# Patient Record
Sex: MALE | Race: OTHER | HISPANIC OR LATINO
[De-identification: names, ages, dates, MRNs, and addresses within clinical notes are randomized per-mention and may not be internally consistent; named-entity substitution may affect disease eponyms.]

---

## 2017-01-23 ENCOUNTER — APPOINTMENT (OUTPATIENT)
Dept: HEMATOLOGY ONCOLOGY | Facility: CLINIC | Age: 42
End: 2017-01-23

## 2017-01-23 VITALS
BODY MASS INDEX: 33.27 KG/M2 | HEART RATE: 71 BPM | SYSTOLIC BLOOD PRESSURE: 122 MMHG | HEIGHT: 67 IN | TEMPERATURE: 97.9 F | WEIGHT: 212 LBS | DIASTOLIC BLOOD PRESSURE: 82 MMHG

## 2017-01-24 LAB
ALBUMIN SERPL-MCNC: 4.1 G/DL
ALBUMIN/GLOB SERPL: 1.64
ALP SERPL-CCNC: 96 IU/L
ALT SERPL-CCNC: 14 IU/L
ANION GAP SERPL CALC-SCNC: 10 MEQ/L
AST SERPL-CCNC: 20 IU/L
BASOPHILS # BLD: 0.01 TH/MM3
BASOPHILS NFR BLD: 0.1 %
BILIRUB SERPL-MCNC: 1.8 MG/DL
BUN SERPL-MCNC: 7 MG/DL
BUN/CREAT SERPL: 9.1 %
CALCIUM SERPL-MCNC: 9.4 MG/DL
CHLORIDE SERPL-SCNC: 104 MEQ/L
CO2 SERPL-SCNC: 26 MEQ/L
CREAT SERPL-MCNC: 0.77 MG/DL
EOSINOPHIL # BLD: 0.05 TH/MM3
EOSINOPHIL NFR BLD: 0.4 %
ERYTHROCYTE [DISTWIDTH] IN BLOOD BY AUTOMATED COUNT: 14.2 %
GFR SERPL CREATININE-BSD FRML MDRD: 111
GLUCOSE SERPL-MCNC: 94 MG/DL
GRANULOCYTES # BLD: 9.7 TH/MM3
GRANULOCYTES NFR BLD: 87 %
HCT VFR BLD AUTO: 41.8 %
HGB BLD-MCNC: 14.2 G/DL
IMM GRANULOCYTES # BLD: 0.04 TH/MM3
IMM GRANULOCYTES NFR BLD: 0.4 %
LYMPHOCYTES # BLD: 1.09 TH/MM3
LYMPHOCYTES NFR BLD: 9.8 %
MCH RBC QN AUTO: 32.5 PG
MCHC RBC AUTO-ENTMCNC: 34 G/DL
MCV RBC AUTO: 95.7 FL
MONOCYTES # BLD: 0.26 TH/MM3
MONOCYTES NFR BLD: 2.3 %
PLATELET # BLD: 204 TH/MM3
PMV BLD AUTO: 10.9 FL
POTASSIUM SERPL-SCNC: 3.8 MMOL/L
PROT SERPL-MCNC: 6.6 G/DL
RBC # BLD AUTO: 4.37 MIL/MM3
SODIUM SERPL-SCNC: 140 MEQ/L
WBC # BLD: 11.15 TH/MM3

## 2017-02-03 ENCOUNTER — OTHER (OUTPATIENT)
Age: 42
End: 2017-02-03

## 2017-02-08 ENCOUNTER — OTHER (OUTPATIENT)
Age: 42
End: 2017-02-08

## 2017-02-14 ENCOUNTER — OTHER (OUTPATIENT)
Age: 42
End: 2017-02-14

## 2017-02-14 ENCOUNTER — APPOINTMENT (OUTPATIENT)
Dept: PODIATRY | Facility: CLINIC | Age: 42
End: 2017-02-14

## 2017-02-22 ENCOUNTER — APPOINTMENT (OUTPATIENT)
Dept: HEMATOLOGY ONCOLOGY | Facility: CLINIC | Age: 42
End: 2017-02-22

## 2017-03-14 ENCOUNTER — APPOINTMENT (OUTPATIENT)
Dept: HEMATOLOGY ONCOLOGY | Facility: CLINIC | Age: 42
End: 2017-03-14

## 2017-03-20 ENCOUNTER — APPOINTMENT (OUTPATIENT)
Dept: HEMATOLOGY ONCOLOGY | Facility: CLINIC | Age: 42
End: 2017-03-20

## 2017-03-20 VITALS
RESPIRATION RATE: 14 BRPM | HEART RATE: 79 BPM | SYSTOLIC BLOOD PRESSURE: 116 MMHG | BODY MASS INDEX: 32.96 KG/M2 | HEIGHT: 67 IN | TEMPERATURE: 97.7 F | WEIGHT: 210 LBS | DIASTOLIC BLOOD PRESSURE: 75 MMHG

## 2017-03-24 ENCOUNTER — OTHER (OUTPATIENT)
Age: 42
End: 2017-03-24

## 2017-03-24 LAB
ALBUMIN SERPL-MCNC: 4.3 G/DL
ALBUMIN/GLOB SERPL: 1.87
ALP SERPL-CCNC: 81 IU/L
ALT SERPL-CCNC: 10 IU/L
ANION GAP SERPL CALC-SCNC: 11 MEQ/L
AST SERPL-CCNC: 18 IU/L
BASOPHILS # BLD: 0.02 TH/MM3
BASOPHILS NFR BLD: 0.2 %
BILIRUB SERPL-MCNC: 1.8 MG/DL
BUN SERPL-MCNC: 4 MG/DL
BUN/CREAT SERPL: 5.2 %
CALCIUM SERPL-MCNC: 9.4 MG/DL
CHLORIDE SERPL-SCNC: 106 MEQ/L
CO2 SERPL-SCNC: 24 MEQ/L
CREAT SERPL-MCNC: 0.77 MG/DL
EOSINOPHIL # BLD: 0.28 TH/MM3
EOSINOPHIL NFR BLD: 3.3 %
ERYTHROCYTE [DISTWIDTH] IN BLOOD BY AUTOMATED COUNT: 13.7 %
GFR SERPL CREATININE-BSD FRML MDRD: 111
GLUCOSE SERPL-MCNC: 78 MG/DL
GRANULOCYTES # BLD: 5.16 TH/MM3
GRANULOCYTES NFR BLD: 61.2 %
HCT VFR BLD AUTO: 40.2 %
HGB BLD-MCNC: 13.8 G/DL
IMM GRANULOCYTES # BLD: 0.02 TH/MM3
IMM GRANULOCYTES NFR BLD: 0.2 %
LYMPHOCYTES # BLD: 2.17 TH/MM3
LYMPHOCYTES NFR BLD: 25.7 %
MCH RBC QN AUTO: 32.1 PG
MCHC RBC AUTO-ENTMCNC: 34.3 G/DL
MCV RBC AUTO: 93.5 FL
MONOCYTES # BLD: 0.79 TH/MM3
MONOCYTES NFR BLD: 9.4 %
PLATELET # BLD: 240 TH/MM3
PMV BLD AUTO: 10.1 FL
POTASSIUM SERPL-SCNC: 4 MMOL/L
PROT SERPL-MCNC: 6.6 G/DL
RBC # BLD AUTO: 4.3 MIL/MM3
SODIUM SERPL-SCNC: 141 MEQ/L
WBC # BLD: 8.44 TH/MM3

## 2017-04-17 ENCOUNTER — APPOINTMENT (OUTPATIENT)
Dept: HEMATOLOGY ONCOLOGY | Facility: CLINIC | Age: 42
End: 2017-04-17

## 2017-04-17 VITALS
HEART RATE: 72 BPM | TEMPERATURE: 97.8 F | HEIGHT: 67 IN | DIASTOLIC BLOOD PRESSURE: 65 MMHG | SYSTOLIC BLOOD PRESSURE: 113 MMHG | BODY MASS INDEX: 32.49 KG/M2 | WEIGHT: 207 LBS | RESPIRATION RATE: 14 BRPM

## 2017-04-18 LAB
ALBUMIN SERPL-MCNC: 4.4 G/DL
ALBUMIN/GLOB SERPL: 1.91
ALP SERPL-CCNC: 96 IU/L
ALT SERPL-CCNC: 11 IU/L
ANION GAP SERPL CALC-SCNC: 12 MEQ/L
AST SERPL-CCNC: 18 IU/L
BASOPHILS # BLD: 0.03 TH/MM3
BASOPHILS NFR BLD: 0.4 %
BILIRUB SERPL-MCNC: 1.6 MG/DL
BUN SERPL-MCNC: 9 MG/DL
BUN/CREAT SERPL: 10.7 %
CALCIUM SERPL-MCNC: 9.4 MG/DL
CHLORIDE SERPL-SCNC: 106 MEQ/L
CO2 SERPL-SCNC: 24 MEQ/L
CREAT SERPL-MCNC: 0.84 MG/DL
EOSINOPHIL # BLD: 0.27 TH/MM3
EOSINOPHIL NFR BLD: 3.8 %
ERYTHROCYTE [DISTWIDTH] IN BLOOD BY AUTOMATED COUNT: 13.4 %
GFR SERPL CREATININE-BSD FRML MDRD: 100
GLUCOSE SERPL-MCNC: 72 MG/DL
GRANULOCYTES # BLD: 3.97 TH/MM3
GRANULOCYTES NFR BLD: 55.2 %
HCT VFR BLD AUTO: 41 %
HGB BLD-MCNC: 13.7 G/DL
IMM GRANULOCYTES # BLD: 0.01 TH/MM3
IMM GRANULOCYTES NFR BLD: 0.1 %
LYMPHOCYTES # BLD: 2.15 TH/MM3
LYMPHOCYTES NFR BLD: 29.9 %
MCH RBC QN AUTO: 31.3 PG
MCHC RBC AUTO-ENTMCNC: 33.4 G/DL
MCV RBC AUTO: 93.6 FL
MONOCYTES # BLD: 0.76 TH/MM3
MONOCYTES NFR BLD: 10.6 %
PLATELET # BLD: 192 TH/MM3
PMV BLD AUTO: 10.7 FL
POTASSIUM SERPL-SCNC: 3.7 MMOL/L
PROT SERPL-MCNC: 6.7 G/DL
RBC # BLD AUTO: 4.38 MIL/MM3
SODIUM SERPL-SCNC: 142 MEQ/L
WBC # BLD: 7.19 TH/MM3

## 2017-05-15 ENCOUNTER — APPOINTMENT (OUTPATIENT)
Dept: HEMATOLOGY ONCOLOGY | Facility: CLINIC | Age: 42
End: 2017-05-15

## 2017-05-15 VITALS
HEART RATE: 61 BPM | WEIGHT: 206 LBS | RESPIRATION RATE: 14 BRPM | SYSTOLIC BLOOD PRESSURE: 108 MMHG | TEMPERATURE: 97.7 F | BODY MASS INDEX: 32.33 KG/M2 | HEIGHT: 67 IN | DIASTOLIC BLOOD PRESSURE: 74 MMHG

## 2017-05-15 LAB
BASOPHILS # BLD: 0.02 TH/MM3
BASOPHILS NFR BLD: 0.3 %
EOSINOPHIL # BLD: 0.19 TH/MM3
EOSINOPHIL NFR BLD: 2.7 %
ERYTHROCYTE [DISTWIDTH] IN BLOOD BY AUTOMATED COUNT: 13.5 %
GRANULOCYTES # BLD: 4.16 TH/MM3
GRANULOCYTES NFR BLD: 59 %
HCT VFR BLD AUTO: 39.1 %
HGB BLD-MCNC: 13 G/DL
IMM GRANULOCYTES # BLD: 0.01 TH/MM3
IMM GRANULOCYTES NFR BLD: 0.1 %
LYMPHOCYTES # BLD: 2.02 TH/MM3
LYMPHOCYTES NFR BLD: 28.7 %
MCH RBC QN AUTO: 31 PG
MCHC RBC AUTO-ENTMCNC: 33.2 G/DL
MCV RBC AUTO: 93.1 FL
MONOCYTES # BLD: 0.65 TH/MM3
MONOCYTES NFR BLD: 9.2 %
PLATELET # BLD: 200 TH/MM3
PMV BLD AUTO: 10.8 FL
RBC # BLD AUTO: 4.2 MIL/MM3
WBC # BLD: 7.05 TH/MM3

## 2017-05-16 LAB
ALBUMIN SERPL-MCNC: 4.3 G/DL
ALBUMIN/GLOB SERPL: 1.72
ALP SERPL-CCNC: 94 IU/L
ALT SERPL-CCNC: 13 IU/L
ANION GAP SERPL CALC-SCNC: 10 MEQ/L
AST SERPL-CCNC: 22 IU/L
BILIRUB SERPL-MCNC: 1.7 MG/DL
BUN SERPL-MCNC: 8 MG/DL
BUN/CREAT SERPL: 7.8 %
CALCIUM SERPL-MCNC: 9.5 MG/DL
CHLORIDE SERPL-SCNC: 103 MEQ/L
CO2 SERPL-SCNC: 28 MEQ/L
CREAT SERPL-MCNC: 1.03 MG/DL
GFR SERPL CREATININE-BSD FRML MDRD: 79
GLUCOSE SERPL-MCNC: 77 MG/DL
POTASSIUM SERPL-SCNC: 3.7 MMOL/L
PROT SERPL-MCNC: 6.8 G/DL
SODIUM SERPL-SCNC: 141 MEQ/L

## 2017-06-19 ENCOUNTER — APPOINTMENT (OUTPATIENT)
Dept: HEMATOLOGY ONCOLOGY | Facility: CLINIC | Age: 42
End: 2017-06-19

## 2017-06-19 VITALS
RESPIRATION RATE: 14 BRPM | TEMPERATURE: 97.3 F | WEIGHT: 204 LBS | BODY MASS INDEX: 32.02 KG/M2 | DIASTOLIC BLOOD PRESSURE: 68 MMHG | HEIGHT: 67 IN | HEART RATE: 64 BPM | SYSTOLIC BLOOD PRESSURE: 106 MMHG

## 2017-06-20 LAB
BASOPHILS # BLD: 0.03 TH/MM3
BASOPHILS NFR BLD: 0.4 %
EOSINOPHIL # BLD: 0.2 TH/MM3
EOSINOPHIL NFR BLD: 2.9 %
ERYTHROCYTE [DISTWIDTH] IN BLOOD BY AUTOMATED COUNT: 13.9 %
GRANULOCYTES # BLD: 3.98 TH/MM3
GRANULOCYTES NFR BLD: 57.9 %
HCT VFR BLD AUTO: 40.5 %
HGB BLD-MCNC: 13.8 G/DL
IMM GRANULOCYTES # BLD: 0.06 TH/MM3
IMM GRANULOCYTES NFR BLD: 0.9 %
LYMPHOCYTES # BLD: 1.95 TH/MM3
LYMPHOCYTES NFR BLD: 28.4 %
MCH RBC QN AUTO: 31 PG
MCHC RBC AUTO-ENTMCNC: 34.1 G/DL
MCV RBC AUTO: 91 FL
MONOCYTES # BLD: 0.65 TH/MM3
MONOCYTES NFR BLD: 9.5 %
PLATELET # BLD: 184 TH/MM3
PMV BLD AUTO: 11.1 FL
RBC # BLD AUTO: 4.45 MIL/MM3
WBC # BLD: 6.87 TH/MM3

## 2017-07-24 ENCOUNTER — APPOINTMENT (OUTPATIENT)
Dept: HEMATOLOGY ONCOLOGY | Facility: CLINIC | Age: 42
End: 2017-07-24

## 2017-07-24 ENCOUNTER — OUTPATIENT (OUTPATIENT)
Dept: OUTPATIENT SERVICES | Facility: HOSPITAL | Age: 42
LOS: 1 days | Discharge: HOME | End: 2017-07-24

## 2017-07-24 VITALS
DIASTOLIC BLOOD PRESSURE: 70 MMHG | SYSTOLIC BLOOD PRESSURE: 114 MMHG | TEMPERATURE: 96.7 F | RESPIRATION RATE: 14 BRPM | HEART RATE: 56 BPM | BODY MASS INDEX: 31.39 KG/M2 | HEIGHT: 67 IN | WEIGHT: 200 LBS

## 2017-07-24 DIAGNOSIS — C34.90 MALIGNANT NEOPLASM OF UNSPECIFIED PART OF UNSPECIFIED BRONCHUS OR LUNG: ICD-10-CM

## 2017-07-24 DIAGNOSIS — C79.31 SECONDARY MALIGNANT NEOPLASM OF BRAIN: ICD-10-CM

## 2017-07-24 DIAGNOSIS — R51 HEADACHE: ICD-10-CM

## 2017-07-24 DIAGNOSIS — A04.72 ENTEROCOLITIS DUE TO CLOSTRIDIUM DIFFICILE, NOT SPECIFIED AS RECURRENT: ICD-10-CM

## 2017-07-24 DIAGNOSIS — R91.8 OTHER NONSPECIFIC ABNORMAL FINDING OF LUNG FIELD: ICD-10-CM

## 2017-07-25 DIAGNOSIS — C79.51 SECONDARY MALIGNANT NEOPLASM OF BONE: ICD-10-CM

## 2017-07-25 LAB
ALBUMIN SERPL-MCNC: 4.3 G/DL
ALBUMIN/GLOB SERPL: 1.48
ALP SERPL-CCNC: 107 IU/L
ALT SERPL-CCNC: 12 IU/L
ANION GAP SERPL CALC-SCNC: 5 MEQ/L
AST SERPL-CCNC: 21 IU/L
BASOPHILS # BLD: 0.03 TH/MM3
BASOPHILS NFR BLD: 0.5 %
BILIRUB SERPL-MCNC: 1.7 MG/DL
BUN SERPL-MCNC: 8 MG/DL
BUN/CREAT SERPL: 9.6 %
CALCIUM SERPL-MCNC: 9.5 MG/DL
CHLORIDE SERPL-SCNC: 105 MEQ/L
CO2 SERPL-SCNC: 26 MEQ/L
CREAT SERPL-MCNC: 0.83 MG/DL
EOSINOPHIL # BLD: 0.25 TH/MM3
EOSINOPHIL NFR BLD: 4 %
ERYTHROCYTE [DISTWIDTH] IN BLOOD BY AUTOMATED COUNT: 14 %
GFR SERPL CREATININE-BSD FRML MDRD: 101
GLUCOSE SERPL-MCNC: 76 MG/DL
GRANULOCYTES # BLD: 3.52 TH/MM3
GRANULOCYTES NFR BLD: 55.7 %
HCT VFR BLD AUTO: 42.8 %
HGB BLD-MCNC: 14.1 G/DL
IMM GRANULOCYTES # BLD: 0.01 TH/MM3
IMM GRANULOCYTES NFR BLD: 0.2 %
LYMPHOCYTES # BLD: 1.93 TH/MM3
LYMPHOCYTES NFR BLD: 30.6 %
MCH RBC QN AUTO: 30.1 PG
MCHC RBC AUTO-ENTMCNC: 32.9 G/DL
MCV RBC AUTO: 91.3 FL
MONOCYTES # BLD: 0.57 TH/MM3
MONOCYTES NFR BLD: 9 %
PLATELET # BLD: 195 TH/MM3
PMV BLD AUTO: 10.9 FL
POTASSIUM SERPL-SCNC: 4.3 MMOL/L
PROT SERPL-MCNC: 7.2 G/DL
RBC # BLD AUTO: 4.69 MIL/MM3
SODIUM SERPL-SCNC: 136 MEQ/L
WBC # BLD: 6.31 TH/MM3

## 2017-08-02 ENCOUNTER — OUTPATIENT (OUTPATIENT)
Dept: OUTPATIENT SERVICES | Facility: HOSPITAL | Age: 42
LOS: 1 days | Discharge: HOME | End: 2017-08-02

## 2017-08-02 DIAGNOSIS — C34.90 MALIGNANT NEOPLASM OF UNSPECIFIED PART OF UNSPECIFIED BRONCHUS OR LUNG: ICD-10-CM

## 2017-08-02 DIAGNOSIS — R91.8 OTHER NONSPECIFIC ABNORMAL FINDING OF LUNG FIELD: ICD-10-CM

## 2017-08-02 DIAGNOSIS — A04.72 ENTEROCOLITIS DUE TO CLOSTRIDIUM DIFFICILE, NOT SPECIFIED AS RECURRENT: ICD-10-CM

## 2017-08-02 DIAGNOSIS — C78.00 SECONDARY MALIGNANT NEOPLASM OF UNSPECIFIED LUNG: ICD-10-CM

## 2017-08-03 ENCOUNTER — OUTPATIENT (OUTPATIENT)
Dept: OUTPATIENT SERVICES | Facility: HOSPITAL | Age: 42
LOS: 1 days | Discharge: HOME | End: 2017-08-03

## 2017-08-03 DIAGNOSIS — C78.00 SECONDARY MALIGNANT NEOPLASM OF UNSPECIFIED LUNG: ICD-10-CM

## 2017-08-03 DIAGNOSIS — C34.90 MALIGNANT NEOPLASM OF UNSPECIFIED PART OF UNSPECIFIED BRONCHUS OR LUNG: ICD-10-CM

## 2017-08-03 DIAGNOSIS — A04.72 ENTEROCOLITIS DUE TO CLOSTRIDIUM DIFFICILE, NOT SPECIFIED AS RECURRENT: ICD-10-CM

## 2017-08-03 DIAGNOSIS — R91.8 OTHER NONSPECIFIC ABNORMAL FINDING OF LUNG FIELD: ICD-10-CM

## 2017-08-21 ENCOUNTER — APPOINTMENT (OUTPATIENT)
Dept: HEMATOLOGY ONCOLOGY | Facility: CLINIC | Age: 42
End: 2017-08-21

## 2017-08-21 VITALS
WEIGHT: 198 LBS | RESPIRATION RATE: 14 BRPM | HEIGHT: 67 IN | BODY MASS INDEX: 31.08 KG/M2 | SYSTOLIC BLOOD PRESSURE: 126 MMHG | DIASTOLIC BLOOD PRESSURE: 86 MMHG | TEMPERATURE: 96.3 F | HEART RATE: 58 BPM

## 2017-08-24 LAB
ALBUMIN SERPL-MCNC: 4.5 G/DL
ALBUMIN/GLOB SERPL: 2.5
ALP SERPL-CCNC: 103 IU/L
ALT SERPL-CCNC: < 5 IU/L
ANION GAP SERPL CALC-SCNC: 15 MEQ/L
AST SERPL-CCNC: 22 IU/L
BASOPHILS # BLD: 0.03 TH/MM3
BASOPHILS NFR BLD: 0.5 %
BILIRUB SERPL-MCNC: 2 MG/DL
BUN SERPL-MCNC: 9 MG/DL
BUN/CREAT SERPL: 9.8 %
CALCIUM SERPL-MCNC: 9.2 MG/DL
CHLORIDE SERPL-SCNC: 104 MEQ/L
CO2 SERPL-SCNC: 22 MEQ/L
CREAT SERPL-MCNC: 0.92 MG/DL
EOSINOPHIL # BLD: 0.16 TH/MM3
EOSINOPHIL NFR BLD: 2.6 %
ERYTHROCYTE [DISTWIDTH] IN BLOOD BY AUTOMATED COUNT: 14.2 %
GFR SERPL CREATININE-BSD FRML MDRD: 90
GLUCOSE SERPL-MCNC: 61 MG/DL
GRANULOCYTES # BLD: 3.64 TH/MM3
GRANULOCYTES NFR BLD: 58.3 %
HCT VFR BLD AUTO: 39.4 %
HGB BLD-MCNC: 13 G/DL
IMM GRANULOCYTES # BLD: 0.01 TH/MM3
IMM GRANULOCYTES NFR BLD: 0.2 %
LYMPHOCYTES # BLD: 1.8 TH/MM3
LYMPHOCYTES NFR BLD: 28.8 %
MCH RBC QN AUTO: 30.2 PG
MCHC RBC AUTO-ENTMCNC: 33 G/DL
MCV RBC AUTO: 91.6 FL
MONOCYTES # BLD: 0.6 TH/MM3
MONOCYTES NFR BLD: 9.6 %
PLATELET # BLD: 202 TH/MM3
PMV BLD AUTO: 10.9 FL
POTASSIUM SERPL-SCNC: 4.3 MMOL/L
PROT SERPL-MCNC: 6.3 G/DL
RBC # BLD AUTO: 4.3 MIL/MM3
SODIUM SERPL-SCNC: 141 MEQ/L
WBC # BLD: 6.24 TH/MM3

## 2017-09-21 ENCOUNTER — APPOINTMENT (OUTPATIENT)
Dept: HEMATOLOGY ONCOLOGY | Facility: CLINIC | Age: 42
End: 2017-09-21

## 2017-09-21 ENCOUNTER — OUTPATIENT (OUTPATIENT)
Dept: OUTPATIENT SERVICES | Facility: HOSPITAL | Age: 42
LOS: 1 days | Discharge: HOME | End: 2017-09-21

## 2017-09-21 VITALS
SYSTOLIC BLOOD PRESSURE: 128 MMHG | HEART RATE: 63 BPM | BODY MASS INDEX: 30.76 KG/M2 | TEMPERATURE: 98 F | DIASTOLIC BLOOD PRESSURE: 68 MMHG | HEIGHT: 67 IN | WEIGHT: 196 LBS | RESPIRATION RATE: 14 BRPM

## 2017-09-21 DIAGNOSIS — C34.90 MALIGNANT NEOPLASM OF UNSPECIFIED PART OF UNSPECIFIED BRONCHUS OR LUNG: ICD-10-CM

## 2017-09-21 DIAGNOSIS — R91.8 OTHER NONSPECIFIC ABNORMAL FINDING OF LUNG FIELD: ICD-10-CM

## 2017-09-21 DIAGNOSIS — C79.31 SECONDARY MALIGNANT NEOPLASM OF BRAIN: ICD-10-CM

## 2017-09-21 DIAGNOSIS — A04.72 ENTEROCOLITIS DUE TO CLOSTRIDIUM DIFFICILE, NOT SPECIFIED AS RECURRENT: ICD-10-CM

## 2017-09-21 DIAGNOSIS — C79.51 SECONDARY MALIGNANT NEOPLASM OF BONE: ICD-10-CM

## 2017-09-28 LAB
ALBUMIN SERPL-MCNC: 4.1 G/DL
ALBUMIN/GLOB SERPL: 1.52
ALP SERPL-CCNC: 102 IU/L
ALT SERPL-CCNC: 11 IU/L
ANION GAP SERPL CALC-SCNC: 9 MEQ/L
AST SERPL-CCNC: 17 IU/L
BASOPHILS # BLD: 0.02 TH/MM3
BASOPHILS NFR BLD: 0.3 %
BILIRUB SERPL-MCNC: 1.6 MG/DL
BUN SERPL-MCNC: 13 MG/DL
BUN/CREAT SERPL: 13.4 %
CALCIUM SERPL-MCNC: 9.4 MG/DL
CHLORIDE SERPL-SCNC: 103 MEQ/L
CO2 SERPL-SCNC: 27 MEQ/L
CREAT SERPL-MCNC: 0.97 MG/DL
EOSINOPHIL # BLD: 0.05 TH/MM3
EOSINOPHIL NFR BLD: 0.7 %
ERYTHROCYTE [DISTWIDTH] IN BLOOD BY AUTOMATED COUNT: 14.2 %
GFR SERPL CREATININE-BSD FRML MDRD: 84
GLUCOSE SERPL-MCNC: 156 MG/DL
GRANULOCYTES # BLD: 4.6 TH/MM3
GRANULOCYTES NFR BLD: 64.9 %
HCT VFR BLD AUTO: 40.7 %
HGB BLD-MCNC: 13.7 G/DL
IMM GRANULOCYTES # BLD: 0.02 TH/MM3
IMM GRANULOCYTES NFR BLD: 0.3 %
LYMPHOCYTES # BLD: 1.78 TH/MM3
LYMPHOCYTES NFR BLD: 25.1 %
MCH RBC QN AUTO: 30 PG
MCHC RBC AUTO-ENTMCNC: 33.7 G/DL
MCV RBC AUTO: 89.1 FL
MONOCYTES # BLD: 0.62 TH/MM3
MONOCYTES NFR BLD: 8.7 %
PLATELET # BLD: 199 TH/MM3
PMV BLD AUTO: 10.7 FL
POTASSIUM SERPL-SCNC: 3.5 MMOL/L
PROT SERPL-MCNC: 6.8 G/DL
RBC # BLD AUTO: 4.57 MIL/MM3
SODIUM SERPL-SCNC: 139 MEQ/L
WBC # BLD: 7.09 TH/MM3

## 2017-10-26 ENCOUNTER — APPOINTMENT (OUTPATIENT)
Dept: HEMATOLOGY ONCOLOGY | Facility: CLINIC | Age: 42
End: 2017-10-26

## 2017-10-26 VITALS
DIASTOLIC BLOOD PRESSURE: 76 MMHG | TEMPERATURE: 97.7 F | SYSTOLIC BLOOD PRESSURE: 110 MMHG | RESPIRATION RATE: 14 BRPM | HEIGHT: 67 IN | HEART RATE: 64 BPM | WEIGHT: 200 LBS | BODY MASS INDEX: 31.39 KG/M2

## 2017-10-26 DIAGNOSIS — R17 UNSPECIFIED JAUNDICE: ICD-10-CM

## 2017-10-26 LAB
BASOPHILS # BLD: 0.04 TH/MM3
BASOPHILS NFR BLD: 0.5 %
EOSINOPHIL # BLD: 0.15 TH/MM3
EOSINOPHIL NFR BLD: 1.9 %
ERYTHROCYTE [DISTWIDTH] IN BLOOD BY AUTOMATED COUNT: 14.6 %
GRANULOCYTES # BLD: 5.34 TH/MM3
GRANULOCYTES NFR BLD: 66.4 %
HCT VFR BLD AUTO: 42.3 %
HGB BLD-MCNC: 13.8 G/DL
IMM GRANULOCYTES # BLD: 0.01 TH/MM3
IMM GRANULOCYTES NFR BLD: 0.1 %
LYMPHOCYTES # BLD: 1.76 TH/MM3
LYMPHOCYTES NFR BLD: 21.9 %
MCH RBC QN AUTO: 29.2 PG
MCHC RBC AUTO-ENTMCNC: 32.6 G/DL
MCV RBC AUTO: 89.6 FL
MONOCYTES # BLD: 0.74 TH/MM3
MONOCYTES NFR BLD: 9.2 %
PLATELET # BLD: 211 TH/MM3
PMV BLD AUTO: 10.4 FL
RBC # BLD AUTO: 4.72 MIL/MM3
WBC # BLD: 8.04 TH/MM3

## 2017-10-27 LAB
ALBUMIN SERPL-MCNC: 4.4 G/DL
ALBUMIN/GLOB SERPL: 1.76
ALP SERPL-CCNC: 110 IU/L
ALT SERPL-CCNC: 10 IU/L
ANION GAP SERPL CALC-SCNC: 12 MEQ/L
AST SERPL-CCNC: 18 IU/L
BILIRUB SERPL-MCNC: 1.9 MG/DL
BUN SERPL-MCNC: 10 MG/DL
BUN/CREAT SERPL: 11.4 %
CALCIUM SERPL-MCNC: 9.4 MG/DL
CHLORIDE SERPL-SCNC: 100 MEQ/L
CO2 SERPL-SCNC: 26 MEQ/L
CREAT SERPL-MCNC: 0.88 MG/DL
GFR SERPL CREATININE-BSD FRML MDRD: 95
GLUCOSE SERPL-MCNC: 72 MG/DL
POTASSIUM SERPL-SCNC: 3.7 MMOL/L
PROT SERPL-MCNC: 6.9 G/DL
SODIUM SERPL-SCNC: 138 MEQ/L

## 2017-11-13 ENCOUNTER — OUTPATIENT (OUTPATIENT)
Dept: OUTPATIENT SERVICES | Facility: HOSPITAL | Age: 42
LOS: 1 days | Discharge: HOME | End: 2017-11-13

## 2017-11-13 DIAGNOSIS — J98.4 OTHER DISORDERS OF LUNG: ICD-10-CM

## 2017-11-13 DIAGNOSIS — A04.72 ENTEROCOLITIS DUE TO CLOSTRIDIUM DIFFICILE, NOT SPECIFIED AS RECURRENT: ICD-10-CM

## 2017-11-13 DIAGNOSIS — R91.8 OTHER NONSPECIFIC ABNORMAL FINDING OF LUNG FIELD: ICD-10-CM

## 2017-11-13 DIAGNOSIS — C34.90 MALIGNANT NEOPLASM OF UNSPECIFIED PART OF UNSPECIFIED BRONCHUS OR LUNG: ICD-10-CM

## 2017-11-16 ENCOUNTER — APPOINTMENT (OUTPATIENT)
Dept: HEMATOLOGY ONCOLOGY | Facility: CLINIC | Age: 42
End: 2017-11-16

## 2017-11-16 VITALS
DIASTOLIC BLOOD PRESSURE: 70 MMHG | HEART RATE: 62 BPM | TEMPERATURE: 97.6 F | RESPIRATION RATE: 14 BRPM | WEIGHT: 196 LBS | SYSTOLIC BLOOD PRESSURE: 108 MMHG | HEIGHT: 67 IN | BODY MASS INDEX: 30.76 KG/M2

## 2017-11-22 ENCOUNTER — OUTPATIENT (OUTPATIENT)
Dept: OUTPATIENT SERVICES | Facility: HOSPITAL | Age: 42
LOS: 1 days | Discharge: HOME | End: 2017-11-22

## 2017-11-22 DIAGNOSIS — C34.90 MALIGNANT NEOPLASM OF UNSPECIFIED PART OF UNSPECIFIED BRONCHUS OR LUNG: ICD-10-CM

## 2017-11-22 DIAGNOSIS — R91.8 OTHER NONSPECIFIC ABNORMAL FINDING OF LUNG FIELD: ICD-10-CM

## 2017-11-22 DIAGNOSIS — C78.00 SECONDARY MALIGNANT NEOPLASM OF UNSPECIFIED LUNG: ICD-10-CM

## 2017-11-22 DIAGNOSIS — A04.72 ENTEROCOLITIS DUE TO CLOSTRIDIUM DIFFICILE, NOT SPECIFIED AS RECURRENT: ICD-10-CM

## 2017-12-26 ENCOUNTER — APPOINTMENT (OUTPATIENT)
Dept: HEMATOLOGY ONCOLOGY | Facility: CLINIC | Age: 42
End: 2017-12-26

## 2017-12-26 VITALS
WEIGHT: 196 LBS | SYSTOLIC BLOOD PRESSURE: 124 MMHG | DIASTOLIC BLOOD PRESSURE: 66 MMHG | BODY MASS INDEX: 30.76 KG/M2 | HEART RATE: 71 BPM | RESPIRATION RATE: 14 BRPM | TEMPERATURE: 98.2 F | HEIGHT: 67 IN

## 2017-12-28 LAB
ALBUMIN SERPL-MCNC: 4.2 G/DL
ALBUMIN/GLOB SERPL: 1.83
ALP SERPL-CCNC: 92 IU/L
ALT SERPL-CCNC: 11 IU/L
ANION GAP SERPL CALC-SCNC: 13 MEQ/L
AST SERPL-CCNC: 16 IU/L
BASOPHILS # BLD: 0.02 TH/MM3
BASOPHILS NFR BLD: 0.2 %
BILIRUB SERPL-MCNC: 1.3 MG/DL
BUN SERPL-MCNC: 10 MG/DL
BUN/CREAT SERPL: 9.9 %
CALCIUM SERPL-MCNC: 9.4 MG/DL
CHLORIDE SERPL-SCNC: 102 MEQ/L
CO2 SERPL-SCNC: 26 MEQ/L
CREAT SERPL-MCNC: 1.01 MG/DL
EOSINOPHIL # BLD: 0.12 TH/MM3
EOSINOPHIL NFR BLD: 1.5 %
ERYTHROCYTE [DISTWIDTH] IN BLOOD BY AUTOMATED COUNT: 14.3 %
GFR SERPL CREATININE-BSD FRML MDRD: 81
GLUCOSE SERPL-MCNC: 76 MG/DL
GRANULOCYTES # BLD: 5.47 TH/MM3
GRANULOCYTES NFR BLD: 67.4 %
HCT VFR BLD AUTO: 40.1 %
HGB BLD-MCNC: 12.8 G/DL
IMM GRANULOCYTES # BLD: 0.01 TH/MM3
IMM GRANULOCYTES NFR BLD: 0.1 %
LYMPHOCYTES # BLD: 1.84 TH/MM3
LYMPHOCYTES NFR BLD: 22.6 %
MCH RBC QN AUTO: 29.3 PG
MCHC RBC AUTO-ENTMCNC: 31.9 G/DL
MCV RBC AUTO: 91.8 FL
MONOCYTES # BLD: 0.67 TH/MM3
MONOCYTES NFR BLD: 8.2 %
PLATELET # BLD: 212 TH/MM3
PMV BLD AUTO: 10.6 FL
POTASSIUM SERPL-SCNC: 3.9 MMOL/L
PROT SERPL-MCNC: 6.5 G/DL
RBC # BLD AUTO: 4.37 MIL/MM3
SODIUM SERPL-SCNC: 141 MEQ/L
WBC # BLD: 8.13 TH/MM3

## 2018-01-22 ENCOUNTER — APPOINTMENT (OUTPATIENT)
Dept: HEMATOLOGY ONCOLOGY | Facility: CLINIC | Age: 43
End: 2018-01-22

## 2018-01-22 ENCOUNTER — OUTPATIENT (OUTPATIENT)
Dept: OUTPATIENT SERVICES | Facility: HOSPITAL | Age: 43
LOS: 1 days | Discharge: HOME | End: 2018-01-22

## 2018-01-22 VITALS
BODY MASS INDEX: 30.45 KG/M2 | RESPIRATION RATE: 14 BRPM | HEART RATE: 86 BPM | TEMPERATURE: 97.8 F | WEIGHT: 194 LBS | HEIGHT: 67 IN | SYSTOLIC BLOOD PRESSURE: 106 MMHG | DIASTOLIC BLOOD PRESSURE: 70 MMHG

## 2018-01-22 DIAGNOSIS — C79.51 SECONDARY MALIGNANT NEOPLASM OF BONE: ICD-10-CM

## 2018-01-22 DIAGNOSIS — C34.90 MALIGNANT NEOPLASM OF UNSPECIFIED PART OF UNSPECIFIED BRONCHUS OR LUNG: ICD-10-CM

## 2018-01-22 DIAGNOSIS — C79.31 SECONDARY MALIGNANT NEOPLASM OF BRAIN: ICD-10-CM

## 2018-01-23 LAB
ALBUMIN SERPL-MCNC: 4.2 G/DL
ALBUMIN/GLOB SERPL: 1.68
ALP SERPL-CCNC: 96 IU/L
ALT SERPL-CCNC: 9 IU/L
ANION GAP SERPL CALC-SCNC: 9 MEQ/L
AST SERPL-CCNC: 18 IU/L
BASOPHILS # BLD: 0.02 TH/MM3
BASOPHILS NFR BLD: 0.3 %
BILIRUB SERPL-MCNC: 1 MG/DL
BUN SERPL-MCNC: 5 MG/DL
BUN/CREAT SERPL: 6 %
CALCIUM SERPL-MCNC: 9.5 MG/DL
CHLORIDE SERPL-SCNC: 104 MEQ/L
CO2 SERPL-SCNC: 29 MEQ/L
CREAT SERPL-MCNC: 0.84 MG/DL
EOSINOPHIL # BLD: 0.11 TH/MM3
EOSINOPHIL NFR BLD: 1.6 %
ERYTHROCYTE [DISTWIDTH] IN BLOOD BY AUTOMATED COUNT: 13.7 %
GFR SERPL CREATININE-BSD FRML MDRD: 100
GLUCOSE SERPL-MCNC: 74 MG/DL
GRANULOCYTES # BLD: 4.35 TH/MM3
GRANULOCYTES NFR BLD: 63 %
HCT VFR BLD AUTO: 38.7 %
HGB BLD-MCNC: 12.9 G/DL
IMM GRANULOCYTES # BLD: 0.01 TH/MM3
IMM GRANULOCYTES NFR BLD: 0.1 %
LYMPHOCYTES # BLD: 1.81 TH/MM3
LYMPHOCYTES NFR BLD: 26.2 %
MCH RBC QN AUTO: 29.8 PG
MCHC RBC AUTO-ENTMCNC: 33.3 G/DL
MCV RBC AUTO: 89.4 FL
MONOCYTES # BLD: 0.61 TH/MM3
MONOCYTES NFR BLD: 8.8 %
PLATELET # BLD: 231 TH/MM3
PMV BLD AUTO: 10.2 FL
POTASSIUM SERPL-SCNC: 3.8 MMOL/L
PROT SERPL-MCNC: 6.7 G/DL
RBC # BLD AUTO: 4.33 MIL/MM3
SODIUM SERPL-SCNC: 142 MEQ/L
WBC # BLD: 6.91 TH/MM3

## 2018-02-01 ENCOUNTER — OUTPATIENT (OUTPATIENT)
Dept: OUTPATIENT SERVICES | Facility: HOSPITAL | Age: 43
LOS: 1 days | Discharge: HOME | End: 2018-02-01

## 2018-02-01 DIAGNOSIS — C78.00 SECONDARY MALIGNANT NEOPLASM OF UNSPECIFIED LUNG: ICD-10-CM

## 2018-02-04 DIAGNOSIS — C34.90 MALIGNANT NEOPLASM OF UNSPECIFIED PART OF UNSPECIFIED BRONCHUS OR LUNG: ICD-10-CM

## 2018-02-04 DIAGNOSIS — A04.72 ENTEROCOLITIS DUE TO CLOSTRIDIUM DIFFICILE, NOT SPECIFIED AS RECURRENT: ICD-10-CM

## 2018-02-04 DIAGNOSIS — R91.8 OTHER NONSPECIFIC ABNORMAL FINDING OF LUNG FIELD: ICD-10-CM

## 2018-02-05 ENCOUNTER — OUTPATIENT (OUTPATIENT)
Dept: OUTPATIENT SERVICES | Facility: HOSPITAL | Age: 43
LOS: 1 days | Discharge: HOME | End: 2018-02-05

## 2018-02-05 DIAGNOSIS — C78.00 SECONDARY MALIGNANT NEOPLASM OF UNSPECIFIED LUNG: ICD-10-CM

## 2018-02-21 ENCOUNTER — APPOINTMENT (OUTPATIENT)
Dept: HEMATOLOGY ONCOLOGY | Facility: CLINIC | Age: 43
End: 2018-02-21

## 2018-02-21 ENCOUNTER — LABORATORY RESULT (OUTPATIENT)
Age: 43
End: 2018-02-21

## 2018-02-21 VITALS
HEART RATE: 65 BPM | RESPIRATION RATE: 14 BRPM | BODY MASS INDEX: 29.19 KG/M2 | HEIGHT: 67 IN | WEIGHT: 186 LBS | TEMPERATURE: 97.4 F | DIASTOLIC BLOOD PRESSURE: 65 MMHG | SYSTOLIC BLOOD PRESSURE: 102 MMHG

## 2018-02-21 LAB
HCT VFR BLD CALC: 41.9 %
HGB BLD-MCNC: 13.5 G/DL
MCHC RBC-ENTMCNC: 28.5 PG
MCHC RBC-ENTMCNC: 32.2 G/DL
MCV RBC AUTO: 88.4 FL
PLATELET # BLD AUTO: 239 K/UL
PMV BLD: 10.3 FL
RBC # BLD: 4.74 M/UL
RBC # FLD: 13.2 %
WBC # FLD AUTO: 7.29 K/UL

## 2018-02-21 NOTE — HISTORY OF PRESENT ILLNESS
[Disease: _____________________] : Disease: [unfilled] [AJCC Stage: ____] : AJCC Stage: [unfilled] [de-identified] : 41/M presenting for follow-up for metastatic ALK-positive lung adenocarcinoma.  He is currently on alectinib 600 mg BID.  He initially presented in 11/2014 with multiple pulmonary nodules, mediastinal lymphadenopathy, multiple bony metastases, and brain metastases.  He was initially started on crizotinib and was switched to ceritinib in 12/2015 upon progression.  He was unable to tolerate ceritinib due to significant vomiting and was switched to alectinib in 02/2016.\par \par He had SRS to a left cerebellar hemisphere brain metastasis (08/2015) and subsequently underwent WBRT for CNS progression (10-11/2015).  He had RT to a painful metastatic lesion in the right humerus (01/2015).  He had also received Xgeva for bony metastatic disease in the past. \par \par Since 09/2016, he has been experiencing intermittent headaches being treated with periodic dexamethasone taper.  MRI brain with perfusion analysis showed bilateral cerebellar lesions, favor postRT changes rather than residual tumor.  His most recent brain MRI (11/01/2016) showed overall  stable  exam  compared  to  the  previous  brain  MRI  9/2/2016; no  significant  interval  change  in  bilateral  cerebellar  heterogeneously  enhancing  masses  with  extensive  associated  edema,  as  well  as  smaller  lesions  within  the  left  parietal  white  matter,  superficial  left  temporal  lobe  and  left  dorsal  medulla; and, stable  mild  ventriculomegaly  and  mild  periventricular  FLAIR  signal  abnormality.  He has been following with our neurosurgeon, Dr. Britt, who has recommended no surgical intervention at this time. \par \par His most recent PETCT (11/01/2016) showed no sites of pathologic FDG uptake suspicious for biologic tumor activity.\par \par He reports occasional headaches and a mild sense of imbalance.  Denies any falls.  However he only takes a prn dexamethasone, once in every three days,  which reportedly relieves his intermittent headaches. \par Today he also complains mild pain in his left great toe, likely secondary to ingrowing toe nail. [de-identified] : ALK+ lung adenocarcinoma [Treatment Protocol] : Treatment Protocol [FreeTextEntry1] : Alectinib 600 mg BID [de-identified] : He presented to follow up. He changed his insurance. Did not get scans and did not take his alectinib for about 1-2 weeks. We were not notified.  Otherwise she feels well she really requires dexamethasone yesterday headache probably once or twice a week as per patient no other complaints.\par \par 4/17/17\par He presents for follow up today. He had MRI of brain that showed stable disease 4/8/2017. He has not had the PET CT yet. He received  his alecetinib on 4/4/17 and stated to take them. States his headaches are rare now and feels well otherwise.\par \par 5/15/17\par He presents for follow up. He had a PET CT on 4/21/17 that  was ucnhged.Since November 1, 2016, no new foci of pathologic FDG uptake to suggest\par biologic tumor activity. . 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 . Unchanged non-FDG avid sclerotic foci involving the right proximal humerus\par and T9 vertebral body.\par \par He  feels well. No dizziness or headache.  No new symptoms or complaints.\par \par 6/19/17\par He is doing well. He has no complaints. No headaches. No SOB. \par \par 7/24/17\par Patient comes in for follow up visit, has no complaints, has no SOB and has a mild cough which is chronic, no hemoptysis. Patient states his appetite is good, weight is stable, he has no headaches and is currently off the steroids. Patient will have repeat PET scan and MRI of the brain with contrast as well as repeat bloodwork. \par \par 8/21/17\par He is here for follow up. He had an MR brain and PET CT on 8/3 and 8/2 that did not show progression, were essentially unchanged.  He feels great otherwise.\par \par 9/21/17\par He is doing well. No complaints. No headaches\par \par October 26, 2017\par He see her for followup he feels great he has no complaints.\par \par 11/16/17\par He is here for follow up. He had an MR of brain on 11/14/17: New areas of nodular enhancement along the inferior surfaces of the temporal lobes bilaterally. These are relatively symmetric and just above the level of the cerebellar enhancing masses, suggesting that these may reflect post treatment change.\par He had to reschedule his PET and states he will do it next Wednesday.\par \par No complaints.\par \par 12/26/17\par He is here for follow up. He feels well. he  had PET/CT in end of November that does not show procession, stable pleural findings. He  feels well. \par \par 1/22/18\par He is here for follow up for his NSCLCA. He states he feels well. No SOB, no headaches no fatigue.. \par \par February 21 2018\par  he is here for followup, he had a recent PET/CT scan which is PRESLEY he also had a recent MRI of the brain. This was reviewed at the CNS tumor board although wasn't clear but the consensus was that this is posttreatment changes in the fact that he is asymptomatic and decided to observe him.\par \par He has no new complaints no headaches no weakness or tingling.\par

## 2018-02-21 NOTE — ASSESSMENT
[FreeTextEntry1] : 1.  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 Due to insurance issues he stopped it but now is back on it since 4/4/2017\par -repeat MRI brain no progression, post treatment changes, he is asymptomatic,  and repeat PET/CT  is without progression \par 2.   Intermittent headaches Resolved\par 3.Elevated total bilirubin likely secondary to alectinib. will monitor, its stable\par \par Plan:\par -C/w Alectinib.  Will check MR brain in 4/2018 and PET CT around 5/2018 if remains assymptomatic\par -will monitor CBC, CMP \par -rtc in one month\par \par

## 2018-02-21 NOTE — PHYSICAL EXAM
[Restricted in physically strenuous activity but ambulatory and able to carry out work of a light or sedentary nature] : Status 1- Restricted in physically strenuous activity but ambulatory and able to carry out work of a light or sedentary nature, e.g., light house work, office work [Normal] : affect appropriate [de-identified] : he has decrease breath sounds on left side base, unchanged.

## 2018-02-21 NOTE — CONSULT LETTER
[Dear  ___] : Dear  [unfilled], [Consult Letter:] : I had the pleasure of evaluating your patient, [unfilled]. [Please see my note below.] : Please see my note below. [Consult Closing:] : Thank you very much for allowing me to participate in the care of this patient.  If you have any questions, please do not hesitate to contact me. [Sincerely,] : Sincerely, [FreeTextEntry3] : Roque

## 2018-02-21 NOTE — RESULTS/DATA
[FreeTextEntry1] : EXAM:  PETCT JORGE ONC FDG SUBS         PROCEDURE DATE:  02/05/2018       INTERPRETATION:  FDG PET CT STUDY   Subsequent  treatment strategy REASON: TUMOR IMAGING - PET with concurrently acquired CT for attenuation  correction and anatomic localization; skull base to mid - thigh .  CPT code 89004.  Fasting blood glucose level:     98 mg/dl  HISTORY: 41-year-old male follow-up for metastatic lung adenocarcinoma.  Had a prior PET/CT on November 2, 2017 which was negative for biologic  tumor activity. Reactive changes were seen status post pleurodesis.  Patient's last radiation therapy was in January 2017. Patient had a  chemotherapy pill taken last night.  TECHNIQUE: Approximately 45 minutes after the intravenous administration  of 9.5 mCi 18-Fluorine FDG, whole body PET images were acquired from base  of skull to mid - thigh.  CT protocol used for this PET/CT study is designed for attenuation  correction and anatomic localization of PET findings.  This  CT  is not designed to replace state-of-the-art diagnostic CT scans for  specific imaging protocols of different body parts.  COMPARISON : November 22, 2017.  FINDINGS:  Head/Neck: No biologically active head/neck lesions.      Normal uptake within the brain, pharyngeal lymphoid tissue, salivary  glands and laryngeal musculature is noted.      Thorax:   No suspicious hypermetabolic mediastinal, hilar, lung  parenchymal lesions. There is resolving posttreatment FDG uptake  circumferentially along the pleural surface of the left hemithorax,  consistent with changes from talc pleurodesis.  Abdomen/Pelvis: Physiologic GI/  activity. No abnormal visceral or  magdalena tracer uptake is present.      Musculoskeletal :  No suspicious hypermetabolic osseous lesions.  Previously described treated sclerotic osseous metastasis in the right  humerus and T9 remain non-FDG avid.  Additional CT findings: None.  IMPRESSION:   No pathologic FDG uptake to suggest biologic tumor activity.  Resolving posttreatment FDG uptake along the pleural surface of the left  hemithorax consistent with changes from talc pleurodesis.         KAL CRUZ M.D., RESIDENT RADIOLOGIST This document has been electronically signed. CAMI VENCES M.D., ATTENDING RADIOLOGIST This document has been electronically signed. Feb 5 2018 12:05PM\par \par EXAM:  MR BRAIN WAW IC          PROCEDURE DATE:  02/01/2018       INTERPRETATION:  Clinical History / Reason for exam: Vertigo. History of  lung and brain cancer withbrain metastasis status post radiation therapy.  Technique: Sagittal and axial precontrast T1-weighted images, axial  T2-weighted images, axial FLAIR images, axial gradient echo images, axial  diffusion weighted images, and sagittal, axial and coronal postcontrast  T1-weighted images of the brain were obtained on the 1.5 Radha magnet  after the intravenous administration of 20 cc of gadolinium contrast.  Comparison is made with previous MRI of the brain dated 11/13/2017.   Findings:  The ventricles, basal cisterns and sulcal pattern are  unchanged when compared to previous study within normal limits for  patients stated age. There are again noted stable bilateral cerebellar  enhancing lesions which previously measured 2.9 x 3.3 x 2.4cm and on the  current study measures 2.8 x 3.4 x 2.8 cm and on the left. This measures  2.7 x 2.3 x1.9 and on the current study measures 2.8 x 2.5 x 1.9 cm.  Increased surrounding edema on axial FLAIR images may be due to post  radiation therapy changes.   There are several supratentorial lesions of enhancement which have  increased in size just above the tentorium on the right which previously  measured 1.9 x 1.3 x 0.9 cm on current study measures 2.2 x 2.1 x 1.2 cm,  left supratentorial region which measured 0.4 x 0.4 and on the current  study measures 1.4 x 0.7 and a right periventricular white matter lesion  which measures 0.6 x 0.7 and on the current study measures 0.9 x 0.6.  Increased edema is also noted bilateral posterior and medial temporal  lobes right greater than left.  Small 0.7 x0.7 cm focus of signal abnormality is again noted in the left  posterior corona radiata and left posterior medulla measuring 0.5 x 0.6  cm, both of which demonstrate blooming artifact on axial gradient echo  sequences as well as a abnormal enhancement and may have represented a  prior hemorrhagic lesions. There is no acute mass effect, midline shift  or hemorrhage.  No extra-axial fluid collections are identified.    There are no acute infarcts on diffusion weighted images.  Expected  signal flow voids are noted in the major intracranial vessels consistent  with theirpatency.    Globes and orbits are grossly within normal limits. The paranasal sinuses  and bilateral mastoid complexes are within normal limits.    There is nobone marrow signal abnormality.  The sella demonstrates CSF  signal onsagittal T1-weighted images and axial T2-weighted images  consistent with a partially empty sella.     Impression:  1.  When compared to previous study dated 11/13/2017 there is slight  interval increase in enhancing lesions in the bilateral supratentorial  regions and right posterior parietal periventricular white matter.  2.  Stable bilateral cerebellar and left corona radiata and left  posterior abdominal enhancing masses.  3.  Increasein edema in the bilateral cerebellum and temporal lobes may  representpostradiation therapy changes.  4.  Follow-up in 2-3 months may be helpful for further evaluation if  clinically indicated.          ELISA LOVE M.D., ATTENDING RADIOLOGIST This document has been electronically signed. Feb 2 2018  4:10PM

## 2018-02-23 LAB
ALBUMIN SERPL ELPH-MCNC: 4.6 G/DL
ALP BLD-CCNC: 96 U/L
ALT SERPL-CCNC: 7 U/L
ANION GAP SERPL CALC-SCNC: 13 MMOL/L
AST SERPL-CCNC: 12 U/L
BILIRUB SERPL-MCNC: 0.7 MG/DL
BUN SERPL-MCNC: 11 MG/DL
CALCIUM SERPL-MCNC: 9.3 MG/DL
CHLORIDE SERPL-SCNC: 102 MMOL/L
CO2 SERPL-SCNC: 26 MMOL/L
CREAT SERPL-MCNC: 0.9 MG/DL
GLUCOSE SERPL-MCNC: 97 MG/DL
POTASSIUM SERPL-SCNC: 4.4 MMOL/L
PROT SERPL-MCNC: 7.2 G/DL
SODIUM SERPL-SCNC: 141 MMOL/L

## 2018-03-07 ENCOUNTER — RX RENEWAL (OUTPATIENT)
Age: 43
End: 2018-03-07

## 2018-03-12 ENCOUNTER — RX RENEWAL (OUTPATIENT)
Age: 43
End: 2018-03-12

## 2018-03-21 ENCOUNTER — APPOINTMENT (OUTPATIENT)
Dept: HEMATOLOGY ONCOLOGY | Facility: CLINIC | Age: 43
End: 2018-03-21

## 2018-03-21 ENCOUNTER — LABORATORY RESULT (OUTPATIENT)
Age: 43
End: 2018-03-21

## 2018-03-21 VITALS
TEMPERATURE: 97.9 F | DIASTOLIC BLOOD PRESSURE: 58 MMHG | HEART RATE: 78 BPM | SYSTOLIC BLOOD PRESSURE: 118 MMHG | HEIGHT: 67 IN | BODY MASS INDEX: 29.98 KG/M2 | WEIGHT: 191 LBS

## 2018-03-21 NOTE — HISTORY OF PRESENT ILLNESS
[Disease: _____________________] : Disease: [unfilled] [AJCC Stage: ____] : AJCC Stage: [unfilled] [de-identified] : 41/M presenting for follow-up for metastatic ALK-positive lung adenocarcinoma.  He is currently on alectinib 600 mg BID.  He initially presented in 11/2014 with multiple pulmonary nodules, mediastinal lymphadenopathy, multiple bony metastases, and brain metastases.  He was initially started on crizotinib and was switched to ceritinib in 12/2015 upon progression.  He was unable to tolerate ceritinib due to significant vomiting and was switched to alectinib in 02/2016.\par \par He had SRS to a left cerebellar hemisphere brain metastasis (08/2015) and subsequently underwent WBRT for CNS progression (10-11/2015).  He had RT to a painful metastatic lesion in the right humerus (01/2015).  He had also received Xgeva for bony metastatic disease in the past. \par \par Since 09/2016, he has been experiencing intermittent headaches being treated with periodic dexamethasone taper.  MRI brain with perfusion analysis showed bilateral cerebellar lesions, favor postRT changes rather than residual tumor.  His most recent brain MRI (11/01/2016) showed overall  stable  exam  compared  to  the  previous  brain  MRI  9/2/2016; no  significant  interval  change  in  bilateral  cerebellar  heterogeneously  enhancing  masses  with  extensive  associated  edema,  as  well  as  smaller  lesions  within  the  left  parietal  white  matter,  superficial  left  temporal  lobe  and  left  dorsal  medulla; and, stable  mild  ventriculomegaly  and  mild  periventricular  FLAIR  signal  abnormality.  He has been following with our neurosurgeon, Dr. Britt, who has recommended no surgical intervention at this time. \par \par His most recent PETCT (11/01/2016) showed no sites of pathologic FDG uptake suspicious for biologic tumor activity.\par \par He reports occasional headaches and a mild sense of imbalance.  Denies any falls.  However he only takes a prn dexamethasone, once in every three days,  which reportedly relieves his intermittent headaches. \par Today he also complains mild pain in his left great toe, likely secondary to ingrowing toe nail. [de-identified] : ALK+ lung adenocarcinoma [Treatment Protocol] : Treatment Protocol [FreeTextEntry1] : Alectinib 600 mg BID [de-identified] : He presented to follow up. He changed his insurance. Did not get scans and did not take his alectinib for about 1-2 weeks. We were not notified.  Otherwise she feels well she really requires dexamethasone yesterday headache probably once or twice a week as per patient no other complaints.\par \par 4/17/17\par He presents for follow up today. He had MRI of brain that showed stable disease 4/8/2017. He has not had the PET CT yet. He received  his alecetinib on 4/4/17 and stated to take them. States his headaches are rare now and feels well otherwise.\par \par 5/15/17\par He presents for follow up. He had a PET CT on 4/21/17 that  was ucnhged.Since November 1, 2016, no new foci of pathologic FDG uptake to suggest\par biologic tumor activity. . 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 . Unchanged non-FDG avid sclerotic foci involving the right proximal humerus\par and T9 vertebral body.\par \par He  feels well. No dizziness or headache.  No new symptoms or complaints.\par \par 6/19/17\par He is doing well. He has no complaints. No headaches. No SOB. \par \par 7/24/17\par Patient comes in for follow up visit, has no complaints, has no SOB and has a mild cough which is chronic, no hemoptysis. Patient states his appetite is good, weight is stable, he has no headaches and is currently off the steroids. Patient will have repeat PET scan and MRI of the brain with contrast as well as repeat bloodwork. \par \par 8/21/17\par He is here for follow up. He had an MR brain and PET CT on 8/3 and 8/2 that did not show progression, were essentially unchanged.  He feels great otherwise.\par \par 9/21/17\par He is doing well. No complaints. No headaches\par \par October 26, 2017\par He see her for followup he feels great he has no complaints.\par \par 11/16/17\par He is here for follow up. He had an MR of brain on 11/14/17: New areas of nodular enhancement along the inferior surfaces of the temporal lobes bilaterally. These are relatively symmetric and just above the level of the cerebellar enhancing masses, suggesting that these may reflect post treatment change.\par He had to reschedule his PET and states he will do it next Wednesday.\par \par No complaints.\par \par 12/26/17\par He is here for follow up. He feels well. he  had PET/CT in end of November that does not show procession, stable pleural findings. He  feels well. \par \par 1/22/18\par He is here for follow up for his NSCLCA. He states he feels well. No SOB, no headaches no fatigue.. \par \par February 21 2018\par  he is here for followup, he had a recent PET/CT scan which is PRESLEY he also had a recent MRI of the brain. This was reviewed at the CNS tumor board although wasn't clear but the consensus was that this is posttreatment changes in the fact that he is asymptomatic and decided to observe him.\par \par He has no new complaints no headaches no weakness or tingling.\par \par \par 3/21/18\par He is here for follow up. HE states he is doing well. Has very mild headaches on occasion but otherwise doing well.

## 2018-03-21 NOTE — RESULTS/DATA
[FreeTextEntry1] : EXAM:  PETCT JORGE ONC FDG SUBS         PROCEDURE DATE:  02/05/2018       INTERPRETATION:  FDG PET CT STUDY   Subsequent  treatment strategy REASON: TUMOR IMAGING - PET with concurrently acquired CT for attenuation  correction and anatomic localization; skull base to mid - thigh .  CPT code 14863.  Fasting blood glucose level:     98 mg/dl  HISTORY: 41-year-old male follow-up for metastatic lung adenocarcinoma.  Had a prior PET/CT on November 2, 2017 which was negative for biologic  tumor activity. Reactive changes were seen status post pleurodesis.  Patient's last radiation therapy was in January 2017. Patient had a  chemotherapy pill taken last night.  TECHNIQUE: Approximately 45 minutes after the intravenous administration  of 9.5 mCi 18-Fluorine FDG, whole body PET images were acquired from base  of skull to mid - thigh.  CT protocol used for this PET/CT study is designed for attenuation  correction and anatomic localization of PET findings.  This  CT  is not designed to replace state-of-the-art diagnostic CT scans for  specific imaging protocols of different body parts.  COMPARISON : November 22, 2017.  FINDINGS:  Head/Neck: No biologically active head/neck lesions.      Normal uptake within the brain, pharyngeal lymphoid tissue, salivary  glands and laryngeal musculature is noted.      Thorax:   No suspicious hypermetabolic mediastinal, hilar, lung  parenchymal lesions. There is resolving posttreatment FDG uptake  circumferentially along the pleural surface of the left hemithorax,  consistent with changes from talc pleurodesis.  Abdomen/Pelvis: Physiologic GI/  activity. No abnormal visceral or  magdalena tracer uptake is present.      Musculoskeletal :  No suspicious hypermetabolic osseous lesions.  Previously described treated sclerotic osseous metastasis in the right  humerus and T9 remain non-FDG avid.  Additional CT findings: None.  IMPRESSION:   No pathologic FDG uptake to suggest biologic tumor activity.  Resolving posttreatment FDG uptake along the pleural surface of the left  hemithorax consistent with changes from talc pleurodesis.     \par \par   PROCEDURE DATE:  02/01/2018       INTERPRETATION:  Clinical History / Reason for exam: Vertigo. History of  lung and brain cancer withbrain metastasis status post radiation therapy.  Technique: Sagittal and axial precontrast T1-weighted images, axial  T2-weighted images, axial FLAIR images, axial gradient echo images, axial  diffusion weighted images, and sagittal, axial and coronal postcontrast  T1-weighted images of the brain were obtained on the 1.5 Radha magnet  after the intravenous administration of 20 cc of gadolinium contrast.  Comparison is made with previous MRI of the brain dated 11/13/2017.   Findings:  The ventricles, basal cisterns and sulcal pattern are  unchanged when compared to previous study within normal limits for  patients stated age. There are again noted stable bilateral cerebellar  enhancing lesions which previously measured 2.9 x 3.3 x 2.4cm and on the  current study measures 2.8 x 3.4 x 2.8 cm and on the left. This measures  2.7 x 2.3 x1.9 and on the current study measures 2.8 x 2.5 x 1.9 cm.  Increased surrounding edema on axial FLAIR images may be due to post  radiation therapy changes.   There are several supratentorial lesions of enhancement which have  increased in size just above the tentorium on the right which previously  measured 1.9 x 1.3 x 0.9 cm on current study measures 2.2 x 2.1 x 1.2 cm,  left supratentorial region which measured 0.4 x 0.4 and on the current  study measures 1.4 x 0.7 and a right periventricular white matter lesion  which measures 0.6 x 0.7 and on the current study measures 0.9 x 0.6.  Increased edema is also noted bilateral posterior and medial temporal  lobes right greater than left.  Small 0.7 x0.7 cm focus of signal abnormality is again noted in the left  posterior corona radiata and left posterior medulla measuring 0.5 x 0.6  cm, both of which demonstrate blooming artifact on axial gradient echo  sequences as well as a abnormal enhancement and may have represented a  prior hemorrhagic lesions. There is no acute mass effect, midline shift  or hemorrhage.  No extra-axial fluid collections are identified.    There are no acute infarcts on diffusion weighted images.  Expected  signal flow voids are noted in the major intracranial vessels consistent  with theirpatency.    Globes and orbits are grossly within normal limits. The paranasal sinuses  and bilateral mastoid complexes are within normal limits.    There is nobone marrow signal abnormality.  The sella demonstrates CSF  signal onsagittal T1-weighted images and axial T2-weighted images  consistent with a partially empty sella.     Impression:  1.  When compared to previous study dated 11/13/2017 there is slight  interval increase in enhancing lesions in the bilateral supratentorial  regions and right posterior parietal periventricular white matter.  2.  Stable bilateral cerebellar and left corona radiata and left  posterior abdominal enhancing masses.  3.  Increasein edema in the bilateral cerebellum and temporal lobes may  representpostradiation therapy changes.  4.  Follow-up in 2-3 months may be helpful for further evaluation if  clinically indicated.

## 2018-03-21 NOTE — ASSESSMENT
[FreeTextEntry1] : 1.  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 Due to insurance issues he stopped it but now is back on it since 4/4/2017\par -repeat MRI brain possibly radiation related changes, post treatment changes, he is asymptomatic,  and repeat PET/CT  is without progression \par 2.   Intermittent headaches mild \par 3.Elevated total bilirubin likely secondary to alectinib. will monitor, its stable\par \par Plan:\par -C/w Alectinib.  Will check MR brain after next visit and possible PET/CT in May\par -will monitor CBC, CMP \par -rtc in 3 weeks. \par \par

## 2018-03-21 NOTE — PHYSICAL EXAM
[Restricted in physically strenuous activity but ambulatory and able to carry out work of a light or sedentary nature] : Status 1- Restricted in physically strenuous activity but ambulatory and able to carry out work of a light or sedentary nature, e.g., light house work, office work [Normal] : affect appropriate [de-identified] : he has decrease breath sounds on left side base, unchanged.

## 2018-03-22 LAB
ALBUMIN SERPL ELPH-MCNC: 4.5 G/DL
ALP BLD-CCNC: 127 U/L
ALT SERPL-CCNC: 9 U/L
ANION GAP SERPL CALC-SCNC: 16 MMOL/L
AST SERPL-CCNC: 14 U/L
BILIRUB SERPL-MCNC: 1.2 MG/DL
BUN SERPL-MCNC: 14 MG/DL
CALCIUM SERPL-MCNC: 9.1 MG/DL
CHLORIDE SERPL-SCNC: 101 MMOL/L
CO2 SERPL-SCNC: 26 MMOL/L
CREAT SERPL-MCNC: 1 MG/DL
GLUCOSE SERPL-MCNC: 95 MG/DL
HCT VFR BLD CALC: 41.4 %
HGB BLD-MCNC: 13.4 G/DL
MCHC RBC-ENTMCNC: 28.1 PG
MCHC RBC-ENTMCNC: 32.4 G/DL
MCV RBC AUTO: 86.8 FL
PLATELET # BLD AUTO: 205 K/UL
PMV BLD: 10.6 FL
POTASSIUM SERPL-SCNC: 3.8 MMOL/L
PROT SERPL-MCNC: 7.5 G/DL
RBC # BLD: 4.77 M/UL
RBC # FLD: 14.3 %
SODIUM SERPL-SCNC: 143 MMOL/L
WBC # FLD AUTO: 7 K/UL

## 2018-04-11 ENCOUNTER — LABORATORY RESULT (OUTPATIENT)
Age: 43
End: 2018-04-11

## 2018-04-11 ENCOUNTER — APPOINTMENT (OUTPATIENT)
Dept: HEMATOLOGY ONCOLOGY | Facility: CLINIC | Age: 43
End: 2018-04-11

## 2018-04-11 VITALS
HEART RATE: 68 BPM | SYSTOLIC BLOOD PRESSURE: 112 MMHG | DIASTOLIC BLOOD PRESSURE: 64 MMHG | HEIGHT: 67 IN | BODY MASS INDEX: 29.35 KG/M2 | WEIGHT: 187 LBS | TEMPERATURE: 97.4 F | RESPIRATION RATE: 14 BRPM

## 2018-04-11 LAB
HCT VFR BLD CALC: 38.2 %
HGB BLD-MCNC: 12.6 G/DL
MCHC RBC-ENTMCNC: 28.3 PG
MCHC RBC-ENTMCNC: 33 G/DL
MCV RBC AUTO: 85.8 FL
PLATELET # BLD AUTO: 230 K/UL
PMV BLD: 10.7 FL
RBC # BLD: 4.45 M/UL
RBC # FLD: 15.9 %
WBC # FLD AUTO: 8.09 K/UL

## 2018-04-12 LAB
ALBUMIN SERPL ELPH-MCNC: 4.4 G/DL
ALP BLD-CCNC: 142 U/L
ALT SERPL-CCNC: 7 U/L
ANION GAP SERPL CALC-SCNC: 15 MMOL/L
AST SERPL-CCNC: 15 U/L
BILIRUB SERPL-MCNC: 1.7 MG/DL
BUN SERPL-MCNC: 9 MG/DL
CALCIUM SERPL-MCNC: 9.5 MG/DL
CHLORIDE SERPL-SCNC: 101 MMOL/L
CO2 SERPL-SCNC: 26 MMOL/L
CREAT SERPL-MCNC: 0.9 MG/DL
GLUCOSE SERPL-MCNC: 119 MG/DL
POTASSIUM SERPL-SCNC: 3.8 MMOL/L
PROT SERPL-MCNC: 7.2 G/DL
SODIUM SERPL-SCNC: 142 MMOL/L

## 2018-04-13 NOTE — ASSESSMENT
[FreeTextEntry1] : 1.  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 Due to insurance issues he stopped it but now is back on it since 4/4/2017\par -repeat MRI brain possibly radiation related changes, post treatment changes, he is asymptomatic,  and repeat PET/CT  is without progression \par 2.   Intermittent headaches mild \par 3.Elevated total bilirubin likely secondary to alectinib. will monitor, its stable\par \par Plan:\par -C/w Alectinib.  Will check MR brain now and  PET/CT in May\par -will monitor CBC, CMP \par -rtc in 1 month, If MRI shows progression will call him and he will see me sooner\par \par

## 2018-04-13 NOTE — HISTORY OF PRESENT ILLNESS
[Disease: _____________________] : Disease: [unfilled] [AJCC Stage: ____] : AJCC Stage: [unfilled] [de-identified] : 41/M presenting for follow-up for metastatic ALK-positive lung adenocarcinoma.  He is currently on alectinib 600 mg BID.  He initially presented in 11/2014 with multiple pulmonary nodules, mediastinal lymphadenopathy, multiple bony metastases, and brain metastases.  He was initially started on crizotinib and was switched to ceritinib in 12/2015 upon progression.  He was unable to tolerate ceritinib due to significant vomiting and was switched to alectinib in 02/2016.\par \par He had SRS to a left cerebellar hemisphere brain metastasis (08/2015) and subsequently underwent WBRT for CNS progression (10-11/2015).  He had RT to a painful metastatic lesion in the right humerus (01/2015).  He had also received Xgeva for bony metastatic disease in the past. \par \par Since 09/2016, he has been experiencing intermittent headaches being treated with periodic dexamethasone taper.  MRI brain with perfusion analysis showed bilateral cerebellar lesions, favor postRT changes rather than residual tumor.  His most recent brain MRI (11/01/2016) showed overall  stable  exam  compared  to  the  previous  brain  MRI  9/2/2016; no  significant  interval  change  in  bilateral  cerebellar  heterogeneously  enhancing  masses  with  extensive  associated  edema,  as  well  as  smaller  lesions  within  the  left  parietal  white  matter,  superficial  left  temporal  lobe  and  left  dorsal  medulla; and, stable  mild  ventriculomegaly  and  mild  periventricular  FLAIR  signal  abnormality.  He has been following with our neurosurgeon, Dr. Britt, who has recommended no surgical intervention at this time. \par \par His most recent PETCT (11/01/2016) showed no sites of pathologic FDG uptake suspicious for biologic tumor activity.\par \par He reports occasional headaches and a mild sense of imbalance.  Denies any falls.  However he only takes a prn dexamethasone, once in every three days,  which reportedly relieves his intermittent headaches. \par Today he also complains mild pain in his left great toe, likely secondary to ingrowing toe nail. [de-identified] : ALK+ lung adenocarcinoma [Treatment Protocol] : Treatment Protocol [FreeTextEntry1] : Alectinib 600 mg BID [de-identified] : He presented to follow up. He changed his insurance. Did not get scans and did not take his alectinib for about 1-2 weeks. We were not notified.  Otherwise she feels well she really requires dexamethasone yesterday headache probably once or twice a week as per patient no other complaints.\par \par 4/17/17\par He presents for follow up today. He had MRI of brain that showed stable disease 4/8/2017. He has not had the PET CT yet. He received  his alecetinib on 4/4/17 and stated to take them. States his headaches are rare now and feels well otherwise.\par \par 5/15/17\par He presents for follow up. He had a PET CT on 4/21/17 that  was ucnhged.Since November 1, 2016, no new foci of pathologic FDG uptake to suggest\par biologic tumor activity. . 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 . Unchanged non-FDG avid sclerotic foci involving the right proximal humerus\par and T9 vertebral body.\par \par He  feels well. No dizziness or headache.  No new symptoms or complaints.\par \par 6/19/17\par He is doing well. He has no complaints. No headaches. No SOB. \par \par 7/24/17\par Patient comes in for follow up visit, has no complaints, has no SOB and has a mild cough which is chronic, no hemoptysis. Patient states his appetite is good, weight is stable, he has no headaches and is currently off the steroids. Patient will have repeat PET scan and MRI of the brain with contrast as well as repeat bloodwork. \par \par 8/21/17\par He is here for follow up. He had an MR brain and PET CT on 8/3 and 8/2 that did not show progression, were essentially unchanged.  He feels great otherwise.\par \par 9/21/17\par He is doing well. No complaints. No headaches\par \par October 26, 2017\par He see her for followup he feels great he has no complaints.\par \par 11/16/17\par He is here for follow up. He had an MR of brain on 11/14/17: New areas of nodular enhancement along the inferior surfaces of the temporal lobes bilaterally. These are relatively symmetric and just above the level of the cerebellar enhancing masses, suggesting that these may reflect post treatment change.\par He had to reschedule his PET and states he will do it next Wednesday.\par \par No complaints.\par \par 12/26/17\par He is here for follow up. He feels well. he  had PET/CT in end of November that does not show procession, stable pleural findings. He  feels well. \par \par 1/22/18\par He is here for follow up for his NSCLCA. He states he feels well. No SOB, no headaches no fatigue.. \par \par February 21 2018\par  he is here for followup, he had a recent PET/CT scan which is PRESLEY he also had a recent MRI of the brain. This was reviewed at the CNS tumor board although wasn't clear but the consensus was that this is posttreatment changes in the fact that he is asymptomatic and decided to observe him.\par \par He has no new complaints no headaches no weakness or tingling.\par \par \par 3/21/18\par He is here for follow up. HE states he is doing well. Has very mild headaches on occasion but otherwise doing well.\par \par 4/13/18\par He is doing well. Reports no new complaints

## 2018-04-13 NOTE — PHYSICAL EXAM
[Restricted in physically strenuous activity but ambulatory and able to carry out work of a light or sedentary nature] : Status 1- Restricted in physically strenuous activity but ambulatory and able to carry out work of a light or sedentary nature, e.g., light house work, office work [Normal] : affect appropriate [de-identified] : he has decrease breath sounds on left side base, unchanged.

## 2018-04-13 NOTE — RESULTS/DATA
[FreeTextEntry1] : EXAM:  PETCT JORGE ONC FDG SUBS         PROCEDURE DATE:  02/05/2018       INTERPRETATION:  FDG PET CT STUDY   Subsequent  treatment strategy REASON: TUMOR IMAGING - PET with concurrently acquired CT for attenuation  correction and anatomic localization; skull base to mid - thigh .  CPT code 39605.  Fasting blood glucose level:     98 mg/dl  HISTORY: 41-year-old male follow-up for metastatic lung adenocarcinoma.  Had a prior PET/CT on November 2, 2017 which was negative for biologic  tumor activity. Reactive changes were seen status post pleurodesis.  Patient's last radiation therapy was in January 2017. Patient had a  chemotherapy pill taken last night.  TECHNIQUE: Approximately 45 minutes after the intravenous administration  of 9.5 mCi 18-Fluorine FDG, whole body PET images were acquired from base  of skull to mid - thigh.  CT protocol used for this PET/CT study is designed for attenuation  correction and anatomic localization of PET findings.  This  CT  is not designed to replace state-of-the-art diagnostic CT scans for  specific imaging protocols of different body parts.  COMPARISON : November 22, 2017.  FINDINGS:  Head/Neck: No biologically active head/neck lesions.      Normal uptake within the brain, pharyngeal lymphoid tissue, salivary  glands and laryngeal musculature is noted.      Thorax:   No suspicious hypermetabolic mediastinal, hilar, lung  parenchymal lesions. There is resolving posttreatment FDG uptake  circumferentially along the pleural surface of the left hemithorax,  consistent with changes from talc pleurodesis.  Abdomen/Pelvis: Physiologic GI/  activity. No abnormal visceral or  magdalena tracer uptake is present.      Musculoskeletal :  No suspicious hypermetabolic osseous lesions.  Previously described treated sclerotic osseous metastasis in the right  humerus and T9 remain non-FDG avid.  Additional CT findings: None.  IMPRESSION:   No pathologic FDG uptake to suggest biologic tumor activity.  Resolving posttreatment FDG uptake along the pleural surface of the left  hemithorax consistent with changes from talc pleurodesis.     \par \par   PROCEDURE DATE:  02/01/2018       INTERPRETATION:  Clinical History / Reason for exam: Vertigo. History of  lung and brain cancer withbrain metastasis status post radiation therapy.  Technique: Sagittal and axial precontrast T1-weighted images, axial  T2-weighted images, axial FLAIR images, axial gradient echo images, axial  diffusion weighted images, and sagittal, axial and coronal postcontrast  T1-weighted images of the brain were obtained on the 1.5 Radha magnet  after the intravenous administration of 20 cc of gadolinium contrast.  Comparison is made with previous MRI of the brain dated 11/13/2017.   Findings:  The ventricles, basal cisterns and sulcal pattern are  unchanged when compared to previous study within normal limits for  patients stated age. There are again noted stable bilateral cerebellar  enhancing lesions which previously measured 2.9 x 3.3 x 2.4cm and on the  current study measures 2.8 x 3.4 x 2.8 cm and on the left. This measures  2.7 x 2.3 x1.9 and on the current study measures 2.8 x 2.5 x 1.9 cm.  Increased surrounding edema on axial FLAIR images may be due to post  radiation therapy changes.   There are several supratentorial lesions of enhancement which have  increased in size just above the tentorium on the right which previously  measured 1.9 x 1.3 x 0.9 cm on current study measures 2.2 x 2.1 x 1.2 cm,  left supratentorial region which measured 0.4 x 0.4 and on the current  study measures 1.4 x 0.7 and a right periventricular white matter lesion  which measures 0.6 x 0.7 and on the current study measures 0.9 x 0.6.  Increased edema is also noted bilateral posterior and medial temporal  lobes right greater than left.  Small 0.7 x0.7 cm focus of signal abnormality is again noted in the left  posterior corona radiata and left posterior medulla measuring 0.5 x 0.6  cm, both of which demonstrate blooming artifact on axial gradient echo  sequences as well as a abnormal enhancement and may have represented a  prior hemorrhagic lesions. There is no acute mass effect, midline shift  or hemorrhage.  No extra-axial fluid collections are identified.    There are no acute infarcts on diffusion weighted images.  Expected  signal flow voids are noted in the major intracranial vessels consistent  with theirpatency.    Globes and orbits are grossly within normal limits. The paranasal sinuses  and bilateral mastoid complexes are within normal limits.    There is nobone marrow signal abnormality.  The sella demonstrates CSF  signal onsagittal T1-weighted images and axial T2-weighted images  consistent with a partially empty sella.     Impression:  1.  When compared to previous study dated 11/13/2017 there is slight  interval increase in enhancing lesions in the bilateral supratentorial  regions and right posterior parietal periventricular white matter.  2.  Stable bilateral cerebellar and left corona radiata and left  posterior abdominal enhancing masses.  3.  Increasein edema in the bilateral cerebellum and temporal lobes may  representpostradiation therapy changes.  4.  Follow-up in 2-3 months may be helpful for further evaluation if  clinically indicated.

## 2018-04-27 ENCOUNTER — OUTPATIENT (OUTPATIENT)
Dept: OUTPATIENT SERVICES | Facility: HOSPITAL | Age: 43
LOS: 1 days | Discharge: HOME | End: 2018-04-27

## 2018-04-27 DIAGNOSIS — C78.00 SECONDARY MALIGNANT NEOPLASM OF UNSPECIFIED LUNG: ICD-10-CM

## 2018-05-03 ENCOUNTER — APPOINTMENT (OUTPATIENT)
Dept: HEMATOLOGY ONCOLOGY | Facility: CLINIC | Age: 43
End: 2018-05-03

## 2018-05-03 VITALS
SYSTOLIC BLOOD PRESSURE: 116 MMHG | HEART RATE: 70 BPM | TEMPERATURE: 98.6 F | HEIGHT: 67 IN | RESPIRATION RATE: 14 BRPM | BODY MASS INDEX: 2.82 KG/M2 | WEIGHT: 18 LBS | DIASTOLIC BLOOD PRESSURE: 72 MMHG

## 2018-05-03 NOTE — RESULTS/DATA
[FreeTextEntry1] : EXAM:  PETCT JORGE ONC FDG SUBS         PROCEDURE DATE:  02/05/2018       INTERPRETATION:  FDG PET CT STUDY   Subsequent  treatment strategy REASON: TUMOR IMAGING - PET with concurrently acquired CT for attenuation  correction and anatomic localization; skull base to mid - thigh .  CPT code 27316.  Fasting blood glucose level:     98 mg/dl  HISTORY: 41-year-old male follow-up for metastatic lung adenocarcinoma.  Had a prior PET/CT on November 2, 2017 which was negative for biologic  tumor activity. Reactive changes were seen status post pleurodesis.  Patient's last radiation therapy was in January 2017. Patient had a  chemotherapy pill taken last night.  TECHNIQUE: Approximately 45 minutes after the intravenous administration  of 9.5 mCi 18-Fluorine FDG, whole body PET images were acquired from base  of skull to mid - thigh.  CT protocol used for this PET/CT study is designed for attenuation  correction and anatomic localization of PET findings.  This  CT  is not designed to replace state-of-the-art diagnostic CT scans for  specific imaging protocols of different body parts.  COMPARISON : November 22, 2017.  FINDINGS:  Head/Neck: No biologically active head/neck lesions.      Normal uptake within the brain, pharyngeal lymphoid tissue, salivary  glands and laryngeal musculature is noted.      Thorax:   No suspicious hypermetabolic mediastinal, hilar, lung  parenchymal lesions. There is resolving posttreatment FDG uptake  circumferentially along the pleural surface of the left hemithorax,  consistent with changes from talc pleurodesis.  Abdomen/Pelvis: Physiologic GI/  activity. No abnormal visceral or  magdalena tracer uptake is present.      Musculoskeletal :  No suspicious hypermetabolic osseous lesions.  Previously described treated sclerotic osseous metastasis in the right  humerus and T9 remain non-FDG avid.  Additional CT findings: None.  IMPRESSION:   No pathologic FDG uptake to suggest biologic tumor activity.  Resolving posttreatment FDG uptake along the pleural surface of the left  hemithorax consistent with changes from talc pleurodesis.     \par \par   PROCEDURE DATE:  02/01/2018       INTERPRETATION:  Clinical History / Reason for exam: Vertigo. History of  lung and brain cancer withbrain metastasis status post radiation therapy.  Technique: Sagittal and axial precontrast T1-weighted images, axial  T2-weighted images, axial FLAIR images, axial gradient echo images, axial  diffusion weighted images, and sagittal, axial and coronal postcontrast  T1-weighted images of the brain were obtained on the 1.5 Radha magnet  after the intravenous administration of 20 cc of gadolinium contrast.  Comparison is made with previous MRI of the brain dated 11/13/2017.   Findings:  The ventricles, basal cisterns and sulcal pattern are  unchanged when compared to previous study within normal limits for  patients stated age. There are again noted stable bilateral cerebellar  enhancing lesions which previously measured 2.9 x 3.3 x 2.4cm and on the  current study measures 2.8 x 3.4 x 2.8 cm and on the left. This measures  2.7 x 2.3 x1.9 and on the current study measures 2.8 x 2.5 x 1.9 cm.  Increased surrounding edema on axial FLAIR images may be due to post  radiation therapy changes.   There are several supratentorial lesions of enhancement which have  increased in size just above the tentorium on the right which previously  measured 1.9 x 1.3 x 0.9 cm on current study measures 2.2 x 2.1 x 1.2 cm,  left supratentorial region which measured 0.4 x 0.4 and on the current  study measures 1.4 x 0.7 and a right periventricular white matter lesion  which measures 0.6 x 0.7 and on the current study measures 0.9 x 0.6.  Increased edema is also noted bilateral posterior and medial temporal  lobes right greater than left.  Small 0.7 x0.7 cm focus of signal abnormality is again noted in the left  posterior corona radiata and left posterior medulla measuring 0.5 x 0.6  cm, both of which demonstrate blooming artifact on axial gradient echo  sequences as well as a abnormal enhancement and may have represented a  prior hemorrhagic lesions. There is no acute mass effect, midline shift  or hemorrhage.  No extra-axial fluid collections are identified.    There are no acute infarcts on diffusion weighted images.  Expected  signal flow voids are noted in the major intracranial vessels consistent  with theirpatency.    Globes and orbits are grossly within normal limits. The paranasal sinuses  and bilateral mastoid complexes are within normal limits.    There is nobone marrow signal abnormality.  The sella demonstrates CSF  signal onsagittal T1-weighted images and axial T2-weighted images  consistent with a partially empty sella.     Impression:  1.  When compared to previous study dated 11/13/2017 there is slight  interval increase in enhancing lesions in the bilateral supratentorial  regions and right posterior parietal periventricular white matter.  2.  Stable bilateral cerebellar and left corona radiata and left  posterior abdominal enhancing masses.  3.  Increasein edema in the bilateral cerebellum and temporal lobes may  representpostradiation therapy changes.  4.  Follow-up in 2-3 months may be helpful for further evaluation if  clinically indicated.

## 2018-05-03 NOTE — HISTORY OF PRESENT ILLNESS
[Disease: _____________________] : Disease: [unfilled] [AJCC Stage: ____] : AJCC Stage: [unfilled] [Treatment Protocol] : Treatment Protocol [de-identified] : 41/M presenting for follow-up for metastatic ALK-positive lung adenocarcinoma.  He is currently on alectinib 600 mg BID.  He initially presented in 11/2014 with multiple pulmonary nodules, mediastinal lymphadenopathy, multiple bony metastases, and brain metastases.  He was initially started on crizotinib and was switched to ceritinib in 12/2015 upon progression.  He was unable to tolerate ceritinib due to significant vomiting and was switched to alectinib in 02/2016.\par \par He had SRS to a left cerebellar hemisphere brain metastasis (08/2015) and subsequently underwent WBRT for CNS progression (10-11/2015).  He had RT to a painful metastatic lesion in the right humerus (01/2015).  He had also received Xgeva for bony metastatic disease in the past. \par \par Since 09/2016, he has been experiencing intermittent headaches being treated with periodic dexamethasone taper.  MRI brain with perfusion analysis showed bilateral cerebellar lesions, favor postRT changes rather than residual tumor.  His most recent brain MRI (11/01/2016) showed overall  stable  exam  compared  to  the  previous  brain  MRI  9/2/2016; no  significant  interval  change  in  bilateral  cerebellar  heterogeneously  enhancing  masses  with  extensive  associated  edema,  as  well  as  smaller  lesions  within  the  left  parietal  white  matter,  superficial  left  temporal  lobe  and  left  dorsal  medulla; and, stable  mild  ventriculomegaly  and  mild  periventricular  FLAIR  signal  abnormality.  He has been following with our neurosurgeon, Dr. Britt, who has recommended no surgical intervention at this time. \par \par His most recent PETCT (11/01/2016) showed no sites of pathologic FDG uptake suspicious for biologic tumor activity.\par \par He reports occasional headaches and a mild sense of imbalance.  Denies any falls.  However he only takes a prn dexamethasone, once in every three days,  which reportedly relieves his intermittent headaches. \par Today he also complains mild pain in his left great toe, likely secondary to ingrowing toe nail. [de-identified] : ALK+ lung adenocarcinoma [FreeTextEntry1] : Alectinib 600 mg BID [de-identified] : He presented to follow up. He changed his insurance. Did not get scans and did not take his alectinib for about 1-2 weeks. We were not notified.  Otherwise she feels well she really requires dexamethasone yesterday headache probably once or twice a week as per patient no other complaints.\par \par 4/17/17\par He presents for follow up today. He had MRI of brain that showed stable disease 4/8/2017. He has not had the PET CT yet. He received  his alecetinib on 4/4/17 and stated to take them. States his headaches are rare now and feels well otherwise.\par \par 5/15/17\par He presents for follow up. He had a PET CT on 4/21/17 that  was ucnhged.Since November 1, 2016, no new foci of pathologic FDG uptake to suggest\par biologic tumor activity. . 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 . Unchanged non-FDG avid sclerotic foci involving the right proximal humerus\par and T9 vertebral body.\par \par He  feels well. No dizziness or headache.  No new symptoms or complaints.\par \par 6/19/17\par He is doing well. He has no complaints. No headaches. No SOB. \par \par 7/24/17\par Patient comes in for follow up visit, has no complaints, has no SOB and has a mild cough which is chronic, no hemoptysis. Patient states his appetite is good, weight is stable, he has no headaches and is currently off the steroids. Patient will have repeat PET scan and MRI of the brain with contrast as well as repeat bloodwork. \par \par 8/21/17\par He is here for follow up. He had an MR brain and PET CT on 8/3 and 8/2 that did not show progression, were essentially unchanged.  He feels great otherwise.\par \par 9/21/17\par He is doing well. No complaints. No headaches\par \par October 26, 2017\par He see her for followup he feels great he has no complaints.\par \par 11/16/17\par He is here for follow up. He had an MR of brain on 11/14/17: New areas of nodular enhancement along the inferior surfaces of the temporal lobes bilaterally. These are relatively symmetric and just above the level of the cerebellar enhancing masses, suggesting that these may reflect post treatment change.\par He had to reschedule his PET and states he will do it next Wednesday.\par \par No complaints.\par \par 12/26/17\par He is here for follow up. He feels well. he  had PET/CT in end of November that does not show procession, stable pleural findings. He  feels well. \par \par 1/22/18\par He is here for follow up for his NSCLCA. He states he feels well. No SOB, no headaches no fatigue.. \par \par February 21 2018\par  he is here for followup, he had a recent PET/CT scan which is PRESLEY he also had a recent MRI of the brain. This was reviewed at the CNS tumor board although wasn't clear but the consensus was that this is posttreatment changes in the fact that he is asymptomatic and decided to observe him.\par \par He has no new complaints no headaches no weakness or tingling.\par \par \par 3/21/18\par He is here for follow up. HE states he is doing well. Has very mild headaches on occasion but otherwise doing well.\par \par 4/13/18\par He is doing well. Reports no new complaints \par \par 5/3/18\par He is here for follow up to discuss his MR brain. HE complains of mild headache. Same memory issues as before. Balance is the same.  MR brain showed : Increased size of right parietotemporal of the findings enhancement  seen lesion measuring 5.2 cm, previously 2.8 centimeters and increased  size of left inferior temporal lobe mass measuring 2.3 cm, previously 1.6  cm. these areas again demonstrate hypoperfusion and may represent post  therapeutic changes.   2.  Significant increased edema and mass effect within the right  hemisphere with 9 mm right to left midline shift.  3.  No new enhancing lesions. Stable additional bilateral cerebellar and  supratentorial lesions.\par \par I spoke with Dr. Mobley and he suspects radiation necrosis not progression and given he is PRESLEY on PET/CT 2/2018 its  highly likely,

## 2018-05-03 NOTE — ASSESSMENT
[FreeTextEntry1] : 1.  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 Due to insurance issues he stopped it but now is back on it since 4/4/2017\par -repeat MRI brain possibly radiation related changes, post treatment changes, he is asymptomatic,  and repeat PET/CT  is without progression \par 2.   Intermittent headaches mild \par 3.Elevated total bilirubin likely secondary to alectinib. will monitor, its stable\par 4. Suspecting Radiation Necrosis\par \par Plan:\par -C/w Alectinib.  \par -will check PET.CT brain to evaluate for tumor progression vs radiation necrosis. I spoke to Dr. Schroeder who will arrange for this\par -he is due for restaging scans and will check CT C/A/P as well post PET of brain\par -he has mild to no symptoms if no tumor progression will most likely offer Avastin for radiation necrosis  or possibly steroids. \par -rtc in 1 month\par \par

## 2018-05-03 NOTE — REVIEW OF SYSTEMS
[Dizziness] : dizziness [Negative] : Heme/Lymph [Fatigue] : no fatigue [Recent Change In Weight] : ~T no recent weight change [de-identified] : has headaches

## 2018-05-03 NOTE — PHYSICAL EXAM
[Restricted in physically strenuous activity but ambulatory and able to carry out work of a light or sedentary nature] : Status 1- Restricted in physically strenuous activity but ambulatory and able to carry out work of a light or sedentary nature, e.g., light house work, office work [Normal] : affect appropriate [de-identified] : he has decrease breath sounds on left side base, unchanged.

## 2018-05-03 NOTE — CONSULT LETTER
[Dear  ___] : Dear  [unfilled], [Consult Letter:] : I had the pleasure of evaluating your patient, [unfilled]. [Please see my note below.] : Please see my note below. [Consult Closing:] : Thank you very much for allowing me to participate in the care of this patient.  If you have any questions, please do not hesitate to contact me. [Sincerely,] : Sincerely, [DrTam  ___] : Dr. WHALEN [FreeTextEntry3] : Roque

## 2018-05-08 ENCOUNTER — RX RENEWAL (OUTPATIENT)
Age: 43
End: 2018-05-08

## 2018-05-09 ENCOUNTER — RX RENEWAL (OUTPATIENT)
Age: 43
End: 2018-05-09

## 2018-05-16 ENCOUNTER — APPOINTMENT (OUTPATIENT)
Dept: HEMATOLOGY ONCOLOGY | Facility: CLINIC | Age: 43
End: 2018-05-16

## 2018-05-16 VITALS
HEIGHT: 67 IN | DIASTOLIC BLOOD PRESSURE: 70 MMHG | TEMPERATURE: 97.3 F | WEIGHT: 186 LBS | BODY MASS INDEX: 29.19 KG/M2 | RESPIRATION RATE: 14 BRPM | SYSTOLIC BLOOD PRESSURE: 113 MMHG | HEART RATE: 59 BPM

## 2018-05-16 NOTE — ASSESSMENT
[FreeTextEntry1] : 1.  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 Due to insurance issues he stopped it but now is back on it since 4/4/2017\par -repeat MRI brain possibly radiation related changes, post treatment changes, he is asymptomatic,  and repeat PET/CT  is without progression \par 2.   Intermittent headaches mild \par 3.Elevated total bilirubin likely secondary to alectinib. will monitor, its stable\par 4. Suspecting Radiation Necrosis\par \par Plan:\par -C/w Alectinib.  \par -  PET.CT brain to evaluate for tumor progression vs radiation necrosis is pending. I spoke to Dr. Schroeder who will arrange for this\par -he is due for restaging scans and will check CT C/A/P as well post PET of brain or whole body PET/CT with brain CT we are trying to arrange this\par -his suspected radiation necrosis is responding to steroids, If he does not het insurance will switch him to Avastin. If progression on PET.CT will start Carbo+ Alimta and avastin althoguh CNS responses are low. He cant get anymore radiation I bought Neurosurgery can offer anything \par -rtc in 2 weeks\par \par

## 2018-05-16 NOTE — REVIEW OF SYSTEMS
[Fatigue] : no fatigue [Recent Change In Weight] : ~T no recent weight change [Dizziness] : dizziness [Difficulty Walking] : difficulty walking [Negative] : Heme/Lymph [de-identified] : headaches resolved.

## 2018-05-16 NOTE — PHYSICAL EXAM
[Restricted in physically strenuous activity but ambulatory and able to carry out work of a light or sedentary nature] : Status 1- Restricted in physically strenuous activity but ambulatory and able to carry out work of a light or sedentary nature, e.g., light house work, office work [Normal] : affect appropriate [de-identified] : he has decrease breath sounds on left side base, unchanged. [de-identified] : he has ptosis in left. Gait is normal good strength  in all llimbs

## 2018-05-16 NOTE — CONSULT LETTER
[Dear  ___] : Dear  [unfilled], [Consult Letter:] : I had the pleasure of evaluating your patient, [unfilled]. [Please see my note below.] : Please see my note below. [Consult Closing:] : Thank you very much for allowing me to participate in the care of this patient.  If you have any questions, please do not hesitate to contact me. [Sincerely,] : Sincerely, [FreeTextEntry3] : Roque [DrTam  ___] : Dr. WHALEN

## 2018-05-16 NOTE — RESULTS/DATA
[FreeTextEntry1] : EXAM:  PETCT JORGE ONC FDG SUBS         PROCEDURE DATE:  02/05/2018       INTERPRETATION:  FDG PET CT STUDY   Subsequent  treatment strategy REASON: TUMOR IMAGING - PET with concurrently acquired CT for attenuation  correction and anatomic localization; skull base to mid - thigh .  CPT code 47632.  Fasting blood glucose level:     98 mg/dl  HISTORY: 41-year-old male follow-up for metastatic lung adenocarcinoma.  Had a prior PET/CT on November 2, 2017 which was negative for biologic  tumor activity. Reactive changes were seen status post pleurodesis.  Patient's last radiation therapy was in January 2017. Patient had a  chemotherapy pill taken last night.  TECHNIQUE: Approximately 45 minutes after the intravenous administration  of 9.5 mCi 18-Fluorine FDG, whole body PET images were acquired from base  of skull to mid - thigh.  CT protocol used for this PET/CT study is designed for attenuation  correction and anatomic localization of PET findings.  This  CT  is not designed to replace state-of-the-art diagnostic CT scans for  specific imaging protocols of different body parts.  COMPARISON : November 22, 2017.  FINDINGS:  Head/Neck: No biologically active head/neck lesions.      Normal uptake within the brain, pharyngeal lymphoid tissue, salivary  glands and laryngeal musculature is noted.      Thorax:   No suspicious hypermetabolic mediastinal, hilar, lung  parenchymal lesions. There is resolving posttreatment FDG uptake  circumferentially along the pleural surface of the left hemithorax,  consistent with changes from talc pleurodesis.  Abdomen/Pelvis: Physiologic GI/  activity. No abnormal visceral or  magdalena tracer uptake is present.      Musculoskeletal :  No suspicious hypermetabolic osseous lesions.  Previously described treated sclerotic osseous metastasis in the right  humerus and T9 remain non-FDG avid.  Additional CT findings: None.  IMPRESSION:   No pathologic FDG uptake to suggest biologic tumor activity.  Resolving posttreatment FDG uptake along the pleural surface of the left  hemithorax consistent with changes from talc pleurodesis.     \par \par   PROCEDURE DATE:  02/01/2018       INTERPRETATION:  Clinical History / Reason for exam: Vertigo. History of  lung and brain cancer withbrain metastasis status post radiation therapy.  Technique: Sagittal and axial precontrast T1-weighted images, axial  T2-weighted images, axial FLAIR images, axial gradient echo images, axial  diffusion weighted images, and sagittal, axial and coronal postcontrast  T1-weighted images of the brain were obtained on the 1.5 Radha magnet  after the intravenous administration of 20 cc of gadolinium contrast.  Comparison is made with previous MRI of the brain dated 11/13/2017.   Findings:  The ventricles, basal cisterns and sulcal pattern are  unchanged when compared to previous study within normal limits for  patients stated age. There are again noted stable bilateral cerebellar  enhancing lesions which previously measured 2.9 x 3.3 x 2.4cm and on the  current study measures 2.8 x 3.4 x 2.8 cm and on the left. This measures  2.7 x 2.3 x1.9 and on the current study measures 2.8 x 2.5 x 1.9 cm.  Increased surrounding edema on axial FLAIR images may be due to post  radiation therapy changes.   There are several supratentorial lesions of enhancement which have  increased in size just above the tentorium on the right which previously  measured 1.9 x 1.3 x 0.9 cm on current study measures 2.2 x 2.1 x 1.2 cm,  left supratentorial region which measured 0.4 x 0.4 and on the current  study measures 1.4 x 0.7 and a right periventricular white matter lesion  which measures 0.6 x 0.7 and on the current study measures 0.9 x 0.6.  Increased edema is also noted bilateral posterior and medial temporal  lobes right greater than left.  Small 0.7 x0.7 cm focus of signal abnormality is again noted in the left  posterior corona radiata and left posterior medulla measuring 0.5 x 0.6  cm, both of which demonstrate blooming artifact on axial gradient echo  sequences as well as a abnormal enhancement and may have represented a  prior hemorrhagic lesions. There is no acute mass effect, midline shift  or hemorrhage.  No extra-axial fluid collections are identified.    There are no acute infarcts on diffusion weighted images.  Expected  signal flow voids are noted in the major intracranial vessels consistent  with theirpatency.    Globes and orbits are grossly within normal limits. The paranasal sinuses  and bilateral mastoid complexes are within normal limits.    There is nobone marrow signal abnormality.  The sella demonstrates CSF  signal onsagittal T1-weighted images and axial T2-weighted images  consistent with a partially empty sella.     Impression:  1.  When compared to previous study dated 11/13/2017 there is slight  interval increase in enhancing lesions in the bilateral supratentorial  regions and right posterior parietal periventricular white matter.  2.  Stable bilateral cerebellar and left corona radiata and left  posterior abdominal enhancing masses.  3.  Increasein edema in the bilateral cerebellum and temporal lobes may  representpostradiation therapy changes.  4.  Follow-up in 2-3 months may be helpful for further evaluation if  clinically indicated.

## 2018-05-21 ENCOUNTER — APPOINTMENT (OUTPATIENT)
Dept: INFUSION THERAPY | Facility: CLINIC | Age: 43
End: 2018-05-21

## 2018-05-21 RX ORDER — DEXAMETHASONE 0.5 MG/5ML
4 ELIXIR ORAL ONCE
Qty: 0 | Refills: 0 | Status: COMPLETED | OUTPATIENT
Start: 2018-05-21 | End: 2018-05-21

## 2018-05-21 RX ADMIN — Medication 4 MILLIGRAM(S): at 15:44

## 2018-05-24 ENCOUNTER — APPOINTMENT (OUTPATIENT)
Dept: INFUSION THERAPY | Facility: CLINIC | Age: 43
End: 2018-05-24

## 2018-05-24 RX ORDER — DEXAMETHASONE 0.5 MG/5ML
4 ELIXIR ORAL ONCE
Qty: 0 | Refills: 0 | Status: COMPLETED | OUTPATIENT
Start: 2018-05-24 | End: 2018-05-24

## 2018-05-24 RX ADMIN — Medication 4 MILLIGRAM(S): at 11:11

## 2018-05-30 ENCOUNTER — LABORATORY RESULT (OUTPATIENT)
Age: 43
End: 2018-05-30

## 2018-05-30 ENCOUNTER — APPOINTMENT (OUTPATIENT)
Dept: INFUSION THERAPY | Facility: CLINIC | Age: 43
End: 2018-05-30

## 2018-05-30 ENCOUNTER — APPOINTMENT (OUTPATIENT)
Dept: HEMATOLOGY ONCOLOGY | Facility: CLINIC | Age: 43
End: 2018-05-30

## 2018-05-30 VITALS
HEIGHT: 67 IN | TEMPERATURE: 97.8 F | WEIGHT: 187 LBS | SYSTOLIC BLOOD PRESSURE: 128 MMHG | DIASTOLIC BLOOD PRESSURE: 68 MMHG | HEART RATE: 66 BPM | RESPIRATION RATE: 14 BRPM | BODY MASS INDEX: 29.35 KG/M2

## 2018-05-30 NOTE — HISTORY OF PRESENT ILLNESS
[Disease: _____________________] : Disease: [unfilled] [AJCC Stage: ____] : AJCC Stage: [unfilled] [de-identified] : 41/M presenting for follow-up for metastatic ALK-positive lung adenocarcinoma.  He is currently on alectinib 600 mg BID.  He initially presented in 11/2014 with multiple pulmonary nodules, mediastinal lymphadenopathy, multiple bony metastases, and brain metastases.  He was initially started on crizotinib and was switched to ceritinib in 12/2015 upon progression.  He was unable to tolerate ceritinib due to significant vomiting and was switched to alectinib in 02/2016.\par \par He had SRS to a left cerebellar hemisphere brain metastasis (08/2015) and subsequently underwent WBRT for CNS progression (10-11/2015).  He had RT to a painful metastatic lesion in the right humerus (01/2015).  He had also received Xgeva for bony metastatic disease in the past. \par \par Since 09/2016, he has been experiencing intermittent headaches being treated with periodic dexamethasone taper.  MRI brain with perfusion analysis showed bilateral cerebellar lesions, favor postRT changes rather than residual tumor.  His most recent brain MRI (11/01/2016) showed overall  stable  exam  compared  to  the  previous  brain  MRI  9/2/2016; no  significant  interval  change  in  bilateral  cerebellar  heterogeneously  enhancing  masses  with  extensive  associated  edema,  as  well  as  smaller  lesions  within  the  left  parietal  white  matter,  superficial  left  temporal  lobe  and  left  dorsal  medulla; and, stable  mild  ventriculomegaly  and  mild  periventricular  FLAIR  signal  abnormality.  He has been following with our neurosurgeon, Dr. Britt, who has recommended no surgical intervention at this time. \par \par His most recent PETCT (11/01/2016) showed no sites of pathologic FDG uptake suspicious for biologic tumor activity.\par \par He reports occasional headaches and a mild sense of imbalance.  Denies any falls.  However he only takes a prn dexamethasone, once in every three days,  which reportedly relieves his intermittent headaches. \par Today he also complains mild pain in his left great toe, likely secondary to ingrowing toe nail. [de-identified] : ALK+ lung adenocarcinoma [Treatment Protocol] : Treatment Protocol [FreeTextEntry1] : Alectinib 600 mg BID [de-identified] : He presented to follow up. He changed his insurance. Did not get scans and did not take his alectinib for about 1-2 weeks. We were not notified.  Otherwise she feels well she really requires dexamethasone yesterday headache probably once or twice a week as per patient no other complaints.\par \par 4/17/17\par He presents for follow up today. He had MRI of brain that showed stable disease 4/8/2017. He has not had the PET CT yet. He received  his alecetinib on 4/4/17 and stated to take them. States his headaches are rare now and feels well otherwise.\par \par 5/15/17\par He presents for follow up. He had a PET CT on 4/21/17 that  was ucnhged.Since November 1, 2016, no new foci of pathologic FDG uptake to suggest\par biologic tumor activity. . 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 . Unchanged non-FDG avid sclerotic foci involving the right proximal humerus\par and T9 vertebral body.\par \par He  feels well. No dizziness or headache.  No new symptoms or complaints.\par \par 6/19/17\par He is doing well. He has no complaints. No headaches. No SOB. \par \par 7/24/17\par Patient comes in for follow up visit, has no complaints, has no SOB and has a mild cough which is chronic, no hemoptysis. Patient states his appetite is good, weight is stable, he has no headaches and is currently off the steroids. Patient will have repeat PET scan and MRI of the brain with contrast as well as repeat bloodwork. \par \par 8/21/17\par He is here for follow up. He had an MR brain and PET CT on 8/3 and 8/2 that did not show progression, were essentially unchanged.  He feels great otherwise.\par \par 9/21/17\par He is doing well. No complaints. No headaches\par \par October 26, 2017\par He see her for followup he feels great he has no complaints.\par \par 11/16/17\par He is here for follow up. He had an MR of brain on 11/14/17: New areas of nodular enhancement along the inferior surfaces of the temporal lobes bilaterally. These are relatively symmetric and just above the level of the cerebellar enhancing masses, suggesting that these may reflect post treatment change.\par He had to reschedule his PET and states he will do it next Wednesday.\par \par No complaints.\par \par 12/26/17\par He is here for follow up. He feels well. he  had PET/CT in end of November that does not show procession, stable pleural findings. He  feels well. \par \par 1/22/18\par He is here for follow up for his NSCLCA. He states he feels well. No SOB, no headaches no fatigue.. \par \par February 21 2018\par  he is here for followup, he had a recent PET/CT scan which is PRESLEY he also had a recent MRI of the brain. This was reviewed at the CNS tumor board although wasn't clear but the consensus was that this is posttreatment changes in the fact that he is asymptomatic and decided to observe him.\par \par He has no new complaints no headaches no weakness or tingling.\par \par \par 3/21/18\par He is here for follow up. HE states he is doing well. Has very mild headaches on occasion but otherwise doing well.\par \par 4/13/18\par He is doing well. Reports no new complaints \par \par 5/3/18\par He is here for follow up to discuss his MR brain. HE complains of mild headache. Same memory issues as before. Balance is the same.  MR brain showed : Increased size of right parietotemporal of the findings enhancement  seen lesion measuring 5.2 cm, previously 2.8 centimeters and increased  size of left inferior temporal lobe mass measuring 2.3 cm, previously 1.6  cm. these areas again demonstrate hypoperfusion and may represent post  therapeutic changes.   2.  Significant increased edema and mass effect within the right  hemisphere with 9 mm right to left midline shift.  3.  No new enhancing lesions. Stable additional bilateral cerebellar and  supratentorial lesions.\par \par I spoke with Dr. 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 He developed severe headaches, some difficulty with speech and balance issues. 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 He has no insurance so my pharmacy has been giving  him a weekly supply while we work on it. He did not have his scans yet. He feel much better now except for dizziness\par \par 5/30/18\par He is feeling much better. His neurological complaints resolved. He stopped steroids on Monday since he had swelling in legs that has resolved.

## 2018-05-30 NOTE — PHYSICAL EXAM
[Restricted in physically strenuous activity but ambulatory and able to carry out work of a light or sedentary nature] : Status 1- Restricted in physically strenuous activity but ambulatory and able to carry out work of a light or sedentary nature, e.g., light house work, office work [Normal] : affect appropriate [de-identified] : he has decrease breath sounds on left side base, unchanged. [de-identified] : he has ptosis in left. Gait is normal good strength  in all llimbs

## 2018-05-30 NOTE — REVIEW OF SYSTEMS
[Fatigue] : no fatigue [Recent Change In Weight] : ~T no recent weight change [Dizziness] : dizziness [Difficulty Walking] : no difficulty walking [Negative] : Heme/Lymph [de-identified] : headaches resolved.

## 2018-05-30 NOTE — RESULTS/DATA
[FreeTextEntry1] : EXAM:  PETCT JORGE ONC FDG SUBS         PROCEDURE DATE:  02/05/2018       INTERPRETATION:  FDG PET CT STUDY   Subsequent  treatment strategy REASON: TUMOR IMAGING - PET with concurrently acquired CT for attenuation  correction and anatomic localization; skull base to mid - thigh .  CPT code 79921.  Fasting blood glucose level:     98 mg/dl  HISTORY: 41-year-old male follow-up for metastatic lung adenocarcinoma.  Had a prior PET/CT on November 2, 2017 which was negative for biologic  tumor activity. Reactive changes were seen status post pleurodesis.  Patient's last radiation therapy was in January 2017. Patient had a  chemotherapy pill taken last night.  TECHNIQUE: Approximately 45 minutes after the intravenous administration  of 9.5 mCi 18-Fluorine FDG, whole body PET images were acquired from base  of skull to mid - thigh.  CT protocol used for this PET/CT study is designed for attenuation  correction and anatomic localization of PET findings.  This  CT  is not designed to replace state-of-the-art diagnostic CT scans for  specific imaging protocols of different body parts.  COMPARISON : November 22, 2017.  FINDINGS:  Head/Neck: No biologically active head/neck lesions.      Normal uptake within the brain, pharyngeal lymphoid tissue, salivary  glands and laryngeal musculature is noted.      Thorax:   No suspicious hypermetabolic mediastinal, hilar, lung  parenchymal lesions. There is resolving posttreatment FDG uptake  circumferentially along the pleural surface of the left hemithorax,  consistent with changes from talc pleurodesis.  Abdomen/Pelvis: Physiologic GI/  activity. No abnormal visceral or  magdalena tracer uptake is present.      Musculoskeletal :  No suspicious hypermetabolic osseous lesions.  Previously described treated sclerotic osseous metastasis in the right  humerus and T9 remain non-FDG avid.  Additional CT findings: None.  IMPRESSION:   No pathologic FDG uptake to suggest biologic tumor activity.  Resolving posttreatment FDG uptake along the pleural surface of the left  hemithorax consistent with changes from talc pleurodesis.     \par \par   PROCEDURE DATE:  02/01/2018       INTERPRETATION:  Clinical History / Reason for exam: Vertigo. History of  lung and brain cancer withbrain metastasis status post radiation therapy.  Technique: Sagittal and axial precontrast T1-weighted images, axial  T2-weighted images, axial FLAIR images, axial gradient echo images, axial  diffusion weighted images, and sagittal, axial and coronal postcontrast  T1-weighted images of the brain were obtained on the 1.5 Radha magnet  after the intravenous administration of 20 cc of gadolinium contrast.  Comparison is made with previous MRI of the brain dated 11/13/2017.   Findings:  The ventricles, basal cisterns and sulcal pattern are  unchanged when compared to previous study within normal limits for  patients stated age. There are again noted stable bilateral cerebellar  enhancing lesions which previously measured 2.9 x 3.3 x 2.4cm and on the  current study measures 2.8 x 3.4 x 2.8 cm and on the left. This measures  2.7 x 2.3 x1.9 and on the current study measures 2.8 x 2.5 x 1.9 cm.  Increased surrounding edema on axial FLAIR images may be due to post  radiation therapy changes.   There are several supratentorial lesions of enhancement which have  increased in size just above the tentorium on the right which previously  measured 1.9 x 1.3 x 0.9 cm on current study measures 2.2 x 2.1 x 1.2 cm,  left supratentorial region which measured 0.4 x 0.4 and on the current  study measures 1.4 x 0.7 and a right periventricular white matter lesion  which measures 0.6 x 0.7 and on the current study measures 0.9 x 0.6.  Increased edema is also noted bilateral posterior and medial temporal  lobes right greater than left.  Small 0.7 x0.7 cm focus of signal abnormality is again noted in the left  posterior corona radiata and left posterior medulla measuring 0.5 x 0.6  cm, both of which demonstrate blooming artifact on axial gradient echo  sequences as well as a abnormal enhancement and may have represented a  prior hemorrhagic lesions. There is no acute mass effect, midline shift  or hemorrhage.  No extra-axial fluid collections are identified.    There are no acute infarcts on diffusion weighted images.  Expected  signal flow voids are noted in the major intracranial vessels consistent  with theirpatency.    Globes and orbits are grossly within normal limits. The paranasal sinuses  and bilateral mastoid complexes are within normal limits.    There is nobone marrow signal abnormality.  The sella demonstrates CSF  signal onsagittal T1-weighted images and axial T2-weighted images  consistent with a partially empty sella.     Impression:  1.  When compared to previous study dated 11/13/2017 there is slight  interval increase in enhancing lesions in the bilateral supratentorial  regions and right posterior parietal periventricular white matter.  2.  Stable bilateral cerebellar and left corona radiata and left  posterior abdominal enhancing masses.  3.  Increasein edema in the bilateral cerebellum and temporal lobes may  representpostradiation therapy changes.  4.  Follow-up in 2-3 months may be helpful for further evaluation if  clinically indicated.

## 2018-05-30 NOTE — ASSESSMENT
[FreeTextEntry1] : 1.  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 Due to insurance issues he stopped it but now is back on it since 4/4/2017\par -repeat MRI brain possibly radiation related changes, post treatment changes, he is asymptomatic,  and repeat PET/CT  is without progression \par 2.   Intermittent headaches mild \par 3.Elevated total bilirubin likely secondary to alectinib. will monitor, its stable\par 4. Suspecting Radiation Necrosis\par \par Plan:\par -C/w Alectinib.  \par -  PET.CT brain to evaluate for tumor progression vs radiation necrosis is pending. It will be whole body as well\par -his suspected radiation necrosis responded to steroids they are now stopped.\par -If progression on PET.CT will start Carbo+ Alimta and Avastin althoguh CNS responses are low. He cant get anymore radiation  unlikely  Neurosurgery can offer anything \par -rtc in 2 weeks post PET/CT\par \par

## 2018-05-31 LAB
ALBUMIN SERPL ELPH-MCNC: 4 G/DL
ALP BLD-CCNC: 120 U/L
ALT SERPL-CCNC: 10 U/L
ANION GAP SERPL CALC-SCNC: 13 MMOL/L
AST SERPL-CCNC: 14 U/L
BILIRUB SERPL-MCNC: 1.6 MG/DL
BUN SERPL-MCNC: 12 MG/DL
CALCIUM SERPL-MCNC: 8.7 MG/DL
CHLORIDE SERPL-SCNC: 103 MMOL/L
CO2 SERPL-SCNC: 26 MMOL/L
CREAT SERPL-MCNC: 0.9 MG/DL
GLUCOSE SERPL-MCNC: 88 MG/DL
HCT VFR BLD CALC: 40 %
HGB BLD-MCNC: 13.2 G/DL
MCHC RBC-ENTMCNC: 29.7 PG
MCHC RBC-ENTMCNC: 33 G/DL
MCV RBC AUTO: 89.9 FL
PLATELET # BLD AUTO: 177 K/UL
PMV BLD: 10.1 FL
POTASSIUM SERPL-SCNC: 4.1 MMOL/L
PROT SERPL-MCNC: 6.3 G/DL
RBC # BLD: 4.45 M/UL
RBC # FLD: 17.4 %
SODIUM SERPL-SCNC: 142 MMOL/L
WBC # FLD AUTO: 10.01 K/UL

## 2018-06-04 ENCOUNTER — FORM ENCOUNTER (OUTPATIENT)
Age: 43
End: 2018-06-04

## 2018-06-05 ENCOUNTER — OUTPATIENT (OUTPATIENT)
Dept: OUTPATIENT SERVICES | Facility: HOSPITAL | Age: 43
LOS: 1 days | Discharge: HOME | End: 2018-06-05

## 2018-06-05 DIAGNOSIS — C78.00 SECONDARY MALIGNANT NEOPLASM OF UNSPECIFIED LUNG: ICD-10-CM

## 2018-06-07 ENCOUNTER — APPOINTMENT (OUTPATIENT)
Dept: HEMATOLOGY ONCOLOGY | Facility: CLINIC | Age: 43
End: 2018-06-07

## 2018-06-13 ENCOUNTER — APPOINTMENT (OUTPATIENT)
Dept: HEMATOLOGY ONCOLOGY | Facility: CLINIC | Age: 43
End: 2018-06-13

## 2018-06-13 VITALS
DIASTOLIC BLOOD PRESSURE: 74 MMHG | BODY MASS INDEX: 28.56 KG/M2 | TEMPERATURE: 97.1 F | HEART RATE: 59 BPM | SYSTOLIC BLOOD PRESSURE: 118 MMHG | WEIGHT: 182 LBS | RESPIRATION RATE: 14 BRPM | HEIGHT: 67 IN

## 2018-06-13 NOTE — HISTORY OF PRESENT ILLNESS
[Disease: _____________________] : Disease: [unfilled] [AJCC Stage: ____] : AJCC Stage: [unfilled] [de-identified] : 41/M presenting for follow-up for metastatic ALK-positive lung adenocarcinoma.  He is currently on alectinib 600 mg BID.  He initially presented in 11/2014 with multiple pulmonary nodules, mediastinal lymphadenopathy, multiple bony metastases, and brain metastases.  He was initially started on crizotinib and was switched to ceritinib in 12/2015 upon progression.  He was unable to tolerate ceritinib due to significant vomiting and was switched to alectinib in 02/2016.\par \par He had SRS to a left cerebellar hemisphere brain metastasis (08/2015) and subsequently underwent WBRT for CNS progression (10-11/2015).  He had RT to a painful metastatic lesion in the right humerus (01/2015).  He had also received Xgeva for bony metastatic disease in the past. \par \par Since 09/2016, he has been experiencing intermittent headaches being treated with periodic dexamethasone taper.  MRI brain with perfusion analysis showed bilateral cerebellar lesions, favor postRT changes rather than residual tumor.  His most recent brain MRI (11/01/2016) showed overall  stable  exam  compared  to  the  previous  brain  MRI  9/2/2016; no  significant  interval  change  in  bilateral  cerebellar  heterogeneously  enhancing  masses  with  extensive  associated  edema,  as  well  as  smaller  lesions  within  the  left  parietal  white  matter,  superficial  left  temporal  lobe  and  left  dorsal  medulla; and, stable  mild  ventriculomegaly  and  mild  periventricular  FLAIR  signal  abnormality.  He has been following with our neurosurgeon, Dr. Britt, who has recommended no surgical intervention at this time. \par \par His most recent PETCT (11/01/2016) showed no sites of pathologic FDG uptake suspicious for biologic tumor activity.\par \par He reports occasional headaches and a mild sense of imbalance.  Denies any falls.  However he only takes a prn dexamethasone, once in every three days,  which reportedly relieves his intermittent headaches. \par Today he also complains mild pain in his left great toe, likely secondary to ingrowing toe nail. [de-identified] : ALK+ lung adenocarcinoma [Treatment Protocol] : Treatment Protocol [FreeTextEntry1] : Alectinib 600 mg BID [de-identified] : He presented to follow up. He changed his insurance. Did not get scans and did not take his alectinib for about 1-2 weeks. We were not notified.  Otherwise she feels well she really requires dexamethasone yesterday headache probably once or twice a week as per patient no other complaints.\par \par 4/17/17\par He presents for follow up today. He had MRI of brain that showed stable disease 4/8/2017. He has not had the PET CT yet. He received  his alecetinib on 4/4/17 and stated to take them. States his headaches are rare now and feels well otherwise.\par \par 5/15/17\par He presents for follow up. He had a PET CT on 4/21/17 that  was ucnhged.Since November 1, 2016, no new foci of pathologic FDG uptake to suggest\par biologic tumor activity. . 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 . Unchanged non-FDG avid sclerotic foci involving the right proximal humerus\par and T9 vertebral body.\par \par He  feels well. No dizziness or headache.  No new symptoms or complaints.\par \par 6/19/17\par He is doing well. He has no complaints. No headaches. No SOB. \par \par 7/24/17\par Patient comes in for follow up visit, has no complaints, has no SOB and has a mild cough which is chronic, no hemoptysis. Patient states his appetite is good, weight is stable, he has no headaches and is currently off the steroids. Patient will have repeat PET scan and MRI of the brain with contrast as well as repeat bloodwork. \par \par 8/21/17\par He is here for follow up. He had an MR brain and PET CT on 8/3 and 8/2 that did not show progression, were essentially unchanged.  He feels great otherwise.\par \par 9/21/17\par He is doing well. No complaints. No headaches\par \par October 26, 2017\par He see her for followup he feels great he has no complaints.\par \par 11/16/17\par He is here for follow up. He had an MR of brain on 11/14/17: New areas of nodular enhancement along the inferior surfaces of the temporal lobes bilaterally. These are relatively symmetric and just above the level of the cerebellar enhancing masses, suggesting that these may reflect post treatment change.\par He had to reschedule his PET and states he will do it next Wednesday.\par \par No complaints.\par \par 12/26/17\par He is here for follow up. He feels well. he  had PET/CT in end of November that does not show procession, stable pleural findings. He  feels well. \par \par 1/22/18\par He is here for follow up for his NSCLCA. He states he feels well. No SOB, no headaches no fatigue.. \par \par February 21 2018\par  he is here for followup, he had a recent PET/CT scan which is PRESLEY he also had a recent MRI of the brain. This was reviewed at the CNS tumor board although wasn't clear but the consensus was that this is posttreatment changes in the fact that he is asymptomatic and decided to observe him.\par \par He has no new complaints no headaches no weakness or tingling.\par \par \par 3/21/18\par He is here for follow up. HE states he is doing well. Has very mild headaches on occasion but otherwise doing well.\par \par 4/13/18\par He is doing well. Reports no new complaints \par \par 5/3/18\par He is here for follow up to discuss his MR brain. HE complains of mild headache. Same memory issues as before. Balance is the same.  MR brain showed : Increased size of right parietotemporal of the findings enhancement  seen lesion measuring 5.2 cm, previously 2.8 centimeters and increased  size of left inferior temporal lobe mass measuring 2.3 cm, previously 1.6  cm. these areas again demonstrate hypoperfusion and may represent post  therapeutic changes.   2.  Significant increased edema and mass effect within the right  hemisphere with 9 mm right to left midline shift.  3.  No new enhancing lesions. Stable additional bilateral cerebellar and  supratentorial lesions.\par \par I spoke with Dr. 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 He developed severe headaches, some difficulty with speech and balance issues. 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 He has no insurance so my pharmacy has been giving  him a weekly supply while we work on it. He did not have his scans yet. He feel much better now except for dizziness\par \par 5/30/18\par He is feeling much better. His neurological complaints resolved. He stopped steroids on Monday since he had swelling in legs that has resolved. \par \par 6/13/18\par He is here for follow up. he had a PET/CT on 6/7/18 that showed These images reveal, when comparing the FDG brain images to MRI of  4/27/2018 series 10, there is one small focus of increased FDG uptake in  the region of the right parietotemporal lobe lesion (series 12 image 34).  Therefore persistent biologic tumor activity in this region cannot be  excluded.  No focal abnormal FDG uptake corresponding with the left inferior  temporal lobe lesion and both cerebellar lesions. In addition most of the  right parietotemporal lobe lesion is not FDG avid Compared to normal brain glucose metabolism in the thalamus (surrogate  for normal brain metabolism) relative hypometabolism of the right  parietal, posterior parietal and occipital region and relative  hypermetabolism of the left posterior parietal region  Persistent posttreatment FDG uptake (SUV up to 11.2) along the pleural  surface of the left hemithorax consistent with posttreatment changes  associated with talc pleurodesis  One new mildly FDG avid soft tissue nodule (SUV 3.3) in the left buttock \par \par He feels well now no headaches.

## 2018-06-13 NOTE — RESULTS/DATA
[FreeTextEntry1] : EXAM:  PETCT JORGE ONC FDG SUBS         PROCEDURE DATE:  02/05/2018       INTERPRETATION:  FDG PET CT STUDY   Subsequent  treatment strategy REASON: TUMOR IMAGING - PET with concurrently acquired CT for attenuation  correction and anatomic localization; skull base to mid - thigh .  CPT code 53259.  Fasting blood glucose level:     98 mg/dl  HISTORY: 41-year-old male follow-up for metastatic lung adenocarcinoma.  Had a prior PET/CT on November 2, 2017 which was negative for biologic  tumor activity. Reactive changes were seen status post pleurodesis.  Patient's last radiation therapy was in January 2017. Patient had a  chemotherapy pill taken last night.  TECHNIQUE: Approximately 45 minutes after the intravenous administration  of 9.5 mCi 18-Fluorine FDG, whole body PET images were acquired from base  of skull to mid - thigh.  CT protocol used for this PET/CT study is designed for attenuation  correction and anatomic localization of PET findings.  This  CT  is not designed to replace state-of-the-art diagnostic CT scans for  specific imaging protocols of different body parts.  COMPARISON : November 22, 2017.  FINDINGS:  Head/Neck: No biologically active head/neck lesions.      Normal uptake within the brain, pharyngeal lymphoid tissue, salivary  glands and laryngeal musculature is noted.      Thorax:   No suspicious hypermetabolic mediastinal, hilar, lung  parenchymal lesions. There is resolving posttreatment FDG uptake  circumferentially along the pleural surface of the left hemithorax,  consistent with changes from talc pleurodesis.  Abdomen/Pelvis: Physiologic GI/  activity. No abnormal visceral or  magdalena tracer uptake is present.      Musculoskeletal :  No suspicious hypermetabolic osseous lesions.  Previously described treated sclerotic osseous metastasis in the right  humerus and T9 remain non-FDG avid.  Additional CT findings: None.  IMPRESSION:   No pathologic FDG uptake to suggest biologic tumor activity.  Resolving posttreatment FDG uptake along the pleural surface of the left  hemithorax consistent with changes from talc pleurodesis.     \par \par   PROCEDURE DATE:  02/01/2018       INTERPRETATION:  Clinical History / Reason for exam: Vertigo. History of  lung and brain cancer withbrain metastasis status post radiation therapy.  Technique: Sagittal and axial precontrast T1-weighted images, axial  T2-weighted images, axial FLAIR images, axial gradient echo images, axial  diffusion weighted images, and sagittal, axial and coronal postcontrast  T1-weighted images of the brain were obtained on the 1.5 Radha magnet  after the intravenous administration of 20 cc of gadolinium contrast.  Comparison is made with previous MRI of the brain dated 11/13/2017.   Findings:  The ventricles, basal cisterns and sulcal pattern are  unchanged when compared to previous study within normal limits for  patients stated age. There are again noted stable bilateral cerebellar  enhancing lesions which previously measured 2.9 x 3.3 x 2.4cm and on the  current study measures 2.8 x 3.4 x 2.8 cm and on the left. This measures  2.7 x 2.3 x1.9 and on the current study measures 2.8 x 2.5 x 1.9 cm.  Increased surrounding edema on axial FLAIR images may be due to post  radiation therapy changes.   There are several supratentorial lesions of enhancement which have  increased in size just above the tentorium on the right which previously  measured 1.9 x 1.3 x 0.9 cm on current study measures 2.2 x 2.1 x 1.2 cm,  left supratentorial region which measured 0.4 x 0.4 and on the current  study measures 1.4 x 0.7 and a right periventricular white matter lesion  which measures 0.6 x 0.7 and on the current study measures 0.9 x 0.6.  Increased edema is also noted bilateral posterior and medial temporal  lobes right greater than left.  Small 0.7 x0.7 cm focus of signal abnormality is again noted in the left  posterior corona radiata and left posterior medulla measuring 0.5 x 0.6  cm, both of which demonstrate blooming artifact on axial gradient echo  sequences as well as a abnormal enhancement and may have represented a  prior hemorrhagic lesions. There is no acute mass effect, midline shift  or hemorrhage.  No extra-axial fluid collections are identified.    There are no acute infarcts on diffusion weighted images.  Expected  signal flow voids are noted in the major intracranial vessels consistent  with theirpatency.    Globes and orbits are grossly within normal limits. The paranasal sinuses  and bilateral mastoid complexes are within normal limits.    There is nobone marrow signal abnormality.  The sella demonstrates CSF  signal onsagittal T1-weighted images and axial T2-weighted images  consistent with a partially empty sella.     Impression:  1.  When compared to previous study dated 11/13/2017 there is slight  interval increase in enhancing lesions in the bilateral supratentorial  regions and right posterior parietal periventricular white matter.  2.  Stable bilateral cerebellar and left corona radiata and left  posterior abdominal enhancing masses.  3.  Increasein edema in the bilateral cerebellum and temporal lobes may  representpostradiation therapy changes.  4.  Follow-up in 2-3 months may be helpful for further evaluation if  clinically indicated.

## 2018-06-13 NOTE — REVIEW OF SYSTEMS
[Fatigue] : no fatigue [Recent Change In Weight] : ~T no recent weight change [Dizziness] : dizziness [Difficulty Walking] : no difficulty walking [Negative] : Heme/Lymph [de-identified] : headaches resolved.

## 2018-06-13 NOTE — ASSESSMENT
[FreeTextEntry1] : 1.  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 Due to insurance issues he stopped it but now is back on it since 4/4/2017\par -repeat MRI brain possibly radiation related changes, post treatment changes, he is asymptomatic,  and repeat PET/CT 6/5/18   is without systemic progression, it confirmed radiation necrosis, there is an area  one small focus of  increased FDG uptake in the region of the right parietotemporal lobe  lesion  but will monitor this. He has no options for directed therapy \par 2.   Intermittent headaches mild, resolved\par 3.Elevated total bilirubin likely secondary to alectinib. will monitor, its stable\par 4.  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  \par - RTC in 1 months \par -will recheck his MR brain and PET/CT in 2-3 months \par

## 2018-06-13 NOTE — PHYSICAL EXAM
[Restricted in physically strenuous activity but ambulatory and able to carry out work of a light or sedentary nature] : Status 1- Restricted in physically strenuous activity but ambulatory and able to carry out work of a light or sedentary nature, e.g., light house work, office work [Normal] : affect appropriate [de-identified] : he has decrease breath sounds on left side base, unchanged. [de-identified] : he has ptosis in left. Gait is normal good strength  in all llimbs

## 2018-07-02 ENCOUNTER — EMERGENCY (EMERGENCY)
Facility: HOSPITAL | Age: 43
LOS: 0 days | Discharge: HOME | End: 2018-07-02
Attending: EMERGENCY MEDICINE | Admitting: EMERGENCY MEDICINE

## 2018-07-02 VITALS
SYSTOLIC BLOOD PRESSURE: 113 MMHG | RESPIRATION RATE: 18 BRPM | OXYGEN SATURATION: 98 % | HEART RATE: 62 BPM | DIASTOLIC BLOOD PRESSURE: 67 MMHG

## 2018-07-02 DIAGNOSIS — Z88.1 ALLERGY STATUS TO OTHER ANTIBIOTIC AGENTS STATUS: ICD-10-CM

## 2018-07-02 DIAGNOSIS — R00.2 PALPITATIONS: ICD-10-CM

## 2018-07-02 DIAGNOSIS — C34.90 MALIGNANT NEOPLASM OF UNSPECIFIED PART OF UNSPECIFIED BRONCHUS OR LUNG: ICD-10-CM

## 2018-07-02 DIAGNOSIS — C79.31 SECONDARY MALIGNANT NEOPLASM OF BRAIN: ICD-10-CM

## 2018-07-02 LAB
ALBUMIN SERPL ELPH-MCNC: 4.2 G/DL — SIGNIFICANT CHANGE UP (ref 3.5–5.2)
ALP SERPL-CCNC: 122 U/L — HIGH (ref 30–115)
ALT FLD-CCNC: 9 U/L — SIGNIFICANT CHANGE UP (ref 0–41)
ANION GAP SERPL CALC-SCNC: 13 MMOL/L — SIGNIFICANT CHANGE UP (ref 7–14)
AST SERPL-CCNC: 17 U/L — SIGNIFICANT CHANGE UP (ref 0–41)
BASOPHILS # BLD AUTO: 0.04 K/UL — SIGNIFICANT CHANGE UP (ref 0–0.2)
BASOPHILS NFR BLD AUTO: 0.6 % — SIGNIFICANT CHANGE UP (ref 0–1)
BILIRUB SERPL-MCNC: 1.5 MG/DL — HIGH (ref 0.2–1.2)
BUN SERPL-MCNC: 13 MG/DL — SIGNIFICANT CHANGE UP (ref 10–20)
CALCIUM SERPL-MCNC: 9 MG/DL — SIGNIFICANT CHANGE UP (ref 8.5–10.1)
CHLORIDE SERPL-SCNC: 101 MMOL/L — SIGNIFICANT CHANGE UP (ref 98–110)
CK MB CFR SERPL CALC: 1.5 NG/ML — SIGNIFICANT CHANGE UP (ref 0.6–6.3)
CK SERPL-CCNC: 77 U/L — SIGNIFICANT CHANGE UP (ref 0–225)
CO2 SERPL-SCNC: 28 MMOL/L — SIGNIFICANT CHANGE UP (ref 17–32)
CREAT SERPL-MCNC: 1.1 MG/DL — SIGNIFICANT CHANGE UP (ref 0.7–1.5)
EOSINOPHIL # BLD AUTO: 0.16 K/UL — SIGNIFICANT CHANGE UP (ref 0–0.7)
EOSINOPHIL NFR BLD AUTO: 2.3 % — SIGNIFICANT CHANGE UP (ref 0–8)
GLUCOSE SERPL-MCNC: 91 MG/DL — SIGNIFICANT CHANGE UP (ref 70–99)
HCT VFR BLD CALC: 36.9 % — LOW (ref 42–52)
HGB BLD-MCNC: 12.2 G/DL — LOW (ref 14–18)
IMM GRANULOCYTES NFR BLD AUTO: 0.3 % — SIGNIFICANT CHANGE UP (ref 0.1–0.3)
LYMPHOCYTES # BLD AUTO: 1.92 K/UL — SIGNIFICANT CHANGE UP (ref 1.2–3.4)
LYMPHOCYTES # BLD AUTO: 27.3 % — SIGNIFICANT CHANGE UP (ref 20.5–51.1)
MCHC RBC-ENTMCNC: 30 PG — SIGNIFICANT CHANGE UP (ref 27–31)
MCHC RBC-ENTMCNC: 33.1 G/DL — SIGNIFICANT CHANGE UP (ref 32–37)
MCV RBC AUTO: 90.9 FL — SIGNIFICANT CHANGE UP (ref 80–94)
MONOCYTES # BLD AUTO: 0.62 K/UL — HIGH (ref 0.1–0.6)
MONOCYTES NFR BLD AUTO: 8.8 % — SIGNIFICANT CHANGE UP (ref 1.7–9.3)
NEUTROPHILS # BLD AUTO: 4.28 K/UL — SIGNIFICANT CHANGE UP (ref 1.4–6.5)
NEUTROPHILS NFR BLD AUTO: 60.7 % — SIGNIFICANT CHANGE UP (ref 42.2–75.2)
NRBC # BLD: 0 /100 WBCS — SIGNIFICANT CHANGE UP (ref 0–0)
PLATELET # BLD AUTO: 208 K/UL — SIGNIFICANT CHANGE UP (ref 130–400)
POTASSIUM SERPL-MCNC: 3.9 MMOL/L — SIGNIFICANT CHANGE UP (ref 3.5–5)
POTASSIUM SERPL-SCNC: 3.9 MMOL/L — SIGNIFICANT CHANGE UP (ref 3.5–5)
PROT SERPL-MCNC: 6.8 G/DL — SIGNIFICANT CHANGE UP (ref 6–8)
RBC # BLD: 4.06 M/UL — LOW (ref 4.7–6.1)
RBC # FLD: 15.8 % — HIGH (ref 11.5–14.5)
SODIUM SERPL-SCNC: 142 MMOL/L — SIGNIFICANT CHANGE UP (ref 135–146)
TROPONIN T SERPL-MCNC: <0.01 NG/ML — SIGNIFICANT CHANGE UP
WBC # BLD: 7.04 K/UL — SIGNIFICANT CHANGE UP (ref 4.8–10.8)
WBC # FLD AUTO: 7.04 K/UL — SIGNIFICANT CHANGE UP (ref 4.8–10.8)

## 2018-07-02 NOTE — ED PROVIDER NOTE - CONDUCTED A DETAILED DISCUSSION WITH PATIENT AND/OR GUARDIAN REGARDING, MDM
radiology results/return to ED if symptoms worsen, persist or questions arise/lab results/need for outpatient follow-up Eustachian tube dysfunction, bilateral    Hypertrophy of tonsils and adenoids    Language barrier    CHAD (obstructive sleep apnea)

## 2018-07-02 NOTE — ED PROVIDER NOTE - NS ED ROS FT
Review of Systems    Constitutional: (-) fever  Cardiovascular: (+) palpitations, (-) chest pain, (-) syncope  Respiratory: (-) cough, (-) shortness of breath  Gastrointestinal: (-) vomiting, (-) diarrhea, (-) abdominal pain  Musculoskeletal: (-) neck pain, (-) back pain, (-) joint pain  Integumentary: (-) rash, (-) edema  Neurological: (-) headache, (-) altered mental status    Except as documented in the HPI, all other systems are negative.

## 2018-07-02 NOTE — ED PROVIDER NOTE - PHYSICAL EXAMINATION
VITAL SIGNS: I have reviewed nursing notes and confirm.  CONSTITUTIONAL: Pt is [place], non-toxic, pleasant male.   SKIN: No rash noted.  EYES: Pink conjunctiva, anicteric, PERRL, EOMI.   ENT: MMM, tongue midline, no exudates.  NECK: Supple, no crepitus, no JVD.  CARD: RRR, no murmurs, equal radial pulses b/l, no tachycardia.   RESP: Decreased breath sounds on the L.   ABD: Soft, NT/ND.  EXT: No edema or calf tenderness.  NEURO: No focal neuro deficits, no ataxia.   PSYCH: Cooperative.

## 2018-07-02 NOTE — ED PROVIDER NOTE - OBJECTIVE STATEMENT
44 y/o M with PMH of stage IV lung CA with mets to the brain s/p chemo and radiation now under control, follows with Dr. Jara monthly, s/p thoracentesis 2 years ago for pleural effusion, presents to ED for evaluation of intermittent palpitations during the night x 2 weeks. Denies CP, SOB, diaphoresis, leg swelling, and back pain.

## 2018-07-02 NOTE — ED ADULT NURSE NOTE - OBJECTIVE STATEMENT
pt comes in with complaints of palpitations on and off for 2 weeks. denies fever. gets blood work done 1x every month due to chemo and radiation for lung ca

## 2018-07-11 ENCOUNTER — APPOINTMENT (OUTPATIENT)
Dept: HEMATOLOGY ONCOLOGY | Facility: CLINIC | Age: 43
End: 2018-07-11

## 2018-07-13 ENCOUNTER — OTHER (OUTPATIENT)
Age: 43
End: 2018-07-13

## 2018-07-16 ENCOUNTER — OUTPATIENT (OUTPATIENT)
Dept: OUTPATIENT SERVICES | Facility: HOSPITAL | Age: 43
LOS: 1 days | Discharge: HOME | End: 2018-07-16

## 2018-07-16 ENCOUNTER — APPOINTMENT (OUTPATIENT)
Dept: HEMATOLOGY ONCOLOGY | Facility: CLINIC | Age: 43
End: 2018-07-16

## 2018-07-16 ENCOUNTER — LABORATORY RESULT (OUTPATIENT)
Age: 43
End: 2018-07-16

## 2018-07-16 VITALS
WEIGHT: 186 LBS | HEART RATE: 64 BPM | TEMPERATURE: 97.6 F | BODY MASS INDEX: 29.19 KG/M2 | DIASTOLIC BLOOD PRESSURE: 74 MMHG | RESPIRATION RATE: 14 BRPM | SYSTOLIC BLOOD PRESSURE: 104 MMHG | HEIGHT: 67 IN

## 2018-07-16 DIAGNOSIS — R51 HEADACHE: ICD-10-CM

## 2018-07-16 DIAGNOSIS — C79.31 SECONDARY MALIGNANT NEOPLASM OF BRAIN: ICD-10-CM

## 2018-07-16 DIAGNOSIS — C34.90 MALIGNANT NEOPLASM OF UNSPECIFIED PART OF UNSPECIFIED BRONCHUS OR LUNG: ICD-10-CM

## 2018-07-16 LAB
HCT VFR BLD CALC: 36.3 %
HGB BLD-MCNC: 12 G/DL
MCHC RBC-ENTMCNC: 30.4 PG
MCHC RBC-ENTMCNC: 33.1 G/DL
MCV RBC AUTO: 91.9 FL
PLATELET # BLD AUTO: 225 K/UL
PMV BLD: 10.9 FL
RBC # BLD: 3.95 M/UL
RBC # FLD: 15.3 %
WBC # FLD AUTO: 7.76 K/UL

## 2018-07-16 NOTE — ASSESSMENT
[FreeTextEntry1] : 1.  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 Due to insurance issues he stopped it but now is back on it since 4/4/2017\par -repeat MRI brain possibly radiation related changes, post treatment changes, he is asymptomatic,  and repeat PET/CT 6/5/18   is without systemic progression, it confirmed radiation necrosis, there is an area  one small focus of  increased FDG uptake in the region of the right parietotemporal lobe  lesion  but will monitor this. He has no options for directed therapy \par 2.   Intermittent headaches mild, resolved\par 3.Elevated total bilirubin likely secondary to alectinib. will monitor, its stable\par 4.  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  \par - RTC in 1 months \par -will recheck his MR brain and PET/CT after next visit\par -cbc. cmp today \par

## 2018-07-16 NOTE — HISTORY OF PRESENT ILLNESS
[Disease: _____________________] : Disease: [unfilled] [AJCC Stage: ____] : AJCC Stage: [unfilled] [Treatment Protocol] : Treatment Protocol [de-identified] : 41/M presenting for follow-up for metastatic ALK-positive lung adenocarcinoma.  He is currently on alectinib 600 mg BID.  He initially presented in 11/2014 with multiple pulmonary nodules, mediastinal lymphadenopathy, multiple bony metastases, and brain metastases.  He was initially started on crizotinib and was switched to ceritinib in 12/2015 upon progression.  He was unable to tolerate ceritinib due to significant vomiting and was switched to alectinib in 02/2016.\par \par He had SRS to a left cerebellar hemisphere brain metastasis (08/2015) and subsequently underwent WBRT for CNS progression (10-11/2015).  He had RT to a painful metastatic lesion in the right humerus (01/2015).  He had also received Xgeva for bony metastatic disease in the past. \par \par Since 09/2016, he has been experiencing intermittent headaches being treated with periodic dexamethasone taper.  MRI brain with perfusion analysis showed bilateral cerebellar lesions, favor postRT changes rather than residual tumor.  His most recent brain MRI (11/01/2016) showed overall  stable  exam  compared  to  the  previous  brain  MRI  9/2/2016; no  significant  interval  change  in  bilateral  cerebellar  heterogeneously  enhancing  masses  with  extensive  associated  edema,  as  well  as  smaller  lesions  within  the  left  parietal  white  matter,  superficial  left  temporal  lobe  and  left  dorsal  medulla; and, stable  mild  ventriculomegaly  and  mild  periventricular  FLAIR  signal  abnormality.  He has been following with our neurosurgeon, Dr. Britt, who has recommended no surgical intervention at this time. \par \par His most recent PETCT (11/01/2016) showed no sites of pathologic FDG uptake suspicious for biologic tumor activity.\par \par He reports occasional headaches and a mild sense of imbalance.  Denies any falls.  However he only takes a prn dexamethasone, once in every three days,  which reportedly relieves his intermittent headaches. \par Today he also complains mild pain in his left great toe, likely secondary to ingrowing toe nail. [de-identified] : ALK+ lung adenocarcinoma [FreeTextEntry1] : Alectinib 600 mg BID [de-identified] : He presented to follow up. He changed his insurance. Did not get scans and did not take his alectinib for about 1-2 weeks. We were not notified.  Otherwise she feels well she really requires dexamethasone yesterday headache probably once or twice a week as per patient no other complaints.\par \par 4/17/17\par He presents for follow up today. He had MRI of brain that showed stable disease 4/8/2017. He has not had the PET CT yet. He received  his alecetinib on 4/4/17 and stated to take them. States his headaches are rare now and feels well otherwise.\par \par 5/15/17\par He presents for follow up. He had a PET CT on 4/21/17 that  was ucnhged.Since November 1, 2016, no new foci of pathologic FDG uptake to suggest\par biologic tumor activity. . 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 . Unchanged non-FDG avid sclerotic foci involving the right proximal humerus\par and T9 vertebral body.\par \par He  feels well. No dizziness or headache.  No new symptoms or complaints.\par \par 6/19/17\par He is doing well. He has no complaints. No headaches. No SOB. \par \par 7/24/17\par Patient comes in for follow up visit, has no complaints, has no SOB and has a mild cough which is chronic, no hemoptysis. Patient states his appetite is good, weight is stable, he has no headaches and is currently off the steroids. Patient will have repeat PET scan and MRI of the brain with contrast as well as repeat bloodwork. \par \par 8/21/17\par He is here for follow up. He had an MR brain and PET CT on 8/3 and 8/2 that did not show progression, were essentially unchanged.  He feels great otherwise.\par \par 9/21/17\par He is doing well. No complaints. No headaches\par \par October 26, 2017\par He see her for followup he feels great he has no complaints.\par \par 11/16/17\par He is here for follow up. He had an MR of brain on 11/14/17: New areas of nodular enhancement along the inferior surfaces of the temporal lobes bilaterally. These are relatively symmetric and just above the level of the cerebellar enhancing masses, suggesting that these may reflect post treatment change.\par He had to reschedule his PET and states he will do it next Wednesday.\par \par No complaints.\par \par 12/26/17\par He is here for follow up. He feels well. he  had PET/CT in end of November that does not show procession, stable pleural findings. He  feels well. \par \par 1/22/18\par He is here for follow up for his NSCLCA. He states he feels well. No SOB, no headaches no fatigue.. \par \par February 21 2018\par  he is here for followup, he had a recent PET/CT scan which is PRESLEY he also had a recent MRI of the brain. This was reviewed at the CNS tumor board although wasn't clear but the consensus was that this is posttreatment changes in the fact that he is asymptomatic and decided to observe him.\par \par He has no new complaints no headaches no weakness or tingling.\par \par \par 3/21/18\par He is here for follow up. HE states he is doing well. Has very mild headaches on occasion but otherwise doing well.\par \par 4/13/18\par He is doing well. Reports no new complaints \par \par 5/3/18\par He is here for follow up to discuss his MR brain. HE complains of mild headache. Same memory issues as before. Balance is the same.  MR brain showed : Increased size of right parietotemporal of the findings enhancement  seen lesion measuring 5.2 cm, previously 2.8 centimeters and increased  size of left inferior temporal lobe mass measuring 2.3 cm, previously 1.6  cm. these areas again demonstrate hypoperfusion and may represent post  therapeutic changes.   2.  Significant increased edema and mass effect within the right  hemisphere with 9 mm right to left midline shift.  3.  No new enhancing lesions. Stable additional bilateral cerebellar and  supratentorial lesions.\par \par I spoke with Dr. 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 He developed severe headaches, some difficulty with speech and balance issues. 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 He has no insurance so my pharmacy has been giving  him a weekly supply while we work on it. He did not have his scans yet. He feel much better now except for dizziness\par \par 5/30/18\par He is feeling much better. His neurological complaints resolved. He stopped steroids on Monday since he had swelling in legs that has resolved. \par \par 6/13/18\par He is here for follow up. he had a PET/CT on 6/7/18 that showed These images reveal, when comparing the FDG brain images to MRI of  4/27/2018 series 10, there is one small focus of increased FDG uptake in  the region of the right parietotemporal lobe lesion (series 12 image 34).  Therefore persistent biologic tumor activity in this region cannot be  excluded.  No focal abnormal FDG uptake corresponding with the left inferior  temporal lobe lesion and both cerebellar lesions. In addition most of the  right parietotemporal lobe lesion is not FDG avid Compared to normal brain glucose metabolism in the thalamus (surrogate  for normal brain metabolism) relative hypometabolism of the right  parietal, posterior parietal and occipital region and relative  hypermetabolism of the left posterior parietal region  Persistent posttreatment FDG uptake (SUV up to 11.2) along the pleural  surface of the left hemithorax consistent with posttreatment changes  associated with talc pleurodesis  One new mildly FDG avid soft tissue nodule (SUV 3.3) in the left buttock \par \par He feels well now ,no headaches.\par \par 7/16/18\par He is here for follow up. doing well. He has no headaches. No new complaints

## 2018-07-16 NOTE — REVIEW OF SYSTEMS
[Dizziness] : dizziness [Fatigue] : no fatigue [Recent Change In Weight] : ~T no recent weight change [Difficulty Walking] : no difficulty walking [Negative] : Integumentary [de-identified] : headaches resolved.

## 2018-07-16 NOTE — RESULTS/DATA
[FreeTextEntry1] : EXAM:  PETCT JORGE ONC FDG SUBS         PROCEDURE DATE:  02/05/2018       INTERPRETATION:  FDG PET CT STUDY   Subsequent  treatment strategy REASON: TUMOR IMAGING - PET with concurrently acquired CT for attenuation  correction and anatomic localization; skull base to mid - thigh .  CPT code 86335.  Fasting blood glucose level:     98 mg/dl  HISTORY: 41-year-old male follow-up for metastatic lung adenocarcinoma.  Had a prior PET/CT on November 2, 2017 which was negative for biologic  tumor activity. Reactive changes were seen status post pleurodesis.  Patient's last radiation therapy was in January 2017. Patient had a  chemotherapy pill taken last night.  TECHNIQUE: Approximately 45 minutes after the intravenous administration  of 9.5 mCi 18-Fluorine FDG, whole body PET images were acquired from base  of skull to mid - thigh.  CT protocol used for this PET/CT study is designed for attenuation  correction and anatomic localization of PET findings.  This  CT  is not designed to replace state-of-the-art diagnostic CT scans for  specific imaging protocols of different body parts.  COMPARISON : November 22, 2017.  FINDINGS:  Head/Neck: No biologically active head/neck lesions.      Normal uptake within the brain, pharyngeal lymphoid tissue, salivary  glands and laryngeal musculature is noted.      Thorax:   No suspicious hypermetabolic mediastinal, hilar, lung  parenchymal lesions. There is resolving posttreatment FDG uptake  circumferentially along the pleural surface of the left hemithorax,  consistent with changes from talc pleurodesis.  Abdomen/Pelvis: Physiologic GI/  activity. No abnormal visceral or  magdalena tracer uptake is present.      Musculoskeletal :  No suspicious hypermetabolic osseous lesions.  Previously described treated sclerotic osseous metastasis in the right  humerus and T9 remain non-FDG avid.  Additional CT findings: None.  IMPRESSION:   No pathologic FDG uptake to suggest biologic tumor activity.  Resolving posttreatment FDG uptake along the pleural surface of the left  hemithorax consistent with changes from talc pleurodesis.     \par \par   PROCEDURE DATE:  02/01/2018       INTERPRETATION:  Clinical History / Reason for exam: Vertigo. History of  lung and brain cancer withbrain metastasis status post radiation therapy.  Technique: Sagittal and axial precontrast T1-weighted images, axial  T2-weighted images, axial FLAIR images, axial gradient echo images, axial  diffusion weighted images, and sagittal, axial and coronal postcontrast  T1-weighted images of the brain were obtained on the 1.5 Radha magnet  after the intravenous administration of 20 cc of gadolinium contrast.  Comparison is made with previous MRI of the brain dated 11/13/2017.   Findings:  The ventricles, basal cisterns and sulcal pattern are  unchanged when compared to previous study within normal limits for  patients stated age. There are again noted stable bilateral cerebellar  enhancing lesions which previously measured 2.9 x 3.3 x 2.4cm and on the  current study measures 2.8 x 3.4 x 2.8 cm and on the left. This measures  2.7 x 2.3 x1.9 and on the current study measures 2.8 x 2.5 x 1.9 cm.  Increased surrounding edema on axial FLAIR images may be due to post  radiation therapy changes.   There are several supratentorial lesions of enhancement which have  increased in size just above the tentorium on the right which previously  measured 1.9 x 1.3 x 0.9 cm on current study measures 2.2 x 2.1 x 1.2 cm,  left supratentorial region which measured 0.4 x 0.4 and on the current  study measures 1.4 x 0.7 and a right periventricular white matter lesion  which measures 0.6 x 0.7 and on the current study measures 0.9 x 0.6.  Increased edema is also noted bilateral posterior and medial temporal  lobes right greater than left.  Small 0.7 x0.7 cm focus of signal abnormality is again noted in the left  posterior corona radiata and left posterior medulla measuring 0.5 x 0.6  cm, both of which demonstrate blooming artifact on axial gradient echo  sequences as well as a abnormal enhancement and may have represented a  prior hemorrhagic lesions. There is no acute mass effect, midline shift  or hemorrhage.  No extra-axial fluid collections are identified.    There are no acute infarcts on diffusion weighted images.  Expected  signal flow voids are noted in the major intracranial vessels consistent  with theirpatency.    Globes and orbits are grossly within normal limits. The paranasal sinuses  and bilateral mastoid complexes are within normal limits.    There is nobone marrow signal abnormality.  The sella demonstrates CSF  signal onsagittal T1-weighted images and axial T2-weighted images  consistent with a partially empty sella.     Impression:  1.  When compared to previous study dated 11/13/2017 there is slight  interval increase in enhancing lesions in the bilateral supratentorial  regions and right posterior parietal periventricular white matter.  2.  Stable bilateral cerebellar and left corona radiata and left  posterior abdominal enhancing masses.  3.  Increasein edema in the bilateral cerebellum and temporal lobes may  representpostradiation therapy changes.  4.  Follow-up in 2-3 months may be helpful for further evaluation if  clinically indicated.

## 2018-07-17 LAB
ALBUMIN SERPL ELPH-MCNC: 4.1 G/DL
ALP BLD-CCNC: 134 U/L
ALT SERPL-CCNC: 8 U/L
ANION GAP SERPL CALC-SCNC: 15 MMOL/L
AST SERPL-CCNC: 16 U/L
BILIRUB SERPL-MCNC: 1.8 MG/DL
BUN SERPL-MCNC: 14 MG/DL
CALCIUM SERPL-MCNC: 9.3 MG/DL
CHLORIDE SERPL-SCNC: 98 MMOL/L
CO2 SERPL-SCNC: 28 MMOL/L
CREAT SERPL-MCNC: 0.9 MG/DL
GLUCOSE SERPL-MCNC: 81 MG/DL
POTASSIUM SERPL-SCNC: 4 MMOL/L
PROT SERPL-MCNC: 7 G/DL
SODIUM SERPL-SCNC: 141 MMOL/L

## 2018-08-13 ENCOUNTER — LABORATORY RESULT (OUTPATIENT)
Age: 43
End: 2018-08-13

## 2018-08-13 ENCOUNTER — APPOINTMENT (OUTPATIENT)
Dept: HEMATOLOGY ONCOLOGY | Facility: CLINIC | Age: 43
End: 2018-08-13

## 2018-08-13 VITALS
HEIGHT: 67 IN | DIASTOLIC BLOOD PRESSURE: 68 MMHG | HEART RATE: 58 BPM | TEMPERATURE: 96.8 F | WEIGHT: 185 LBS | RESPIRATION RATE: 14 BRPM | SYSTOLIC BLOOD PRESSURE: 105 MMHG | BODY MASS INDEX: 29.03 KG/M2

## 2018-08-13 PROBLEM — C34.90 MALIGNANT NEOPLASM OF UNSPECIFIED PART OF UNSPECIFIED BRONCHUS OR LUNG: Chronic | Status: ACTIVE | Noted: 2018-07-02

## 2018-08-16 LAB
ALBUMIN SERPL ELPH-MCNC: 4.4 G/DL
ALP BLD-CCNC: 164 U/L
ALT SERPL-CCNC: 8 U/L
ANION GAP SERPL CALC-SCNC: 15 MMOL/L
AST SERPL-CCNC: 17 U/L
BILIRUB SERPL-MCNC: 1.8 MG/DL
BUN SERPL-MCNC: 9 MG/DL
CALCIUM SERPL-MCNC: 9.1 MG/DL
CHLORIDE SERPL-SCNC: 101 MMOL/L
CO2 SERPL-SCNC: 28 MMOL/L
CREAT SERPL-MCNC: 0.9 MG/DL
GLUCOSE SERPL-MCNC: 89 MG/DL
HCT VFR BLD CALC: 36.6 %
HGB BLD-MCNC: 12.2 G/DL
MCHC RBC-ENTMCNC: 30.6 PG
MCHC RBC-ENTMCNC: 33.3 G/DL
MCV RBC AUTO: 91.7 FL
PLATELET # BLD AUTO: 216 K/UL
PMV BLD: 10.9 FL
POTASSIUM SERPL-SCNC: 4 MMOL/L
PROT SERPL-MCNC: 7.1 G/DL
RBC # BLD: 3.99 M/UL
RBC # FLD: 14.6 %
SODIUM SERPL-SCNC: 144 MMOL/L
WBC # FLD AUTO: 7.69 K/UL

## 2018-08-16 NOTE — HISTORY OF PRESENT ILLNESS
[Disease: _____________________] : Disease: [unfilled] [AJCC Stage: ____] : AJCC Stage: [unfilled] [de-identified] : 41/M presenting for follow-up for metastatic ALK-positive lung adenocarcinoma.  He is currently on alectinib 600 mg BID.  He initially presented in 11/2014 with multiple pulmonary nodules, mediastinal lymphadenopathy, multiple bony metastases, and brain metastases.  He was initially started on crizotinib and was switched to ceritinib in 12/2015 upon progression.  He was unable to tolerate ceritinib due to significant vomiting and was switched to alectinib in 02/2016.\par \par He had SRS to a left cerebellar hemisphere brain metastasis (08/2015) and subsequently underwent WBRT for CNS progression (10-11/2015).  He had RT to a painful metastatic lesion in the right humerus (01/2015).  He had also received Xgeva for bony metastatic disease in the past. \par \par Since 09/2016, he has been experiencing intermittent headaches being treated with periodic dexamethasone taper.  MRI brain with perfusion analysis showed bilateral cerebellar lesions, favor postRT changes rather than residual tumor.  His most recent brain MRI (11/01/2016) showed overall  stable  exam  compared  to  the  previous  brain  MRI  9/2/2016; no  significant  interval  change  in  bilateral  cerebellar  heterogeneously  enhancing  masses  with  extensive  associated  edema,  as  well  as  smaller  lesions  within  the  left  parietal  white  matter,  superficial  left  temporal  lobe  and  left  dorsal  medulla; and, stable  mild  ventriculomegaly  and  mild  periventricular  FLAIR  signal  abnormality.  He has been following with our neurosurgeon, Dr. Britt, who has recommended no surgical intervention at this time. \par \par His most recent PETCT (11/01/2016) showed no sites of pathologic FDG uptake suspicious for biologic tumor activity.\par \par He reports occasional headaches and a mild sense of imbalance.  Denies any falls.  However he only takes a prn dexamethasone, once in every three days,  which reportedly relieves his intermittent headaches. \par Today he also complains mild pain in his left great toe, likely secondary to ingrowing toe nail. [de-identified] : ALK+ lung adenocarcinoma [Treatment Protocol] : Treatment Protocol [FreeTextEntry1] : Alectinib 600 mg BID [de-identified] : He presented to follow up. He changed his insurance. Did not get scans and did not take his alectinib for about 1-2 weeks. We were not notified.  Otherwise she feels well she really requires dexamethasone yesterday headache probably once or twice a week as per patient no other complaints.\par \par 4/17/17\par He presents for follow up today. He had MRI of brain that showed stable disease 4/8/2017. He has not had the PET CT yet. He received  his alecetinib on 4/4/17 and stated to take them. States his headaches are rare now and feels well otherwise.\par \par 5/15/17\par He presents for follow up. He had a PET CT on 4/21/17 that  was ucnhged.Since November 1, 2016, no new foci of pathologic FDG uptake to suggest\par biologic tumor activity. . 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 . Unchanged non-FDG avid sclerotic foci involving the right proximal humerus\par and T9 vertebral body.\par \par He  feels well. No dizziness or headache.  No new symptoms or complaints.\par \par 6/19/17\par He is doing well. He has no complaints. No headaches. No SOB. \par \par 7/24/17\par Patient comes in for follow up visit, has no complaints, has no SOB and has a mild cough which is chronic, no hemoptysis. Patient states his appetite is good, weight is stable, he has no headaches and is currently off the steroids. Patient will have repeat PET scan and MRI of the brain with contrast as well as repeat bloodwork. \par \par 8/21/17\par He is here for follow up. He had an MR brain and PET CT on 8/3 and 8/2 that did not show progression, were essentially unchanged.  He feels great otherwise.\par \par 9/21/17\par He is doing well. No complaints. No headaches\par \par October 26, 2017\par He see her for followup he feels great he has no complaints.\par \par 11/16/17\par He is here for follow up. He had an MR of brain on 11/14/17: New areas of nodular enhancement along the inferior surfaces of the temporal lobes bilaterally. These are relatively symmetric and just above the level of the cerebellar enhancing masses, suggesting that these may reflect post treatment change.\par He had to reschedule his PET and states he will do it next Wednesday.\par \par No complaints.\par \par 12/26/17\par He is here for follow up. He feels well. he  had PET/CT in end of November that does not show procession, stable pleural findings. He  feels well. \par \par 1/22/18\par He is here for follow up for his NSCLCA. He states he feels well. No SOB, no headaches no fatigue.. \par \par February 21 2018\par  he is here for followup, he had a recent PET/CT scan which is PRESLEY he also had a recent MRI of the brain. This was reviewed at the CNS tumor board although wasn't clear but the consensus was that this is posttreatment changes in the fact that he is asymptomatic and decided to observe him.\par \par He has no new complaints no headaches no weakness or tingling.\par \par \par 3/21/18\par He is here for follow up. HE states he is doing well. Has very mild headaches on occasion but otherwise doing well.\par \par 4/13/18\par He is doing well. Reports no new complaints \par \par 5/3/18\par He is here for follow up to discuss his MR brain. HE complains of mild headache. Same memory issues as before. Balance is the same.  MR brain showed : Increased size of right parietotemporal of the findings enhancement  seen lesion measuring 5.2 cm, previously 2.8 centimeters and increased  size of left inferior temporal lobe mass measuring 2.3 cm, previously 1.6  cm. these areas again demonstrate hypoperfusion and may represent post  therapeutic changes.   2.  Significant increased edema and mass effect within the right  hemisphere with 9 mm right to left midline shift.  3.  No new enhancing lesions. Stable additional bilateral cerebellar and  supratentorial lesions.\par \par I spoke with Dr. 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 He developed severe headaches, some difficulty with speech and balance issues. 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 He has no insurance so my pharmacy has been giving  him a weekly supply while we work on it. He did not have his scans yet. He feel much better now except for dizziness\par \par 5/30/18\par He is feeling much better. His neurological complaints resolved. He stopped steroids on Monday since he had swelling in legs that has resolved. \par \par 6/13/18\par He is here for follow up. he had a PET/CT on 6/7/18 that showed These images reveal, when comparing the FDG brain images to MRI of  4/27/2018 series 10, there is one small focus of increased FDG uptake in  the region of the right parietotemporal lobe lesion (series 12 image 34).  Therefore persistent biologic tumor activity in this region cannot be  excluded.  No focal abnormal FDG uptake corresponding with the left inferior  temporal lobe lesion and both cerebellar lesions. In addition most of the  right parietotemporal lobe lesion is not FDG avid Compared to normal brain glucose metabolism in the thalamus (surrogate  for normal brain metabolism) relative hypometabolism of the right  parietal, posterior parietal and occipital region and relative  hypermetabolism of the left posterior parietal region  Persistent posttreatment FDG uptake (SUV up to 11.2) along the pleural  surface of the left hemithorax consistent with posttreatment changes  associated with talc pleurodesis  One new mildly FDG avid soft tissue nodule (SUV 3.3) in the left buttock \par \par He feels well now ,no headaches.\par \par 7/16/18\par He is here for follow up. doing well. He has no headaches. No new complaints\par \par 8/13/18\par He is here for follow up.  He is doing well. has no complaints. He is considering returning to work.

## 2018-08-16 NOTE — ASSESSMENT
[FreeTextEntry1] : 1.  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 Due to insurance issues he stopped it but now is back on it since 4/4/2017\par -repeat MRI brain possibly radiation related changes, post treatment changes, he is asymptomatic,  and repeat PET/CT 6/5/18   is without systemic progression, it confirmed radiation necrosis, there is an area  one small focus of  increased FDG uptake in the region of the right parietotemporal lobe  lesion  but will monitor this. He has no options for directed therapy \par 2.   Intermittent headaches mild, resolved\par 3.Elevated total bilirubin likely secondary to alectinib. will monitor, its stable\par 4.  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  \par - RTC in 1 months \par -will recheck his MR brain and PET/CT prior to next visit.. \par -cbc. cmp today \par

## 2018-08-16 NOTE — RESULTS/DATA
[FreeTextEntry1] : EXAM:  PETCT JORGE ONC FDG SUBS         PROCEDURE DATE:  02/05/2018       INTERPRETATION:  FDG PET CT STUDY   Subsequent  treatment strategy REASON: TUMOR IMAGING - PET with concurrently acquired CT for attenuation  correction and anatomic localization; skull base to mid - thigh .  CPT code 89406.  Fasting blood glucose level:     98 mg/dl  HISTORY: 41-year-old male follow-up for metastatic lung adenocarcinoma.  Had a prior PET/CT on November 2, 2017 which was negative for biologic  tumor activity. Reactive changes were seen status post pleurodesis.  Patient's last radiation therapy was in January 2017. Patient had a  chemotherapy pill taken last night.  TECHNIQUE: Approximately 45 minutes after the intravenous administration  of 9.5 mCi 18-Fluorine FDG, whole body PET images were acquired from base  of skull to mid - thigh.  CT protocol used for this PET/CT study is designed for attenuation  correction and anatomic localization of PET findings.  This  CT  is not designed to replace state-of-the-art diagnostic CT scans for  specific imaging protocols of different body parts.  COMPARISON : November 22, 2017.  FINDINGS:  Head/Neck: No biologically active head/neck lesions.      Normal uptake within the brain, pharyngeal lymphoid tissue, salivary  glands and laryngeal musculature is noted.      Thorax:   No suspicious hypermetabolic mediastinal, hilar, lung  parenchymal lesions. There is resolving posttreatment FDG uptake  circumferentially along the pleural surface of the left hemithorax,  consistent with changes from talc pleurodesis.  Abdomen/Pelvis: Physiologic GI/  activity. No abnormal visceral or  magdalena tracer uptake is present.      Musculoskeletal :  No suspicious hypermetabolic osseous lesions.  Previously described treated sclerotic osseous metastasis in the right  humerus and T9 remain non-FDG avid.  Additional CT findings: None.  IMPRESSION:   No pathologic FDG uptake to suggest biologic tumor activity.  Resolving posttreatment FDG uptake along the pleural surface of the left  hemithorax consistent with changes from talc pleurodesis.     \par \par   PROCEDURE DATE:  02/01/2018       INTERPRETATION:  Clinical History / Reason for exam: Vertigo. History of  lung and brain cancer withbrain metastasis status post radiation therapy.  Technique: Sagittal and axial precontrast T1-weighted images, axial  T2-weighted images, axial FLAIR images, axial gradient echo images, axial  diffusion weighted images, and sagittal, axial and coronal postcontrast  T1-weighted images of the brain were obtained on the 1.5 Radha magnet  after the intravenous administration of 20 cc of gadolinium contrast.  Comparison is made with previous MRI of the brain dated 11/13/2017.   Findings:  The ventricles, basal cisterns and sulcal pattern are  unchanged when compared to previous study within normal limits for  patients stated age. There are again noted stable bilateral cerebellar  enhancing lesions which previously measured 2.9 x 3.3 x 2.4cm and on the  current study measures 2.8 x 3.4 x 2.8 cm and on the left. This measures  2.7 x 2.3 x1.9 and on the current study measures 2.8 x 2.5 x 1.9 cm.  Increased surrounding edema on axial FLAIR images may be due to post  radiation therapy changes.   There are several supratentorial lesions of enhancement which have  increased in size just above the tentorium on the right which previously  measured 1.9 x 1.3 x 0.9 cm on current study measures 2.2 x 2.1 x 1.2 cm,  left supratentorial region which measured 0.4 x 0.4 and on the current  study measures 1.4 x 0.7 and a right periventricular white matter lesion  which measures 0.6 x 0.7 and on the current study measures 0.9 x 0.6.  Increased edema is also noted bilateral posterior and medial temporal  lobes right greater than left.  Small 0.7 x0.7 cm focus of signal abnormality is again noted in the left  posterior corona radiata and left posterior medulla measuring 0.5 x 0.6  cm, both of which demonstrate blooming artifact on axial gradient echo  sequences as well as a abnormal enhancement and may have represented a  prior hemorrhagic lesions. There is no acute mass effect, midline shift  or hemorrhage.  No extra-axial fluid collections are identified.    There are no acute infarcts on diffusion weighted images.  Expected  signal flow voids are noted in the major intracranial vessels consistent  with theirpatency.    Globes and orbits are grossly within normal limits. The paranasal sinuses  and bilateral mastoid complexes are within normal limits.    There is nobone marrow signal abnormality.  The sella demonstrates CSF  signal onsagittal T1-weighted images and axial T2-weighted images  consistent with a partially empty sella.     Impression:  1.  When compared to previous study dated 11/13/2017 there is slight  interval increase in enhancing lesions in the bilateral supratentorial  regions and right posterior parietal periventricular white matter.  2.  Stable bilateral cerebellar and left corona radiata and left  posterior abdominal enhancing masses.  3.  Increasein edema in the bilateral cerebellum and temporal lobes may  representpostradiation therapy changes.  4.  Follow-up in 2-3 months may be helpful for further evaluation if  clinically indicated.

## 2018-08-16 NOTE — REVIEW OF SYSTEMS
[Fatigue] : no fatigue [Recent Change In Weight] : ~T no recent weight change [Dizziness] : dizziness [Difficulty Walking] : no difficulty walking [Negative] : Heme/Lymph [de-identified] : headaches resolved.

## 2018-08-16 NOTE — PHYSICAL EXAM
[Restricted in physically strenuous activity but ambulatory and able to carry out work of a light or sedentary nature] : Status 1- Restricted in physically strenuous activity but ambulatory and able to carry out work of a light or sedentary nature, e.g., light house work, office work [Normal] : affect appropriate [de-identified] : he has decrease breath sounds on left side base, unchanged. [de-identified] :  Gait is normal good strength  in all limbs

## 2018-08-20 ENCOUNTER — RX RENEWAL (OUTPATIENT)
Age: 43
End: 2018-08-20

## 2018-08-23 ENCOUNTER — OUTPATIENT (OUTPATIENT)
Dept: OUTPATIENT SERVICES | Facility: HOSPITAL | Age: 43
LOS: 1 days | Discharge: HOME | End: 2018-08-23

## 2018-08-23 DIAGNOSIS — I67.89 OTHER CEREBROVASCULAR DISEASE: ICD-10-CM

## 2018-09-10 ENCOUNTER — APPOINTMENT (OUTPATIENT)
Dept: HEMATOLOGY ONCOLOGY | Facility: CLINIC | Age: 43
End: 2018-09-10

## 2018-09-10 ENCOUNTER — LABORATORY RESULT (OUTPATIENT)
Age: 43
End: 2018-09-10

## 2018-09-10 VITALS
HEART RATE: 62 BPM | WEIGHT: 184 LBS | SYSTOLIC BLOOD PRESSURE: 100 MMHG | HEIGHT: 67 IN | TEMPERATURE: 97.6 F | BODY MASS INDEX: 28.88 KG/M2 | DIASTOLIC BLOOD PRESSURE: 70 MMHG | RESPIRATION RATE: 14 BRPM

## 2018-09-11 LAB
ALBUMIN SERPL ELPH-MCNC: 4.7 G/DL
ALP BLD-CCNC: 136 U/L
ALT SERPL-CCNC: 8 U/L
ANION GAP SERPL CALC-SCNC: 16 MMOL/L
AST SERPL-CCNC: 15 U/L
BILIRUB SERPL-MCNC: 1.8 MG/DL
BUN SERPL-MCNC: 11 MG/DL
CALCIUM SERPL-MCNC: 9.2 MG/DL
CHLORIDE SERPL-SCNC: 100 MMOL/L
CO2 SERPL-SCNC: 25 MMOL/L
CREAT SERPL-MCNC: 0.9 MG/DL
GLUCOSE SERPL-MCNC: 81 MG/DL
HCT VFR BLD CALC: 38 %
HGB BLD-MCNC: 12.3 G/DL
MCHC RBC-ENTMCNC: 29.6 PG
MCHC RBC-ENTMCNC: 32.4 G/DL
MCV RBC AUTO: 91.6 FL
PLATELET # BLD AUTO: 232 K/UL
PMV BLD: 11.5 FL
POTASSIUM SERPL-SCNC: 3.8 MMOL/L
PROT SERPL-MCNC: 7.4 G/DL
RBC # BLD: 4.15 M/UL
RBC # FLD: 14.5 %
SODIUM SERPL-SCNC: 141 MMOL/L
WBC # FLD AUTO: 7.21 K/UL

## 2018-09-14 NOTE — REVIEW OF SYSTEMS
[Fatigue] : no fatigue [Recent Change In Weight] : ~T no recent weight change [Dizziness] : dizziness [Difficulty Walking] : no difficulty walking [Negative] : Heme/Lymph [de-identified] : headaches resolved., memory issues

## 2018-09-14 NOTE — HISTORY OF PRESENT ILLNESS
[Disease: _____________________] : Disease: [unfilled] [AJCC Stage: ____] : AJCC Stage: [unfilled] [de-identified] : 41/M presenting for follow-up for metastatic ALK-positive lung adenocarcinoma.  He is currently on alectinib 600 mg BID.  He initially presented in 11/2014 with multiple pulmonary nodules, mediastinal lymphadenopathy, multiple bony metastases, and brain metastases.  He was initially started on crizotinib and was switched to ceritinib in 12/2015 upon progression.  He was unable to tolerate ceritinib due to significant vomiting and was switched to alectinib in 02/2016.\par \par He had SRS to a left cerebellar hemisphere brain metastasis (08/2015) and subsequently underwent WBRT for CNS progression (10-11/2015).  He had RT to a painful metastatic lesion in the right humerus (01/2015).  He had also received Xgeva for bony metastatic disease in the past. \par \par Since 09/2016, he has been experiencing intermittent headaches being treated with periodic dexamethasone taper.  MRI brain with perfusion analysis showed bilateral cerebellar lesions, favor postRT changes rather than residual tumor.  His most recent brain MRI (11/01/2016) showed overall  stable  exam  compared  to  the  previous  brain  MRI  9/2/2016; no  significant  interval  change  in  bilateral  cerebellar  heterogeneously  enhancing  masses  with  extensive  associated  edema,  as  well  as  smaller  lesions  within  the  left  parietal  white  matter,  superficial  left  temporal  lobe  and  left  dorsal  medulla; and, stable  mild  ventriculomegaly  and  mild  periventricular  FLAIR  signal  abnormality.  He has been following with our neurosurgeon, Dr. Britt, who has recommended no surgical intervention at this time. \par \par His most recent PETCT (11/01/2016) showed no sites of pathologic FDG uptake suspicious for biologic tumor activity.\par \par He reports occasional headaches and a mild sense of imbalance.  Denies any falls.  However he only takes a prn dexamethasone, once in every three days,  which reportedly relieves his intermittent headaches. \par Today he also complains mild pain in his left great toe, likely secondary to ingrowing toe nail. [de-identified] : ALK+ lung adenocarcinoma [Treatment Protocol] : Treatment Protocol [FreeTextEntry1] : Alectinib 600 mg BID [de-identified] : He presented to follow up. He changed his insurance. Did not get scans and did not take his alectinib for about 1-2 weeks. We were not notified.  Otherwise she feels well she really requires dexamethasone yesterday headache probably once or twice a week as per patient no other complaints.\par \par 4/17/17\par He presents for follow up today. He had MRI of brain that showed stable disease 4/8/2017. He has not had the PET CT yet. He received  his alecetinib on 4/4/17 and stated to take them. States his headaches are rare now and feels well otherwise.\par \par 5/15/17\par He presents for follow up. He had a PET CT on 4/21/17 that  was ucnhged.Since November 1, 2016, no new foci of pathologic FDG uptake to suggest\par biologic tumor activity. . 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 . Unchanged non-FDG avid sclerotic foci involving the right proximal humerus\par and T9 vertebral body.\par \par He  feels well. No dizziness or headache.  No new symptoms or complaints.\par \par 6/19/17\par He is doing well. He has no complaints. No headaches. No SOB. \par \par 7/24/17\par Patient comes in for follow up visit, has no complaints, has no SOB and has a mild cough which is chronic, no hemoptysis. Patient states his appetite is good, weight is stable, he has no headaches and is currently off the steroids. Patient will have repeat PET scan and MRI of the brain with contrast as well as repeat bloodwork. \par \par 8/21/17\par He is here for follow up. He had an MR brain and PET CT on 8/3 and 8/2 that did not show progression, were essentially unchanged.  He feels great otherwise.\par \par 9/21/17\par He is doing well. No complaints. No headaches\par \par October 26, 2017\par He see her for followup he feels great he has no complaints.\par \par 11/16/17\par He is here for follow up. He had an MR of brain on 11/14/17: New areas of nodular enhancement along the inferior surfaces of the temporal lobes bilaterally. These are relatively symmetric and just above the level of the cerebellar enhancing masses, suggesting that these may reflect post treatment change.\par He had to reschedule his PET and states he will do it next Wednesday.\par \par No complaints.\par \par 12/26/17\par He is here for follow up. He feels well. he  had PET/CT in end of November that does not show procession, stable pleural findings. He  feels well. \par \par 1/22/18\par He is here for follow up for his NSCLCA. He states he feels well. No SOB, no headaches no fatigue.. \par \par February 21 2018\par  he is here for followup, he had a recent PET/CT scan which is PRESLEY he also had a recent MRI of the brain. This was reviewed at the CNS tumor board although wasn't clear but the consensus was that this is posttreatment changes in the fact that he is asymptomatic and decided to observe him.\par \par He has no new complaints no headaches no weakness or tingling.\par \par \par 3/21/18\par He is here for follow up. HE states he is doing well. Has very mild headaches on occasion but otherwise doing well.\par \par 4/13/18\par He is doing well. Reports no new complaints \par \par 5/3/18\par He is here for follow up to discuss his MR brain. HE complains of mild headache. Same memory issues as before. Balance is the same.  MR brain showed : Increased size of right parietotemporal of the findings enhancement  seen lesion measuring 5.2 cm, previously 2.8 centimeters and increased  size of left inferior temporal lobe mass measuring 2.3 cm, previously 1.6  cm. these areas again demonstrate hypoperfusion and may represent post  therapeutic changes.   2.  Significant increased edema and mass effect within the right  hemisphere with 9 mm right to left midline shift.  3.  No new enhancing lesions. Stable additional bilateral cerebellar and  supratentorial lesions.\par \par I spoke with Dr. 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 He developed severe headaches, some difficulty with speech and balance issues. 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 He has no insurance so my pharmacy has been giving  him a weekly supply while we work on it. He did not have his scans yet. He feel much better now except for dizziness\par \par 5/30/18\par He is feeling much better. His neurological complaints resolved. He stopped steroids on Monday since he had swelling in legs that has resolved. \par \par 6/13/18\par He is here for follow up. he had a PET/CT on 6/7/18 that showed These images reveal, when comparing the FDG brain images to MRI of  4/27/2018 series 10, there is one small focus of increased FDG uptake in  the region of the right parietotemporal lobe lesion (series 12 image 34).  Therefore persistent biologic tumor activity in this region cannot be  excluded.  No focal abnormal FDG uptake corresponding with the left inferior  temporal lobe lesion and both cerebellar lesions. In addition most of the  right parietotemporal lobe lesion is not FDG avid Compared to normal brain glucose metabolism in the thalamus (surrogate  for normal brain metabolism) relative hypometabolism of the right  parietal, posterior parietal and occipital region and relative  hypermetabolism of the left posterior parietal region  Persistent posttreatment FDG uptake (SUV up to 11.2) along the pleural  surface of the left hemithorax consistent with posttreatment changes  associated with talc pleurodesis  One new mildly FDG avid soft tissue nodule (SUV 3.3) in the left buttock \par \par He feels well now ,no headaches.\par \par 7/16/18\par He is here for follow up. doing well. He has no headaches. No new complaints\par \par 8/13/18\par He is here for follow up.  He is doing well. has no complaints. He is considering returning to work.\par \par 9/10/18\par He is here for follow up. He had an MR brain on 8/24/18 that showed In comparison to the previous brain MRI dated 4/27/2018:  1.  No significant interval change in the size and configuration of the  heterogeneously enhancing, necrotic and hemorrhagic lesions within the  temporal/occipital lobes and cerebellar hemispheres. Enhancement pattern  suggests post-therapeutic changes.  2.  Moderately increased edema within the posterior fossa (cerebellar  hemispheres and brainstem). Increased mass effect upon the fourth  ventricle with no evidence of hydrocephalus.  3.  The left cerebral hemispheric edema has mildly increased and the  right cerebral hemispheric edema has mildly decreased.   4.  Resolution of the previously seen right to left midline shift, right  lateral ventricular compression and left lateral ventricular enlargement.  5.  Treated lesions again noted in the left frontal white matter and left  lateral medulla, without contrast enhancement.\par \par HE is doing well has no new complaints. Has no neuroglial complaints.

## 2018-09-14 NOTE — PHYSICAL EXAM
[Restricted in physically strenuous activity but ambulatory and able to carry out work of a light or sedentary nature] : Status 1- Restricted in physically strenuous activity but ambulatory and able to carry out work of a light or sedentary nature, e.g., light house work, office work [Normal] : affect appropriate [de-identified] : he has decrease breath sounds on left side base, unchanged. [de-identified] :  Gait is normal good strength  in all limbs

## 2018-09-14 NOTE — ASSESSMENT
[FreeTextEntry1] : 1.  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 Due to insurance issues he stopped it but now is back on it since 4/4/2017\par -repeat MRI brain [id number] radiation related changes, post treatment changes, he is asymptomatic,  and repeat PET/CT 6/5/18   is without systemic progression, it confirmed radiation necrosis\par 2.   Intermittent headaches mild, resolved\par 3.Elevated total bilirubin likely secondary to alectinib. will monitor, its stable\par 4.  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  \par - RTC in 1 months \par -will recheck PET/CT he did not obtain it since last visit of\par -cbc. cmp today \par

## 2018-09-14 NOTE — RESULTS/DATA
[FreeTextEntry1] : EXAM:  PETCT JORGE ONC FDG SUBS         PROCEDURE DATE:  02/05/2018       INTERPRETATION:  FDG PET CT STUDY   Subsequent  treatment strategy REASON: TUMOR IMAGING - PET with concurrently acquired CT for attenuation  correction and anatomic localization; skull base to mid - thigh .  CPT code 02920.  Fasting blood glucose level:     98 mg/dl  HISTORY: 41-year-old male follow-up for metastatic lung adenocarcinoma.  Had a prior PET/CT on November 2, 2017 which was negative for biologic  tumor activity. Reactive changes were seen status post pleurodesis.  Patient's last radiation therapy was in January 2017. Patient had a  chemotherapy pill taken last night.  TECHNIQUE: Approximately 45 minutes after the intravenous administration  of 9.5 mCi 18-Fluorine FDG, whole body PET images were acquired from base  of skull to mid - thigh.  CT protocol used for this PET/CT study is designed for attenuation  correction and anatomic localization of PET findings.  This  CT  is not designed to replace state-of-the-art diagnostic CT scans for  specific imaging protocols of different body parts.  COMPARISON : November 22, 2017.  FINDINGS:  Head/Neck: No biologically active head/neck lesions.      Normal uptake within the brain, pharyngeal lymphoid tissue, salivary  glands and laryngeal musculature is noted.      Thorax:   No suspicious hypermetabolic mediastinal, hilar, lung  parenchymal lesions. There is resolving posttreatment FDG uptake  circumferentially along the pleural surface of the left hemithorax,  consistent with changes from talc pleurodesis.  Abdomen/Pelvis: Physiologic GI/  activity. No abnormal visceral or  magdalena tracer uptake is present.      Musculoskeletal :  No suspicious hypermetabolic osseous lesions.  Previously described treated sclerotic osseous metastasis in the right  humerus and T9 remain non-FDG avid.  Additional CT findings: None.  IMPRESSION:   No pathologic FDG uptake to suggest biologic tumor activity.  Resolving posttreatment FDG uptake along the pleural surface of the left  hemithorax consistent with changes from talc pleurodesis.     \par \par   PROCEDURE DATE:  02/01/2018       INTERPRETATION:  Clinical History / Reason for exam: Vertigo. History of  lung and brain cancer withbrain metastasis status post radiation therapy.  Technique: Sagittal and axial precontrast T1-weighted images, axial  T2-weighted images, axial FLAIR images, axial gradient echo images, axial  diffusion weighted images, and sagittal, axial and coronal postcontrast  T1-weighted images of the brain were obtained on the 1.5 Radha magnet  after the intravenous administration of 20 cc of gadolinium contrast.  Comparison is made with previous MRI of the brain dated 11/13/2017.   Findings:  The ventricles, basal cisterns and sulcal pattern are  unchanged when compared to previous study within normal limits for  patients stated age. There are again noted stable bilateral cerebellar  enhancing lesions which previously measured 2.9 x 3.3 x 2.4cm and on the  current study measures 2.8 x 3.4 x 2.8 cm and on the left. This measures  2.7 x 2.3 x1.9 and on the current study measures 2.8 x 2.5 x 1.9 cm.  Increased surrounding edema on axial FLAIR images may be due to post  radiation therapy changes.   There are several supratentorial lesions of enhancement which have  increased in size just above the tentorium on the right which previously  measured 1.9 x 1.3 x 0.9 cm on current study measures 2.2 x 2.1 x 1.2 cm,  left supratentorial region which measured 0.4 x 0.4 and on the current  study measures 1.4 x 0.7 and a right periventricular white matter lesion  which measures 0.6 x 0.7 and on the current study measures 0.9 x 0.6.  Increased edema is also noted bilateral posterior and medial temporal  lobes right greater than left.  Small 0.7 x0.7 cm focus of signal abnormality is again noted in the left  posterior corona radiata and left posterior medulla measuring 0.5 x 0.6  cm, both of which demonstrate blooming artifact on axial gradient echo  sequences as well as a abnormal enhancement and may have represented a  prior hemorrhagic lesions. There is no acute mass effect, midline shift  or hemorrhage.  No extra-axial fluid collections are identified.    There are no acute infarcts on diffusion weighted images.  Expected  signal flow voids are noted in the major intracranial vessels consistent  with theirpatency.    Globes and orbits are grossly within normal limits. The paranasal sinuses  and bilateral mastoid complexes are within normal limits.    There is nobone marrow signal abnormality.  The sella demonstrates CSF  signal onsagittal T1-weighted images and axial T2-weighted images  consistent with a partially empty sella.     Impression:  1.  When compared to previous study dated 11/13/2017 there is slight  interval increase in enhancing lesions in the bilateral supratentorial  regions and right posterior parietal periventricular white matter.  2.  Stable bilateral cerebellar and left corona radiata and left  posterior abdominal enhancing masses.  3.  Increasein edema in the bilateral cerebellum and temporal lobes may  representpostradiation therapy changes.  4.  Follow-up in 2-3 months may be helpful for further evaluation if  clinically indicated.

## 2018-09-27 ENCOUNTER — FORM ENCOUNTER (OUTPATIENT)
Age: 43
End: 2018-09-27

## 2018-09-28 ENCOUNTER — OUTPATIENT (OUTPATIENT)
Dept: OUTPATIENT SERVICES | Facility: HOSPITAL | Age: 43
LOS: 1 days | Discharge: HOME | End: 2018-09-28

## 2018-09-28 DIAGNOSIS — C78.00 SECONDARY MALIGNANT NEOPLASM OF UNSPECIFIED LUNG: ICD-10-CM

## 2018-09-28 LAB — GLUCOSE BLDC GLUCOMTR-MCNC: 100 MG/DL — HIGH (ref 70–99)

## 2018-10-01 ENCOUNTER — LABORATORY RESULT (OUTPATIENT)
Age: 43
End: 2018-10-01

## 2018-10-01 ENCOUNTER — APPOINTMENT (OUTPATIENT)
Dept: HEMATOLOGY ONCOLOGY | Facility: CLINIC | Age: 43
End: 2018-10-01

## 2018-10-02 LAB
APPEARANCE: ABNORMAL
BILIRUBIN URINE: NEGATIVE
BLOOD URINE: NEGATIVE
COLOR: YELLOW
GLUCOSE QUALITATIVE U: NEGATIVE MG/DL
KETONES URINE: NEGATIVE
LEUKOCYTE ESTERASE URINE: NEGATIVE
NITRITE URINE: NEGATIVE
PH URINE: 7
PROTEIN URINE: NEGATIVE MG/DL
SPECIFIC GRAVITY URINE: 1.01
UROBILINOGEN URINE: 0.2 MG/DL (ref 0.2–?)

## 2018-10-10 ENCOUNTER — LABORATORY RESULT (OUTPATIENT)
Age: 43
End: 2018-10-10

## 2018-10-10 ENCOUNTER — APPOINTMENT (OUTPATIENT)
Dept: HEMATOLOGY ONCOLOGY | Facility: CLINIC | Age: 43
End: 2018-10-10

## 2018-10-10 VITALS
TEMPERATURE: 96.3 F | HEIGHT: 67 IN | WEIGHT: 181 LBS | BODY MASS INDEX: 28.41 KG/M2 | SYSTOLIC BLOOD PRESSURE: 103 MMHG | DIASTOLIC BLOOD PRESSURE: 68 MMHG | HEART RATE: 54 BPM | RESPIRATION RATE: 14 BRPM

## 2018-10-10 DIAGNOSIS — R51 HEADACHE: ICD-10-CM

## 2018-10-10 DIAGNOSIS — N40.0 BENIGN PROSTATIC HYPERPLASIA WITHOUT LOWER URINARY TRACT SYMPMS: ICD-10-CM

## 2018-10-12 LAB
ALBUMIN SERPL ELPH-MCNC: 4.9 G/DL
ALP BLD-CCNC: 146 U/L
ALT SERPL-CCNC: 12 U/L
ANION GAP SERPL CALC-SCNC: 17 MMOL/L
AST SERPL-CCNC: 17 U/L
BILIRUB SERPL-MCNC: 2.2 MG/DL
BUN SERPL-MCNC: 10 MG/DL
CALCIUM SERPL-MCNC: 9.5 MG/DL
CHLORIDE SERPL-SCNC: 99 MMOL/L
CO2 SERPL-SCNC: 27 MMOL/L
CREAT SERPL-MCNC: 1 MG/DL
GLUCOSE SERPL-MCNC: 71 MG/DL
HCT VFR BLD CALC: 37.7 %
HGB BLD-MCNC: 12.4 G/DL
MCHC RBC-ENTMCNC: 29.7 PG
MCHC RBC-ENTMCNC: 32.9 G/DL
MCV RBC AUTO: 90.4 FL
PLATELET # BLD AUTO: 211 K/UL
PMV BLD: 10.9 FL
POTASSIUM SERPL-SCNC: 4.1 MMOL/L
PROT SERPL-MCNC: 7.5 G/DL
PSA SERPL-MCNC: 0.98 NG/ML
RBC # BLD: 4.17 M/UL
RBC # FLD: 15.3 %
SODIUM SERPL-SCNC: 143 MMOL/L
WBC # FLD AUTO: 8.42 K/UL

## 2018-10-14 NOTE — ASSESSMENT
[FreeTextEntry1] : 1.  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 Due to insurance issues he stopped it but now is back on it since 4/4/2017\par -repeat MRI brain [id number] radiation related changes, post treatment changes, he is asymptomatic,  and repeat PET/CT 6/5/18   is without systemic progression, it confirmed radiation necrosis\par 2.   Intermittent headaches now back again but not a severe \par 3.Elevated total bilirubin likely secondary to alectinib. will monitor, its stable\par 4.  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 LUTs symptoism\par \par Plan:\par -C/w Alectinib.  \par - will check MR brain since has headaches again \par -cbc. cmp today \par -RTC post MR \par -LUTS will check PSA and start Flomax maybe will send to Urology

## 2018-10-14 NOTE — HISTORY OF PRESENT ILLNESS
[Disease: _____________________] : Disease: [unfilled] [AJCC Stage: ____] : AJCC Stage: [unfilled] [de-identified] : 41/M presenting for follow-up for metastatic ALK-positive lung adenocarcinoma.  He is currently on alectinib 600 mg BID.  He initially presented in 11/2014 with multiple pulmonary nodules, mediastinal lymphadenopathy, multiple bony metastases, and brain metastases.  He was initially started on crizotinib and was switched to ceritinib in 12/2015 upon progression.  He was unable to tolerate ceritinib due to significant vomiting and was switched to alectinib in 02/2016.\par \par He had SRS to a left cerebellar hemisphere brain metastasis (08/2015) and subsequently underwent WBRT for CNS progression (10-11/2015).  He had RT to a painful metastatic lesion in the right humerus (01/2015).  He had also received Xgeva for bony metastatic disease in the past. \par \par Since 09/2016, he has been experiencing intermittent headaches being treated with periodic dexamethasone taper.  MRI brain with perfusion analysis showed bilateral cerebellar lesions, favor postRT changes rather than residual tumor.  His most recent brain MRI (11/01/2016) showed overall  stable  exam  compared  to  the  previous  brain  MRI  9/2/2016; no  significant  interval  change  in  bilateral  cerebellar  heterogeneously  enhancing  masses  with  extensive  associated  edema,  as  well  as  smaller  lesions  within  the  left  parietal  white  matter,  superficial  left  temporal  lobe  and  left  dorsal  medulla; and, stable  mild  ventriculomegaly  and  mild  periventricular  FLAIR  signal  abnormality.  He has been following with our neurosurgeon, Dr. Britt, who has recommended no surgical intervention at this time. \par \par His most recent PETCT (11/01/2016) showed no sites of pathologic FDG uptake suspicious for biologic tumor activity.\par \par He reports occasional headaches and a mild sense of imbalance.  Denies any falls.  However he only takes a prn dexamethasone, once in every three days,  which reportedly relieves his intermittent headaches. \par Today he also complains mild pain in his left great toe, likely secondary to ingrowing toe nail. [de-identified] : ALK+ lung adenocarcinoma [Treatment Protocol] : Treatment Protocol [FreeTextEntry1] : Alectinib 600 mg BID [de-identified] : He presented to follow up. He changed his insurance. Did not get scans and did not take his alectinib for about 1-2 weeks. We were not notified.  Otherwise she feels well she really requires dexamethasone yesterday headache probably once or twice a week as per patient no other complaints.\par \par 4/17/17\par He presents for follow up today. He had MRI of brain that showed stable disease 4/8/2017. He has not had the PET CT yet. He received  his alecetinib on 4/4/17 and stated to take them. States his headaches are rare now and feels well otherwise.\par \par 5/15/17\par He presents for follow up. He had a PET CT on 4/21/17 that  was ucnhged.Since November 1, 2016, no new foci of pathologic FDG uptake to suggest\par biologic tumor activity. . 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 . Unchanged non-FDG avid sclerotic foci involving the right proximal humerus\par and T9 vertebral body.\par \par He  feels well. No dizziness or headache.  No new symptoms or complaints.\par \par 6/19/17\par He is doing well. He has no complaints. No headaches. No SOB. \par \par 7/24/17\par Patient comes in for follow up visit, has no complaints, has no SOB and has a mild cough which is chronic, no hemoptysis. Patient states his appetite is good, weight is stable, he has no headaches and is currently off the steroids. Patient will have repeat PET scan and MRI of the brain with contrast as well as repeat bloodwork. \par \par 8/21/17\par He is here for follow up. He had an MR brain and PET CT on 8/3 and 8/2 that did not show progression, were essentially unchanged.  He feels great otherwise.\par \par 9/21/17\par He is doing well. No complaints. No headaches\par \par October 26, 2017\par He see her for followup he feels great he has no complaints.\par \par 11/16/17\par He is here for follow up. He had an MR of brain on 11/14/17: New areas of nodular enhancement along the inferior surfaces of the temporal lobes bilaterally. These are relatively symmetric and just above the level of the cerebellar enhancing masses, suggesting that these may reflect post treatment change.\par He had to reschedule his PET and states he will do it next Wednesday.\par \par No complaints.\par \par 12/26/17\par He is here for follow up. He feels well. he  had PET/CT in end of November that does not show procession, stable pleural findings. He  feels well. \par \par 1/22/18\par He is here for follow up for his NSCLCA. He states he feels well. No SOB, no headaches no fatigue.. \par \par February 21 2018\par  he is here for followup, he had a recent PET/CT scan which is PRESLEY he also had a recent MRI of the brain. This was reviewed at the CNS tumor board although wasn't clear but the consensus was that this is posttreatment changes in the fact that he is asymptomatic and decided to observe him.\par \par He has no new complaints no headaches no weakness or tingling.\par \par \par 3/21/18\par He is here for follow up. HE states he is doing well. Has very mild headaches on occasion but otherwise doing well.\par \par 4/13/18\par He is doing well. Reports no new complaints \par \par 5/3/18\par He is here for follow up to discuss his MR brain. HE complains of mild headache. Same memory issues as before. Balance is the same.  MR brain showed : Increased size of right parietotemporal of the findings enhancement  seen lesion measuring 5.2 cm, previously 2.8 centimeters and increased  size of left inferior temporal lobe mass measuring 2.3 cm, previously 1.6  cm. these areas again demonstrate hypoperfusion and may represent post  therapeutic changes.   2.  Significant increased edema and mass effect within the right  hemisphere with 9 mm right to left midline shift.  3.  No new enhancing lesions. Stable additional bilateral cerebellar and  supratentorial lesions.\par \par I spoke with Dr. 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 He developed severe headaches, some difficulty with speech and balance issues. 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 He has no insurance so my pharmacy has been giving  him a weekly supply while we work on it. He did not have his scans yet. He feel much better now except for dizziness\par \par 5/30/18\par He is feeling much better. His neurological complaints resolved. He stopped steroids on Monday since he had swelling in legs that has resolved. \par \par 6/13/18\par He is here for follow up. he had a PET/CT on 6/7/18 that showed These images reveal, when comparing the FDG brain images to MRI of  4/27/2018 series 10, there is one small focus of increased FDG uptake in  the region of the right parietotemporal lobe lesion (series 12 image 34).  Therefore persistent biologic tumor activity in this region cannot be  excluded.  No focal abnormal FDG uptake corresponding with the left inferior  temporal lobe lesion and both cerebellar lesions. In addition most of the  right parietotemporal lobe lesion is not FDG avid Compared to normal brain glucose metabolism in the thalamus (surrogate  for normal brain metabolism) relative hypometabolism of the right  parietal, posterior parietal and occipital region and relative  hypermetabolism of the left posterior parietal region  Persistent posttreatment FDG uptake (SUV up to 11.2) along the pleural  surface of the left hemithorax consistent with posttreatment changes  associated with talc pleurodesis  One new mildly FDG avid soft tissue nodule (SUV 3.3) in the left buttock \par \par He feels well now ,no headaches.\par \par 7/16/18\par He is here for follow up. doing well. He has no headaches. No new complaints\par \par 8/13/18\par He is here for follow up.  He is doing well. has no complaints. He is considering returning to work.\par \par 9/10/18\par He is here for follow up. He had an MR brain on 8/24/18 that showed In comparison to the previous brain MRI dated 4/27/2018:  1.  No significant interval change in the size and configuration of the  heterogeneously enhancing, necrotic and hemorrhagic lesions within the  temporal/occipital lobes and cerebellar hemispheres. Enhancement pattern  suggests post-therapeutic changes.  2.  Moderately increased edema within the posterior fossa (cerebellar  hemispheres and brainstem). Increased mass effect upon the fourth  ventricle with no evidence of hydrocephalus.  3.  The left cerebral hemispheric edema has mildly increased and the  right cerebral hemispheric edema has mildly decreased.   4.  Resolution of the previously seen right to left midline shift, right  lateral ventricular compression and left lateral ventricular enlargement.  5.  Treated lesions again noted in the left frontal white matter and left  lateral medulla, without contrast enhancement.\par \par HE is doing well has no new complaints. Has no neuroglial complaints. \par \par 10/10/18\par He is here for follow up he had PET/CT on  9/28/18 that was PRESLEY. He no has headaches again. States he is voiding multiple times a day and unable to empty bladder fully.

## 2018-10-14 NOTE — REVIEW OF SYSTEMS
[Fatigue] : no fatigue [Recent Change In Weight] : ~T no recent weight change [Dizziness] : dizziness [Difficulty Walking] : no difficulty walking [Negative] : Heme/Lymph [de-identified] : headaches resolved., memory issues

## 2018-10-14 NOTE — PHYSICAL EXAM
[Restricted in physically strenuous activity but ambulatory and able to carry out work of a light or sedentary nature] : Status 1- Restricted in physically strenuous activity but ambulatory and able to carry out work of a light or sedentary nature, e.g., light house work, office work [Normal] : affect appropriate [de-identified] : he has decrease breath sounds on left side base, unchanged. [de-identified] :  Gait is normal good strength  in all limbs

## 2018-10-21 ENCOUNTER — EMERGENCY (EMERGENCY)
Facility: HOSPITAL | Age: 43
LOS: 0 days | Discharge: HOME | End: 2018-10-22
Attending: STUDENT IN AN ORGANIZED HEALTH CARE EDUCATION/TRAINING PROGRAM | Admitting: STUDENT IN AN ORGANIZED HEALTH CARE EDUCATION/TRAINING PROGRAM

## 2018-10-21 VITALS
DIASTOLIC BLOOD PRESSURE: 56 MMHG | WEIGHT: 179.9 LBS | TEMPERATURE: 98 F | HEART RATE: 68 BPM | OXYGEN SATURATION: 98 % | SYSTOLIC BLOOD PRESSURE: 115 MMHG | RESPIRATION RATE: 17 BRPM

## 2018-10-21 DIAGNOSIS — R10.9 UNSPECIFIED ABDOMINAL PAIN: ICD-10-CM

## 2018-10-21 DIAGNOSIS — N39.0 URINARY TRACT INFECTION, SITE NOT SPECIFIED: ICD-10-CM

## 2018-10-21 DIAGNOSIS — Z88.0 ALLERGY STATUS TO PENICILLIN: ICD-10-CM

## 2018-10-21 LAB
ALBUMIN SERPL ELPH-MCNC: 4.5 G/DL — SIGNIFICANT CHANGE UP (ref 3.5–5.2)
ALP SERPL-CCNC: 133 U/L — HIGH (ref 30–115)
ALT FLD-CCNC: 12 U/L — SIGNIFICANT CHANGE UP (ref 0–41)
ANION GAP SERPL CALC-SCNC: 11 MMOL/L — SIGNIFICANT CHANGE UP (ref 7–14)
APPEARANCE UR: CLEAR — SIGNIFICANT CHANGE UP
AST SERPL-CCNC: 28 U/L — SIGNIFICANT CHANGE UP (ref 0–41)
BACTERIA # UR AUTO: ABNORMAL /HPF
BASOPHILS # BLD AUTO: 0.04 K/UL — SIGNIFICANT CHANGE UP (ref 0–0.2)
BASOPHILS NFR BLD AUTO: 0.6 % — SIGNIFICANT CHANGE UP (ref 0–1)
BILIRUB SERPL-MCNC: 1.9 MG/DL — HIGH (ref 0.2–1.2)
BILIRUB UR-MCNC: NEGATIVE — SIGNIFICANT CHANGE UP
BUN SERPL-MCNC: 14 MG/DL — SIGNIFICANT CHANGE UP (ref 10–20)
CALCIUM SERPL-MCNC: 9.1 MG/DL — SIGNIFICANT CHANGE UP (ref 8.5–10.1)
CHLORIDE SERPL-SCNC: 102 MMOL/L — SIGNIFICANT CHANGE UP (ref 98–110)
CO2 SERPL-SCNC: 28 MMOL/L — SIGNIFICANT CHANGE UP (ref 17–32)
COLOR SPEC: YELLOW — SIGNIFICANT CHANGE UP
CREAT SERPL-MCNC: 1.1 MG/DL — SIGNIFICANT CHANGE UP (ref 0.7–1.5)
DIFF PNL FLD: ABNORMAL
EOSINOPHIL # BLD AUTO: 0.15 K/UL — SIGNIFICANT CHANGE UP (ref 0–0.7)
EOSINOPHIL NFR BLD AUTO: 2.1 % — SIGNIFICANT CHANGE UP (ref 0–8)
GLUCOSE SERPL-MCNC: 87 MG/DL — SIGNIFICANT CHANGE UP (ref 70–99)
GLUCOSE UR QL: NEGATIVE MG/DL — SIGNIFICANT CHANGE UP
HCT VFR BLD CALC: 34.9 % — LOW (ref 42–52)
HGB BLD-MCNC: 11.4 G/DL — LOW (ref 14–18)
IMM GRANULOCYTES NFR BLD AUTO: 0.1 % — SIGNIFICANT CHANGE UP (ref 0.1–0.3)
KETONES UR-MCNC: NEGATIVE — SIGNIFICANT CHANGE UP
LEUKOCYTE ESTERASE UR-ACNC: ABNORMAL
LIDOCAIN IGE QN: 48 U/L — SIGNIFICANT CHANGE UP (ref 7–60)
LYMPHOCYTES # BLD AUTO: 2.39 K/UL — SIGNIFICANT CHANGE UP (ref 1.2–3.4)
LYMPHOCYTES # BLD AUTO: 33.5 % — SIGNIFICANT CHANGE UP (ref 20.5–51.1)
MCHC RBC-ENTMCNC: 29.2 PG — SIGNIFICANT CHANGE UP (ref 27–31)
MCHC RBC-ENTMCNC: 32.7 G/DL — SIGNIFICANT CHANGE UP (ref 32–37)
MCV RBC AUTO: 89.5 FL — SIGNIFICANT CHANGE UP (ref 80–94)
MONOCYTES # BLD AUTO: 0.81 K/UL — HIGH (ref 0.1–0.6)
MONOCYTES NFR BLD AUTO: 11.3 % — HIGH (ref 1.7–9.3)
NEUTROPHILS # BLD AUTO: 3.74 K/UL — SIGNIFICANT CHANGE UP (ref 1.4–6.5)
NEUTROPHILS NFR BLD AUTO: 52.4 % — SIGNIFICANT CHANGE UP (ref 42.2–75.2)
NITRITE UR-MCNC: NEGATIVE — SIGNIFICANT CHANGE UP
NRBC # BLD: 0 /100 WBCS — SIGNIFICANT CHANGE UP (ref 0–0)
PH UR: 8 — SIGNIFICANT CHANGE UP (ref 5–8)
PLATELET # BLD AUTO: 235 K/UL — SIGNIFICANT CHANGE UP (ref 130–400)
POTASSIUM SERPL-MCNC: 4.7 MMOL/L — SIGNIFICANT CHANGE UP (ref 3.5–5)
POTASSIUM SERPL-SCNC: 4.7 MMOL/L — SIGNIFICANT CHANGE UP (ref 3.5–5)
PROT SERPL-MCNC: 7.2 G/DL — SIGNIFICANT CHANGE UP (ref 6–8)
PROT UR-MCNC: NEGATIVE MG/DL — SIGNIFICANT CHANGE UP
RBC # BLD: 3.9 M/UL — LOW (ref 4.7–6.1)
RBC # FLD: 15.5 % — HIGH (ref 11.5–14.5)
RBC CASTS # UR COMP ASSIST: SIGNIFICANT CHANGE UP /HPF
SODIUM SERPL-SCNC: 141 MMOL/L — SIGNIFICANT CHANGE UP (ref 135–146)
SP GR SPEC: 1.02 — SIGNIFICANT CHANGE UP (ref 1.01–1.03)
UROBILINOGEN FLD QL: 1 MG/DL (ref 0.2–0.2)
WBC # BLD: 7.14 K/UL — SIGNIFICANT CHANGE UP (ref 4.8–10.8)
WBC # FLD AUTO: 7.14 K/UL — SIGNIFICANT CHANGE UP (ref 4.8–10.8)
WBC UR QL: SIGNIFICANT CHANGE UP /HPF

## 2018-10-21 NOTE — ED PROVIDER NOTE - NSFOLLOWUPINSTRUCTIONS_ED_ALL_ED_FT
Take cipro for the next 7 days. Follow up with urology in 1-2 weeks. Return for worsening pain, fever or other concerning symptoms.

## 2018-10-21 NOTE — ED PROVIDER NOTE - NS ED ROS FT
Constutional: no fever or rigors  Eyes: no eye redness, acute visual change  ENMT: no ear pain, no throat pain  Card: no chest pain, no palpitations  Pulm: no cough, no shortness of breath  GI: pos abdominal pain, no nausea or vomiting  : no dysuria or hematuria  MSK: no limitation in range of motion, no neck pain  Skin: no rash, no abrasion  Neuro: no numbness, no weakness  Heme/Onc: no easy bruising, no bleeding tendency   Allergic: no hives, no throat swelling

## 2018-10-21 NOTE — ED PROVIDER NOTE - PHYSICAL EXAMINATION
PHYSICAL EXAM:    Constitutional: awake, alert, NAD  Eyes: EOMI, no conj injection  HENT: NC AT  Back: no c/t/l spine ttp  Respiratory: no respiratory distress, breath sounds equal b/l, no wheezing, rhonchi or stridor.   Cardiovascular: RRR nml S1S2  Gastrointestinal: soft, no masses, focal LLQ tenderness. nondistended. No guarding or rebound.   - normal external exam, no testicular tenderness b/l, no swelling b/l, normal lay  Extremities: no peripheral edema  Neurological: AAOx3, CN II-XII grossly intact, no focal numbness or weakness  Skin: no rash  Musculoskeletal: no gross deformity

## 2018-10-21 NOTE — ED PROVIDER NOTE - CARE PLAN
Assessment and plan of treatment:	focal llq pain, c/f diverticulitis, r/o uti  belly labs, fluids, ctap, prn pain control, ed observation Principal Discharge DX:	Urinary tract infection without hematuria, site unspecified  Assessment and plan of treatment:	focal llq pain, c/f diverticulitis, r/o uti  belly labs, fluids, ctap, prn pain control, ed observation

## 2018-10-21 NOTE — ED PROVIDER NOTE - OBJECTIVE STATEMENT
44 y/o M with PMH of stage IV lung CA with mets to the brain s/p chemo and radiation now under control, follows with Dr. Jara monthly, s/p thoracentesis 2 years ago for pleural effusion, presents to ED for LLQ pain. sx x1 day. no n/v/d/fever/chills. + dysuria. no cp, sob, confusion.

## 2018-10-21 NOTE — ED PROVIDER NOTE - NSFOLLOWUPCLINICS_GEN_ALL_ED_FT
[facility]  Urology  .  [place]   Phone: [phone]  Fax:   Follow Up Time: 7-10 Days

## 2018-10-21 NOTE — ED PROVIDER NOTE - PLAN OF CARE
focal llq pain, c/f diverticulitis, r/o uti  belly labs, fluids, ctap, prn pain control, ed observation

## 2018-10-22 RX ORDER — CIPROFLOXACIN LACTATE 400MG/40ML
500 VIAL (ML) INTRAVENOUS ONCE
Qty: 0 | Refills: 0 | Status: COMPLETED | OUTPATIENT
Start: 2018-10-22 | End: 2018-10-22

## 2018-10-22 RX ORDER — MOXIFLOXACIN HYDROCHLORIDE TABLETS, 400 MG 400 MG/1
1 TABLET, FILM COATED ORAL
Qty: 14 | Refills: 0
Start: 2018-10-22 | End: 2018-10-28

## 2018-10-22 RX ADMIN — Medication 500 MILLIGRAM(S): at 01:52

## 2018-10-24 ENCOUNTER — APPOINTMENT (OUTPATIENT)
Dept: HEMATOLOGY ONCOLOGY | Facility: CLINIC | Age: 43
End: 2018-10-24

## 2018-10-28 ENCOUNTER — FORM ENCOUNTER (OUTPATIENT)
Age: 43
End: 2018-10-28

## 2018-10-29 ENCOUNTER — OUTPATIENT (OUTPATIENT)
Dept: OUTPATIENT SERVICES | Facility: HOSPITAL | Age: 43
LOS: 1 days | Discharge: HOME | End: 2018-10-29

## 2018-10-29 DIAGNOSIS — C15.9 MALIGNANT NEOPLASM OF ESOPHAGUS, UNSPECIFIED: ICD-10-CM

## 2018-10-30 ENCOUNTER — APPOINTMENT (OUTPATIENT)
Dept: HEMATOLOGY ONCOLOGY | Facility: CLINIC | Age: 43
End: 2018-10-30

## 2018-10-30 ENCOUNTER — LABORATORY RESULT (OUTPATIENT)
Age: 43
End: 2018-10-30

## 2018-10-30 VITALS
RESPIRATION RATE: 14 BRPM | HEART RATE: 58 BPM | HEIGHT: 67 IN | DIASTOLIC BLOOD PRESSURE: 80 MMHG | BODY MASS INDEX: 28.41 KG/M2 | WEIGHT: 181 LBS | SYSTOLIC BLOOD PRESSURE: 117 MMHG | TEMPERATURE: 97.3 F

## 2018-10-30 DIAGNOSIS — M79.652 PAIN IN RIGHT THIGH: ICD-10-CM

## 2018-10-30 DIAGNOSIS — M79.651 PAIN IN RIGHT THIGH: ICD-10-CM

## 2018-10-30 RX ORDER — TAMSULOSIN HYDROCHLORIDE 0.4 MG/1
0.4 CAPSULE ORAL
Qty: 30 | Refills: 6 | Status: COMPLETED | COMMUNITY
Start: 2018-10-10 | End: 2018-10-30

## 2018-10-30 NOTE — REVIEW OF SYSTEMS
[Fatigue] : no fatigue [Recent Change In Weight] : ~T no recent weight change [Confused] : confusion [Dizziness] : dizziness [Difficulty Walking] : no difficulty walking [Negative] : Heme/Lymph [de-identified] : headaches , memory issues

## 2018-10-30 NOTE — HISTORY OF PRESENT ILLNESS
[Disease: _____________________] : Disease: [unfilled] [AJCC Stage: ____] : AJCC Stage: [unfilled] [de-identified] : 41/M presenting for follow-up for metastatic ALK-positive lung adenocarcinoma.  He is currently on alectinib 600 mg BID.  He initially presented in 11/2014 with multiple pulmonary nodules, mediastinal lymphadenopathy, multiple bony metastases, and brain metastases.  He was initially started on crizotinib and was switched to ceritinib in 12/2015 upon progression.  He was unable to tolerate ceritinib due to significant vomiting and was switched to alectinib in 02/2016.\par \par He had SRS to a left cerebellar hemisphere brain metastasis (08/2015) and subsequently underwent WBRT for CNS progression (10-11/2015).  He had RT to a painful metastatic lesion in the right humerus (01/2015).  He had also received Xgeva for bony metastatic disease in the past. \par \par Since 09/2016, he has been experiencing intermittent headaches being treated with periodic dexamethasone taper.  MRI brain with perfusion analysis showed bilateral cerebellar lesions, favor postRT changes rather than residual tumor.  His most recent brain MRI (11/01/2016) showed overall  stable  exam  compared  to  the  previous  brain  MRI  9/2/2016; no  significant  interval  change  in  bilateral  cerebellar  heterogeneously  enhancing  masses  with  extensive  associated  edema,  as  well  as  smaller  lesions  within  the  left  parietal  white  matter,  superficial  left  temporal  lobe  and  left  dorsal  medulla; and, stable  mild  ventriculomegaly  and  mild  periventricular  FLAIR  signal  abnormality.  He has been following with our neurosurgeon, Dr. Britt, who has recommended no surgical intervention at this time. \par \par His most recent PETCT (11/01/2016) showed no sites of pathologic FDG uptake suspicious for biologic tumor activity.\par \par He reports occasional headaches and a mild sense of imbalance.  Denies any falls.  However he only takes a prn dexamethasone, once in every three days,  which reportedly relieves his intermittent headaches. \par Today he also complains mild pain in his left great toe, likely secondary to ingrowing toe nail. [de-identified] : ALK+ lung adenocarcinoma [Treatment Protocol] : Treatment Protocol [FreeTextEntry1] : Alectinib 600 mg BID [de-identified] : He presented to follow up. He changed his insurance. Did not get scans and did not take his alectinib for about 1-2 weeks. We were not notified.  Otherwise she feels well she really requires dexamethasone yesterday headache probably once or twice a week as per patient no other complaints.\par \par 4/17/17\par He presents for follow up today. He had MRI of brain that showed stable disease 4/8/2017. He has not had the PET CT yet. He received  his alecetinib on 4/4/17 and stated to take them. States his headaches are rare now and feels well otherwise.\par \par 5/15/17\par He presents for follow up. He had a PET CT on 4/21/17 that  was ucnhged.Since November 1, 2016, no new foci of pathologic FDG uptake to suggest\par biologic tumor activity. . 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 . Unchanged non-FDG avid sclerotic foci involving the right proximal humerus\par and T9 vertebral body.\par \par He  feels well. No dizziness or headache.  No new symptoms or complaints.\par \par 6/19/17\par He is doing well. He has no complaints. No headaches. No SOB. \par \par 7/24/17\par Patient comes in for follow up visit, has no complaints, has no SOB and has a mild cough which is chronic, no hemoptysis. Patient states his appetite is good, weight is stable, he has no headaches and is currently off the steroids. Patient will have repeat PET scan and MRI of the brain with contrast as well as repeat bloodwork. \par \par 8/21/17\par He is here for follow up. He had an MR brain and PET CT on 8/3 and 8/2 that did not show progression, were essentially unchanged.  He feels great otherwise.\par \par 9/21/17\par He is doing well. No complaints. No headaches\par \par October 26, 2017\par He see her for followup he feels great he has no complaints.\par \par 11/16/17\par He is here for follow up. He had an MR of brain on 11/14/17: New areas of nodular enhancement along the inferior surfaces of the temporal lobes bilaterally. These are relatively symmetric and just above the level of the cerebellar enhancing masses, suggesting that these may reflect post treatment change.\par He had to reschedule his PET and states he will do it next Wednesday.\par \par No complaints.\par \par 12/26/17\par He is here for follow up. He feels well. he  had PET/CT in end of November that does not show procession, stable pleural findings. He  feels well. \par \par 1/22/18\par He is here for follow up for his NSCLCA. He states he feels well. No SOB, no headaches no fatigue.. \par \par February 21 2018\par  he is here for followup, he had a recent PET/CT scan which is PRESLEY he also had a recent MRI of the brain. This was reviewed at the CNS tumor board although wasn't clear but the consensus was that this is posttreatment changes in the fact that he is asymptomatic and decided to observe him.\par \par He has no new complaints no headaches no weakness or tingling.\par \par \par 3/21/18\par He is here for follow up. HE states he is doing well. Has very mild headaches on occasion but otherwise doing well.\par \par 4/13/18\par He is doing well. Reports no new complaints \par \par 5/3/18\par He is here for follow up to discuss his MR brain. HE complains of mild headache. Same memory issues as before. Balance is the same.  MR brain showed : Increased size of right parietotemporal of the findings enhancement  seen lesion measuring 5.2 cm, previously 2.8 centimeters and increased  size of left inferior temporal lobe mass measuring 2.3 cm, previously 1.6  cm. these areas again demonstrate hypoperfusion and may represent post  therapeutic changes.   2.  Significant increased edema and mass effect within the right  hemisphere with 9 mm right to left midline shift.  3.  No new enhancing lesions. Stable additional bilateral cerebellar and  supratentorial lesions.\par \par I spoke with Dr. 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 He developed severe headaches, some difficulty with speech and balance issues. 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 He has no insurance so my pharmacy has been giving  him a weekly supply while we work on it. He did not have his scans yet. He feel much better now except for dizziness\par \par 5/30/18\par He is feeling much better. His neurological complaints resolved. He stopped steroids on Monday since he had swelling in legs that has resolved. \par \par 6/13/18\par He is here for follow up. he had a PET/CT on 6/7/18 that showed These images reveal, when comparing the FDG brain images to MRI of  4/27/2018 series 10, there is one small focus of increased FDG uptake in  the region of the right parietotemporal lobe lesion (series 12 image 34).  Therefore persistent biologic tumor activity in this region cannot be  excluded.  No focal abnormal FDG uptake corresponding with the left inferior  temporal lobe lesion and both cerebellar lesions. In addition most of the  right parietotemporal lobe lesion is not FDG avid Compared to normal brain glucose metabolism in the thalamus (surrogate  for normal brain metabolism) relative hypometabolism of the right  parietal, posterior parietal and occipital region and relative  hypermetabolism of the left posterior parietal region  Persistent posttreatment FDG uptake (SUV up to 11.2) along the pleural  surface of the left hemithorax consistent with posttreatment changes  associated with talc pleurodesis  One new mildly FDG avid soft tissue nodule (SUV 3.3) in the left buttock \par \par He feels well now ,no headaches.\par \par 7/16/18\par He is here for follow up. doing well. He has no headaches. No new complaints\par \par 8/13/18\par He is here for follow up.  He is doing well. has no complaints. He is considering returning to work.\par \par 9/10/18\par He is here for follow up. He had an MR brain on 8/24/18 that showed In comparison to the previous brain MRI dated 4/27/2018:  1.  No significant interval change in the size and configuration of the  heterogeneously enhancing, necrotic and hemorrhagic lesions within the  temporal/occipital lobes and cerebellar hemispheres. Enhancement pattern  suggests post-therapeutic changes.  2.  Moderately increased edema within the posterior fossa (cerebellar  hemispheres and brainstem). Increased mass effect upon the fourth  ventricle with no evidence of hydrocephalus.  3.  The left cerebral hemispheric edema has mildly increased and the  right cerebral hemispheric edema has mildly decreased.   4.  Resolution of the previously seen right to left midline shift, right  lateral ventricular compression and left lateral ventricular enlargement.  5.  Treated lesions again noted in the left frontal white matter and left  lateral medulla, without contrast enhancement.\par \par HE is doing well has no new complaints. Has no neuroglial complaints. \par \par 10/10/18\par He is here for follow up he had PET/CT on  9/28/18 that was PRESLEY. He no has headaches again. States he is voiding multiple times a day and unable to empty bladder fully.\par \par 10/30/18\par He is here for follow up. he was in the ED for.Left lower quadrant pain symptoms her was given cirpo. he had a Ct abd/pelvis in ED 10/22/18 : Stable posttreatment changes of the left hemithorax including large  loculated pleural effusion.  2.  Multiple nondilated fluid-filled small bowel loops which may reflect  evidence of enteritis.  \par He had MR brain on 10/29/18:  No new lesions, but several existing lesions have increased in size  2. Bilateral hemorrhagic cerebellar lesions and edema has increased. Mass  effect on the fourth ventricle with hydrocephalus.   2.  Essentially unchanged sizes of supratentorial lesions within the  bilateral temporooccipital lobes and mildly improved edema\par \par He is having headaches and more confusion. He took 4 flomax in am and PM instead of his alecensa. I called [facility] with him and they told us they did not deliver his pills since they could not reach him but will deliver them tomorrow. He will be home. He states he had alecensa at home but not sure.

## 2018-10-30 NOTE — ASSESSMENT
[FreeTextEntry1] : 1.  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 Due to insurance issues he stopped it but now is back on it since 4/4/2017\par -repeat MRI brain 10795 radiation related changes, post treatment changes, he is asymptomatic,  and repeat PET/CT 6/5/18   is without systemic progression, it confirmed radiation necrosis. Recent PET/CT on  9/28/18 that was PRESLEY. CT abd was unchged. I think the findings on MR brain are radiation necrosis given his symptoms as before\par 2.   Intermittent headaches now back again but not a severe \par 3.Elevated total bilirubin likely secondary to alectinib. will monitor, its stable\par 4.  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 LUTs symptoism\par \par Plan:\par -C/w Alectinib.  \par - will start Avastin 7.5 mg/kg every 3 weeks for 4 doses. Unable to get him Dexamethasone his insurance wont  pay for it and my pharmacy wont dispense it \par -cbc. cmp today \par -RTC in one month \par -PSA was normal  \par -will stop all medications except for alectinib given he gets confused \par RTC in one months

## 2018-10-30 NOTE — PHYSICAL EXAM
[Restricted in physically strenuous activity but ambulatory and able to carry out work of a light or sedentary nature] : Status 1- Restricted in physically strenuous activity but ambulatory and able to carry out work of a light or sedentary nature, e.g., light house work, office work [Normal] : affect appropriate [de-identified] : he has decrease breath sounds on left side base, unchanged. [de-identified] :  Gait is normal good strength  in all limbs

## 2018-10-31 LAB
ALBUMIN SERPL ELPH-MCNC: 4.6 G/DL
ALP BLD-CCNC: 129 U/L
ALT SERPL-CCNC: 10 U/L
ANION GAP SERPL CALC-SCNC: 15 MMOL/L
AST SERPL-CCNC: 19 U/L
BILIRUB SERPL-MCNC: 2.2 MG/DL
BUN SERPL-MCNC: 12 MG/DL
CALCIUM SERPL-MCNC: 9.5 MG/DL
CHLORIDE SERPL-SCNC: 101 MMOL/L
CO2 SERPL-SCNC: 27 MMOL/L
CREAT SERPL-MCNC: 0.9 MG/DL
GLUCOSE SERPL-MCNC: 82 MG/DL
HCT VFR BLD CALC: 36.6 %
HGB BLD-MCNC: 12.1 G/DL
MCHC RBC-ENTMCNC: 30 PG
MCHC RBC-ENTMCNC: 33.1 G/DL
MCV RBC AUTO: 90.8 FL
PLATELET # BLD AUTO: 228 K/UL
PMV BLD: 10.7 FL
POTASSIUM SERPL-SCNC: 4 MMOL/L
PROT SERPL-MCNC: 7.5 G/DL
RBC # BLD: 4.03 M/UL
RBC # FLD: 15.8 %
SODIUM SERPL-SCNC: 143 MMOL/L
WBC # FLD AUTO: 7.85 K/UL

## 2018-11-06 ENCOUNTER — APPOINTMENT (OUTPATIENT)
Dept: INFUSION THERAPY | Facility: CLINIC | Age: 43
End: 2018-11-06

## 2018-11-06 VITALS
RESPIRATION RATE: 18 BRPM | SYSTOLIC BLOOD PRESSURE: 109 MMHG | TEMPERATURE: 98.9 F | HEART RATE: 59 BPM | DIASTOLIC BLOOD PRESSURE: 64 MMHG

## 2018-11-06 RX ORDER — ACETAMINOPHEN 500 MG
650 TABLET ORAL ONCE
Qty: 0 | Refills: 0 | Status: COMPLETED | OUTPATIENT
Start: 2018-11-06 | End: 2018-11-06

## 2018-11-06 RX ORDER — DIPHENHYDRAMINE HCL 50 MG
50 CAPSULE ORAL ONCE
Qty: 0 | Refills: 0 | Status: COMPLETED | OUTPATIENT
Start: 2018-11-06 | End: 2018-11-06

## 2018-11-06 RX ORDER — BEVACIZUMAB 400 MG/16ML
615 INJECTION, SOLUTION INTRAVENOUS ONCE
Qty: 0 | Refills: 0 | Status: COMPLETED | OUTPATIENT
Start: 2018-11-06 | End: 2018-11-06

## 2018-11-06 RX ADMIN — BEVACIZUMAB 83.07 MILLIGRAM(S): 400 INJECTION, SOLUTION INTRAVENOUS at 15:35

## 2018-11-06 RX ADMIN — Medication 650 MILLIGRAM(S): at 15:36

## 2018-11-06 RX ADMIN — Medication 50 MILLIGRAM(S): at 15:36

## 2018-11-07 LAB
APPEARANCE: CLEAR
BILIRUBIN URINE: NEGATIVE
BLOOD URINE: NEGATIVE
COLOR: YELLOW
GLUCOSE QUALITATIVE U: NEGATIVE MG/DL
KETONES URINE: NEGATIVE
LEUKOCYTE ESTERASE URINE: NEGATIVE
NITRITE URINE: NEGATIVE
PH URINE: 7.5
PROTEIN URINE: NEGATIVE MG/DL
SPECIFIC GRAVITY URINE: <=1.005
UROBILINOGEN URINE: 0.2 MG/DL (ref 0.2–?)

## 2018-11-11 ENCOUNTER — FORM ENCOUNTER (OUTPATIENT)
Age: 43
End: 2018-11-11

## 2018-11-12 ENCOUNTER — OUTPATIENT (OUTPATIENT)
Dept: OUTPATIENT SERVICES | Facility: HOSPITAL | Age: 43
LOS: 1 days | Discharge: HOME | End: 2018-11-12

## 2018-11-12 ENCOUNTER — APPOINTMENT (OUTPATIENT)
Dept: HEMATOLOGY ONCOLOGY | Facility: CLINIC | Age: 43
End: 2018-11-12

## 2018-11-12 DIAGNOSIS — I67.89 OTHER CEREBROVASCULAR DISEASE: ICD-10-CM

## 2018-11-12 DIAGNOSIS — R51 HEADACHE: ICD-10-CM

## 2018-11-12 DIAGNOSIS — C79.31 SECONDARY MALIGNANT NEOPLASM OF BRAIN: ICD-10-CM

## 2018-11-12 DIAGNOSIS — M79.651 PAIN IN RIGHT THIGH: ICD-10-CM

## 2018-11-12 DIAGNOSIS — C34.90 MALIGNANT NEOPLASM OF UNSPECIFIED PART OF UNSPECIFIED BRONCHUS OR LUNG: ICD-10-CM

## 2018-11-15 NOTE — ASSESSMENT
[FreeTextEntry1] : 1.  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 Due to insurance issues he stopped it but now is back on it since 4/4/2017\par -repeat MRI brain 58275 radiation related changes, post treatment changes, he is asymptomatic,  and repeat PET/CT 6/5/18   is without systemic progression, it confirmed radiation necrosis. Recent PET/CT on  9/28/18 that was PRESLEY. CT abd was unchged. I think the findings on MR brain are radiation necrosis given his symptoms as before\par 2.   Intermittent headaches now back again but not a severe \par 3.Elevated total bilirubin likely secondary to alectinib. will monitor, its stable\par 4.  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 LUTs symptoism\par \par Plan:\par -C/w Alectinib.  \par - on t Avastin 7.5 mg/kg every 3 weeks for 4 doses. Unable to get him Dexamethasone his insurance wont  pay for it and my pharmacy wont dispense it \par -PSA was normal  \par -will stop all medications except for alectinib given he gets confused\par -We will send for a LE doppler to rule out any DVT given symptoms in his thights his exam was normal, no swelling or positive leg raise test and no tenderness to palpation of his spine, maybe he is having cramps? although he denies\par \par RTC as scheduled on 11/27 will call with results of doppler and c.w avastin

## 2018-11-15 NOTE — REVIEW OF SYSTEMS
[Confused] : confusion [Dizziness] : dizziness [Negative] : Heme/Lymph [Fatigue] : no fatigue [Recent Change In Weight] : ~T no recent weight change [Difficulty Walking] : no difficulty walking [de-identified] : headaches , memory issues

## 2018-11-15 NOTE — PHYSICAL EXAM
[Restricted in physically strenuous activity but ambulatory and able to carry out work of a light or sedentary nature] : Status 1- Restricted in physically strenuous activity but ambulatory and able to carry out work of a light or sedentary nature, e.g., light house work, office work [Normal] : affect appropriate [de-identified] : he has decrease breath sounds on left side base, unchanged. [de-identified] :  Gait is normal good strength  in all limbs

## 2018-11-15 NOTE — HISTORY OF PRESENT ILLNESS
[Disease: _____________________] : Disease: [unfilled] [AJCC Stage: ____] : AJCC Stage: [unfilled] [Treatment Protocol] : Treatment Protocol [de-identified] : 41/M presenting for follow-up for metastatic ALK-positive lung adenocarcinoma.  He is currently on alectinib 600 mg BID.  He initially presented in 11/2014 with multiple pulmonary nodules, mediastinal lymphadenopathy, multiple bony metastases, and brain metastases.  He was initially started on crizotinib and was switched to ceritinib in 12/2015 upon progression.  He was unable to tolerate ceritinib due to significant vomiting and was switched to alectinib in 02/2016.\par \par He had SRS to a left cerebellar hemisphere brain metastasis (08/2015) and subsequently underwent WBRT for CNS progression (10-11/2015).  He had RT to a painful metastatic lesion in the right humerus (01/2015).  He had also received Xgeva for bony metastatic disease in the past. \par \par Since 09/2016, he has been experiencing intermittent headaches being treated with periodic dexamethasone taper.  MRI brain with perfusion analysis showed bilateral cerebellar lesions, favor postRT changes rather than residual tumor.  His most recent brain MRI (11/01/2016) showed overall  stable  exam  compared  to  the  previous  brain  MRI  9/2/2016; no  significant  interval  change  in  bilateral  cerebellar  heterogeneously  enhancing  masses  with  extensive  associated  edema,  as  well  as  smaller  lesions  within  the  left  parietal  white  matter,  superficial  left  temporal  lobe  and  left  dorsal  medulla; and, stable  mild  ventriculomegaly  and  mild  periventricular  FLAIR  signal  abnormality.  He has been following with our neurosurgeon, Dr. Britt, who has recommended no surgical intervention at this time. \par \par His most recent PETCT (11/01/2016) showed no sites of pathologic FDG uptake suspicious for biologic tumor activity.\par \par He reports occasional headaches and a mild sense of imbalance.  Denies any falls.  However he only takes a prn dexamethasone, once in every three days,  which reportedly relieves his intermittent headaches. \par Today he also complains mild pain in his left great toe, likely secondary to ingrowing toe nail. [de-identified] : ALK+ lung adenocarcinoma [Therapy: ___] : Therapy: [unfilled] [FreeTextEntry1] : Alectinib 600 mg BID\par Avastin was started for radiation induced necrosis [de-identified] : He presented to follow up. He changed his insurance. Did not get scans and did not take his alectinib for about 1-2 weeks. We were not notified.  Otherwise she feels well she really requires dexamethasone yesterday headache probably once or twice a week as per patient no other complaints.\par \par 4/17/17\par He presents for follow up today. He had MRI of brain that showed stable disease 4/8/2017. He has not had the PET CT yet. He received  his alecetinib on 4/4/17 and stated to take them. States his headaches are rare now and feels well otherwise.\par \par 5/15/17\par He presents for follow up. He had a PET CT on 4/21/17 that  was ucnhged.Since November 1, 2016, no new foci of pathologic FDG uptake to suggest\par biologic tumor activity. . 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 . Unchanged non-FDG avid sclerotic foci involving the right proximal humerus\par and T9 vertebral body.\par \par He  feels well. No dizziness or headache.  No new symptoms or complaints.\par \par 6/19/17\par He is doing well. He has no complaints. No headaches. No SOB. \par \par 7/24/17\par Patient comes in for follow up visit, has no complaints, has no SOB and has a mild cough which is chronic, no hemoptysis. Patient states his appetite is good, weight is stable, he has no headaches and is currently off the steroids. Patient will have repeat PET scan and MRI of the brain with contrast as well as repeat bloodwork. \par \par 8/21/17\par He is here for follow up. He had an MR brain and PET CT on 8/3 and 8/2 that did not show progression, were essentially unchanged.  He feels great otherwise.\par \par 9/21/17\par He is doing well. No complaints. No headaches\par \par October 26, 2017\par He see her for followup he feels great he has no complaints.\par \par 11/16/17\par He is here for follow up. He had an MR of brain on 11/14/17: New areas of nodular enhancement along the inferior surfaces of the temporal lobes bilaterally. These are relatively symmetric and just above the level of the cerebellar enhancing masses, suggesting that these may reflect post treatment change.\par He had to reschedule his PET and states he will do it next Wednesday.\par \par No complaints.\par \par 12/26/17\par He is here for follow up. He feels well. he  had PET/CT in end of November that does not show procession, stable pleural findings. He  feels well. \par \par 1/22/18\par He is here for follow up for his NSCLCA. He states he feels well. No SOB, no headaches no fatigue.. \par \par February 21 2018\par  he is here for followup, he had a recent PET/CT scan which is PRESLEY he also had a recent MRI of the brain. This was reviewed at the CNS tumor board although wasn't clear but the consensus was that this is posttreatment changes in the fact that he is asymptomatic and decided to observe him.\par \par He has no new complaints no headaches no weakness or tingling.\par \par \par 3/21/18\par He is here for follow up. HE states he is doing well. Has very mild headaches on occasion but otherwise doing well.\par \par 4/13/18\par He is doing well. Reports no new complaints \par \par 5/3/18\par He is here for follow up to discuss his MR brain. HE complains of mild headache. Same memory issues as before. Balance is the same.  MR brain showed : Increased size of right parietotemporal of the findings enhancement  seen lesion measuring 5.2 cm, previously 2.8 centimeters and increased  size of left inferior temporal lobe mass measuring 2.3 cm, previously 1.6  cm. these areas again demonstrate hypoperfusion and may represent post  therapeutic changes.   2.  Significant increased edema and mass effect within the right  hemisphere with 9 mm right to left midline shift.  3.  No new enhancing lesions. Stable additional bilateral cerebellar and  supratentorial lesions.\par \par I spoke with Dr. 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 He developed severe headaches, some difficulty with speech and balance issues. 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 He has no insurance so my pharmacy has been giving  him a weekly supply while we work on it. He did not have his scans yet. He feel much better now except for dizziness\par \par 5/30/18\par He is feeling much better. His neurological complaints resolved. He stopped steroids on Monday since he had swelling in legs that has resolved. \par \par 6/13/18\par He is here for follow up. he had a PET/CT on 6/7/18 that showed These images reveal, when comparing the FDG brain images to MRI of  4/27/2018 series 10, there is one small focus of increased FDG uptake in  the region of the right parietotemporal lobe lesion (series 12 image 34).  Therefore persistent biologic tumor activity in this region cannot be  excluded.  No focal abnormal FDG uptake corresponding with the left inferior  temporal lobe lesion and both cerebellar lesions. In addition most of the  right parietotemporal lobe lesion is not FDG avid Compared to normal brain glucose metabolism in the thalamus (surrogate  for normal brain metabolism) relative hypometabolism of the right  parietal, posterior parietal and occipital region and relative  hypermetabolism of the left posterior parietal region  Persistent posttreatment FDG uptake (SUV up to 11.2) along the pleural  surface of the left hemithorax consistent with posttreatment changes  associated with talc pleurodesis  One new mildly FDG avid soft tissue nodule (SUV 3.3) in the left buttock \par \par He feels well now ,no headaches.\par \par 7/16/18\par He is here for follow up. doing well. He has no headaches. No new complaints\par \par 8/13/18\par He is here for follow up.  He is doing well. has no complaints. He is considering returning to work.\par \par 9/10/18\par He is here for follow up. He had an MR brain on 8/24/18 that showed In comparison to the previous brain MRI dated 4/27/2018:  1.  No significant interval change in the size and configuration of the  heterogeneously enhancing, necrotic and hemorrhagic lesions within the  temporal/occipital lobes and cerebellar hemispheres. Enhancement pattern  suggests post-therapeutic changes.  2.  Moderately increased edema within the posterior fossa (cerebellar  hemispheres and brainstem). Increased mass effect upon the fourth  ventricle with no evidence of hydrocephalus.  3.  The left cerebral hemispheric edema has mildly increased and the  right cerebral hemispheric edema has mildly decreased.   4.  Resolution of the previously seen right to left midline shift, right  lateral ventricular compression and left lateral ventricular enlargement.  5.  Treated lesions again noted in the left frontal white matter and left  lateral medulla, without contrast enhancement.\par \par HE is doing well has no new complaints. Has no neuroglial complaints. \par \par 10/10/18\par He is here for follow up he had PET/CT on  9/28/18 that was PRESLEY. He no has headaches again. States he is voiding multiple times a day and unable to empty bladder fully.\par \par 10/30/18\par He is here for follow up. he was in the ED for.Left lower quadrant pain symptoms her was given cirpo. he had a Ct abd/pelvis in ED 10/22/18 : Stable posttreatment changes of the left hemithorax including large  loculated pleural effusion.  2.  Multiple nondilated fluid-filled small bowel loops which may reflect  evidence of enteritis.  \par He had MR brain on 10/29/18:  No new lesions, but several existing lesions have increased in size  2. Bilateral hemorrhagic cerebellar lesions and edema has increased. Mass  effect on the fourth ventricle with hydrocephalus.   2.  Essentially unchanged sizes of supratentorial lesions within the  bilateral temporooccipital lobes and mildly improved edema\par \par He is having headaches and more confusion. He took 4 flomax in am and PM instead of his alecensa. I called [facility] with him and they told us they did not deliver his pills since they could not reach him but will deliver them tomorrow. He will be home. He states he had alecensa at home but not sure.\par \par 11/12/18\par Patient presents to the office with a chief complaint of burning in his right thigh.  He has had no new medications.  The patient states it is worse in the evening but lasts all day.  The patient states that the burning sensation starts at the right knee and is ascending.  Straight leg raise negative.  Discussed that it is unlikely that it is from his Avastin treatment, but unsure the etiology.  We will send for LE doppler to rule out any DVT.

## 2018-11-21 ENCOUNTER — APPOINTMENT (OUTPATIENT)
Dept: HEMATOLOGY ONCOLOGY | Facility: CLINIC | Age: 43
End: 2018-11-21

## 2018-11-27 ENCOUNTER — APPOINTMENT (OUTPATIENT)
Dept: INFUSION THERAPY | Facility: CLINIC | Age: 43
End: 2018-11-27

## 2018-11-27 ENCOUNTER — LABORATORY RESULT (OUTPATIENT)
Age: 43
End: 2018-11-27

## 2018-11-27 ENCOUNTER — APPOINTMENT (OUTPATIENT)
Dept: HEMATOLOGY ONCOLOGY | Facility: CLINIC | Age: 43
End: 2018-11-27

## 2018-11-27 ENCOUNTER — OTHER (OUTPATIENT)
Age: 43
End: 2018-11-27

## 2018-11-27 VITALS
SYSTOLIC BLOOD PRESSURE: 107 MMHG | BODY MASS INDEX: 28.25 KG/M2 | HEIGHT: 67 IN | WEIGHT: 180 LBS | HEART RATE: 70 BPM | RESPIRATION RATE: 18 BRPM | DIASTOLIC BLOOD PRESSURE: 61 MMHG | TEMPERATURE: 96.7 F

## 2018-11-27 LAB
HCT VFR BLD CALC: 35.6 %
HGB BLD-MCNC: 12.2 G/DL
MCHC RBC-ENTMCNC: 31.1 PG
MCHC RBC-ENTMCNC: 34.3 G/DL
MCV RBC AUTO: 90.8 FL
PLATELET # BLD AUTO: 219 K/UL
PMV BLD: 10.9 FL
RBC # BLD: 3.92 M/UL
RBC # FLD: 15.4 %
WBC # FLD AUTO: 8.41 K/UL

## 2018-11-27 RX ORDER — BEVACIZUMAB 400 MG/16ML
615 INJECTION, SOLUTION INTRAVENOUS ONCE
Qty: 0 | Refills: 0 | Status: COMPLETED | OUTPATIENT
Start: 2018-11-27 | End: 2018-11-27

## 2018-11-27 RX ORDER — DIPHENHYDRAMINE HCL 50 MG
50 CAPSULE ORAL ONCE
Qty: 0 | Refills: 0 | Status: COMPLETED | OUTPATIENT
Start: 2018-11-27 | End: 2018-11-27

## 2018-11-27 RX ORDER — ACETAMINOPHEN 500 MG
650 TABLET ORAL ONCE
Qty: 0 | Refills: 0 | Status: COMPLETED | OUTPATIENT
Start: 2018-11-27 | End: 2018-11-27

## 2018-11-27 RX ADMIN — Medication 650 MILLIGRAM(S): at 17:19

## 2018-11-27 RX ADMIN — Medication 650 MILLIGRAM(S): at 17:18

## 2018-11-27 RX ADMIN — Medication 50 MILLIGRAM(S): at 17:19

## 2018-11-27 RX ADMIN — BEVACIZUMAB 83.07 MILLIGRAM(S): 400 INJECTION, SOLUTION INTRAVENOUS at 17:25

## 2018-11-27 NOTE — PHYSICAL EXAM
[Restricted in physically strenuous activity but ambulatory and able to carry out work of a light or sedentary nature] : Status 1- Restricted in physically strenuous activity but ambulatory and able to carry out work of a light or sedentary nature, e.g., light house work, office work [Normal] : affect appropriate [de-identified] : he has decrease breath sounds on left side base, unchanged. [de-identified] :  Gait is normal good strength  in all limbs

## 2018-11-27 NOTE — ASSESSMENT
[FreeTextEntry1] : 1.  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 Due to insurance issues he stopped it but now is back on it since 4/4/2017\par -repeat MRI brain 71389 radiation related changes, post treatment changes, he is asymptomatic,  and repeat PET/CT 6/5/18   is without systemic progression, it confirmed radiation necrosis. Recent PET/CT on  9/28/18 that was PRESLEY. CT abd was unchanged. I think the findings on MR brain are radiation necrosis given his symptoms as before\par 2.   Intermittent headaches now back again \par 3.Elevated total bilirubin likely secondary to alectinib. will monitor, its stable\par 4.  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 LUTs symptoism\par \par Plan:\par -C/w Alectinib.  \par - on t Avastin 7.5 mg/kg every 3 weeks for 4 doses. Unable to get him Dexamethasone his insurance wont  pay for it and my pharmacy wont dispense it. Due for dose 2 today\par -PSA was normal  \par -will stop all medications except for alectinib given he gets confused\par -will check PET/CT after next visit and MR brain in 2 months around late January\par \par RTC in 4 weeks,  CMP, UA today as well  \par

## 2018-11-27 NOTE — REVIEW OF SYSTEMS
[Confused] : confusion [Dizziness] : dizziness [Negative] : Heme/Lymph [Fatigue] : no fatigue [Recent Change In Weight] : ~T no recent weight change [Difficulty Walking] : no difficulty walking [de-identified] : headaches , memory issues  better now

## 2018-11-27 NOTE — HISTORY OF PRESENT ILLNESS
[Disease: _____________________] : Disease: [unfilled] [AJCC Stage: ____] : AJCC Stage: [unfilled] [Treatment Protocol] : Treatment Protocol [Therapy: ___] : Therapy: [unfilled] [de-identified] : 41/M presenting for follow-up for metastatic ALK-positive lung adenocarcinoma.  He is currently on alectinib 600 mg BID.  He initially presented in 11/2014 with multiple pulmonary nodules, mediastinal lymphadenopathy, multiple bony metastases, and brain metastases.  He was initially started on crizotinib and was switched to ceritinib in 12/2015 upon progression.  He was unable to tolerate ceritinib due to significant vomiting and was switched to alectinib in 02/2016.\par \par He had SRS to a left cerebellar hemisphere brain metastasis (08/2015) and subsequently underwent WBRT for CNS progression (10-11/2015).  He had RT to a painful metastatic lesion in the right humerus (01/2015).  He had also received Xgeva for bony metastatic disease in the past. \par \par Since 09/2016, he has been experiencing intermittent headaches being treated with periodic dexamethasone taper.  MRI brain with perfusion analysis showed bilateral cerebellar lesions, favor postRT changes rather than residual tumor.  His most recent brain MRI (11/01/2016) showed overall  stable  exam  compared  to  the  previous  brain  MRI  9/2/2016; no  significant  interval  change  in  bilateral  cerebellar  heterogeneously  enhancing  masses  with  extensive  associated  edema,  as  well  as  smaller  lesions  within  the  left  parietal  white  matter,  superficial  left  temporal  lobe  and  left  dorsal  medulla; and, stable  mild  ventriculomegaly  and  mild  periventricular  FLAIR  signal  abnormality.  He has been following with our neurosurgeon, Dr. Britt, who has recommended no surgical intervention at this time. \par \par His most recent PETCT (11/01/2016) showed no sites of pathologic FDG uptake suspicious for biologic tumor activity.\par \par He reports occasional headaches and a mild sense of imbalance.  Denies any falls.  However he only takes a prn dexamethasone, once in every three days,  which reportedly relieves his intermittent headaches. \par Today he also complains mild pain in his left great toe, likely secondary to ingrowing toe nail. 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 He changed his insurance. Did not get scans and did not take his alectinib for about 1-2 weeks. We were not notified.  Otherwise she feels well she really requires dexamethasone yesterday headache probably once or twice a week as per patient no other complaints.\par \par 4/17/17\par He presents for follow up today. He had MRI of brain that showed stable disease 4/8/2017. He has not had the PET CT yet. He received  his alecetinib on 4/4/17 and stated to take them. States his headaches are rare now and feels well otherwise.\par \par 5/15/17\par He presents for follow up. He had a PET CT on 4/21/17 that  was ucnhged.Since November 1, 2016, no new foci of pathologic FDG uptake to suggest\par biologic tumor activity. . 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 . Unchanged non-FDG avid sclerotic foci involving the right proximal humerus\par and T9 vertebral body.\par \par He  feels well. No dizziness or headache.  No new symptoms or complaints.\par \par 6/19/17\par He is doing well. He has no complaints. No headaches. No SOB. \par \par 7/24/17\par Patient comes in for follow up visit, has no complaints, has no SOB and has a mild cough which is chronic, no hemoptysis. Patient states his appetite is good, weight is stable, he has no headaches and is currently off the steroids. Patient will have repeat PET scan and MRI of the brain with contrast as well as repeat bloodwork. \par \par 8/21/17\par He is here for follow up. He had an MR brain and PET CT on 8/3 and 8/2 that did not show progression, were essentially unchanged.  He feels great otherwise.\par \par 9/21/17\par He is doing well. No complaints. No headaches\par \par October 26, 2017\par He see her for followup he feels great he has no complaints.\par \par 11/16/17\par He is here for follow up. He had an MR of brain on 11/14/17: New areas of nodular enhancement along the inferior surfaces of the temporal lobes bilaterally. These are relatively symmetric and just above the level of the cerebellar enhancing masses, suggesting that these may reflect post treatment change.\par He had to reschedule his PET and states he will do it next Wednesday.\par \par No complaints.\par \par 12/26/17\par He is here for follow up. He feels well. he  had PET/CT in end of November that does not show procession, stable pleural findings. He  feels well. \par \par 1/22/18\par He is here for follow up for his NSCLCA. He states he feels well. No SOB, no headaches no fatigue.. \par \par February 21 2018\par  he is here for followup, he had a recent PET/CT scan which is PRESLEY he also had a recent MRI of the brain. This was reviewed at the CNS tumor board although wasn't clear but the consensus was that this is posttreatment changes in the fact that he is asymptomatic and decided to observe him.\par \par He has no new complaints no headaches no weakness or tingling.\par \par \par 3/21/18\par He is here for follow up. HE states he is doing well. Has very mild headaches on occasion but otherwise doing well.\par \par 4/13/18\par He is doing well. Reports no new complaints \par \par 5/3/18\par He is here for follow up to discuss his MR brain. HE complains of mild headache. Same memory issues as before. Balance is the same.  MR brain showed : Increased size of right parietotemporal of the findings enhancement  seen lesion measuring 5.2 cm, previously 2.8 centimeters and increased  size of left inferior temporal lobe mass measuring 2.3 cm, previously 1.6  cm. these areas again demonstrate hypoperfusion and may represent post  therapeutic changes.   2.  Significant increased edema and mass effect within the right  hemisphere with 9 mm right to left midline shift.  3.  No new enhancing lesions. Stable additional bilateral cerebellar and  supratentorial lesions.\par \par I spoke with Dr. 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 He developed severe headaches, some difficulty with speech and balance issues. 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 He has no insurance so my pharmacy has been giving  him a weekly supply while we work on it. He did not have his scans yet. He feel much better now except for dizziness\par \par 5/30/18\par He is feeling much better. His neurological complaints resolved. He stopped steroids on Monday since he had swelling in legs that has resolved. \par \par 6/13/18\par He is here for follow up. he had a PET/CT on 6/7/18 that showed These images reveal, when comparing the FDG brain images to MRI of  4/27/2018 series 10, there is one small focus of increased FDG uptake in  the region of the right parietotemporal lobe lesion (series 12 image 34).  Therefore persistent biologic tumor activity in this region cannot be  excluded.  No focal abnormal FDG uptake corresponding with the left inferior  temporal lobe lesion and both cerebellar lesions. In addition most of the  right parietotemporal lobe lesion is not FDG avid Compared to normal brain glucose metabolism in the thalamus (surrogate  for normal brain metabolism) relative hypometabolism of the right  parietal, posterior parietal and occipital region and relative  hypermetabolism of the left posterior parietal region  Persistent posttreatment FDG uptake (SUV up to 11.2) along the pleural  surface of the left hemithorax consistent with posttreatment changes  associated with talc pleurodesis  One new mildly FDG avid soft tissue nodule (SUV 3.3) in the left buttock \par \par He feels well now ,no headaches.\par \par 7/16/18\par He is here for follow up. doing well. He has no headaches. No new complaints\par \par 8/13/18\par He is here for follow up.  He is doing well. has no complaints. He is considering returning to work.\par \par 9/10/18\par He is here for follow up. He had an MR brain on 8/24/18 that showed In comparison to the previous brain MRI dated 4/27/2018:  1.  No significant interval change in the size and configuration of the  heterogeneously enhancing, necrotic and hemorrhagic lesions within the  temporal/occipital lobes and cerebellar hemispheres. Enhancement pattern  suggests post-therapeutic changes.  2.  Moderately increased edema within the posterior fossa (cerebellar  hemispheres and brainstem). Increased mass effect upon the fourth  ventricle with no evidence of hydrocephalus.  3.  The left cerebral hemispheric edema has mildly increased and the  right cerebral hemispheric edema has mildly decreased.   4.  Resolution of the previously seen right to left midline shift, right  lateral ventricular compression and left lateral ventricular enlargement.  5.  Treated lesions again noted in the left frontal white matter and left  lateral medulla, without contrast enhancement.\par \par HE is doing well has no new complaints. Has no neuroglial complaints. \par \par 10/10/18\par He is here for follow up he had PET/CT on  9/28/18 that was PRESLEY. He no has headaches again. States he is voiding multiple times a day and unable to empty bladder fully.\par \par 10/30/18\par He is here for follow up. he was in the ED for.Left lower quadrant pain symptoms her was given cirpo. he had a Ct abd/pelvis in ED 10/22/18 : Stable posttreatment changes of the left hemithorax including large  loculated pleural effusion.  2.  Multiple nondilated fluid-filled small bowel loops which may reflect  evidence of enteritis.  \par He had MR brain on 10/29/18:  No new lesions, but several existing lesions have increased in size  2. Bilateral hemorrhagic cerebellar lesions and edema has increased. Mass  effect on the fourth ventricle with hydrocephalus.   2.  Essentially unchanged sizes of supratentorial lesions within the  bilateral temporooccipital lobes and mildly improved edema\par \par He is having headaches and more confusion. He took 4 flomax in am and PM instead of his alecensa. I called [facility] with him and they told us they did not deliver his pills since they could not reach him but will deliver them tomorrow. He will be home. He states he had alecensa at home but not sure.\par \par 11/12/18\par Patient presents to the office with a chief complaint of burning in his right thigh.  He has had no new medications.  The patient states it is worse in the evening but lasts all day.  The patient states that the burning sensation starts at the right knee and is ascending.  Straight leg raise negative.  Discussed that it is unlikely that it is from his Avastin treatment, but unsure the etiology.  We will send for LE doppler to rule out any DVT.\par \par 11/27/18\par He is here for follow up. His Dopplers  were negative and his pain/cramps are nearly gone only some in right thigh. His headaches are gone and he is speech is  better and states memory is better since we started Avastin.

## 2018-11-28 LAB
APPEARANCE: ABNORMAL
BILIRUBIN URINE: NEGATIVE
BLOOD URINE: NEGATIVE
COLOR: YELLOW
GLUCOSE QUALITATIVE U: NEGATIVE MG/DL
KETONES URINE: NEGATIVE
LEUKOCYTE ESTERASE URINE: NEGATIVE
NITRITE URINE: NEGATIVE
PH URINE: 6.5
PROTEIN URINE: NEGATIVE MG/DL
SPECIFIC GRAVITY URINE: 1.01
UROBILINOGEN URINE: 1 MG/DL (ref 0.2–?)

## 2018-11-30 ENCOUNTER — APPOINTMENT (OUTPATIENT)
Dept: UROLOGY | Facility: CLINIC | Age: 43
End: 2018-11-30

## 2018-12-20 ENCOUNTER — APPOINTMENT (OUTPATIENT)
Dept: INFUSION THERAPY | Facility: CLINIC | Age: 43
End: 2018-12-20

## 2018-12-20 ENCOUNTER — LABORATORY RESULT (OUTPATIENT)
Age: 43
End: 2018-12-20

## 2018-12-20 ENCOUNTER — OUTPATIENT (OUTPATIENT)
Dept: OUTPATIENT SERVICES | Facility: HOSPITAL | Age: 43
LOS: 1 days | Discharge: HOME | End: 2018-12-20

## 2018-12-20 DIAGNOSIS — C34.90 MALIGNANT NEOPLASM OF UNSPECIFIED PART OF UNSPECIFIED BRONCHUS OR LUNG: ICD-10-CM

## 2018-12-20 LAB
HCT VFR BLD CALC: 36.5 %
HGB BLD-MCNC: 12.2 G/DL
MCHC RBC-ENTMCNC: 30.3 PG
MCHC RBC-ENTMCNC: 33.4 G/DL
MCV RBC AUTO: 90.8 FL
PLATELET # BLD AUTO: 216 K/UL
PMV BLD: 10.6 FL
RBC # BLD: 4.02 M/UL
RBC # FLD: 14.3 %
WBC # FLD AUTO: 8.42 K/UL

## 2018-12-20 RX ORDER — BEVACIZUMAB 400 MG/16ML
615 INJECTION, SOLUTION INTRAVENOUS ONCE
Qty: 0 | Refills: 0 | Status: COMPLETED | OUTPATIENT
Start: 2018-12-20 | End: 2018-12-20

## 2018-12-20 RX ORDER — DIPHENHYDRAMINE HCL 50 MG
50 CAPSULE ORAL ONCE
Qty: 0 | Refills: 0 | Status: COMPLETED | OUTPATIENT
Start: 2018-12-20 | End: 2018-12-20

## 2018-12-20 RX ORDER — ACETAMINOPHEN 500 MG
650 TABLET ORAL ONCE
Qty: 0 | Refills: 0 | Status: COMPLETED | OUTPATIENT
Start: 2018-12-20 | End: 2018-12-20

## 2018-12-20 RX ADMIN — Medication 650 MILLIGRAM(S): at 13:08

## 2018-12-20 RX ADMIN — BEVACIZUMAB 83.07 MILLIGRAM(S): 400 INJECTION, SOLUTION INTRAVENOUS at 13:33

## 2018-12-20 RX ADMIN — Medication 50 MILLIGRAM(S): at 13:07

## 2018-12-21 LAB
APPEARANCE: CLEAR
BILIRUBIN URINE: NEGATIVE
BLOOD URINE: NEGATIVE
COLOR: YELLOW
GLUCOSE QUALITATIVE U: NEGATIVE MG/DL
KETONES URINE: NEGATIVE
LEUKOCYTE ESTERASE URINE: NEGATIVE
NITRITE URINE: NEGATIVE
PH URINE: 7.5
PROTEIN URINE: NEGATIVE MG/DL
SPECIFIC GRAVITY URINE: <=1.005
UROBILINOGEN URINE: 1 MG/DL (ref 0.2–?)

## 2018-12-26 ENCOUNTER — APPOINTMENT (OUTPATIENT)
Dept: HEMATOLOGY ONCOLOGY | Facility: CLINIC | Age: 43
End: 2018-12-26

## 2019-01-09 ENCOUNTER — APPOINTMENT (OUTPATIENT)
Dept: HEMATOLOGY ONCOLOGY | Facility: CLINIC | Age: 44
End: 2019-01-09

## 2019-01-09 ENCOUNTER — APPOINTMENT (OUTPATIENT)
Dept: INFUSION THERAPY | Facility: CLINIC | Age: 44
End: 2019-01-09

## 2019-01-09 ENCOUNTER — LABORATORY RESULT (OUTPATIENT)
Age: 44
End: 2019-01-09

## 2019-01-09 VITALS
HEIGHT: 67 IN | RESPIRATION RATE: 18 BRPM | BODY MASS INDEX: 27.62 KG/M2 | HEART RATE: 66 BPM | TEMPERATURE: 96.6 F | SYSTOLIC BLOOD PRESSURE: 116 MMHG | DIASTOLIC BLOOD PRESSURE: 64 MMHG | WEIGHT: 176 LBS

## 2019-01-09 RX ORDER — DIPHENHYDRAMINE HCL 50 MG
50 CAPSULE ORAL ONCE
Qty: 0 | Refills: 0 | Status: COMPLETED | OUTPATIENT
Start: 2019-01-09 | End: 2019-01-09

## 2019-01-09 RX ORDER — BEVACIZUMAB 400 MG/16ML
615 INJECTION, SOLUTION INTRAVENOUS ONCE
Qty: 0 | Refills: 0 | Status: COMPLETED | OUTPATIENT
Start: 2019-01-09 | End: 2019-01-09

## 2019-01-09 RX ORDER — ACETAMINOPHEN 500 MG
650 TABLET ORAL ONCE
Qty: 0 | Refills: 0 | Status: COMPLETED | OUTPATIENT
Start: 2019-01-09 | End: 2019-01-09

## 2019-01-09 RX ADMIN — Medication 650 MILLIGRAM(S): at 12:57

## 2019-01-09 RX ADMIN — Medication 50 MILLIGRAM(S): at 12:57

## 2019-01-09 RX ADMIN — BEVACIZUMAB 83.07 MILLIGRAM(S): 400 INJECTION, SOLUTION INTRAVENOUS at 12:58

## 2019-01-09 NOTE — ASSESSMENT
[FreeTextEntry1] : 1.  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 Due to insurance issues he stopped it but now is back on it since 4/4/2017\par -repeat MRI brain 88238 radiation related changes, post treatment changes, he is asymptomatic,  and repeat PET/CT 6/5/18   is without systemic progression, it confirmed radiation necrosis. Recent PET/CT on  9/28/18 that was PRESLEY. CT abd was unchanged. I think the findings on MR brain are radiation necrosis given his symptoms as before\par 2.   Intermittent headaches improved\par 3.Elevated total bilirubin likely secondary to alectinib. will monitor, its stable\par 4.  Radiation Necrosis confirmed by PET/CT and responded to Dexamethasone  now on Avastin responded well \par 5. LUTs symptoms better\par \par Plan:\par -C/w Alectinib.  \par - on Avastin 7.5 mg/kg every 3 weeks for 4 doses. ALst dose today Unable to get him Dexamethasone his insurance wont  pay for it and my pharmacy wont dispense it. Due for dose 2 today\par -PSA was normal  \par -will stop all medications except for alectinib given he gets confused\par -will check PET/CT and MR brain now \par \par RTC in 4 weeks,  CMP, UA today as well  \par

## 2019-01-09 NOTE — HISTORY OF PRESENT ILLNESS
[Disease: _____________________] : Disease: [unfilled] [AJCC Stage: ____] : AJCC Stage: [unfilled] [Treatment Protocol] : Treatment Protocol [Therapy: ___] : Therapy: [unfilled] [de-identified] : 41/M presenting for follow-up for metastatic ALK-positive lung adenocarcinoma.  He is currently on alectinib 600 mg BID.  He initially presented in 11/2014 with multiple pulmonary nodules, mediastinal lymphadenopathy, multiple bony metastases, and brain metastases.  He was initially started on crizotinib and was switched to ceritinib in 12/2015 upon progression.  He was unable to tolerate ceritinib due to significant vomiting and was switched to alectinib in 02/2016.\par \par He had SRS to a left cerebellar hemisphere brain metastasis (08/2015) and subsequently underwent WBRT for CNS progression (10-11/2015).  He had RT to a painful metastatic lesion in the right humerus (01/2015).  He had also received Xgeva for bony metastatic disease in the past. \par \par Since 09/2016, he has been experiencing intermittent headaches being treated with periodic dexamethasone taper.  MRI brain with perfusion analysis showed bilateral cerebellar lesions, favor postRT changes rather than residual tumor.  His most recent brain MRI (11/01/2016) showed overall  stable  exam  compared  to  the  previous  brain  MRI  9/2/2016; no  significant  interval  change  in  bilateral  cerebellar  heterogeneously  enhancing  masses  with  extensive  associated  edema,  as  well  as  smaller  lesions  within  the  left  parietal  white  matter,  superficial  left  temporal  lobe  and  left  dorsal  medulla; and, stable  mild  ventriculomegaly  and  mild  periventricular  FLAIR  signal  abnormality.  He has been following with our neurosurgeon, Dr. Britt, who has recommended no surgical intervention at this time. \par \par His most recent PETCT (11/01/2016) showed no sites of pathologic FDG uptake suspicious for biologic tumor activity.\par \par He reports occasional headaches and a mild sense of imbalance.  Denies any falls.  However he only takes a prn dexamethasone, once in every three days,  which reportedly relieves his intermittent headaches. \par Today he also complains mild pain in his left great toe, likely secondary to ingrowing toe nail. 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 He changed his insurance. Did not get scans and did not take his alectinib for about 1-2 weeks. We were not notified.  Otherwise she feels well she really requires dexamethasone yesterday headache probably once or twice a week as per patient no other complaints.\par \par 4/17/17\par He presents for follow up today. He had MRI of brain that showed stable disease 4/8/2017. He has not had the PET CT yet. He received  his alecetinib on 4/4/17 and stated to take them. States his headaches are rare now and feels well otherwise.\par \par 5/15/17\par He presents for follow up. He had a PET CT on 4/21/17 that  was ucnhged.Since November 1, 2016, no new foci of pathologic FDG uptake to suggest\par biologic tumor activity. . 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 . Unchanged non-FDG avid sclerotic foci involving the right proximal humerus\par and T9 vertebral body.\par \par He  feels well. No dizziness or headache.  No new symptoms or complaints.\par \par 6/19/17\par He is doing well. He has no complaints. No headaches. No SOB. \par \par 7/24/17\par Patient comes in for follow up visit, has no complaints, has no SOB and has a mild cough which is chronic, no hemoptysis. Patient states his appetite is good, weight is stable, he has no headaches and is currently off the steroids. Patient will have repeat PET scan and MRI of the brain with contrast as well as repeat bloodwork. \par \par 8/21/17\par He is here for follow up. He had an MR brain and PET CT on 8/3 and 8/2 that did not show progression, were essentially unchanged.  He feels great otherwise.\par \par 9/21/17\par He is doing well. No complaints. No headaches\par \par October 26, 2017\par He see her for followup he feels great he has no complaints.\par \par 11/16/17\par He is here for follow up. He had an MR of brain on 11/14/17: New areas of nodular enhancement along the inferior surfaces of the temporal lobes bilaterally. These are relatively symmetric and just above the level of the cerebellar enhancing masses, suggesting that these may reflect post treatment change.\par He had to reschedule his PET and states he will do it next Wednesday.\par \par No complaints.\par \par 12/26/17\par He is here for follow up. He feels well. he  had PET/CT in end of November that does not show procession, stable pleural findings. He  feels well. \par \par 1/22/18\par He is here for follow up for his NSCLCA. He states he feels well. No SOB, no headaches no fatigue.. \par \par February 21 2018\par  he is here for followup, he had a recent PET/CT scan which is PRESLEY he also had a recent MRI of the brain. This was reviewed at the CNS tumor board although wasn't clear but the consensus was that this is posttreatment changes in the fact that he is asymptomatic and decided to observe him.\par \par He has no new complaints no headaches no weakness or tingling.\par \par \par 3/21/18\par He is here for follow up. HE states he is doing well. Has very mild headaches on occasion but otherwise doing well.\par \par 4/13/18\par He is doing well. Reports no new complaints \par \par 5/3/18\par He is here for follow up to discuss his MR brain. HE complains of mild headache. Same memory issues as before. Balance is the same.  MR brain showed : Increased size of right parietotemporal of the findings enhancement  seen lesion measuring 5.2 cm, previously 2.8 centimeters and increased  size of left inferior temporal lobe mass measuring 2.3 cm, previously 1.6  cm. these areas again demonstrate hypoperfusion and may represent post  therapeutic changes.   2.  Significant increased edema and mass effect within the right  hemisphere with 9 mm right to left midline shift.  3.  No new enhancing lesions. Stable additional bilateral cerebellar and  supratentorial lesions.\par \par I spoke with Dr. 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 He developed severe headaches, some difficulty with speech and balance issues. 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 He has no insurance so my pharmacy has been giving  him a weekly supply while we work on it. He did not have his scans yet. He feel much better now except for dizziness\par \par 5/30/18\par He is feeling much better. His neurological complaints resolved. He stopped steroids on Monday since he had swelling in legs that has resolved. \par \par 6/13/18\par He is here for follow up. he had a PET/CT on 6/7/18 that showed These images reveal, when comparing the FDG brain images to MRI of  4/27/2018 series 10, there is one small focus of increased FDG uptake in  the region of the right parietotemporal lobe lesion (series 12 image 34).  Therefore persistent biologic tumor activity in this region cannot be  excluded.  No focal abnormal FDG uptake corresponding with the left inferior  temporal lobe lesion and both cerebellar lesions. In addition most of the  right parietotemporal lobe lesion is not FDG avid Compared to normal brain glucose metabolism in the thalamus (surrogate  for normal brain metabolism) relative hypometabolism of the right  parietal, posterior parietal and occipital region and relative  hypermetabolism of the left posterior parietal region  Persistent posttreatment FDG uptake (SUV up to 11.2) along the pleural  surface of the left hemithorax consistent with posttreatment changes  associated with talc pleurodesis  One new mildly FDG avid soft tissue nodule (SUV 3.3) in the left buttock \par \par He feels well now ,no headaches.\par \par 7/16/18\par He is here for follow up. doing well. He has no headaches. No new complaints\par \par 8/13/18\par He is here for follow up.  He is doing well. has no complaints. He is considering returning to work.\par \par 9/10/18\par He is here for follow up. He had an MR brain on 8/24/18 that showed In comparison to the previous brain MRI dated 4/27/2018:  1.  No significant interval change in the size and configuration of the  heterogeneously enhancing, necrotic and hemorrhagic lesions within the  temporal/occipital lobes and cerebellar hemispheres. Enhancement pattern  suggests post-therapeutic changes.  2.  Moderately increased edema within the posterior fossa (cerebellar  hemispheres and brainstem). Increased mass effect upon the fourth  ventricle with no evidence of hydrocephalus.  3.  The left cerebral hemispheric edema has mildly increased and the  right cerebral hemispheric edema has mildly decreased.   4.  Resolution of the previously seen right to left midline shift, right  lateral ventricular compression and left lateral ventricular enlargement.  5.  Treated lesions again noted in the left frontal white matter and left  lateral medulla, without contrast enhancement.\par \par HE is doing well has no new complaints. Has no neuroglial complaints. \par \par 10/10/18\par He is here for follow up he had PET/CT on  9/28/18 that was PRESLEY. He no has headaches again. States he is voiding multiple times a day and unable to empty bladder fully.\par \par 10/30/18\par He is here for follow up. he was in the ED for.Left lower quadrant pain symptoms her was given cirpo. he had a Ct abd/pelvis in ED 10/22/18 : Stable posttreatment changes of the left hemithorax including large  loculated pleural effusion.  2.  Multiple nondilated fluid-filled small bowel loops which may reflect  evidence of enteritis.  \par He had MR brain on 10/29/18:  No new lesions, but several existing lesions have increased in size  2. Bilateral hemorrhagic cerebellar lesions and edema has increased. Mass  effect on the fourth ventricle with hydrocephalus.   2.  Essentially unchanged sizes of supratentorial lesions within the  bilateral temporooccipital lobes and mildly improved edema\par \par He is having headaches and more confusion. He took 4 flomax in am and PM instead of his alecensa. I called [facility] with him and they told us they did not deliver his pills since they could not reach him but will deliver them tomorrow. He will be home. He states he had alecensa at home but not sure.\par \par 11/12/18\par Patient presents to the office with a chief complaint of burning in his right thigh.  He has had no new medications.  The patient states it is worse in the evening but lasts all day.  The patient states that the burning sensation starts at the right knee and is ascending.  Straight leg raise negative.  Discussed that it is unlikely that it is from his Avastin treatment, but unsure the etiology.  We will send for LE doppler to rule out any DVT.\par \par 11/27/18\par He is here for follow up. His Dopplers  were negative and his pain/cramps are nearly gone only some in right thigh. His headaches are gone and he is speech is  better and states memory is better since we started Avastin.\par \par 1/9/19\par He is doing well.  He has no more pain in his legs. He is eatnig well. He is due for 4/4 dose of Avastin. No other complains.\par

## 2019-01-09 NOTE — REVIEW OF SYSTEMS
[Confused] : confusion [Dizziness] : dizziness [Negative] : Heme/Lymph [Fatigue] : no fatigue [Recent Change In Weight] : ~T no recent weight change [Difficulty Walking] : no difficulty walking [de-identified] : headaches on occasion  , memory issues  chronic a bit better post Avastin

## 2019-01-09 NOTE — PHYSICAL EXAM
[Restricted in physically strenuous activity but ambulatory and able to carry out work of a light or sedentary nature] : Status 1- Restricted in physically strenuous activity but ambulatory and able to carry out work of a light or sedentary nature, e.g., light house work, office work [Normal] : affect appropriate [de-identified] : he has decrease breath sounds on left side base, unchanged. [de-identified] :  Gait is normal good strength  in all limbs

## 2019-01-10 LAB
ALBUMIN SERPL ELPH-MCNC: 4.2 G/DL
ALP BLD-CCNC: 124 U/L
ALT SERPL-CCNC: 10 U/L
ANION GAP SERPL CALC-SCNC: 18 MMOL/L
APPEARANCE: CLEAR
AST SERPL-CCNC: 18 U/L
BILIRUB SERPL-MCNC: 1 MG/DL
BILIRUBIN URINE: NEGATIVE
BLOOD URINE: NEGATIVE
BUN SERPL-MCNC: 13 MG/DL
CALCIUM SERPL-MCNC: 9 MG/DL
CHLORIDE SERPL-SCNC: 99 MMOL/L
CO2 SERPL-SCNC: 21 MMOL/L
COLOR: YELLOW
CREAT SERPL-MCNC: 0.8 MG/DL
GLUCOSE QUALITATIVE U: NEGATIVE MG/DL
GLUCOSE SERPL-MCNC: 104 MG/DL
HCT VFR BLD CALC: 37.2 %
HGB BLD-MCNC: 12.4 G/DL
KETONES URINE: NEGATIVE
LEUKOCYTE ESTERASE URINE: NEGATIVE
MCHC RBC-ENTMCNC: 29.9 PG
MCHC RBC-ENTMCNC: 33.3 G/DL
MCV RBC AUTO: 89.6 FL
NITRITE URINE: NEGATIVE
PH URINE: 8
PLATELET # BLD AUTO: 309 K/UL
PMV BLD: 10.2 FL
POTASSIUM SERPL-SCNC: 4.2 MMOL/L
PROT SERPL-MCNC: 6.9 G/DL
PROTEIN URINE: NEGATIVE MG/DL
RBC # BLD: 4.15 M/UL
RBC # FLD: 14.2 %
SODIUM SERPL-SCNC: 138 MMOL/L
SPECIFIC GRAVITY URINE: 1.01
UROBILINOGEN URINE: 0.2 MG/DL (ref 0.2–?)
WBC # FLD AUTO: 9.07 K/UL

## 2019-01-19 ENCOUNTER — FORM ENCOUNTER (OUTPATIENT)
Age: 44
End: 2019-01-19

## 2019-01-20 ENCOUNTER — OUTPATIENT (OUTPATIENT)
Dept: OUTPATIENT SERVICES | Facility: HOSPITAL | Age: 44
LOS: 1 days | Discharge: HOME | End: 2019-01-20

## 2019-01-20 DIAGNOSIS — C78.00 SECONDARY MALIGNANT NEOPLASM OF UNSPECIFIED LUNG: ICD-10-CM

## 2019-01-30 ENCOUNTER — APPOINTMENT (OUTPATIENT)
Dept: INFUSION THERAPY | Facility: CLINIC | Age: 44
End: 2019-01-30

## 2019-01-30 ENCOUNTER — APPOINTMENT (OUTPATIENT)
Dept: HEMATOLOGY ONCOLOGY | Facility: CLINIC | Age: 44
End: 2019-01-30

## 2019-01-31 ENCOUNTER — RX RENEWAL (OUTPATIENT)
Age: 44
End: 2019-01-31

## 2019-02-03 ENCOUNTER — FORM ENCOUNTER (OUTPATIENT)
Age: 44
End: 2019-02-03

## 2019-02-04 ENCOUNTER — OUTPATIENT (OUTPATIENT)
Dept: OUTPATIENT SERVICES | Facility: HOSPITAL | Age: 44
LOS: 1 days | Discharge: HOME | End: 2019-02-04

## 2019-02-04 DIAGNOSIS — C78.01 SECONDARY MALIGNANT NEOPLASM OF RIGHT LUNG: ICD-10-CM

## 2019-02-04 DIAGNOSIS — C78.00 SECONDARY MALIGNANT NEOPLASM OF UNSPECIFIED LUNG: ICD-10-CM

## 2019-02-04 DIAGNOSIS — C78.02 SECONDARY MALIGNANT NEOPLASM OF LEFT LUNG: ICD-10-CM

## 2019-02-04 LAB — GLUCOSE BLDC GLUCOMTR-MCNC: 93 MG/DL — SIGNIFICANT CHANGE UP (ref 70–99)

## 2019-02-06 ENCOUNTER — LABORATORY RESULT (OUTPATIENT)
Age: 44
End: 2019-02-06

## 2019-02-06 ENCOUNTER — APPOINTMENT (OUTPATIENT)
Dept: HEMATOLOGY ONCOLOGY | Facility: CLINIC | Age: 44
End: 2019-02-06

## 2019-02-06 VITALS
HEIGHT: 67 IN | WEIGHT: 174 LBS | DIASTOLIC BLOOD PRESSURE: 69 MMHG | SYSTOLIC BLOOD PRESSURE: 109 MMHG | BODY MASS INDEX: 27.31 KG/M2 | RESPIRATION RATE: 14 BRPM | TEMPERATURE: 98 F | HEART RATE: 77 BPM

## 2019-02-07 LAB
HCT VFR BLD CALC: 38.5 %
HGB BLD-MCNC: 13 G/DL
MCHC RBC-ENTMCNC: 30.3 PG
MCHC RBC-ENTMCNC: 33.8 G/DL
MCV RBC AUTO: 89.7 FL
PLATELET # BLD AUTO: 198 K/UL
PMV BLD: 11 FL
RBC # BLD: 4.29 M/UL
RBC # FLD: 14.6 %
WBC # FLD AUTO: 7.47 K/UL

## 2019-02-10 NOTE — HISTORY OF PRESENT ILLNESS
[Disease: _____________________] : Disease: [unfilled] [AJCC Stage: ____] : AJCC Stage: [unfilled] [Treatment Protocol] : Treatment Protocol [Therapy: ___] : Therapy: [unfilled] [de-identified] : 41/M presenting for follow-up for metastatic ALK-positive lung adenocarcinoma.  He is currently on alectinib 600 mg BID.  He initially presented in 11/2014 with multiple pulmonary nodules, mediastinal lymphadenopathy, multiple bony metastases, and brain metastases.  He was initially started on crizotinib and was switched to ceritinib in 12/2015 upon progression.  He was unable to tolerate ceritinib due to significant vomiting and was switched to alectinib in 02/2016.\par \par He had SRS to a left cerebellar hemisphere brain metastasis (08/2015) and subsequently underwent WBRT for CNS progression (10-11/2015).  He had RT to a painful metastatic lesion in the right humerus (01/2015).  He had also received Xgeva for bony metastatic disease in the past. \par \par Since 09/2016, he has been experiencing intermittent headaches being treated with periodic dexamethasone taper.  MRI brain with perfusion analysis showed bilateral cerebellar lesions, favor postRT changes rather than residual tumor.  His most recent brain MRI (11/01/2016) showed overall  stable  exam  compared  to  the  previous  brain  MRI  9/2/2016; no  significant  interval  change  in  bilateral  cerebellar  heterogeneously  enhancing  masses  with  extensive  associated  edema,  as  well  as  smaller  lesions  within  the  left  parietal  white  matter,  superficial  left  temporal  lobe  and  left  dorsal  medulla; and, stable  mild  ventriculomegaly  and  mild  periventricular  FLAIR  signal  abnormality.  He has been following with our neurosurgeon, Dr. Britt, who has recommended no surgical intervention at this time. \par \par His most recent PETCT (11/01/2016) showed no sites of pathologic FDG uptake suspicious for biologic tumor activity.\par \par He reports occasional headaches and a mild sense of imbalance.  Denies any falls.  However he only takes a prn dexamethasone, once in every three days,  which reportedly relieves his intermittent headaches. \par Today he also complains mild pain in his left great toe, likely secondary to ingrowing toe nail. 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 He changed his insurance. Did not get scans and did not take his alectinib for about 1-2 weeks. We were not notified.  Otherwise she feels well she really requires dexamethasone yesterday headache probably once or twice a week as per patient no other complaints.\par \par 4/17/17\par He presents for follow up today. He had MRI of brain that showed stable disease 4/8/2017. He has not had the PET CT yet. He received  his alecetinib on 4/4/17 and stated to take them. States his headaches are rare now and feels well otherwise.\par \par 5/15/17\par He presents for follow up. He had a PET CT on 4/21/17 that  was ucnhged.Since November 1, 2016, no new foci of pathologic FDG uptake to suggest\par biologic tumor activity. . 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 . Unchanged non-FDG avid sclerotic foci involving the right proximal humerus\par and T9 vertebral body.\par \par He  feels well. No dizziness or headache.  No new symptoms or complaints.\par \par 6/19/17\par He is doing well. He has no complaints. No headaches. No SOB. \par \par 7/24/17\par Patient comes in for follow up visit, has no complaints, has no SOB and has a mild cough which is chronic, no hemoptysis. Patient states his appetite is good, weight is stable, he has no headaches and is currently off the steroids. Patient will have repeat PET scan and MRI of the brain with contrast as well as repeat bloodwork. \par \par 8/21/17\par He is here for follow up. He had an MR brain and PET CT on 8/3 and 8/2 that did not show progression, were essentially unchanged.  He feels great otherwise.\par \par 9/21/17\par He is doing well. No complaints. No headaches\par \par October 26, 2017\par He see her for followup he feels great he has no complaints.\par \par 11/16/17\par He is here for follow up. He had an MR of brain on 11/14/17: New areas of nodular enhancement along the inferior surfaces of the temporal lobes bilaterally. These are relatively symmetric and just above the level of the cerebellar enhancing masses, suggesting that these may reflect post treatment change.\par He had to reschedule his PET and states he will do it next Wednesday.\par \par No complaints.\par \par 12/26/17\par He is here for follow up. He feels well. he  had PET/CT in end of November that does not show procession, stable pleural findings. He  feels well. \par \par 1/22/18\par He is here for follow up for his NSCLCA. He states he feels well. No SOB, no headaches no fatigue.. \par \par February 21 2018\par  he is here for followup, he had a recent PET/CT scan which is PRESLEY he also had a recent MRI of the brain. This was reviewed at the CNS tumor board although wasn't clear but the consensus was that this is posttreatment changes in the fact that he is asymptomatic and decided to observe him.\par \par He has no new complaints no headaches no weakness or tingling.\par \par \par 3/21/18\par He is here for follow up. HE states he is doing well. Has very mild headaches on occasion but otherwise doing well.\par \par 4/13/18\par He is doing well. Reports no new complaints \par \par 5/3/18\par He is here for follow up to discuss his MR brain. HE complains of mild headache. Same memory issues as before. Balance is the same.  MR brain showed : Increased size of right parietotemporal of the findings enhancement  seen lesion measuring 5.2 cm, previously 2.8 centimeters and increased  size of left inferior temporal lobe mass measuring 2.3 cm, previously 1.6  cm. these areas again demonstrate hypoperfusion and may represent post  therapeutic changes.   2.  Significant increased edema and mass effect within the right  hemisphere with 9 mm right to left midline shift.  3.  No new enhancing lesions. Stable additional bilateral cerebellar and  supratentorial lesions.\par \par I spoke with Dr. 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 He developed severe headaches, some difficulty with speech and balance issues. 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 He has no insurance so my pharmacy has been giving  him a weekly supply while we work on it. He did not have his scans yet. He feel much better now except for dizziness\par \par 5/30/18\par He is feeling much better. His neurological complaints resolved. He stopped steroids on Monday since he had swelling in legs that has resolved. \par \par 6/13/18\par He is here for follow up. he had a PET/CT on 6/7/18 that showed These images reveal, when comparing the FDG brain images to MRI of  4/27/2018 series 10, there is one small focus of increased FDG uptake in  the region of the right parietotemporal lobe lesion (series 12 image 34).  Therefore persistent biologic tumor activity in this region cannot be  excluded.  No focal abnormal FDG uptake corresponding with the left inferior  temporal lobe lesion and both cerebellar lesions. In addition most of the  right parietotemporal lobe lesion is not FDG avid Compared to normal brain glucose metabolism in the thalamus (surrogate  for normal brain metabolism) relative hypometabolism of the right  parietal, posterior parietal and occipital region and relative  hypermetabolism of the left posterior parietal region  Persistent posttreatment FDG uptake (SUV up to 11.2) along the pleural  surface of the left hemithorax consistent with posttreatment changes  associated with talc pleurodesis  One new mildly FDG avid soft tissue nodule (SUV 3.3) in the left buttock \par \par He feels well now ,no headaches.\par \par 7/16/18\par He is here for follow up. doing well. He has no headaches. No new complaints\par \par 8/13/18\par He is here for follow up.  He is doing well. has no complaints. He is considering returning to work.\par \par 9/10/18\par He is here for follow up. He had an MR brain on 8/24/18 that showed In comparison to the previous brain MRI dated 4/27/2018:  1.  No significant interval change in the size and configuration of the  heterogeneously enhancing, necrotic and hemorrhagic lesions within the  temporal/occipital lobes and cerebellar hemispheres. Enhancement pattern  suggests post-therapeutic changes.  2.  Moderately increased edema within the posterior fossa (cerebellar  hemispheres and brainstem). Increased mass effect upon the fourth  ventricle with no evidence of hydrocephalus.  3.  The left cerebral hemispheric edema has mildly increased and the  right cerebral hemispheric edema has mildly decreased.   4.  Resolution of the previously seen right to left midline shift, right  lateral ventricular compression and left lateral ventricular enlargement.  5.  Treated lesions again noted in the left frontal white matter and left  lateral medulla, without contrast enhancement.\par \par HE is doing well has no new complaints. Has no neuroglial complaints. \par \par 10/10/18\par He is here for follow up he had PET/CT on  9/28/18 that was PRESLEY. He no has headaches again. States he is voiding multiple times a day and unable to empty bladder fully.\par \par 10/30/18\par He is here for follow up. he was in the ED for.Left lower quadrant pain symptoms her was given cirpo. he had a Ct abd/pelvis in ED 10/22/18 : Stable posttreatment changes of the left hemithorax including large  loculated pleural effusion.  2.  Multiple nondilated fluid-filled small bowel loops which may reflect  evidence of enteritis.  \par He had MR brain on 10/29/18:  No new lesions, but several existing lesions have increased in size  2. Bilateral hemorrhagic cerebellar lesions and edema has increased. Mass  effect on the fourth ventricle with hydrocephalus.   2.  Essentially unchanged sizes of supratentorial lesions within the  bilateral temporooccipital lobes and mildly improved edema\par \par He is having headaches and more confusion. He took 4 flomax in am and PM instead of his alecensa. I called [facility] with him and they told us they did not deliver his pills since they could not reach him but will deliver them tomorrow. He will be home. He states he had alecensa at home but not sure.\par \par 11/12/18\par Patient presents to the office with a chief complaint of burning in his right thigh.  He has had no new medications.  The patient states it is worse in the evening but lasts all day.  The patient states that the burning sensation starts at the right knee and is ascending.  Straight leg raise negative.  Discussed that it is unlikely that it is from his Avastin treatment, but unsure the etiology.  We will send for LE doppler to rule out any DVT.\par \par 11/27/18\par He is here for follow up. His Dopplers  were negative and his pain/cramps are nearly gone only some in right thigh. His headaches are gone and he is speech is  better and states memory is better since we started Avastin.\par \par 1/9/19\par He is doing well.  He has no more pain in his legs. He is eating well. He is due for 4/4 dose of Avastin. No other complains.\par \par 2/6/19\par He is here for follow-up.  He generally feels well.  He states he still suffers from headaches.  His memory has improved minimally.\par PET/CT 2/4/19 IMPRESSION: Since 9/28/2018, No definite sites of abnormal FDG uptake to suggest biologic tumor activity. Cerebellar lesions seen on brain MRI from 1/20/2019, are not FDG avid and consistent with treated metastases. Post talc pleurodesis of the left pleura, unchanged. \par MR Brain 1/20/19 IMPRESSION: In comparison to the previous brain MRI dated 10/29/2018: 1. No significant interval change in the size and configuration of the heterogeneously enhancing, necrotic and hemorrhagic lesions within the temporal/occipital lobes and cerebellar hemispheres. 2. The edema within the brainstem and cerebellum is about stable;however there is slight progression of the hydrocephalus since the prior exam. 3. The edema within the temporal and occipital lobes has decreased. 4. New patchy area of restricted diffusion around the left temporal horn (without corresponding enhancement) may reflect superimposed ischemic changes. Recommend attention on follow-up imaging.

## 2019-02-10 NOTE — REVIEW OF SYSTEMS
[Confused] : confusion [Dizziness] : dizziness [Negative] : Heme/Lymph [Fatigue] : no fatigue [Recent Change In Weight] : ~T no recent weight change [Difficulty Walking] : no difficulty walking [de-identified] : headaches on occasion, memory issues,  chronic a bit better post Avastin

## 2019-02-10 NOTE — PHYSICAL EXAM
[Restricted in physically strenuous activity but ambulatory and able to carry out work of a light or sedentary nature] : Status 1- Restricted in physically strenuous activity but ambulatory and able to carry out work of a light or sedentary nature, e.g., light house work, office work [Normal] : affect appropriate [de-identified] : he has decrease breath sounds on left side base, unchanged. [de-identified] :  Gait is normal good strength  in all limbs

## 2019-02-10 NOTE — ASSESSMENT
[FreeTextEntry1] : 1.  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 Due to insurance issues he stopped it but now is back on it since 4/4/2017\par -repeat MRI brain 47182 radiation related changes, post treatment changes, he is asymptomatic,  and repeat PET/CT 6/5/18   is without systemic progression, it confirmed radiation necrosis. Recent PET/CT on  9/28/18 that was PRESLEY. CT abd was unchanged. I think the findings on MR brain are radiation necrosis given his symptoms as before. Recent MR brain and PET/CT do not show progression.  \par 2.   Intermittent headaches improved\par 3.Elevated total bilirubin likely secondary to alectinib. will monitor, its stable\par 4.  Radiation Necrosis confirmed by PET/CT and responded to Dexamethasone  now on Avastin responded well \par 5. LUTs symptoms better PSA was normal  \par \par Plan:\par - C/w Alectinib.  \par - will stop all medications except for alectinib given he gets confused\par - will repeat PET/CT and MR brain in 2-3 months\par -if radiation necrosis acts up again will try to get Dexamethasone since he has new insurance now maybe they will pay for it\par \par RTC in 4 weeks,  CMP today

## 2019-03-06 ENCOUNTER — APPOINTMENT (OUTPATIENT)
Dept: HEMATOLOGY ONCOLOGY | Facility: CLINIC | Age: 44
End: 2019-03-06

## 2019-03-06 ENCOUNTER — LABORATORY RESULT (OUTPATIENT)
Age: 44
End: 2019-03-06

## 2019-03-06 VITALS
SYSTOLIC BLOOD PRESSURE: 119 MMHG | HEART RATE: 66 BPM | RESPIRATION RATE: 14 BRPM | WEIGHT: 178 LBS | BODY MASS INDEX: 27.94 KG/M2 | DIASTOLIC BLOOD PRESSURE: 75 MMHG | TEMPERATURE: 97.6 F | HEIGHT: 67 IN

## 2019-03-06 LAB
HCT VFR BLD CALC: 38.3 %
HGB BLD-MCNC: 12.7 G/DL
MCHC RBC-ENTMCNC: 30.3 PG
MCHC RBC-ENTMCNC: 33.2 G/DL
MCV RBC AUTO: 91.4 FL
PLATELET # BLD AUTO: 204 K/UL
PMV BLD: 11.1 FL
RBC # BLD: 4.19 M/UL
RBC # FLD: 15.2 %
WBC # FLD AUTO: 8.99 K/UL

## 2019-03-06 RX ORDER — DEXAMETHASONE 4 MG/1
4 TABLET ORAL
Qty: 28 | Refills: 0 | Status: COMPLETED | COMMUNITY
Start: 2019-03-06 | End: 2019-03-20

## 2019-03-07 LAB
ALBUMIN SERPL ELPH-MCNC: 4.4 G/DL
ALP BLD-CCNC: 105 U/L
ALT SERPL-CCNC: 10 U/L
ANION GAP SERPL CALC-SCNC: 11 MMOL/L
AST SERPL-CCNC: 16 U/L
BILIRUB SERPL-MCNC: 1.4 MG/DL
BUN SERPL-MCNC: 17 MG/DL
CALCIUM SERPL-MCNC: 9.4 MG/DL
CHLORIDE SERPL-SCNC: 104 MMOL/L
CO2 SERPL-SCNC: 25 MMOL/L
CREAT SERPL-MCNC: 0.8 MG/DL
GLUCOSE SERPL-MCNC: 94 MG/DL
POTASSIUM SERPL-SCNC: 4.1 MMOL/L
PROT SERPL-MCNC: 7.2 G/DL
SODIUM SERPL-SCNC: 140 MMOL/L

## 2019-03-08 NOTE — ASSESSMENT
[FreeTextEntry1] : 1.  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 Due to insurance issues he stopped it but now is back on it since 4/4/2017\par -repeat MRI brain [id number] radiation related changes, post treatment changes, he is asymptomatic,  and repeat PET/CT 6/5/18   is without systemic progression, it confirmed radiation necrosis. Recent PET/CT on  9/28/18 that was PRESLEY. CT abd was unchanged. I think the findings on MR brain are radiation necrosis given his symptoms as before. Recent MR brain and PET/CT do not show progression.  \par 2.   Intermittent headaches improved\par 3.Elevated total bilirubin likely secondary to alectinib. will monitor, its stable\par 4.  Radiation Necrosis confirmed by PET/CT and responded to Dexamethasone  now on Avastin responded well \par 5. LUTs symptoms better PSA was normal  \par \par Plan:\par - C/w Alectinib.  \par - Ordered Decadron 4mg BiD for headaches which maybe due to Radiation necrosis vs progression \par - will repeat PET/CT and MR brain in 1 month, will check MR russ sooner if headaches do not improve with dexamethasone\par \par RTC in 2 weeks

## 2019-03-08 NOTE — PHYSICAL EXAM
[Restricted in physically strenuous activity but ambulatory and able to carry out work of a light or sedentary nature] : Status 1- Restricted in physically strenuous activity but ambulatory and able to carry out work of a light or sedentary nature, e.g., light house work, office work [Normal] : affect appropriate [de-identified] : he has decrease breath sounds on left side base, unchanged. [de-identified] :  Gait is normal good strength  in all limbs

## 2019-03-08 NOTE — REVIEW OF SYSTEMS
[Confused] : confusion [Dizziness] : dizziness [Negative] : Heme/Lymph [Fatigue] : no fatigue [Recent Change In Weight] : ~T no recent weight change [Difficulty Walking] : no difficulty walking [de-identified] : headaches on occasion, memory issues,  chronic a bit better post Avastin

## 2019-03-08 NOTE — HISTORY OF PRESENT ILLNESS
[Disease: _____________________] : Disease: [unfilled] [AJCC Stage: ____] : AJCC Stage: [unfilled] [Treatment Protocol] : Treatment Protocol [Therapy: ___] : Therapy: [unfilled] [de-identified] : 41/M presenting for follow-up for metastatic ALK-positive lung adenocarcinoma.  He is currently on alectinib 600 mg BID.  He initially presented in 11/2014 with multiple pulmonary nodules, mediastinal lymphadenopathy, multiple bony metastases, and brain metastases.  He was initially started on crizotinib and was switched to ceritinib in 12/2015 upon progression.  He was unable to tolerate ceritinib due to significant vomiting and was switched to alectinib in 02/2016.\par \par He had SRS to a left cerebellar hemisphere brain metastasis (08/2015) and subsequently underwent WBRT for CNS progression (10-11/2015).  He had RT to a painful metastatic lesion in the right humerus (01/2015).  He had also received Xgeva for bony metastatic disease in the past. \par \par Since 09/2016, he has been experiencing intermittent headaches being treated with periodic dexamethasone taper.  MRI brain with perfusion analysis showed bilateral cerebellar lesions, favor postRT changes rather than residual tumor.  His most recent brain MRI (11/01/2016) showed overall  stable  exam  compared  to  the  previous  brain  MRI  9/2/2016; no  significant  interval  change  in  bilateral  cerebellar  heterogeneously  enhancing  masses  with  extensive  associated  edema,  as  well  as  smaller  lesions  within  the  left  parietal  white  matter,  superficial  left  temporal  lobe  and  left  dorsal  medulla; and, stable  mild  ventriculomegaly  and  mild  periventricular  FLAIR  signal  abnormality.  He has been following with our neurosurgeon, Dr. Britt, who has recommended no surgical intervention at this time. \par \par His most recent PETCT (11/01/2016) showed no sites of pathologic FDG uptake suspicious for biologic tumor activity.\par \par He reports occasional headaches and a mild sense of imbalance.  Denies any falls.  However he only takes a prn dexamethasone, once in every three days,  which reportedly relieves his intermittent headaches. \par Today he also complains mild pain in his left great toe, likely secondary to ingrowing toe nail. 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 He changed his insurance. Did not get scans and did not take his alectinib for about 1-2 weeks. We were not notified.  Otherwise she feels well she really requires dexamethasone yesterday headache probably once or twice a week as per patient no other complaints.\par \par 4/17/17\par He presents for follow up today. He had MRI of brain that showed stable disease 4/8/2017. He has not had the PET CT yet. He received  his alecetinib on 4/4/17 and stated to take them. States his headaches are rare now and feels well otherwise.\par \par 5/15/17\par He presents for follow up. He had a PET CT on 4/21/17 that  was ucnhged.Since November 1, 2016, no new foci of pathologic FDG uptake to suggest\par biologic tumor activity. . 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 . Unchanged non-FDG avid sclerotic foci involving the right proximal humerus\par and T9 vertebral body.\par \par He  feels well. No dizziness or headache.  No new symptoms or complaints.\par \par 6/19/17\par He is doing well. He has no complaints. No headaches. No SOB. \par \par 7/24/17\par Patient comes in for follow up visit, has no complaints, has no SOB and has a mild cough which is chronic, no hemoptysis. Patient states his appetite is good, weight is stable, he has no headaches and is currently off the steroids. Patient will have repeat PET scan and MRI of the brain with contrast as well as repeat bloodwork. \par \par 8/21/17\par He is here for follow up. He had an MR brain and PET CT on 8/3 and 8/2 that did not show progression, were essentially unchanged.  He feels great otherwise.\par \par 9/21/17\par He is doing well. No complaints. No headaches\par \par October 26, 2017\par He see her for followup he feels great he has no complaints.\par \par 11/16/17\par He is here for follow up. He had an MR of brain on 11/14/17: New areas of nodular enhancement along the inferior surfaces of the temporal lobes bilaterally. These are relatively symmetric and just above the level of the cerebellar enhancing masses, suggesting that these may reflect post treatment change.\par He had to reschedule his PET and states he will do it next Wednesday.\par \par No complaints.\par \par 12/26/17\par He is here for follow up. He feels well. he  had PET/CT in end of November that does not show procession, stable pleural findings. He  feels well. \par \par 1/22/18\par He is here for follow up for his NSCLCA. He states he feels well. No SOB, no headaches no fatigue.. \par \par February 21 2018\par  he is here for followup, he had a recent PET/CT scan which is PRESLEY he also had a recent MRI of the brain. This was reviewed at the CNS tumor board although wasn't clear but the consensus was that this is posttreatment changes in the fact that he is asymptomatic and decided to observe him.\par \par He has no new complaints no headaches no weakness or tingling.\par \par \par 3/21/18\par He is here for follow up. HE states he is doing well. Has very mild headaches on occasion but otherwise doing well.\par \par 4/13/18\par He is doing well. Reports no new complaints \par \par 5/3/18\par He is here for follow up to discuss his MR brain. HE complains of mild headache. Same memory issues as before. Balance is the same.  MR brain showed : Increased size of right parietotemporal of the findings enhancement  seen lesion measuring 5.2 cm, previously 2.8 centimeters and increased  size of left inferior temporal lobe mass measuring 2.3 cm, previously 1.6  cm. these areas again demonstrate hypoperfusion and may represent post  therapeutic changes.   2.  Significant increased edema and mass effect within the right  hemisphere with 9 mm right to left midline shift.  3.  No new enhancing lesions. Stable additional bilateral cerebellar and  supratentorial lesions.\par \par I spoke with Dr. 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 He developed severe headaches, some difficulty with speech and balance issues. 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 He has no insurance so my pharmacy has been giving  him a weekly supply while we work on it. He did not have his scans yet. He feel much better now except for dizziness\par \par 5/30/18\par He is feeling much better. His neurological complaints resolved. He stopped steroids on Monday since he had swelling in legs that has resolved. \par \par 6/13/18\par He is here for follow up. he had a PET/CT on 6/7/18 that showed These images reveal, when comparing the FDG brain images to MRI of  4/27/2018 series 10, there is one small focus of increased FDG uptake in  the region of the right parietotemporal lobe lesion (series 12 image 34).  Therefore persistent biologic tumor activity in this region cannot be  excluded.  No focal abnormal FDG uptake corresponding with the left inferior  temporal lobe lesion and both cerebellar lesions. In addition most of the  right parietotemporal lobe lesion is not FDG avid Compared to normal brain glucose metabolism in the thalamus (surrogate  for normal brain metabolism) relative hypometabolism of the right  parietal, posterior parietal and occipital region and relative  hypermetabolism of the left posterior parietal region  Persistent posttreatment FDG uptake (SUV up to 11.2) along the pleural  surface of the left hemithorax consistent with posttreatment changes  associated with talc pleurodesis  One new mildly FDG avid soft tissue nodule (SUV 3.3) in the left buttock \par \par He feels well now ,no headaches.\par \par 7/16/18\par He is here for follow up. doing well. He has no headaches. No new complaints\par \par 8/13/18\par He is here for follow up.  He is doing well. has no complaints. He is considering returning to work.\par \par 9/10/18\par He is here for follow up. He had an MR brain on 8/24/18 that showed In comparison to the previous brain MRI dated 4/27/2018:  1.  No significant interval change in the size and configuration of the  heterogeneously enhancing, necrotic and hemorrhagic lesions within the  temporal/occipital lobes and cerebellar hemispheres. Enhancement pattern  suggests post-therapeutic changes.  2.  Moderately increased edema within the posterior fossa (cerebellar  hemispheres and brainstem). Increased mass effect upon the fourth  ventricle with no evidence of hydrocephalus.  3.  The left cerebral hemispheric edema has mildly increased and the  right cerebral hemispheric edema has mildly decreased.   4.  Resolution of the previously seen right to left midline shift, right  lateral ventricular compression and left lateral ventricular enlargement.  5.  Treated lesions again noted in the left frontal white matter and left  lateral medulla, without contrast enhancement.\par \par HE is doing well has no new complaints. Has no neuroglial complaints. \par \par 10/10/18\par He is here for follow up he had PET/CT on  9/28/18 that was PRESLEY. He no has headaches again. States he is voiding multiple times a day and unable to empty bladder fully.\par \par 10/30/18\par He is here for follow up. he was in the ED for.Left lower quadrant pain symptoms her was given cirpo. he had a Ct abd/pelvis in ED 10/22/18 : Stable posttreatment changes of the left hemithorax including large  loculated pleural effusion.  2.  Multiple nondilated fluid-filled small bowel loops which may reflect  evidence of enteritis.  \par He had MR brain on 10/29/18:  No new lesions, but several existing lesions have increased in size  2. Bilateral hemorrhagic cerebellar lesions and edema has increased. Mass  effect on the fourth ventricle with hydrocephalus.   2.  Essentially unchanged sizes of supratentorial lesions within the  bilateral temporooccipital lobes and mildly improved edema\par \par He is having headaches and more confusion. He took 4 flomax in am and PM instead of his alecensa. I called [facility] with him and they told us they did not deliver his pills since they could not reach him but will deliver them tomorrow. He will be home. He states he had alecensa at home but not sure.\par \par 11/12/18\par Patient presents to the office with a chief complaint of burning in his right thigh.  He has had no new medications.  The patient states it is worse in the evening but lasts all day.  The patient states that the burning sensation starts at the right knee and is ascending.  Straight leg raise negative.  Discussed that it is unlikely that it is from his Avastin treatment, but unsure the etiology.  We will send for LE doppler to rule out any DVT.\par \par 11/27/18\par He is here for follow up. His Dopplers  were negative and his pain/cramps are nearly gone only some in right thigh. His headaches are gone and he is speech is  better and states memory is better since we started Avastin.\par \par 1/9/19\par He is doing well.  He has no more pain in his legs. He is eating well. He is due for 4/4 dose of Avastin. No other complains.\par \par 2/6/19\par He is here for follow-up.  He generally feels well.  He states he still suffers from headaches.  His memory has improved minimally.\par PET/CT 2/4/19 IMPRESSION: Since 9/28/2018, No definite sites of abnormal FDG uptake to suggest biologic tumor activity. Cerebellar lesions seen on brain MRI from 1/20/2019, are not FDG avid and consistent with treated metastases. Post talc pleurodesis of the left pleura, unchanged. \par MR Brain 1/20/19 IMPRESSION: In comparison to the previous brain MRI dated 10/29/2018: 1. No significant interval change in the size and configuration of the heterogeneously enhancing, necrotic and hemorrhagic lesions within the temporal/occipital lobes and cerebellar hemispheres. 2. The edema within the brainstem and cerebellum is about stable;however there is slight progression of the hydrocephalus since the prior exam. 3. The edema within the temporal and occipital lobes has decreased. 4. New patchy area of restricted diffusion around the left temporal horn (without corresponding enhancement) may reflect superimposed ischemic changes. Recommend attention on follow-up imaging. \par \par 3/6/19\par Patient is here for a follow-up visit.  He states he is having increased frequency of headaches with minimal relief from Tylenol.  He is now staying in the shelter on [address] with his daughter.  He will  his medications from [facility] from now on.  We are awaiting arrangement for him to get an apartment.\par

## 2019-03-27 ENCOUNTER — LABORATORY RESULT (OUTPATIENT)
Age: 44
End: 2019-03-27

## 2019-03-27 ENCOUNTER — OUTPATIENT (OUTPATIENT)
Dept: OUTPATIENT SERVICES | Facility: HOSPITAL | Age: 44
LOS: 1 days | Discharge: HOME | End: 2019-03-27

## 2019-03-27 ENCOUNTER — APPOINTMENT (OUTPATIENT)
Dept: HEMATOLOGY ONCOLOGY | Facility: CLINIC | Age: 44
End: 2019-03-27

## 2019-03-27 VITALS
DIASTOLIC BLOOD PRESSURE: 80 MMHG | TEMPERATURE: 97.1 F | BODY MASS INDEX: 27.47 KG/M2 | WEIGHT: 175 LBS | HEIGHT: 67 IN | HEART RATE: 68 BPM | SYSTOLIC BLOOD PRESSURE: 120 MMHG | RESPIRATION RATE: 14 BRPM

## 2019-03-27 DIAGNOSIS — C34.90 MALIGNANT NEOPLASM OF UNSPECIFIED PART OF UNSPECIFIED BRONCHUS OR LUNG: ICD-10-CM

## 2019-03-27 DIAGNOSIS — C79.31 SECONDARY MALIGNANT NEOPLASM OF BRAIN: ICD-10-CM

## 2019-03-27 LAB
ALBUMIN SERPL ELPH-MCNC: 4.6 G/DL
ALP BLD-CCNC: 104 U/L
ALT SERPL-CCNC: 12 U/L
ANION GAP SERPL CALC-SCNC: 13 MMOL/L
AST SERPL-CCNC: 14 U/L
BILIRUB SERPL-MCNC: 2 MG/DL
BUN SERPL-MCNC: 10 MG/DL
CALCIUM SERPL-MCNC: 9.3 MG/DL
CHLORIDE SERPL-SCNC: 103 MMOL/L
CO2 SERPL-SCNC: 26 MMOL/L
CREAT SERPL-MCNC: 0.9 MG/DL
GLUCOSE SERPL-MCNC: 94 MG/DL
HCT VFR BLD CALC: 40.6 %
HGB BLD-MCNC: 13.5 G/DL
MCHC RBC-ENTMCNC: 30.3 PG
MCHC RBC-ENTMCNC: 33.3 G/DL
MCV RBC AUTO: 91.2 FL
PLATELET # BLD AUTO: 189 K/UL
PMV BLD: 10.7 FL
POTASSIUM SERPL-SCNC: 3.9 MMOL/L
PROT SERPL-MCNC: 7.1 G/DL
RBC # BLD: 4.45 M/UL
RBC # FLD: 15.6 %
SODIUM SERPL-SCNC: 142 MMOL/L
WBC # FLD AUTO: 8.06 K/UL

## 2019-03-31 NOTE — PHYSICAL EXAM
[Restricted in physically strenuous activity but ambulatory and able to carry out work of a light or sedentary nature] : Status 1- Restricted in physically strenuous activity but ambulatory and able to carry out work of a light or sedentary nature, e.g., light house work, office work [Normal] : affect appropriate [de-identified] : he has decrease breath sounds on left side base, unchanged. [de-identified] :  Gait is normal good strength  in all limbs

## 2019-03-31 NOTE — HISTORY OF PRESENT ILLNESS
[Disease: _____________________] : Disease: [unfilled] [AJCC Stage: ____] : AJCC Stage: [unfilled] [Treatment Protocol] : Treatment Protocol [Therapy: ___] : Therapy: [unfilled] [de-identified] : 41/M presenting for follow-up for metastatic ALK-positive lung adenocarcinoma.  He is currently on alectinib 600 mg BID.  He initially presented in 11/2014 with multiple pulmonary nodules, mediastinal lymphadenopathy, multiple bony metastases, and brain metastases.  He was initially started on crizotinib and was switched to ceritinib in 12/2015 upon progression.  He was unable to tolerate ceritinib due to significant vomiting and was switched to alectinib in 02/2016.\par \par He had SRS to a left cerebellar hemisphere brain metastasis (08/2015) and subsequently underwent WBRT for CNS progression (10-11/2015).  He had RT to a painful metastatic lesion in the right humerus (01/2015).  He had also received Xgeva for bony metastatic disease in the past. \par \par Since 09/2016, he has been experiencing intermittent headaches being treated with periodic dexamethasone taper.  MRI brain with perfusion analysis showed bilateral cerebellar lesions, favor postRT changes rather than residual tumor.  His most recent brain MRI (11/01/2016) showed overall  stable  exam  compared  to  the  previous  brain  MRI  9/2/2016; no  significant  interval  change  in  bilateral  cerebellar  heterogeneously  enhancing  masses  with  extensive  associated  edema,  as  well  as  smaller  lesions  within  the  left  parietal  white  matter,  superficial  left  temporal  lobe  and  left  dorsal  medulla; and, stable  mild  ventriculomegaly  and  mild  periventricular  FLAIR  signal  abnormality.  He has been following with our neurosurgeon, Dr. Britt, who has recommended no surgical intervention at this time. \par \par His most recent PETCT (11/01/2016) showed no sites of pathologic FDG uptake suspicious for biologic tumor activity.\par \par He reports occasional headaches and a mild sense of imbalance.  Denies any falls.  However he only takes a prn dexamethasone, once in every three days,  which reportedly relieves his intermittent headaches. \par Today he also complains mild pain in his left great toe, likely secondary to ingrowing toe nail. 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 He changed his insurance. Did not get scans and did not take his alectinib for about 1-2 weeks. We were not notified.  Otherwise she feels well she really requires dexamethasone yesterday headache probably once or twice a week as per patient no other complaints.\par \par 4/17/17\par He presents for follow up today. He had MRI of brain that showed stable disease 4/8/2017. He has not had the PET CT yet. He received  his alecetinib on 4/4/17 and stated to take them. States his headaches are rare now and feels well otherwise.\par \par 5/15/17\par He presents for follow up. He had a PET CT on 4/21/17 that  was ucnhged.Since November 1, 2016, no new foci of pathologic FDG uptake to suggest\par biologic tumor activity. . 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 . Unchanged non-FDG avid sclerotic foci involving the right proximal humerus\par and T9 vertebral body.\par \par He  feels well. No dizziness or headache.  No new symptoms or complaints.\par \par 6/19/17\par He is doing well. He has no complaints. No headaches. No SOB. \par \par 7/24/17\par Patient comes in for follow up visit, has no complaints, has no SOB and has a mild cough which is chronic, no hemoptysis. Patient states his appetite is good, weight is stable, he has no headaches and is currently off the steroids. Patient will have repeat PET scan and MRI of the brain with contrast as well as repeat bloodwork. \par \par 8/21/17\par He is here for follow up. He had an MR brain and PET CT on 8/3 and 8/2 that did not show progression, were essentially unchanged.  He feels great otherwise.\par \par 9/21/17\par He is doing well. No complaints. No headaches\par \par October 26, 2017\par He see her for followup he feels great he has no complaints.\par \par 11/16/17\par He is here for follow up. He had an MR of brain on 11/14/17: New areas of nodular enhancement along the inferior surfaces of the temporal lobes bilaterally. These are relatively symmetric and just above the level of the cerebellar enhancing masses, suggesting that these may reflect post treatment change.\par He had to reschedule his PET and states he will do it next Wednesday.\par \par No complaints.\par \par 12/26/17\par He is here for follow up. He feels well. he  had PET/CT in end of November that does not show procession, stable pleural findings. He  feels well. \par \par 1/22/18\par He is here for follow up for his NSCLCA. He states he feels well. No SOB, no headaches no fatigue.. \par \par February 21 2018\par  he is here for followup, he had a recent PET/CT scan which is PRESLEY he also had a recent MRI of the brain. This was reviewed at the CNS tumor board although wasn't clear but the consensus was that this is posttreatment changes in the fact that he is asymptomatic and decided to observe him.\par \par He has no new complaints no headaches no weakness or tingling.\par \par \par 3/21/18\par He is here for follow up. HE states he is doing well. Has very mild headaches on occasion but otherwise doing well.\par \par 4/13/18\par He is doing well. Reports no new complaints \par \par 5/3/18\par He is here for follow up to discuss his MR brain. HE complains of mild headache. Same memory issues as before. Balance is the same.  MR brain showed : Increased size of right parietotemporal of the findings enhancement  seen lesion measuring 5.2 cm, previously 2.8 centimeters and increased  size of left inferior temporal lobe mass measuring 2.3 cm, previously 1.6  cm. these areas again demonstrate hypoperfusion and may represent post  therapeutic changes.   2.  Significant increased edema and mass effect within the right  hemisphere with 9 mm right to left midline shift.  3.  No new enhancing lesions. Stable additional bilateral cerebellar and  supratentorial lesions.\par \par I spoke with Dr. 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 He developed severe headaches, some difficulty with speech and balance issues. 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 He has no insurance so my pharmacy has been giving  him a weekly supply while we work on it. He did not have his scans yet. He feel much better now except for dizziness\par \par 5/30/18\par He is feeling much better. His neurological complaints resolved. He stopped steroids on Monday since he had swelling in legs that has resolved. \par \par 6/13/18\par He is here for follow up. he had a PET/CT on 6/7/18 that showed These images reveal, when comparing the FDG brain images to MRI of  4/27/2018 series 10, there is one small focus of increased FDG uptake in  the region of the right parietotemporal lobe lesion (series 12 image 34).  Therefore persistent biologic tumor activity in this region cannot be  excluded.  No focal abnormal FDG uptake corresponding with the left inferior  temporal lobe lesion and both cerebellar lesions. In addition most of the  right parietotemporal lobe lesion is not FDG avid Compared to normal brain glucose metabolism in the thalamus (surrogate  for normal brain metabolism) relative hypometabolism of the right  parietal, posterior parietal and occipital region and relative  hypermetabolism of the left posterior parietal region  Persistent posttreatment FDG uptake (SUV up to 11.2) along the pleural  surface of the left hemithorax consistent with posttreatment changes  associated with talc pleurodesis  One new mildly FDG avid soft tissue nodule (SUV 3.3) in the left buttock \par \par He feels well now ,no headaches.\par \par 7/16/18\par He is here for follow up. doing well. He has no headaches. No new complaints\par \par 8/13/18\par He is here for follow up.  He is doing well. has no complaints. He is considering returning to work.\par \par 9/10/18\par He is here for follow up. He had an MR brain on 8/24/18 that showed In comparison to the previous brain MRI dated 4/27/2018:  1.  No significant interval change in the size and configuration of the  heterogeneously enhancing, necrotic and hemorrhagic lesions within the  temporal/occipital lobes and cerebellar hemispheres. Enhancement pattern  suggests post-therapeutic changes.  2.  Moderately increased edema within the posterior fossa (cerebellar  hemispheres and brainstem). Increased mass effect upon the fourth  ventricle with no evidence of hydrocephalus.  3.  The left cerebral hemispheric edema has mildly increased and the  right cerebral hemispheric edema has mildly decreased.   4.  Resolution of the previously seen right to left midline shift, right  lateral ventricular compression and left lateral ventricular enlargement.  5.  Treated lesions again noted in the left frontal white matter and left  lateral medulla, without contrast enhancement.\par \par HE is doing well has no new complaints. Has no neuroglial complaints. \par \par 10/10/18\par He is here for follow up he had PET/CT on  9/28/18 that was PRESLEY. He no has headaches again. States he is voiding multiple times a day and unable to empty bladder fully.\par \par 10/30/18\par He is here for follow up. he was in the ED for.Left lower quadrant pain symptoms her was given cirpo. he had a Ct abd/pelvis in ED 10/22/18 : Stable posttreatment changes of the left hemithorax including large  loculated pleural effusion.  2.  Multiple nondilated fluid-filled small bowel loops which may reflect  evidence of enteritis.  \par He had MR brain on 10/29/18:  No new lesions, but several existing lesions have increased in size  2. Bilateral hemorrhagic cerebellar lesions and edema has increased. Mass  effect on the fourth ventricle with hydrocephalus.   2.  Essentially unchanged sizes of supratentorial lesions within the  bilateral temporooccipital lobes and mildly improved edema\par \par He is having headaches and more confusion. He took 4 flomax in am and PM instead of his alecensa. I called [facility] with him and they told us they did not deliver his pills since they could not reach him but will deliver them tomorrow. He will be home. He states he had alecensa at home but not sure.\par \par 11/12/18\par Patient presents to the office with a chief complaint of burning in his right thigh.  He has had no new medications.  The patient states it is worse in the evening but lasts all day.  The patient states that the burning sensation starts at the right knee and is ascending.  Straight leg raise negative.  Discussed that it is unlikely that it is from his Avastin treatment, but unsure the etiology.  We will send for LE doppler to rule out any DVT.\par \par 11/27/18\par He is here for follow up. His Dopplers  were negative and his pain/cramps are nearly gone only some in right thigh. His headaches are gone and he is speech is  better and states memory is better since we started Avastin.\par \par 1/9/19\par He is doing well.  He has no more pain in his legs. He is eating well. He is due for 4/4 dose of Avastin. No other complains.\par \par 2/6/19\par He is here for follow-up.  He generally feels well.  He states he still suffers from headaches.  His memory has improved minimally.\par PET/CT 2/4/19 IMPRESSION: Since 9/28/2018, No definite sites of abnormal FDG uptake to suggest biologic tumor activity. Cerebellar lesions seen on brain MRI from 1/20/2019, are not FDG avid and consistent with treated metastases. Post talc pleurodesis of the left pleura, unchanged. \par MR Brain 1/20/19 IMPRESSION: In comparison to the previous brain MRI dated 10/29/2018: 1. No significant interval change in the size and configuration of the heterogeneously enhancing, necrotic and hemorrhagic lesions within the temporal/occipital lobes and cerebellar hemispheres. 2. The edema within the brainstem and cerebellum is about stable;however there is slight progression of the hydrocephalus since the prior exam. 3. The edema within the temporal and occipital lobes has decreased. 4. New patchy area of restricted diffusion around the left temporal horn (without corresponding enhancement) may reflect superimposed ischemic changes. Recommend attention on follow-up imaging. \par \par 3/6/19\par Patient is here for a follow-up visit.  He states he is having increased frequency of headaches with minimal relief from Tylenol.  He is now staying in the shelter on [address] with his daughter.  He will  his medications from [facility] from now on.  We are awaiting arrangement for him to get an apartment.\par \par 3/27/19\par He is here for follow up. his headchae is better and he takes his Dexamethasone only when he has it which helps. Last PET was in February and last MRI was in January.\par

## 2019-03-31 NOTE — ASSESSMENT
[FreeTextEntry1] : 1.  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 Due to insurance issues he stopped it but now is back on it since 4/4/2017\par -repeat MRI brain 8/2018 radiation related changes, post treatment changes, he is asymptomatic,  and repeat PET/CT 6/5/18   is without systemic progression, it confirmed radiation necrosis. Recent PET/CT on  9/28/18 that was PRESLEY. CT abd was unchanged. I think the findings on MR brain are radiation necrosis given his symptoms as before. Recent MR brain and PET/CT do not show progression.  \par 2.   Intermittent headaches improved on dexamethasone\par 3.Elevated total bilirubin likely secondary to alectinib. will monitor, its stable\par 4.  Radiation Necrosis confirmed by PET/CT and responded to Dexamethasone  and was on  Avastin responded well  but worsen and now back on Dexamethasone but taking it PRN \par 5. LUTs symptoms better PSA was normal  \par \par Plan:\par - C/w Alectinib.  \par - Ordered Decadron 4mg  BID but he is taking it PRN ant it helps  for radiaiton necrosis vs progression, will check MR brain again now\par -will check PET/CT after next visit\par \par \par RTC in 4

## 2019-03-31 NOTE — REVIEW OF SYSTEMS
[Confused] : confusion [Dizziness] : dizziness [Negative] : Heme/Lymph [Fatigue] : no fatigue [Recent Change In Weight] : ~T no recent weight change [Difficulty Walking] : no difficulty walking [de-identified] : headaches on occasion, memory issues,  chronic a bit better post Avastin

## 2019-04-01 ENCOUNTER — FORM ENCOUNTER (OUTPATIENT)
Age: 44
End: 2019-04-01

## 2019-04-02 ENCOUNTER — OUTPATIENT (OUTPATIENT)
Dept: OUTPATIENT SERVICES | Facility: HOSPITAL | Age: 44
LOS: 1 days | Discharge: HOME | End: 2019-04-02
Payer: COMMERCIAL

## 2019-04-02 DIAGNOSIS — C78.00 SECONDARY MALIGNANT NEOPLASM OF UNSPECIFIED LUNG: ICD-10-CM

## 2019-04-02 PROCEDURE — 70552 MRI BRAIN STEM W/DYE: CPT | Mod: 26

## 2019-04-07 ENCOUNTER — EMERGENCY (EMERGENCY)
Facility: HOSPITAL | Age: 44
LOS: 0 days | Discharge: HOME | End: 2019-04-08
Attending: EMERGENCY MEDICINE | Admitting: EMERGENCY MEDICINE
Payer: COMMERCIAL

## 2019-04-07 VITALS
OXYGEN SATURATION: 97 % | SYSTOLIC BLOOD PRESSURE: 115 MMHG | DIASTOLIC BLOOD PRESSURE: 63 MMHG | TEMPERATURE: 96 F | HEART RATE: 65 BPM | RESPIRATION RATE: 18 BRPM

## 2019-04-07 VITALS
RESPIRATION RATE: 18 BRPM | OXYGEN SATURATION: 98 % | DIASTOLIC BLOOD PRESSURE: 57 MMHG | SYSTOLIC BLOOD PRESSURE: 101 MMHG | TEMPERATURE: 97 F | HEART RATE: 58 BPM

## 2019-04-07 DIAGNOSIS — Z79.2 LONG TERM (CURRENT) USE OF ANTIBIOTICS: ICD-10-CM

## 2019-04-07 DIAGNOSIS — R00.2 PALPITATIONS: ICD-10-CM

## 2019-04-07 DIAGNOSIS — Z87.891 PERSONAL HISTORY OF NICOTINE DEPENDENCE: ICD-10-CM

## 2019-04-07 DIAGNOSIS — R07.9 CHEST PAIN, UNSPECIFIED: ICD-10-CM

## 2019-04-07 DIAGNOSIS — R07.89 OTHER CHEST PAIN: ICD-10-CM

## 2019-04-07 DIAGNOSIS — Z88.0 ALLERGY STATUS TO PENICILLIN: ICD-10-CM

## 2019-04-07 LAB
ALBUMIN SERPL ELPH-MCNC: 4.1 G/DL — SIGNIFICANT CHANGE UP (ref 3.5–5.2)
ALP SERPL-CCNC: 95 U/L — SIGNIFICANT CHANGE UP (ref 30–115)
ALT FLD-CCNC: 13 U/L — SIGNIFICANT CHANGE UP (ref 0–41)
ANION GAP SERPL CALC-SCNC: 13 MMOL/L — SIGNIFICANT CHANGE UP (ref 7–14)
APTT BLD: 30.3 SEC — SIGNIFICANT CHANGE UP (ref 27–39.2)
AST SERPL-CCNC: 15 U/L — SIGNIFICANT CHANGE UP (ref 0–41)
BASOPHILS # BLD AUTO: 0.04 K/UL — SIGNIFICANT CHANGE UP (ref 0–0.2)
BASOPHILS NFR BLD AUTO: 0.5 % — SIGNIFICANT CHANGE UP (ref 0–1)
BILIRUB SERPL-MCNC: 1.7 MG/DL — HIGH (ref 0.2–1.2)
BUN SERPL-MCNC: 17 MG/DL — SIGNIFICANT CHANGE UP (ref 10–20)
CALCIUM SERPL-MCNC: 8.9 MG/DL — SIGNIFICANT CHANGE UP (ref 8.5–10.1)
CHLORIDE SERPL-SCNC: 106 MMOL/L — SIGNIFICANT CHANGE UP (ref 98–110)
CO2 SERPL-SCNC: 27 MMOL/L — SIGNIFICANT CHANGE UP (ref 17–32)
CREAT SERPL-MCNC: 0.9 MG/DL — SIGNIFICANT CHANGE UP (ref 0.7–1.5)
EOSINOPHIL # BLD AUTO: 0.14 K/UL — SIGNIFICANT CHANGE UP (ref 0–0.7)
EOSINOPHIL NFR BLD AUTO: 1.8 % — SIGNIFICANT CHANGE UP (ref 0–8)
GLUCOSE SERPL-MCNC: 130 MG/DL — HIGH (ref 70–99)
HCT VFR BLD CALC: 36.3 % — LOW (ref 42–52)
HGB BLD-MCNC: 11.9 G/DL — LOW (ref 14–18)
IMM GRANULOCYTES NFR BLD AUTO: 0.4 % — HIGH (ref 0.1–0.3)
INR BLD: 1.04 RATIO — SIGNIFICANT CHANGE UP (ref 0.65–1.3)
LYMPHOCYTES # BLD AUTO: 2.32 K/UL — SIGNIFICANT CHANGE UP (ref 1.2–3.4)
LYMPHOCYTES # BLD AUTO: 29.5 % — SIGNIFICANT CHANGE UP (ref 20.5–51.1)
MCHC RBC-ENTMCNC: 30.3 PG — SIGNIFICANT CHANGE UP (ref 27–31)
MCHC RBC-ENTMCNC: 32.8 G/DL — SIGNIFICANT CHANGE UP (ref 32–37)
MCV RBC AUTO: 92.4 FL — SIGNIFICANT CHANGE UP (ref 80–94)
MONOCYTES # BLD AUTO: 0.8 K/UL — HIGH (ref 0.1–0.6)
MONOCYTES NFR BLD AUTO: 10.2 % — HIGH (ref 1.7–9.3)
NEUTROPHILS # BLD AUTO: 4.53 K/UL — SIGNIFICANT CHANGE UP (ref 1.4–6.5)
NEUTROPHILS NFR BLD AUTO: 57.6 % — SIGNIFICANT CHANGE UP (ref 42.2–75.2)
NRBC # BLD: 0 /100 WBCS — SIGNIFICANT CHANGE UP (ref 0–0)
PLATELET # BLD AUTO: 204 K/UL — SIGNIFICANT CHANGE UP (ref 130–400)
POTASSIUM SERPL-MCNC: 3.7 MMOL/L — SIGNIFICANT CHANGE UP (ref 3.5–5)
POTASSIUM SERPL-SCNC: 3.7 MMOL/L — SIGNIFICANT CHANGE UP (ref 3.5–5)
PROT SERPL-MCNC: 6.2 G/DL — SIGNIFICANT CHANGE UP (ref 6–8)
PROTHROM AB SERPL-ACNC: 11.9 SEC — SIGNIFICANT CHANGE UP (ref 9.95–12.87)
RBC # BLD: 3.93 M/UL — LOW (ref 4.7–6.1)
RBC # FLD: 15.1 % — HIGH (ref 11.5–14.5)
SODIUM SERPL-SCNC: 146 MMOL/L — SIGNIFICANT CHANGE UP (ref 135–146)
TROPONIN T SERPL-MCNC: <0.01 NG/ML — SIGNIFICANT CHANGE UP
WBC # BLD: 7.86 K/UL — SIGNIFICANT CHANGE UP (ref 4.8–10.8)
WBC # FLD AUTO: 7.86 K/UL — SIGNIFICANT CHANGE UP (ref 4.8–10.8)

## 2019-04-07 PROCEDURE — 71275 CT ANGIOGRAPHY CHEST: CPT | Mod: 26

## 2019-04-07 PROCEDURE — 93010 ELECTROCARDIOGRAM REPORT: CPT

## 2019-04-07 PROCEDURE — 99285 EMERGENCY DEPT VISIT HI MDM: CPT

## 2019-04-07 PROCEDURE — 71046 X-RAY EXAM CHEST 2 VIEWS: CPT | Mod: 26

## 2019-04-07 NOTE — ED PROVIDER NOTE - ATTENDING CONTRIBUTION TO CARE
43y m h/o lung CA w/brain mets s/p partial L lung resection & pleurodesis, chemo/rads in remission (recent PET-CT in Feb 2019 showing no active CA) p/w L sided CP x 1 week. Worse w/inspiration & lying on L side. No assoc sx. No f/c, sob, palpitations, cough, hemoptysis, nv, abd pain, edema. PPMD Portillo / Onc Bershadsky. PE: young m wdwn nad, ncat, perrl/eomi, op clear, neck supple no jvd, rrr nl s1s2 no mrg, decr BS L lung fields no wrr, abd soft ntnd no palpable masses no rgr, no cvat, ext no cce no calf erythema or ttp dpi.

## 2019-04-07 NOTE — ED PROVIDER NOTE - NS ED ROS FT
Review of Systems:  	•	CONSTITUTIONAL - no fever, no diaphoresis, no chills  	•	SKIN - no rash  	•	HEMATOLOGIC - no bleeding, no bruising  	•	EYES - no eye pain, no blurry vision  	•	ENT - no congestion  	•	RESPIRATORY - no shortness of breath, no cough  	•	CARDIAC - no chest pain, no palpitations  	•	GI - no abd pain, no nausea, no vomiting, no diarrhea, no constipation  	•	GENITO-URINARY - no dysuria; no hematuria, no increased urinary frequency  	•	MUSCULOSKELETAL - no joint paint, no swelling, no redness  	•	NEUROLOGIC - no weakness, no headache, no paresthesias, no LOC  	•	PSYCH - no anxiety, non suicidal, non homicidal, no hallucination, no depression  	All other ROS are negative except as documented in HPI.

## 2019-04-07 NOTE — ED PROVIDER NOTE - CARE PROVIDER_API CALL
Faustino Jraa)  Hematology; Internal Medicine; Medical Oncology  [address] [place] [address]  Phone: [phone]  Fax: [phone]  Follow Up Time: 1-3 Days    Babar Licea)  Cardiovascular Disease; Internal Medicine; Interventional Cardiology  [address] [place] [address]  Phone: [phone]  Fax: [phone]  Follow Up Time: 1-3 Days    Your primary care provider,   Phone: (   )    -  Fax: (   )    -  Follow Up Time: 1-3 Days

## 2019-04-07 NOTE — ED PROVIDER NOTE - NSFOLLOWUPINSTRUCTIONS_ED_ALL_ED_FT
Palpitations    A palpitation is the feeling that your heartbeat is irregular or is faster than normal. It may feel like your heart is fluttering or skipping a beat. They may be caused by many things, including smoking, caffeine, alcohol, stress, and certain medicines. Although most causes of palpitations are not serious, palpitations can be a sign of a serious medical problem. Avoid caffeine, alcohol, tobacco products at home. Try to reduce stress and anxiety, and make sure to get plenty of rest.     SEEK IMMEDIATE MEDICAL CARE IF YOU HAVE THE FOLLOWING SYMPTOMS: chest pain, shortness of breath, severe headache, or dizziness/lightheadedness.    Chest Pain    Chest pain can be caused by many different conditions which may or may not be dangerous. Causes include heartburn, lung infections, heart attack, blood clot in lungs, skin infections, strain or damage to muscle, cartilage, or bones, etc. Lab tests or other studies including an electrocardiogram (EKG) may have been performed to find the cause of your pain. Make sure to follow up with a cardiologist or as instructed by your health care professional.    SEEK IMMEDIATE MEDICAL CARE IF YOU HAVE THE FOLLOWING SYMPTOMS: worsening chest pain, coughing up blood, unexplained back/neck/jaw pain, severe abdominal pain, dizziness or lightheadedness, shortness of breath, sweaty or clammy skin, vomiting, or racing heart beat. These symptoms may represent a serious problem that is an emergency. Do not wait to see if the symptoms will go away. Get medical help right away. Call your local emergency services (911 in the [place] Do not drive yourself to the hospital.

## 2019-04-07 NOTE — ED PROVIDER NOTE - PROVIDER TOKENS
PROVIDER:[TOKEN:[[id number]],FOLLOWUP:[1-3 Days]],PROVIDER:[TOKEN:[[id number]],FOLLOWUP:[1-3 Days]],FREE:[LAST:[Your primary care provider],PHONE:[(   )    -],FAX:[(   )    -],FOLLOWUP:[1-3 Days]]

## 2019-04-07 NOTE — ED ADULT TRIAGE NOTE - BSA (M2)
Cellulitis: Care Instructions  Your Care Instructions    Cellulitis is a skin infection caused by bacteria, most often strep or staph. It often occurs after a break in the skin from a scrape, cut, bite, or puncture, or after a rash. Cellulitis may be treated without doing tests to find out what caused it. But your doctor may do tests, if needed, to look for a specific bacteria, like methicillin-resistant Staphylococcus aureus (MRSA). The doctor has checked you carefully, but problems can develop later. If you notice any problems or new symptoms, get medical treatment right away. Follow-up care is a key part of your treatment and safety. Be sure to make and go to all appointments, and call your doctor if you are having problems. It's also a good idea to know your test results and keep a list of the medicines you take. How can you care for yourself at home? · Take your antibiotics as directed. Do not stop taking them just because you feel better. You need to take the full course of antibiotics. · Prop up the infected area on pillows to reduce pain and swelling. Try to keep the area above the level of your heart as often as you can. · If your doctor told you how to care for your wound, follow your doctor's instructions. If you did not get instructions, follow this general advice:  ¨ Wash the wound with clean water 2 times a day. Don't use hydrogen peroxide or alcohol, which can slow healing. ¨ You may cover the wound with a thin layer of petroleum jelly, such as Vaseline, and a nonstick bandage. ¨ Apply more petroleum jelly and replace the bandage as needed. · Be safe with medicines. Take pain medicines exactly as directed. ¨ If the doctor gave you a prescription medicine for pain, take it as prescribed. ¨ If you are not taking a prescription pain medicine, ask your doctor if you can take an over-the-counter medicine.   To prevent cellulitis in the future  · Try to prevent cuts, scrapes, or other 1.93 injuries to your skin. Cellulitis most often occurs where there is a break in the skin. · If you get a scrape, cut, mild burn, or bite, wash the wound with clean water as soon as you can to help avoid infection. Don't use hydrogen peroxide or alcohol, which can slow healing. · If you have swelling in your legs (edema), support stockings and good skin care may help prevent leg sores and cellulitis. · Take care of your feet, especially if you have diabetes or other conditions that increase the risk of infection. Wear shoes and socks. Do not go barefoot. If you have athlete's foot or other skin problems on your feet, talk to your doctor about how to treat them. When should you call for help? Call your doctor now or seek immediate medical care if:    · You have signs that your infection is getting worse, such as:  ¨ Increased pain, swelling, warmth, or redness. ¨ Red streaks leading from the area. ¨ Pus draining from the area. ¨ A fever.     · You get a rash.    Watch closely for changes in your health, and be sure to contact your doctor if:    · You do not get better as expected. Where can you learn more? Go to http://morgan-edi.info/. Sherley Henderson in the search box to learn more about \"Cellulitis: Care Instructions. \"  Current as of: May 10, 2017  Content Version: 11.7  © 5483-0934 [employer]. Care instructions adapted under license by [employer] (which disclaims liability or warranty for this information). If you have questions about a medical condition or this instruction, always ask your healthcare professional. [place] [address] any warranty or liability for your use of this information.

## 2019-04-07 NOTE — ED PROVIDER NOTE - CARE PROVIDERS DIRECT ADDRESSES
[email or web address]

## 2019-04-07 NOTE — ED ADULT NURSE NOTE - NSIMPLEMENTINTERV_GEN_ALL_ED
Implemented All Universal Safety Interventions:  [place] to call system. Call bell, personal items and telephone within reach. Instruct patient to call for assistance. Room bathroom lighting operational. Non-slip footwear when patient is off stretcher. Physically safe environment: no spills, clutter or unnecessary equipment. Stretcher in lowest position, wheels locked, appropriate side rails in place.

## 2019-04-07 NOTE — ED PROVIDER NOTE - CLINICAL SUMMARY MEDICAL DECISION MAKING FREE TEXT BOX
pt had workup for constant left sided chest pain, labs unremarkable, troponin negative, EKG nonischemic, CTA without acute PE and unchanged from prior imaging done this year per radiology read. pt reported feeling much better in ED. all results reviewed. advised close follow up with Dr. Jara as well as cardiology and pt agrees. Strict return precautions advised and pt verbalized understanding.

## 2019-04-07 NOTE — ED PROVIDER NOTE - NSCAREINITIATED _GEN_ER
53 year old male patient with a history of asthma and atrial fibrillation diagnosed in 2011 at which time he had a OWEN/Cardioversion. Patient has been feeling well since then up until two weeks ago at she tile he developed symptoms of palpitations/irregular heart beat associated with mild shortness of breath. He has restarted his metoprolol. He otherwise denies any chest pain, syncope, fever or chills.     OWEN: 4/7/11: mild MR, mild LAE (4.3cm), EF 55%.
Masha Sanchez(PA)

## 2019-04-08 ENCOUNTER — INBOUND DOCUMENT (OUTPATIENT)
Age: 44
End: 2019-04-08

## 2019-04-29 ENCOUNTER — APPOINTMENT (OUTPATIENT)
Dept: HEMATOLOGY ONCOLOGY | Facility: CLINIC | Age: 44
End: 2019-04-29

## 2019-04-29 ENCOUNTER — LABORATORY RESULT (OUTPATIENT)
Age: 44
End: 2019-04-29

## 2019-04-29 VITALS
WEIGHT: 180 LBS | DIASTOLIC BLOOD PRESSURE: 80 MMHG | BODY MASS INDEX: 28.25 KG/M2 | HEIGHT: 67 IN | TEMPERATURE: 96.7 F | HEART RATE: 70 BPM | RESPIRATION RATE: 14 BRPM | SYSTOLIC BLOOD PRESSURE: 125 MMHG

## 2019-04-29 LAB
HCT VFR BLD CALC: 39.5 %
HGB BLD-MCNC: 13.1 G/DL
MCHC RBC-ENTMCNC: 30.3 PG
MCHC RBC-ENTMCNC: 33.2 G/DL
MCV RBC AUTO: 91.4 FL
PLATELET # BLD AUTO: 222 K/UL
PMV BLD: 10.6 FL
RBC # BLD: 4.32 M/UL
RBC # FLD: 15 %
WBC # FLD AUTO: 9.64 K/UL

## 2019-04-30 LAB
ALBUMIN SERPL ELPH-MCNC: 4.4 G/DL
ALP BLD-CCNC: 83 U/L
ALT SERPL-CCNC: 9 U/L
ANION GAP SERPL CALC-SCNC: 13 MMOL/L
AST SERPL-CCNC: 12 U/L
BILIRUB SERPL-MCNC: 1.5 MG/DL
BUN SERPL-MCNC: 14 MG/DL
CALCIUM SERPL-MCNC: 9.5 MG/DL
CHLORIDE SERPL-SCNC: 102 MMOL/L
CO2 SERPL-SCNC: 27 MMOL/L
CREAT SERPL-MCNC: 0.9 MG/DL
GLUCOSE SERPL-MCNC: 83 MG/DL
POTASSIUM SERPL-SCNC: 4.3 MMOL/L
PROT SERPL-MCNC: 6.6 G/DL
SODIUM SERPL-SCNC: 142 MMOL/L

## 2019-04-30 NOTE — ASSESSMENT
[FreeTextEntry1] : 1.  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 Due to insurance issues he stopped it but now is back on it since 4/4/2017\par - repeat MRI brain 8/2018 radiation related changes, post treatment changes, he is asymptomatic,  and repeat PET/CT 6/5/18   is without systemic progression, it confirmed radiation necrosis. Recent PET/CT on  9/28/18 that was PRESLEY. CT abd was unchanged. I think the findings on MR brain are radiation necrosis given his symptoms as before. Recent MR brain showed more edema but he is doing well. CT chest was PRESLEY. Thus MR brain findings most likely due to  radiation necrosis\par \par 2.   Intermittent headaches improves on dexamethasone, this is due ot radiaiton necrosis\par \par 3. Elevated total bilirubin likely secondary to alectinib.\par - will monitor, its stable\par \par 4.  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 LUTs symptoms better PSA was normal  \par \par Plan:\par - C/w Alectinib.  \par - CBC, CMP today\par - c/w Decadron 4mg  BID but he is taking it PRN for radiation necrosis\par - repeat PET/CT and MRI brain to be done in 2 months\par \par RTC in 1 month

## 2019-04-30 NOTE — HISTORY OF PRESENT ILLNESS
[Disease: _____________________] : Disease: [unfilled] [AJCC Stage: ____] : AJCC Stage: [unfilled] [Treatment Protocol] : Treatment Protocol [de-identified] : 41/M presenting for follow-up for metastatic ALK-positive lung adenocarcinoma.  He is currently on alectinib 600 mg BID.  He initially presented in 11/2014 with multiple pulmonary nodules, mediastinal lymphadenopathy, multiple bony metastases, and brain metastases.  He was initially started on crizotinib and was switched to ceritinib in 12/2015 upon progression.  He was unable to tolerate ceritinib due to significant vomiting and was switched to alectinib in 02/2016.\par \par He had SRS to a left cerebellar hemisphere brain metastasis (08/2015) and subsequently underwent WBRT for CNS progression (10-11/2015).  He had RT to a painful metastatic lesion in the right humerus (01/2015).  He had also received Xgeva for bony metastatic disease in the past. \par \par Since 09/2016, he has been experiencing intermittent headaches being treated with periodic dexamethasone taper.  MRI brain with perfusion analysis showed bilateral cerebellar lesions, favor postRT changes rather than residual tumor.  His most recent brain MRI (11/01/2016) showed overall  stable  exam  compared  to  the  previous  brain  MRI  9/2/2016; no  significant  interval  change  in  bilateral  cerebellar  heterogeneously  enhancing  masses  with  extensive  associated  edema,  as  well  as  smaller  lesions  within  the  left  parietal  white  matter,  superficial  left  temporal  lobe  and  left  dorsal  medulla; and, stable  mild  ventriculomegaly  and  mild  periventricular  FLAIR  signal  abnormality.  He has been following with our neurosurgeon, Dr. Britt, who has recommended no surgical intervention at this time. \par \par His most recent PETCT (11/01/2016) showed no sites of pathologic FDG uptake suspicious for biologic tumor activity.\par \par He reports occasional headaches and a mild sense of imbalance.  Denies any falls.  However he only takes a prn dexamethasone, once in every three days,  which reportedly relieves his intermittent headaches. \par Today he also complains mild pain in his left great toe, likely secondary to ingrowing toe nail. 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 He changed his insurance. Did not get scans and did not take his alectinib for about 1-2 weeks. We were not notified.  Otherwise she feels well she really requires dexamethasone yesterday headache probably once or twice a week as per patient no other complaints.\par \par 4/17/17\par He presents for follow up today. He had MRI of brain that showed stable disease 4/8/2017. He has not had the PET CT yet. He received  his alecetinib on 4/4/17 and stated to take them. States his headaches are rare now and feels well otherwise.\par \par 5/15/17\par He presents for follow up. He had a PET CT on 4/21/17 that  was ucnhged.Since November 1, 2016, no new foci of pathologic FDG uptake to suggest\par biologic tumor activity. . 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 . Unchanged non-FDG avid sclerotic foci involving the right proximal humerus\par and T9 vertebral body.\par \par He  feels well. No dizziness or headache.  No new symptoms or complaints.\par \par 6/19/17\par He is doing well. He has no complaints. No headaches. No SOB. \par \par 7/24/17\par Patient comes in for follow up visit, has no complaints, has no SOB and has a mild cough which is chronic, no hemoptysis. Patient states his appetite is good, weight is stable, he has no headaches and is currently off the steroids. Patient will have repeat PET scan and MRI of the brain with contrast as well as repeat bloodwork. \par \par 8/21/17\par He is here for follow up. He had an MR brain and PET CT on 8/3 and 8/2 that did not show progression, were essentially unchanged.  He feels great otherwise.\par \par 9/21/17\par He is doing well. No complaints. No headaches\par \par October 26, 2017\par He see her for followup he feels great he has no complaints.\par \par 11/16/17\par He is here for follow up. He had an MR of brain on 11/14/17: New areas of nodular enhancement along the inferior surfaces of the temporal lobes bilaterally. These are relatively symmetric and just above the level of the cerebellar enhancing masses, suggesting that these may reflect post treatment change.\par He had to reschedule his PET and states he will do it next Wednesday.\par \par No complaints.\par \par 12/26/17\par He is here for follow up. He feels well. he  had PET/CT in end of November that does not show procession, stable pleural findings. He  feels well. \par \par 1/22/18\par He is here for follow up for his NSCLCA. He states he feels well. No SOB, no headaches no fatigue.. \par \par February 21 2018\par  he is here for followup, he had a recent PET/CT scan which is PRESLEY he also had a recent MRI of the brain. This was reviewed at the CNS tumor board although wasn't clear but the consensus was that this is posttreatment changes in the fact that he is asymptomatic and decided to observe him.\par \par He has no new complaints no headaches no weakness or tingling.\par \par \par 3/21/18\par He is here for follow up. HE states he is doing well. Has very mild headaches on occasion but otherwise doing well.\par \par 4/13/18\par He is doing well. Reports no new complaints \par \par 5/3/18\par He is here for follow up to discuss his MR brain. HE complains of mild headache. Same memory issues as before. Balance is the same.  MR brain showed : Increased size of right parietotemporal of the findings enhancement  seen lesion measuring 5.2 cm, previously 2.8 centimeters and increased  size of left inferior temporal lobe mass measuring 2.3 cm, previously 1.6  cm. these areas again demonstrate hypoperfusion and may represent post  therapeutic changes.   2.  Significant increased edema and mass effect within the right  hemisphere with 9 mm right to left midline shift.  3.  No new enhancing lesions. Stable additional bilateral cerebellar and  supratentorial lesions.\par \par I spoke with Dr. 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 He developed severe headaches, some difficulty with speech and balance issues. 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 He has no insurance so my pharmacy has been giving  him a weekly supply while we work on it. He did not have his scans yet. He feel much better now except for dizziness\par \par 5/30/18\par He is feeling much better. His neurological complaints resolved. He stopped steroids on Monday since he had swelling in legs that has resolved. \par \par 6/13/18\par He is here for follow up. he had a PET/CT on 6/7/18 that showed These images reveal, when comparing the FDG brain images to MRI of  4/27/2018 series 10, there is one small focus of increased FDG uptake in  the region of the right parietotemporal lobe lesion (series 12 image 34).  Therefore persistent biologic tumor activity in this region cannot be  excluded.  No focal abnormal FDG uptake corresponding with the left inferior  temporal lobe lesion and both cerebellar lesions. In addition most of the  right parietotemporal lobe lesion is not FDG avid Compared to normal brain glucose metabolism in the thalamus (surrogate  for normal brain metabolism) relative hypometabolism of the right  parietal, posterior parietal and occipital region and relative  hypermetabolism of the left posterior parietal region  Persistent posttreatment FDG uptake (SUV up to 11.2) along the pleural  surface of the left hemithorax consistent with posttreatment changes  associated with talc pleurodesis  One new mildly FDG avid soft tissue nodule (SUV 3.3) in the left buttock \par \par He feels well now ,no headaches.\par \par 7/16/18\par He is here for follow up. doing well. He has no headaches. No new complaints\par \par 8/13/18\par He is here for follow up.  He is doing well. has no complaints. He is considering returning to work.\par \par 9/10/18\par He is here for follow up. He had an MR brain on 8/24/18 that showed In comparison to the previous brain MRI dated 4/27/2018:  1.  No significant interval change in the size and configuration of the  heterogeneously enhancing, necrotic and hemorrhagic lesions within the  temporal/occipital lobes and cerebellar hemispheres. Enhancement pattern  suggests post-therapeutic changes.  2.  Moderately increased edema within the posterior fossa (cerebellar  hemispheres and brainstem). Increased mass effect upon the fourth  ventricle with no evidence of hydrocephalus.  3.  The left cerebral hemispheric edema has mildly increased and the  right cerebral hemispheric edema has mildly decreased.   4.  Resolution of the previously seen right to left midline shift, right  lateral ventricular compression and left lateral ventricular enlargement.  5.  Treated lesions again noted in the left frontal white matter and left  lateral medulla, without contrast enhancement.\par \par HE is doing well has no new complaints. Has no neuroglial complaints. \par \par 10/10/18\par He is here for follow up he had PET/CT on  9/28/18 that was PRESLEY. He no has headaches again. States he is voiding multiple times a day and unable to empty bladder fully.\par \par 10/30/18\par He is here for follow up. he was in the ED for.Left lower quadrant pain symptoms her was given cirpo. he had a Ct abd/pelvis in ED 10/22/18 : Stable posttreatment changes of the left hemithorax including large  loculated pleural effusion.  2.  Multiple nondilated fluid-filled small bowel loops which may reflect  evidence of enteritis.  \par He had MR brain on 10/29/18:  No new lesions, but several existing lesions have increased in size  2. Bilateral hemorrhagic cerebellar lesions and edema has increased. Mass  effect on the fourth ventricle with hydrocephalus.   2.  Essentially unchanged sizes of supratentorial lesions within the  bilateral temporooccipital lobes and mildly improved edema\par \par He is having headaches and more confusion. He took 4 flomax in am and PM instead of his alecensa. I called [facility] with him and they told us they did not deliver his pills since they could not reach him but will deliver them tomorrow. He will be home. He states he had alecensa at home but not sure.\par \par 11/12/18\par Patient presents to the office with a chief complaint of burning in his right thigh.  He has had no new medications.  The patient states it is worse in the evening but lasts all day.  The patient states that the burning sensation starts at the right knee and is ascending.  Straight leg raise negative.  Discussed that it is unlikely that it is from his Avastin treatment, but unsure the etiology.  We will send for LE doppler to rule out any DVT.\par \par 11/27/18\par He is here for follow up. His Dopplers  were negative and his pain/cramps are nearly gone only some in right thigh. His headaches are gone and he is speech is  better and states memory is better since we started Avastin.\par \par 1/9/19\par He is doing well.  He has no more pain in his legs. He is eating well. He is due for 4/4 dose of Avastin. No other complains.\par \par 2/6/19\par He is here for follow-up.  He generally feels well.  He states he still suffers from headaches.  His memory has improved minimally.\par PET/CT 2/4/19 IMPRESSION: Since 9/28/2018, No definite sites of abnormal FDG uptake to suggest biologic tumor activity. Cerebellar lesions seen on brain MRI from 1/20/2019, are not FDG avid and consistent with treated metastases. Post talc pleurodesis of the left pleura, unchanged. \par MR Brain 1/20/19 IMPRESSION: In comparison to the previous brain MRI dated 10/29/2018: 1. No significant interval change in the size and configuration of the heterogeneously enhancing, necrotic and hemorrhagic lesions within the temporal/occipital lobes and cerebellar hemispheres. 2. The edema within the brainstem and cerebellum is about stable;however there is slight progression of the hydrocephalus since the prior exam. 3. The edema within the temporal and occipital lobes has decreased. 4. New patchy area of restricted diffusion around the left temporal horn (without corresponding enhancement) may reflect superimposed ischemic changes. Recommend attention on follow-up imaging. \par \par 3/6/19\par Patient is here for a follow-up visit.  He states he is having increased frequency of headaches with minimal relief from Tylenol.  He is now staying in the shelter on [address] with his daughter.  He will  his medications from [facility] from now on.  We are awaiting arrangement for him to get an apartment.\par \par 3/27/19\par He is here for follow up. his headache is better and he takes his Dexamethasone only when he has it which helps. Last PET was in February and last MRI was in January.\par \par 4/29/19\par He is here for follow-up.  Since last visit, he presented to the ED in 4/2019 due to chest pain.  He underwent CTA chest that demonstrated unchanged left pleural effusion. Likely radiation induced necrosis changes, he states it may have been related to stress related to his daughter.  He is still awaiting placement from the shelter.  He reports his usual frequency headaches, but he takes the Decadron as needed which helps.  \par CTA Chest (4/7/19) IMPRESSION:No pulmonary emboli.20.9 cm pleural fluid collection in the left hemithorax, essentially unchanged since PET/CT dated 2/4/2019.\par MR Brain (4/2/19) IMPRESSION: Since January 20, 2019:New worse signal abnormality involving the brain and volodymyr with increased edema and worse effacement of the fourth ventricle and resulting mild hydrocephalus.Slightly worse edema involving the cerebellum.Stable bilateral posterior temporal/occipital/cerebellar heterogeneously enhancing lesions.Stable left posterior frontal deep white matter and left dorsal medullary hypointense foci, compatible with treated lesions.

## 2019-04-30 NOTE — REVIEW OF SYSTEMS
[Confused] : confusion [Dizziness] : dizziness [Negative] : Heme/Lymph [Fatigue] : no fatigue [Recent Change In Weight] : ~T no recent weight change [Difficulty Walking] : no difficulty walking [de-identified] : headaches on occasion, memory issues,  chronic a bit better post Avastin

## 2019-04-30 NOTE — PHYSICAL EXAM
[Restricted in physically strenuous activity but ambulatory and able to carry out work of a light or sedentary nature] : Status 1- Restricted in physically strenuous activity but ambulatory and able to carry out work of a light or sedentary nature, e.g., light house work, office work [Normal] : affect appropriate [de-identified] : he has decrease breath sounds on left side base, unchanged. [de-identified] :  Gait is normal good strength  in all limbs [de-identified] : forgetful, but stable

## 2019-05-16 ENCOUNTER — RX RENEWAL (OUTPATIENT)
Age: 44
End: 2019-05-16

## 2019-05-22 ENCOUNTER — APPOINTMENT (OUTPATIENT)
Dept: HEMATOLOGY ONCOLOGY | Facility: CLINIC | Age: 44
End: 2019-05-22

## 2019-05-22 ENCOUNTER — LABORATORY RESULT (OUTPATIENT)
Age: 44
End: 2019-05-22

## 2019-05-22 VITALS
WEIGHT: 180 LBS | HEIGHT: 67 IN | TEMPERATURE: 97.1 F | SYSTOLIC BLOOD PRESSURE: 119 MMHG | BODY MASS INDEX: 28.25 KG/M2 | RESPIRATION RATE: 14 BRPM | DIASTOLIC BLOOD PRESSURE: 70 MMHG | HEART RATE: 72 BPM

## 2019-05-22 LAB
HCT VFR BLD CALC: 39.7 %
HGB BLD-MCNC: 13.3 G/DL
MCHC RBC-ENTMCNC: 31 PG
MCHC RBC-ENTMCNC: 33.5 G/DL
MCV RBC AUTO: 92.5 FL
PLATELET # BLD AUTO: 196 K/UL
PMV BLD: 11.1 FL
RBC # BLD: 4.29 M/UL
RBC # FLD: 14.9 %
WBC # FLD AUTO: 10.16 K/UL

## 2019-05-23 LAB
ALBUMIN SERPL ELPH-MCNC: 4.3 G/DL
ALP BLD-CCNC: 72 U/L
ALT SERPL-CCNC: 9 U/L
ANION GAP SERPL CALC-SCNC: 12 MMOL/L
AST SERPL-CCNC: 11 U/L
BILIRUB SERPL-MCNC: 1.5 MG/DL
BUN SERPL-MCNC: 15 MG/DL
CALCIUM SERPL-MCNC: 9.1 MG/DL
CHLORIDE SERPL-SCNC: 103 MMOL/L
CO2 SERPL-SCNC: 27 MMOL/L
CREAT SERPL-MCNC: 0.9 MG/DL
ESTIMATED AVERAGE GLUCOSE: 103 MG/DL
GLUCOSE SERPL-MCNC: 97 MG/DL
HBA1C MFR BLD HPLC: 5.2 %
POTASSIUM SERPL-SCNC: 3.7 MMOL/L
PROT SERPL-MCNC: 6.6 G/DL
SODIUM SERPL-SCNC: 142 MMOL/L

## 2019-05-24 NOTE — CONSULT LETTER
[Consult Letter:] : I had the pleasure of evaluating your patient, [unfilled]. [Please see my note below.] : Please see my note below. [Dear  ___] : Dear  [unfilled], [Consult Closing:] : Thank you very much for allowing me to participate in the care of this patient.  If you have any questions, please do not hesitate to contact me. [Sincerely,] : Sincerely, [DrTam  ___] : Dr. WHALEN [FreeTextEntry3] : Roque

## 2019-05-24 NOTE — REVIEW OF SYSTEMS
[Fatigue] : no fatigue [Recent Change In Weight] : ~T no recent weight change [Confused] : confusion [Dizziness] : dizziness [Difficulty Walking] : no difficulty walking [de-identified] : headaches on occasion, memory issues,  chronic a bit better post Avastin  [Negative] : Heme/Lymph

## 2019-05-24 NOTE — ASSESSMENT
[FreeTextEntry1] : 1.  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 Due to insurance issues he stopped it but now is back on it since 4/4/2017\par - repeat MRI brain 8/2018 radiation related changes, post treatment changes, he is asymptomatic,  and repeat PET/CT 6/5/18   is without systemic progression, it confirmed radiation necrosis. Recent PET/CT on  9/28/18 that was PRESLEY. CT abd was unchanged. I think the findings on MR brain are radiation necrosis given his symptoms as before. Recent MR brain showed more edema but he is doing well. CT chest was PRESLEY. Thus MR brain findings most likely due to  radiation necrosis\par \par 2.   Intermittent headaches improves on dexamethasone, this is due ot radiaiton necrosis\par \par 3. Elevated total bilirubin likely secondary to alectinib.\par - will monitor, its stable\par \par 4.  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 LUTs symptoms better PSA was normal  \par \par Plan:\par - C/w Alectinib.  \par - CBC, CMP today\par - c/w Decadron 4mg  BID but he is taking it PRN for radiation necrosis\par - repeat CT C/A/P ordered and Repeat MRI\par -if no progression but only edema due to radiation necrosis will restart him on Avastin again and taper off the steroids\par \par RTC in 2 weeks post scans [Palliative] : Goals of care discussed with patient: Palliative

## 2019-05-24 NOTE — PHYSICAL EXAM
[Restricted in physically strenuous activity but ambulatory and able to carry out work of a light or sedentary nature] : Status 1- Restricted in physically strenuous activity but ambulatory and able to carry out work of a light or sedentary nature, e.g., light house work, office work [de-identified] : he has decrease breath sounds on left side base, unchanged. [Normal] : affect appropriate [de-identified] :  Gait is normal good strength  in all limbs [de-identified] : forgetful, but stable

## 2019-05-25 ENCOUNTER — FORM ENCOUNTER (OUTPATIENT)
Age: 44
End: 2019-05-25

## 2019-05-26 ENCOUNTER — OUTPATIENT (OUTPATIENT)
Dept: OUTPATIENT SERVICES | Facility: HOSPITAL | Age: 44
LOS: 1 days | Discharge: HOME | End: 2019-05-26
Payer: MEDICARE

## 2019-05-26 DIAGNOSIS — C78.00 SECONDARY MALIGNANT NEOPLASM OF UNSPECIFIED LUNG: ICD-10-CM

## 2019-05-26 PROCEDURE — 71260 CT THORAX DX C+: CPT | Mod: 26

## 2019-05-26 PROCEDURE — 74177 CT ABD & PELVIS W/CONTRAST: CPT | Mod: 26

## 2019-05-28 ENCOUNTER — FORM ENCOUNTER (OUTPATIENT)
Age: 44
End: 2019-05-28

## 2019-05-29 ENCOUNTER — OUTPATIENT (OUTPATIENT)
Dept: OUTPATIENT SERVICES | Facility: HOSPITAL | Age: 44
LOS: 1 days | Discharge: HOME | End: 2019-05-29
Payer: MEDICARE

## 2019-05-29 DIAGNOSIS — C78.00 SECONDARY MALIGNANT NEOPLASM OF UNSPECIFIED LUNG: ICD-10-CM

## 2019-05-29 PROCEDURE — 70553 MRI BRAIN STEM W/O & W/DYE: CPT | Mod: 26

## 2019-06-03 ENCOUNTER — APPOINTMENT (OUTPATIENT)
Dept: HEMATOLOGY ONCOLOGY | Facility: CLINIC | Age: 44
End: 2019-06-03
Payer: MEDICARE

## 2019-06-03 VITALS
WEIGHT: 180 LBS | TEMPERATURE: 98.7 F | BODY MASS INDEX: 28.25 KG/M2 | SYSTOLIC BLOOD PRESSURE: 120 MMHG | DIASTOLIC BLOOD PRESSURE: 80 MMHG | HEART RATE: 70 BPM | RESPIRATION RATE: 14 BRPM | HEIGHT: 67 IN

## 2019-06-03 PROCEDURE — 99213 OFFICE O/P EST LOW 20 MIN: CPT

## 2019-06-07 ENCOUNTER — APPOINTMENT (OUTPATIENT)
Dept: INFUSION THERAPY | Facility: CLINIC | Age: 44
End: 2019-06-07

## 2019-06-07 NOTE — ASSESSMENT
[FreeTextEntry1] : 1.  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 Due to insurance issues he stopped it but now is back on it since 4/4/2017\par - repeat MRI brain 8/2018 radiation related changes, post treatment changes, he is asymptomatic,  and repeat PET/CT 6/5/18   is without systemic progression, it confirmed radiation necrosis. Recent PET/CT on  9/28/18 that was PRESLEY. CT abd was unchanged. I think the findings on MR brain are radiation necrosis given his symptoms as before. Recent MR brain showed more edema but he is doing well. CT chest was PRESLEY. Thus MR brain findings most likely due to  radiation necrosis that re causing his headaches. \par \par 2.   Intermittent headaches  continues to  dexamethasone, this is due to  radiation necrosis\par \par 3. Elevated total bilirubin likely secondary to alectinib.\par - will monitor, its stable\par \par 4.  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 LUTs symptoms better PSA was normal  \par \par Plan:\par - C/w Alectinib.  \par - Need to get him off the dexamethasone and will administer   a course of Avastin again. \par - c/w Decadron 4mg  daily he will taper it off if Avastin helps\par \par \par RTC in 4 weeks  [Palliative] : Goals of care discussed with patient: Palliative

## 2019-06-07 NOTE — HISTORY OF PRESENT ILLNESS
[Disease: _____________________] : Disease: [unfilled] [AJCC Stage: ____] : AJCC Stage: [unfilled] [de-identified] : ALK+ lung adenocarcinoma [de-identified] : 41/M presenting for follow-up for metastatic ALK-positive lung adenocarcinoma.  He is currently on alectinib 600 mg BID.  He initially presented in 11/2014 with multiple pulmonary nodules, mediastinal lymphadenopathy, multiple bony metastases, and brain metastases.  He was initially started on crizotinib and was switched to ceritinib in 12/2015 upon progression.  He was unable to tolerate ceritinib due to significant vomiting and was switched to alectinib in 02/2016.\par \par He had SRS to a left cerebellar hemisphere brain metastasis (08/2015) and subsequently underwent WBRT for CNS progression (10-11/2015).  He had RT to a painful metastatic lesion in the right humerus (01/2015).  He had also received Xgeva for bony metastatic disease in the past. \par \par Since 09/2016, he has been experiencing intermittent headaches being treated with periodic dexamethasone taper.  MRI brain with perfusion analysis showed bilateral cerebellar lesions, favor postRT changes rather than residual tumor.  His most recent brain MRI (11/01/2016) showed overall  stable  exam  compared  to  the  previous  brain  MRI  9/2/2016; no  significant  interval  change  in  bilateral  cerebellar  heterogeneously  enhancing  masses  with  extensive  associated  edema,  as  well  as  smaller  lesions  within  the  left  parietal  white  matter,  superficial  left  temporal  lobe  and  left  dorsal  medulla; and, stable  mild  ventriculomegaly  and  mild  periventricular  FLAIR  signal  abnormality.  He has been following with our neurosurgeon, Dr. Britt, who has recommended no surgical intervention at this time. \par \par His most recent PETCT (11/01/2016) showed no sites of pathologic FDG uptake suspicious for biologic tumor activity.\par \par He reports occasional headaches and a mild sense of imbalance.  Denies any falls.  However he only takes a prn dexamethasone, once in every three days,  which reportedly relieves his intermittent headaches. \par Today he also complains mild pain in his left great toe, likely secondary to ingrowing toe nail. 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 He changed his insurance. Did not get scans and did not take his alectinib for about 1-2 weeks. We were not notified.  Otherwise she feels well she really requires dexamethasone yesterday headache probably once or twice a week as per patient no other complaints.\par \par 4/17/17\par He presents for follow up today. He had MRI of brain that showed stable disease 4/8/2017. He has not had the PET CT yet. He received  his alecetinib on 4/4/17 and stated to take them. States his headaches are rare now and feels well otherwise.\par \par 5/15/17\par He presents for follow up. He had a PET CT on 4/21/17 that  was ucnhged.Since November 1, 2016, no new foci of pathologic FDG uptake to suggest\par biologic tumor activity. . 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 . Unchanged non-FDG avid sclerotic foci involving the right proximal humerus\par and T9 vertebral body.\par \par He  feels well. No dizziness or headache.  No new symptoms or complaints.\par \par 6/19/17\par He is doing well. He has no complaints. No headaches. No SOB. \par \par 7/24/17\par Patient comes in for follow up visit, has no complaints, has no SOB and has a mild cough which is chronic, no hemoptysis. Patient states his appetite is good, weight is stable, he has no headaches and is currently off the steroids. Patient will have repeat PET scan and MRI of the brain with contrast as well as repeat bloodwork. \par \par 8/21/17\par He is here for follow up. He had an MR brain and PET CT on 8/3 and 8/2 that did not show progression, were essentially unchanged.  He feels great otherwise.\par \par 9/21/17\par He is doing well. No complaints. No headaches\par \par October 26, 2017\par He see her for followup he feels great he has no complaints.\par \par 11/16/17\par He is here for follow up. He had an MR of brain on 11/14/17: New areas of nodular enhancement along the inferior surfaces of the temporal lobes bilaterally. These are relatively symmetric and just above the level of the cerebellar enhancing masses, suggesting that these may reflect post treatment change.\par He had to reschedule his PET and states he will do it next Wednesday.\par \par No complaints.\par \par 12/26/17\par He is here for follow up. He feels well. he  had PET/CT in end of November that does not show procession, stable pleural findings. He  feels well. \par \par 1/22/18\par He is here for follow up for his NSCLCA. He states he feels well. No SOB, no headaches no fatigue.. \par \par February 21 2018\par  he is here for followup, he had a recent PET/CT scan which is PRESLEY he also had a recent MRI of the brain. This was reviewed at the CNS tumor board although wasn't clear but the consensus was that this is posttreatment changes in the fact that he is asymptomatic and decided to observe him.\par \par He has no new complaints no headaches no weakness or tingling.\par \par \par 3/21/18\par He is here for follow up. HE states he is doing well. Has very mild headaches on occasion but otherwise doing well.\par \par 4/13/18\par He is doing well. Reports no new complaints \par \par 5/3/18\par He is here for follow up to discuss his MR brain. HE complains of mild headache. Same memory issues as before. Balance is the same.  MR brain showed : Increased size of right parietotemporal of the findings enhancement  seen lesion measuring 5.2 cm, previously 2.8 centimeters and increased  size of left inferior temporal lobe mass measuring 2.3 cm, previously 1.6  cm. these areas again demonstrate hypoperfusion and may represent post  therapeutic changes.   2.  Significant increased edema and mass effect within the right  hemisphere with 9 mm right to left midline shift.  3.  No new enhancing lesions. Stable additional bilateral cerebellar and  supratentorial lesions.\par \par I spoke with Dr. 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 He developed severe headaches, some difficulty with speech and balance issues. 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 He has no insurance so my pharmacy has been giving  him a weekly supply while we work on it. He did not have his scans yet. He feel much better now except for dizziness\par \par 5/30/18\par He is feeling much better. His neurological complaints resolved. He stopped steroids on Monday since he had swelling in legs that has resolved. \par \par 6/13/18\par He is here for follow up. he had a PET/CT on 6/7/18 that showed These images reveal, when comparing the FDG brain images to MRI of  4/27/2018 series 10, there is one small focus of increased FDG uptake in  the region of the right parietotemporal lobe lesion (series 12 image 34).  Therefore persistent biologic tumor activity in this region cannot be  excluded.  No focal abnormal FDG uptake corresponding with the left inferior  temporal lobe lesion and both cerebellar lesions. In addition most of the  right parietotemporal lobe lesion is not FDG avid Compared to normal brain glucose metabolism in the thalamus (surrogate  for normal brain metabolism) relative hypometabolism of the right  parietal, posterior parietal and occipital region and relative  hypermetabolism of the left posterior parietal region  Persistent posttreatment FDG uptake (SUV up to 11.2) along the pleural  surface of the left hemithorax consistent with posttreatment changes  associated with talc pleurodesis  One new mildly FDG avid soft tissue nodule (SUV 3.3) in the left buttock \par \par He feels well now ,no headaches.\par \par 7/16/18\par He is here for follow up. doing well. He has no headaches. No new complaints\par \par 8/13/18\par He is here for follow up.  He is doing well. has no complaints. He is considering returning to work.\par \par 9/10/18\par He is here for follow up. He had an MR brain on 8/24/18 that showed In comparison to the previous brain MRI dated 4/27/2018:  1.  No significant interval change in the size and configuration of the  heterogeneously enhancing, necrotic and hemorrhagic lesions within the  temporal/occipital lobes and cerebellar hemispheres. Enhancement pattern  suggests post-therapeutic changes.  2.  Moderately increased edema within the posterior fossa (cerebellar  hemispheres and brainstem). Increased mass effect upon the fourth  ventricle with no evidence of hydrocephalus.  3.  The left cerebral hemispheric edema has mildly increased and the  right cerebral hemispheric edema has mildly decreased.   4.  Resolution of the previously seen right to left midline shift, right  lateral ventricular compression and left lateral ventricular enlargement.  5.  Treated lesions again noted in the left frontal white matter and left  lateral medulla, without contrast enhancement.\par \par HE is doing well has no new complaints. Has no neuroglial complaints. \par \par 10/10/18\par He is here for follow up he had PET/CT on  9/28/18 that was PRESLEY. He no has headaches again. States he is voiding multiple times a day and unable to empty bladder fully.\par \par 10/30/18\par He is here for follow up. he was in the ED for.Left lower quadrant pain symptoms her was given cirpo. he had a Ct abd/pelvis in ED 10/22/18 : Stable posttreatment changes of the left hemithorax including large  loculated pleural effusion.  2.  Multiple nondilated fluid-filled small bowel loops which may reflect  evidence of enteritis.  \par He had MR brain on 10/29/18:  No new lesions, but several existing lesions have increased in size  2. Bilateral hemorrhagic cerebellar lesions and edema has increased. Mass  effect on the fourth ventricle with hydrocephalus.   2.  Essentially unchanged sizes of supratentorial lesions within the  bilateral temporooccipital lobes and mildly improved edema\par \par He is having headaches and more confusion. He took 4 flomax in am and PM instead of his alecensa. I called [facility] with him and they told us they did not deliver his pills since they could not reach him but will deliver them tomorrow. He will be home. He states he had alecensa at home but not sure.\par \par 11/12/18\par Patient presents to the office with a chief complaint of burning in his right thigh.  He has had no new medications.  The patient states it is worse in the evening but lasts all day.  The patient states that the burning sensation starts at the right knee and is ascending.  Straight leg raise negative.  Discussed that it is unlikely that it is from his Avastin treatment, but unsure the etiology.  We will send for LE doppler to rule out any DVT.\par \par 11/27/18\par He is here for follow up. His Dopplers  were negative and his pain/cramps are nearly gone only some in right thigh. His headaches are gone and he is speech is  better and states memory is better since we started Avastin.\par \par 1/9/19\par He is doing well.  He has no more pain in his legs. He is eating well. He is due for 4/4 dose of Avastin. No other complains.\par \par 2/6/19\par He is here for follow-up.  He generally feels well.  He states he still suffers from headaches.  His memory has improved minimally.\par PET/CT 2/4/19 IMPRESSION: Since 9/28/2018, No definite sites of abnormal FDG uptake to suggest biologic tumor activity. Cerebellar lesions seen on brain MRI from 1/20/2019, are not FDG avid and consistent with treated metastases. Post talc pleurodesis of the left pleura, unchanged. \par MR Brain 1/20/19 IMPRESSION: In comparison to the previous brain MRI dated 10/29/2018: 1. No significant interval change in the size and configuration of the heterogeneously enhancing, necrotic and hemorrhagic lesions within the temporal/occipital lobes and cerebellar hemispheres. 2. The edema within the brainstem and cerebellum is about stable;however there is slight progression of the hydrocephalus since the prior exam. 3. The edema within the temporal and occipital lobes has decreased. 4. New patchy area of restricted diffusion around the left temporal horn (without corresponding enhancement) may reflect superimposed ischemic changes. Recommend attention on follow-up imaging. \par \par 3/6/19\par Patient is here for a follow-up visit.  He states he is having increased frequency of headaches with minimal relief from Tylenol.  He is now staying in the shelter on [address] with his daughter.  He will  his medications from [facility] from now on.  We are awaiting arrangement for him to get an apartment.\par \par 3/27/19\par He is here for follow up. his headache is better and he takes his Dexamethasone only when he has it which helps. Last PET was in February and last MRI was in January.\par \par 4/29/19\par He is here for follow-up.  Since last visit, he presented to the ED in 4/2019 due to chest pain.  He underwent CTA chest that demonstrated unchanged left pleural effusion. Likely radiation induced necrosis changes, he states it may have been related to stress related to his daughter.  He is still awaiting placement from the shelter.  He reports his usual frequency headaches, but he takes the Decadron as needed which helps.  \par CTA Chest (4/7/19) IMPRESSION:No pulmonary emboli.20.9 cm pleural fluid collection in the left hemithorax, essentially unchanged since PET/CT dated 2/4/2019.\par MR Brain (4/2/19) IMPRESSION: Since January 20, 2019:New worse signal abnormality involving the brain and volodymyr with increased edema and worse effacement of the fourth ventricle and resulting mild hydrocephalus.Slightly worse edema involving the cerebellum.Stable bilateral posterior temporal/occipital/cerebellar heterogeneously enhancing lesions.Stable left posterior frontal deep white matter and left dorsal medullary hypointense foci, compatible with treated lesions.\par \par 5/22/19\par He came in for follow up. He has been complaining of headaches mainly in AM. He does not think dexamethasone is helping anymore. No other issues.\par \par 6/3/19\par He is here for follow up. HIs headacehs are the same and uses dexamethasone 4 mg almost dialy. He had  CT C/A/P that showed a stable effusion 5/26/19 amd MR brain showed  5/29/19: Overall no significant changes compared with the previous brain MRI dated \par 4/2/2019:\par \par 1.  No significant interval change in the size and configuration of the \par heterogeneously enhancing, necrotic and hemorrhagic lesions within the \par temporal/occipital lobes and cerebellar hemispheres.\par \par 2. Extensive edema within the cerebellar hemispheres, brainstem, parietal \par and temporal lobes is again seen. Patchy areas of enhancement are noted \par in this region. Appearance is most suggestive of posttreatment changes.\par \par 3. Stable patchy area of restricted diffusion around the left temporal \par horn again seen, nonspecific.\par \par 4. No new enhancing lesions identified.\par \par

## 2019-06-07 NOTE — REVIEW OF SYSTEMS
[Fatigue] : no fatigue [Recent Change In Weight] : ~T no recent weight change [Confused] : confusion [Dizziness] : dizziness [Difficulty Walking] : no difficulty walking [Negative] : Heme/Lymph [de-identified] : headaches o, memory issues,

## 2019-06-07 NOTE — PHYSICAL EXAM
[Restricted in physically strenuous activity but ambulatory and able to carry out work of a light or sedentary nature] : Status 1- Restricted in physically strenuous activity but ambulatory and able to carry out work of a light or sedentary nature, e.g., light house work, office work [Normal] : full range of motion and no deformities appreciated [de-identified] : forgetful, but stable [de-identified] :  Gait is normal good strength  in all limbs [de-identified] : he has decrease breath sounds on left side base, unchanged.

## 2019-06-07 NOTE — CONSULT LETTER
[Dear  ___] : Dear  [unfilled], [Consult Letter:] : I had the pleasure of evaluating your patient, [unfilled]. [Please see my note below.] : Please see my note below. [Consult Closing:] : Thank you very much for allowing me to participate in the care of this patient.  If you have any questions, please do not hesitate to contact me. [FreeTextEntry3] : Roque [Sincerely,] : Sincerely, [DrTam  ___] : Dr. WHALEN

## 2019-06-24 ENCOUNTER — APPOINTMENT (OUTPATIENT)
Dept: INFUSION THERAPY | Facility: CLINIC | Age: 44
End: 2019-06-24

## 2019-06-24 ENCOUNTER — APPOINTMENT (OUTPATIENT)
Dept: HEMATOLOGY ONCOLOGY | Facility: CLINIC | Age: 44
End: 2019-06-24
Payer: MEDICARE

## 2019-06-24 ENCOUNTER — LABORATORY RESULT (OUTPATIENT)
Age: 44
End: 2019-06-24

## 2019-06-24 VITALS
SYSTOLIC BLOOD PRESSURE: 123 MMHG | TEMPERATURE: 99.2 F | RESPIRATION RATE: 16 BRPM | HEIGHT: 67 IN | HEART RATE: 75 BPM | WEIGHT: 184 LBS | BODY MASS INDEX: 28.88 KG/M2 | DIASTOLIC BLOOD PRESSURE: 62 MMHG

## 2019-06-24 LAB
HCT VFR BLD CALC: 40.5 %
HGB BLD-MCNC: 14 G/DL
MCHC RBC-ENTMCNC: 31.9 PG
MCHC RBC-ENTMCNC: 34.6 G/DL
MCV RBC AUTO: 92.3 FL
PLATELET # BLD AUTO: 198 K/UL
PMV BLD: 10.3 FL
RBC # BLD: 4.39 M/UL
RBC # FLD: 15.1 %
WBC # FLD AUTO: 11.19 K/UL

## 2019-06-24 PROCEDURE — 99213 OFFICE O/P EST LOW 20 MIN: CPT

## 2019-06-24 RX ORDER — DIPHENHYDRAMINE HCL 50 MG
50 CAPSULE ORAL ONCE
Refills: 0 | Status: COMPLETED | OUTPATIENT
Start: 2019-06-24 | End: 2019-06-24

## 2019-06-24 RX ORDER — BEVACIZUMAB 400 MG/16ML
622 INJECTION, SOLUTION INTRAVENOUS ONCE
Refills: 0 | Status: COMPLETED | OUTPATIENT
Start: 2019-06-24 | End: 2019-06-24

## 2019-06-24 RX ORDER — ACETAMINOPHEN 500 MG
650 TABLET ORAL ONCE
Refills: 0 | Status: COMPLETED | OUTPATIENT
Start: 2019-06-24 | End: 2019-06-24

## 2019-06-24 RX ADMIN — Medication 650 MILLIGRAM(S): at 13:54

## 2019-06-24 RX ADMIN — BEVACIZUMAB 622 MILLIGRAM(S): 400 INJECTION, SOLUTION INTRAVENOUS at 16:00

## 2019-06-24 RX ADMIN — BEVACIZUMAB 83.25 MILLIGRAM(S): 400 INJECTION, SOLUTION INTRAVENOUS at 14:30

## 2019-06-24 RX ADMIN — Medication 50 MILLIGRAM(S): at 13:55

## 2019-06-25 LAB
ALBUMIN SERPL ELPH-MCNC: 4.2 G/DL
ALP BLD-CCNC: 85 U/L
ALT SERPL-CCNC: 10 U/L
ANION GAP SERPL CALC-SCNC: 16 MMOL/L
APPEARANCE: ABNORMAL
AST SERPL-CCNC: 13 U/L
BILIRUB SERPL-MCNC: 1.5 MG/DL
BILIRUBIN URINE: NEGATIVE
BLOOD URINE: NEGATIVE
BUN SERPL-MCNC: 13 MG/DL
CALCIUM SERPL-MCNC: 9.2 MG/DL
CHLORIDE SERPL-SCNC: 104 MMOL/L
CO2 SERPL-SCNC: 23 MMOL/L
COLOR: YELLOW
CREAT SERPL-MCNC: 0.8 MG/DL
GLUCOSE QUALITATIVE U: NEGATIVE MG/DL
GLUCOSE SERPL-MCNC: 125 MG/DL
KETONES URINE: NEGATIVE
LEUKOCYTE ESTERASE URINE: NEGATIVE
NITRITE URINE: NEGATIVE
PH URINE: 7.5
POTASSIUM SERPL-SCNC: 3.9 MMOL/L
PROT SERPL-MCNC: 6.8 G/DL
PROTEIN URINE: NEGATIVE MG/DL
SODIUM SERPL-SCNC: 143 MMOL/L
SPECIFIC GRAVITY URINE: 1.01
UROBILINOGEN URINE: 1 MG/DL (ref 0.2–?)

## 2019-06-25 NOTE — CONSULT LETTER
[Dear  ___] : Dear  [unfilled], [Consult Letter:] : I had the pleasure of evaluating your patient, [unfilled]. [Please see my note below.] : Please see my note below. [Sincerely,] : Sincerely, [Consult Closing:] : Thank you very much for allowing me to participate in the care of this patient.  If you have any questions, please do not hesitate to contact me. [DrTam  ___] : Dr. WHALEN [FreeTextEntry3] : Roque

## 2019-06-25 NOTE — HISTORY OF PRESENT ILLNESS
[Disease: _____________________] : Disease: [unfilled] [AJCC Stage: ____] : AJCC Stage: [unfilled] [Treatment Protocol] : Treatment Protocol [de-identified] : 41/M presenting for follow-up for metastatic ALK-positive lung adenocarcinoma.  He is currently on alectinib 600 mg BID.  He initially presented in 11/2014 with multiple pulmonary nodules, mediastinal lymphadenopathy, multiple bony metastases, and brain metastases.  He was initially started on crizotinib and was switched to ceritinib in 12/2015 upon progression.  He was unable to tolerate ceritinib due to significant vomiting and was switched to alectinib in 02/2016.\par \par He had SRS to a left cerebellar hemisphere brain metastasis (08/2015) and subsequently underwent WBRT for CNS progression (10-11/2015).  He had RT to a painful metastatic lesion in the right humerus (01/2015).  He had also received Xgeva for bony metastatic disease in the past. \par \par Since 09/2016, he has been experiencing intermittent headaches being treated with periodic dexamethasone taper.  MRI brain with perfusion analysis showed bilateral cerebellar lesions, favor postRT changes rather than residual tumor.  His most recent brain MRI (11/01/2016) showed overall  stable  exam  compared  to  the  previous  brain  MRI  9/2/2016; no  significant  interval  change  in  bilateral  cerebellar  heterogeneously  enhancing  masses  with  extensive  associated  edema,  as  well  as  smaller  lesions  within  the  left  parietal  white  matter,  superficial  left  temporal  lobe  and  left  dorsal  medulla; and, stable  mild  ventriculomegaly  and  mild  periventricular  FLAIR  signal  abnormality.  He has been following with our neurosurgeon, Dr. Britt, who has recommended no surgical intervention at this time. \par \par His most recent PETCT (11/01/2016) showed no sites of pathologic FDG uptake suspicious for biologic tumor activity.\par \par He reports occasional headaches and a mild sense of imbalance.  Denies any falls.  However he only takes a prn dexamethasone, once in every three days,  which reportedly relieves his intermittent headaches. \par Today he also complains mild pain in his left great toe, likely secondary to ingrowing toe nail. 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 He changed his insurance. Did not get scans and did not take his alectinib for about 1-2 weeks. We were not notified.  Otherwise she feels well she really requires dexamethasone yesterday headache probably once or twice a week as per patient no other complaints.\par \par 4/17/17\par He presents for follow up today. He had MRI of brain that showed stable disease 4/8/2017. He has not had the PET CT yet. He received  his alecetinib on 4/4/17 and stated to take them. States his headaches are rare now and feels well otherwise.\par \par 5/15/17\par He presents for follow up. He had a PET CT on 4/21/17 that  was ucnhged.Since November 1, 2016, no new foci of pathologic FDG uptake to suggest\par biologic tumor activity. . 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 . Unchanged non-FDG avid sclerotic foci involving the right proximal humerus\par and T9 vertebral body.\par \par He  feels well. No dizziness or headache.  No new symptoms or complaints.\par \par 6/19/17\par He is doing well. He has no complaints. No headaches. No SOB. \par \par 7/24/17\par Patient comes in for follow up visit, has no complaints, has no SOB and has a mild cough which is chronic, no hemoptysis. Patient states his appetite is good, weight is stable, he has no headaches and is currently off the steroids. Patient will have repeat PET scan and MRI of the brain with contrast as well as repeat bloodwork. \par \par 8/21/17\par He is here for follow up. He had an MR brain and PET CT on 8/3 and 8/2 that did not show progression, were essentially unchanged.  He feels great otherwise.\par \par 9/21/17\par He is doing well. No complaints. No headaches\par \par October 26, 2017\par He see her for followup he feels great he has no complaints.\par \par 11/16/17\par He is here for follow up. He had an MR of brain on 11/14/17: New areas of nodular enhancement along the inferior surfaces of the temporal lobes bilaterally. These are relatively symmetric and just above the level of the cerebellar enhancing masses, suggesting that these may reflect post treatment change.\par He had to reschedule his PET and states he will do it next Wednesday.\par \par No complaints.\par \par 12/26/17\par He is here for follow up. He feels well. he  had PET/CT in end of November that does not show procession, stable pleural findings. He  feels well. \par \par 1/22/18\par He is here for follow up for his NSCLCA. He states he feels well. No SOB, no headaches no fatigue.. \par \par February 21 2018\par  he is here for followup, he had a recent PET/CT scan which is PRESLEY he also had a recent MRI of the brain. This was reviewed at the CNS tumor board although wasn't clear but the consensus was that this is posttreatment changes in the fact that he is asymptomatic and decided to observe him.\par \par He has no new complaints no headaches no weakness or tingling.\par \par \par 3/21/18\par He is here for follow up. HE states he is doing well. Has very mild headaches on occasion but otherwise doing well.\par \par 4/13/18\par He is doing well. Reports no new complaints \par \par 5/3/18\par He is here for follow up to discuss his MR brain. HE complains of mild headache. Same memory issues as before. Balance is the same.  MR brain showed : Increased size of right parietotemporal of the findings enhancement  seen lesion measuring 5.2 cm, previously 2.8 centimeters and increased  size of left inferior temporal lobe mass measuring 2.3 cm, previously 1.6  cm. these areas again demonstrate hypoperfusion and may represent post  therapeutic changes.   2.  Significant increased edema and mass effect within the right  hemisphere with 9 mm right to left midline shift.  3.  No new enhancing lesions. Stable additional bilateral cerebellar and  supratentorial lesions.\par \par I spoke with Dr. 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 He developed severe headaches, some difficulty with speech and balance issues. 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 He has no insurance so my pharmacy has been giving  him a weekly supply while we work on it. He did not have his scans yet. He feel much better now except for dizziness\par \par 5/30/18\par He is feeling much better. His neurological complaints resolved. He stopped steroids on Monday since he had swelling in legs that has resolved. \par \par 6/13/18\par He is here for follow up. he had a PET/CT on 6/7/18 that showed These images reveal, when comparing the FDG brain images to MRI of  4/27/2018 series 10, there is one small focus of increased FDG uptake in  the region of the right parietotemporal lobe lesion (series 12 image 34).  Therefore persistent biologic tumor activity in this region cannot be  excluded.  No focal abnormal FDG uptake corresponding with the left inferior  temporal lobe lesion and both cerebellar lesions. In addition most of the  right parietotemporal lobe lesion is not FDG avid Compared to normal brain glucose metabolism in the thalamus (surrogate  for normal brain metabolism) relative hypometabolism of the right  parietal, posterior parietal and occipital region and relative  hypermetabolism of the left posterior parietal region  Persistent posttreatment FDG uptake (SUV up to 11.2) along the pleural  surface of the left hemithorax consistent with posttreatment changes  associated with talc pleurodesis  One new mildly FDG avid soft tissue nodule (SUV 3.3) in the left buttock \par \par He feels well now ,no headaches.\par \par 7/16/18\par He is here for follow up. doing well. He has no headaches. No new complaints\par \par 8/13/18\par He is here for follow up.  He is doing well. has no complaints. He is considering returning to work.\par \par 9/10/18\par He is here for follow up. He had an MR brain on 8/24/18 that showed In comparison to the previous brain MRI dated 4/27/2018:  1.  No significant interval change in the size and configuration of the  heterogeneously enhancing, necrotic and hemorrhagic lesions within the  temporal/occipital lobes and cerebellar hemispheres. Enhancement pattern  suggests post-therapeutic changes.  2.  Moderately increased edema within the posterior fossa (cerebellar  hemispheres and brainstem). Increased mass effect upon the fourth  ventricle with no evidence of hydrocephalus.  3.  The left cerebral hemispheric edema has mildly increased and the  right cerebral hemispheric edema has mildly decreased.   4.  Resolution of the previously seen right to left midline shift, right  lateral ventricular compression and left lateral ventricular enlargement.  5.  Treated lesions again noted in the left frontal white matter and left  lateral medulla, without contrast enhancement.\par \par HE is doing well has no new complaints. Has no neuroglial complaints. \par \par 10/10/18\par He is here for follow up he had PET/CT on  9/28/18 that was PRESLEY. He no has headaches again. States he is voiding multiple times a day and unable to empty bladder fully.\par \par 10/30/18\par He is here for follow up. he was in the ED for.Left lower quadrant pain symptoms her was given cirpo. he had a Ct abd/pelvis in ED 10/22/18 : Stable posttreatment changes of the left hemithorax including large  loculated pleural effusion.  2.  Multiple nondilated fluid-filled small bowel loops which may reflect  evidence of enteritis.  \par He had MR brain on 10/29/18:  No new lesions, but several existing lesions have increased in size  2. Bilateral hemorrhagic cerebellar lesions and edema has increased. Mass  effect on the fourth ventricle with hydrocephalus.   2.  Essentially unchanged sizes of supratentorial lesions within the  bilateral temporooccipital lobes and mildly improved edema\par \par He is having headaches and more confusion. He took 4 flomax in am and PM instead of his alecensa. I called [facility] with him and they told us they did not deliver his pills since they could not reach him but will deliver them tomorrow. He will be home. He states he had alecensa at home but not sure.\par \par 11/12/18\par Patient presents to the office with a chief complaint of burning in his right thigh.  He has had no new medications.  The patient states it is worse in the evening but lasts all day.  The patient states that the burning sensation starts at the right knee and is ascending.  Straight leg raise negative.  Discussed that it is unlikely that it is from his Avastin treatment, but unsure the etiology.  We will send for LE doppler to rule out any DVT.\par \par 11/27/18\par He is here for follow up. His Dopplers  were negative and his pain/cramps are nearly gone only some in right thigh. His headaches are gone and he is speech is  better and states memory is better since we started Avastin.\par \par 1/9/19\par He is doing well.  He has no more pain in his legs. He is eating well. He is due for 4/4 dose of Avastin. No other complains.\par \par 2/6/19\par He is here for follow-up.  He generally feels well.  He states he still suffers from headaches.  His memory has improved minimally.\par PET/CT 2/4/19 IMPRESSION: Since 9/28/2018, No definite sites of abnormal FDG uptake to suggest biologic tumor activity. Cerebellar lesions seen on brain MRI from 1/20/2019, are not FDG avid and consistent with treated metastases. Post talc pleurodesis of the left pleura, unchanged. \par MR Brain 1/20/19 IMPRESSION: In comparison to the previous brain MRI dated 10/29/2018: 1. No significant interval change in the size and configuration of the heterogeneously enhancing, necrotic and hemorrhagic lesions within the temporal/occipital lobes and cerebellar hemispheres. 2. The edema within the brainstem and cerebellum is about stable;however there is slight progression of the hydrocephalus since the prior exam. 3. The edema within the temporal and occipital lobes has decreased. 4. New patchy area of restricted diffusion around the left temporal horn (without corresponding enhancement) may reflect superimposed ischemic changes. Recommend attention on follow-up imaging. \par \par 3/6/19\par Patient is here for a follow-up visit.  He states he is having increased frequency of headaches with minimal relief from Tylenol.  He is now staying in the shelter on [address] with his daughter.  He will  his medications from [facility] from now on.  We are awaiting arrangement for him to get an apartment.\par \par 3/27/19\par He is here for follow up. his headache is better and he takes his Dexamethasone only when he has it which helps. Last PET was in February and last MRI was in January.\par \par 4/29/19\par He is here for follow-up.  Since last visit, he presented to the ED in 4/2019 due to chest pain.  He underwent CTA chest that demonstrated unchanged left pleural effusion. Likely radiation induced necrosis changes, he states it may have been related to stress related to his daughter.  He is still awaiting placement from the shelter.  He reports his usual frequency headaches, but he takes the Decadron as needed which helps.  \par CTA Chest (4/7/19) IMPRESSION:No pulmonary emboli.20.9 cm pleural fluid collection in the left hemithorax, essentially unchanged since PET/CT dated 2/4/2019.\par MR Brain (4/2/19) IMPRESSION: Since January 20, 2019:New worse signal abnormality involving the brain and volodymyr with increased edema and worse effacement of the fourth ventricle and resulting mild hydrocephalus.Slightly worse edema involving the cerebellum.Stable bilateral posterior temporal/occipital/cerebellar heterogeneously enhancing lesions.Stable left posterior frontal deep white matter and left dorsal medullary hypointense foci, compatible with treated lesions.\par \par 5/22/19\par He came in for follow up. He has been complaining of headaches mainly in AM. He does not think dexamethasone is helping anymore. No other issues.\par \par 6/3/19\par He is here for follow up. HIs headacehs are the same and uses dexamethasone 4 mg almost dialy. He had  CT C/A/P that showed a stable effusion 5/26/19 amd MR brain showed  5/29/19: Overall no significant changes compared with the previous brain MRI dated \par 4/2/2019:\par \par 1.  No significant interval change in the size and configuration of the \par heterogeneously enhancing, necrotic and hemorrhagic lesions within the \par temporal/occipital lobes and cerebellar hemispheres.\par \par 2. Extensive edema within the cerebellar hemispheres, brainstem, parietal \par and temporal lobes is again seen. Patchy areas of enhancement are noted \par in this region. Appearance is most suggestive of posttreatment changes.\par \par 3. Stable patchy area of restricted diffusion around the left temporal \par horn again seen, nonspecific.\par \par 4. No new enhancing lesions identified.\par \par 6/24/19\par He is here for follow up. he missed his Avastin since he had family issues. Still has headaches they are the same. Will restart Avastin today,.\par

## 2019-06-25 NOTE — REVIEW OF SYSTEMS
[Confused] : confusion [Dizziness] : dizziness [Negative] : Heme/Lymph [Fatigue] : no fatigue [Difficulty Walking] : no difficulty walking [de-identified] : headaches,, memory issues,  [Recent Change In Weight] : ~T no recent weight change

## 2019-06-25 NOTE — PHYSICAL EXAM
[Restricted in physically strenuous activity but ambulatory and able to carry out work of a light or sedentary nature] : Status 1- Restricted in physically strenuous activity but ambulatory and able to carry out work of a light or sedentary nature, e.g., light house work, office work [Normal] : full range of motion and no deformities appreciated [de-identified] : he has decrease breath sounds on left side base, unchanged. [de-identified] :  Gait is normal good strength  in all limbs [de-identified] : forgetful, but stable

## 2019-06-25 NOTE — ASSESSMENT
[Palliative] : Goals of care discussed with patient: Palliative [FreeTextEntry1] : 1.  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 Due to insurance issues he stopped it but now is back on it since 4/4/2017\par - repeat MRI brain 8/2018 radiation related changes, post treatment changes, he is asymptomatic,  and repeat PET/CT 6/5/18   is without systemic progression, it confirmed radiation necrosis. Recent PET/CT on  9/28/18 that was PRESLEY. CT abd was unchanged. I think the findings on MR brain are radiation necrosis given his symptoms as before. Recent MR brain showed more edema but he is doing well. CT chest was PRESLEY. Thus MR brain findings most likely due to  radiation necrosis that re causing his headaches. \par \par 2.   Intermittent headaches  continues to  dexamethasone, this is due to  radiation necrosis\par \par 3. Elevated total bilirubin likely secondary to alectinib.\par - will monitor, its stable\par \par 4.  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 LUTs symptoms better PSA was normal  \par \par Plan:\par - C/w Alectinib.  \par - Need to get him off the dexamethasone and will administer   a course of Avastin again first dose after this visit was arranged\par - c/w Decadron 4mg  daily he will taper it off if Avastin helps\par -will scan Around August again especially if headaches get better with Avastin \par \par \par RTC in 4 weeks

## 2019-07-15 ENCOUNTER — APPOINTMENT (OUTPATIENT)
Dept: HEMATOLOGY ONCOLOGY | Facility: CLINIC | Age: 44
End: 2019-07-15
Payer: MEDICARE

## 2019-07-15 ENCOUNTER — OUTPATIENT (OUTPATIENT)
Dept: OUTPATIENT SERVICES | Facility: HOSPITAL | Age: 44
LOS: 1 days | Discharge: HOME | End: 2019-07-15

## 2019-07-15 ENCOUNTER — LABORATORY RESULT (OUTPATIENT)
Age: 44
End: 2019-07-15

## 2019-07-15 ENCOUNTER — APPOINTMENT (OUTPATIENT)
Dept: INFUSION THERAPY | Facility: CLINIC | Age: 44
End: 2019-07-15
Payer: MEDICARE

## 2019-07-15 VITALS
TEMPERATURE: 97.8 F | BODY MASS INDEX: 29.19 KG/M2 | HEART RATE: 55 BPM | DIASTOLIC BLOOD PRESSURE: 66 MMHG | SYSTOLIC BLOOD PRESSURE: 105 MMHG | WEIGHT: 186 LBS | RESPIRATION RATE: 14 BRPM | HEIGHT: 67 IN

## 2019-07-15 DIAGNOSIS — C79.31 SECONDARY MALIGNANT NEOPLASM OF BRAIN: ICD-10-CM

## 2019-07-15 DIAGNOSIS — I67.89 OTHER CEREBROVASCULAR DISEASE: ICD-10-CM

## 2019-07-15 DIAGNOSIS — C78.00 SECONDARY MALIGNANT NEOPLASM OF UNSPECIFIED LUNG: ICD-10-CM

## 2019-07-15 DIAGNOSIS — C34.90 MALIGNANT NEOPLASM OF UNSPECIFIED PART OF UNSPECIFIED BRONCHUS OR LUNG: ICD-10-CM

## 2019-07-15 LAB
HCT VFR BLD CALC: 41.5 %
HGB BLD-MCNC: 13.9 G/DL
MCHC RBC-ENTMCNC: 31.7 PG
MCHC RBC-ENTMCNC: 33.5 G/DL
MCV RBC AUTO: 94.5 FL
PLATELET # BLD AUTO: 185 K/UL
PMV BLD: 10.9 FL
RBC # BLD: 4.39 M/UL
RBC # FLD: 15 %
WBC # FLD AUTO: 10.11 K/UL

## 2019-07-15 PROCEDURE — 99213 OFFICE O/P EST LOW 20 MIN: CPT

## 2019-07-15 RX ORDER — DIPHENHYDRAMINE HCL 50 MG
50 CAPSULE ORAL ONCE
Refills: 0 | Status: COMPLETED | OUTPATIENT
Start: 2019-07-15 | End: 2019-07-15

## 2019-07-15 RX ORDER — ACETAMINOPHEN 500 MG
650 TABLET ORAL ONCE
Refills: 0 | Status: COMPLETED | OUTPATIENT
Start: 2019-07-15 | End: 2019-07-15

## 2019-07-15 RX ORDER — BEVACIZUMAB 400 MG/16ML
622 INJECTION, SOLUTION INTRAVENOUS ONCE
Refills: 0 | Status: COMPLETED | OUTPATIENT
Start: 2019-07-15 | End: 2019-07-15

## 2019-07-15 RX ADMIN — BEVACIZUMAB 83.25 MILLIGRAM(S): 400 INJECTION, SOLUTION INTRAVENOUS at 16:09

## 2019-07-15 RX ADMIN — BEVACIZUMAB 622 MILLIGRAM(S): 400 INJECTION, SOLUTION INTRAVENOUS at 17:11

## 2019-07-15 RX ADMIN — Medication 650 MILLIGRAM(S): at 15:57

## 2019-07-15 RX ADMIN — Medication 50 MILLIGRAM(S): at 15:57

## 2019-07-16 LAB
ALBUMIN SERPL ELPH-MCNC: 3.8 G/DL
ALP BLD-CCNC: 76 U/L
ALT SERPL-CCNC: 12 U/L
ANION GAP SERPL CALC-SCNC: 15 MMOL/L
APPEARANCE: CLEAR
AST SERPL-CCNC: 24 U/L
BILIRUB SERPL-MCNC: 1.4 MG/DL
BILIRUBIN URINE: NEGATIVE
BLOOD URINE: NEGATIVE
BUN SERPL-MCNC: 14 MG/DL
CALCIUM SERPL-MCNC: 8.9 MG/DL
CHLORIDE SERPL-SCNC: 103 MMOL/L
CO2 SERPL-SCNC: 24 MMOL/L
COLOR: YELLOW
CREAT SERPL-MCNC: 0.7 MG/DL
GLUCOSE QUALITATIVE U: NEGATIVE
GLUCOSE SERPL-MCNC: 105 MG/DL
KETONES URINE: NEGATIVE
LEUKOCYTE ESTERASE URINE: NEGATIVE
NITRITE URINE: NEGATIVE
PH URINE: 7
POTASSIUM SERPL-SCNC: 4.2 MMOL/L
PROT SERPL-MCNC: 6.8 G/DL
PROTEIN URINE: NORMAL
SODIUM SERPL-SCNC: 142 MMOL/L
SPECIFIC GRAVITY URINE: 1.02
UROBILINOGEN URINE: NORMAL

## 2019-07-18 NOTE — PHYSICAL EXAM
[Restricted in physically strenuous activity but ambulatory and able to carry out work of a light or sedentary nature] : Status 1- Restricted in physically strenuous activity but ambulatory and able to carry out work of a light or sedentary nature, e.g., light house work, office work [Normal] : affect appropriate [de-identified] : he has decrease breath sounds on left side base, unchanged. [de-identified] :  Gait is normal good strength  in all limbs [de-identified] : forgetful, but stable

## 2019-07-18 NOTE — REVIEW OF SYSTEMS
[Fatigue] : no fatigue [Recent Change In Weight] : ~T no recent weight change [Confused] : confusion [Dizziness] : dizziness [Difficulty Walking] : no difficulty walking [Negative] : Heme/Lymph [de-identified] : headaches,, memory issues,

## 2019-07-18 NOTE — ASSESSMENT
[FreeTextEntry1] : 1.  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 Due to insurance issues he stopped it but now is back on it since 4/4/2017\par - repeat MRI brain 8/2018 radiation related changes, post treatment changes, he is asymptomatic,  and repeat PET/CT 6/5/18   is without systemic progression, it confirmed radiation necrosis. Recent PET/CT on  9/28/18 that was PRESLEY. CT abd was unchanged. I think the findings on MR brain are radiation necrosis given his symptoms as before. Recent MR brain showed more edema but he is doing well. CT chest was PRESLEY. Thus MR brain findings most likely due to  radiation necrosis that re causing his headaches. \par \par 2.   Intermittent headaches  continues to  dexamethasone, this is due to  radiation necrosis\par \par 3. Elevated total bilirubin likely secondary to alectinib.\par - will monitor, its stable\par \par 4.  Radiation Necrosis confirmed by PET/CT and responded to Dexamethasone  and was on  Avastin responded well  but worse again \par - now back on Dexamethasone on and off \par \par 5. LUTs symptoms better PSA was normal  \par \par Plan:\par - C/w Alectinib.  \par - Need to get him off the dexamethasone and undergoing another  course of Avastin again\par  c/w Decadron 4mg   PRN as needed, nothing else helps his headaches daily he will taper it off if Avastin helps\par -will check CTs and MR brain \par \par \par RTC in 4 weeks  [Palliative] : Goals of care discussed with patient: Palliative

## 2019-07-18 NOTE — HISTORY OF PRESENT ILLNESS
[Disease: _____________________] : Disease: [unfilled] [AJCC Stage: ____] : AJCC Stage: [unfilled] [de-identified] : 41/M presenting for follow-up for metastatic ALK-positive lung adenocarcinoma.  He is currently on alectinib 600 mg BID.  He initially presented in 11/2014 with multiple pulmonary nodules, mediastinal lymphadenopathy, multiple bony metastases, and brain metastases.  He was initially started on crizotinib and was switched to ceritinib in 12/2015 upon progression.  He was unable to tolerate ceritinib due to significant vomiting and was switched to alectinib in 02/2016.\par \par He had SRS to a left cerebellar hemisphere brain metastasis (08/2015) and subsequently underwent WBRT for CNS progression (10-11/2015).  He had RT to a painful metastatic lesion in the right humerus (01/2015).  He had also received Xgeva for bony metastatic disease in the past. \par \par Since 09/2016, he has been experiencing intermittent headaches being treated with periodic dexamethasone taper.  MRI brain with perfusion analysis showed bilateral cerebellar lesions, favor postRT changes rather than residual tumor.  His most recent brain MRI (11/01/2016) showed overall  stable  exam  compared  to  the  previous  brain  MRI  9/2/2016; no  significant  interval  change  in  bilateral  cerebellar  heterogeneously  enhancing  masses  with  extensive  associated  edema,  as  well  as  smaller  lesions  within  the  left  parietal  white  matter,  superficial  left  temporal  lobe  and  left  dorsal  medulla; and, stable  mild  ventriculomegaly  and  mild  periventricular  FLAIR  signal  abnormality.  He has been following with our neurosurgeon, Dr. Britt, who has recommended no surgical intervention at this time. \par \par His most recent PETCT (11/01/2016) showed no sites of pathologic FDG uptake suspicious for biologic tumor activity.\par \par He reports occasional headaches and a mild sense of imbalance.  Denies any falls.  However he only takes a prn dexamethasone, once in every three days,  which reportedly relieves his intermittent headaches. \par Today he also complains mild pain in his left great toe, likely secondary to ingrowing toe nail. 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 He changed his insurance. Did not get scans and did not take his alectinib for about 1-2 weeks. We were not notified.  Otherwise she feels well she really requires dexamethasone yesterday headache probably once or twice a week as per patient no other complaints.\par \par 4/17/17\par He presents for follow up today. He had MRI of brain that showed stable disease 4/8/2017. He has not had the PET CT yet. He received  his alecetinib on 4/4/17 and stated to take them. States his headaches are rare now and feels well otherwise.\par \par 5/15/17\par He presents for follow up. He had a PET CT on 4/21/17 that  was ucnhged.Since November 1, 2016, no new foci of pathologic FDG uptake to suggest\par biologic tumor activity. . 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 . Unchanged non-FDG avid sclerotic foci involving the right proximal humerus\par and T9 vertebral body.\par \par He  feels well. No dizziness or headache.  No new symptoms or complaints.\par \par 6/19/17\par He is doing well. He has no complaints. No headaches. No SOB. \par \par 7/24/17\par Patient comes in for follow up visit, has no complaints, has no SOB and has a mild cough which is chronic, no hemoptysis. Patient states his appetite is good, weight is stable, he has no headaches and is currently off the steroids. Patient will have repeat PET scan and MRI of the brain with contrast as well as repeat bloodwork. \par \par 8/21/17\par He is here for follow up. He had an MR brain and PET CT on 8/3 and 8/2 that did not show progression, were essentially unchanged.  He feels great otherwise.\par \par 9/21/17\par He is doing well. No complaints. No headaches\par \par October 26, 2017\par He see her for followup he feels great he has no complaints.\par \par 11/16/17\par He is here for follow up. He had an MR of brain on 11/14/17: New areas of nodular enhancement along the inferior surfaces of the temporal lobes bilaterally. These are relatively symmetric and just above the level of the cerebellar enhancing masses, suggesting that these may reflect post treatment change.\par He had to reschedule his PET and states he will do it next Wednesday.\par \par No complaints.\par \par 12/26/17\par He is here for follow up. He feels well. he  had PET/CT in end of November that does not show procession, stable pleural findings. He  feels well. \par \par 1/22/18\par He is here for follow up for his NSCLCA. He states he feels well. No SOB, no headaches no fatigue.. \par \par February 21 2018\par  he is here for followup, he had a recent PET/CT scan which is PRESLEY he also had a recent MRI of the brain. This was reviewed at the CNS tumor board although wasn't clear but the consensus was that this is posttreatment changes in the fact that he is asymptomatic and decided to observe him.\par \par He has no new complaints no headaches no weakness or tingling.\par \par \par 3/21/18\par He is here for follow up. HE states he is doing well. Has very mild headaches on occasion but otherwise doing well.\par \par 4/13/18\par He is doing well. Reports no new complaints \par \par 5/3/18\par He is here for follow up to discuss his MR brain. HE complains of mild headache. Same memory issues as before. Balance is the same.  MR brain showed : Increased size of right parietotemporal of the findings enhancement  seen lesion measuring 5.2 cm, previously 2.8 centimeters and increased  size of left inferior temporal lobe mass measuring 2.3 cm, previously 1.6  cm. these areas again demonstrate hypoperfusion and may represent post  therapeutic changes.   2.  Significant increased edema and mass effect within the right  hemisphere with 9 mm right to left midline shift.  3.  No new enhancing lesions. Stable additional bilateral cerebellar and  supratentorial lesions.\par \par I spoke with Dr. 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 He developed severe headaches, some difficulty with speech and balance issues. 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 He has no insurance so my pharmacy has been giving  him a weekly supply while we work on it. He did not have his scans yet. He feel much better now except for dizziness\par \par 5/30/18\par He is feeling much better. His neurological complaints resolved. He stopped steroids on Monday since he had swelling in legs that has resolved. \par \par 6/13/18\par He is here for follow up. he had a PET/CT on 6/7/18 that showed These images reveal, when comparing the FDG brain images to MRI of  4/27/2018 series 10, there is one small focus of increased FDG uptake in  the region of the right parietotemporal lobe lesion (series 12 image 34).  Therefore persistent biologic tumor activity in this region cannot be  excluded.  No focal abnormal FDG uptake corresponding with the left inferior  temporal lobe lesion and both cerebellar lesions. In addition most of the  right parietotemporal lobe lesion is not FDG avid Compared to normal brain glucose metabolism in the thalamus (surrogate  for normal brain metabolism) relative hypometabolism of the right  parietal, posterior parietal and occipital region and relative  hypermetabolism of the left posterior parietal region  Persistent posttreatment FDG uptake (SUV up to 11.2) along the pleural  surface of the left hemithorax consistent with posttreatment changes  associated with talc pleurodesis  One new mildly FDG avid soft tissue nodule (SUV 3.3) in the left buttock \par \par He feels well now ,no headaches.\par \par 7/16/18\par He is here for follow up. doing well. He has no headaches. No new complaints\par \par 8/13/18\par He is here for follow up.  He is doing well. has no complaints. He is considering returning to work.\par \par 9/10/18\par He is here for follow up. He had an MR brain on 8/24/18 that showed In comparison to the previous brain MRI dated 4/27/2018:  1.  No significant interval change in the size and configuration of the  heterogeneously enhancing, necrotic and hemorrhagic lesions within the  temporal/occipital lobes and cerebellar hemispheres. Enhancement pattern  suggests post-therapeutic changes.  2.  Moderately increased edema within the posterior fossa (cerebellar  hemispheres and brainstem). Increased mass effect upon the fourth  ventricle with no evidence of hydrocephalus.  3.  The left cerebral hemispheric edema has mildly increased and the  right cerebral hemispheric edema has mildly decreased.   4.  Resolution of the previously seen right to left midline shift, right  lateral ventricular compression and left lateral ventricular enlargement.  5.  Treated lesions again noted in the left frontal white matter and left  lateral medulla, without contrast enhancement.\par \par HE is doing well has no new complaints. Has no neuroglial complaints. \par \par 10/10/18\par He is here for follow up he had PET/CT on  9/28/18 that was PRESLEY. He no has headaches again. States he is voiding multiple times a day and unable to empty bladder fully.\par \par 10/30/18\par He is here for follow up. he was in the ED for.Left lower quadrant pain symptoms her was given cirpo. he had a Ct abd/pelvis in ED 10/22/18 : Stable posttreatment changes of the left hemithorax including large  loculated pleural effusion.  2.  Multiple nondilated fluid-filled small bowel loops which may reflect  evidence of enteritis.  \par He had MR brain on 10/29/18:  No new lesions, but several existing lesions have increased in size  2. Bilateral hemorrhagic cerebellar lesions and edema has increased. Mass  effect on the fourth ventricle with hydrocephalus.   2.  Essentially unchanged sizes of supratentorial lesions within the  bilateral temporooccipital lobes and mildly improved edema\par \par He is having headaches and more confusion. He took 4 flomax in am and PM instead of his alecensa. I called [facility] with him and they told us they did not deliver his pills since they could not reach him but will deliver them tomorrow. He will be home. He states he had alecensa at home but not sure.\par \par 11/12/18\par Patient presents to the office with a chief complaint of burning in his right thigh.  He has had no new medications.  The patient states it is worse in the evening but lasts all day.  The patient states that the burning sensation starts at the right knee and is ascending.  Straight leg raise negative.  Discussed that it is unlikely that it is from his Avastin treatment, but unsure the etiology.  We will send for LE doppler to rule out any DVT.\par \par 11/27/18\par He is here for follow up. His Dopplers  were negative and his pain/cramps are nearly gone only some in right thigh. His headaches are gone and he is speech is  better and states memory is better since we started Avastin.\par \par 1/9/19\par He is doing well.  He has no more pain in his legs. He is eating well. He is due for 4/4 dose of Avastin. No other complains.\par \par 2/6/19\par He is here for follow-up.  He generally feels well.  He states he still suffers from headaches.  His memory has improved minimally.\par PET/CT 2/4/19 IMPRESSION: Since 9/28/2018, No definite sites of abnormal FDG uptake to suggest biologic tumor activity. Cerebellar lesions seen on brain MRI from 1/20/2019, are not FDG avid and consistent with treated metastases. Post talc pleurodesis of the left pleura, unchanged. \par MR Brain 1/20/19 IMPRESSION: In comparison to the previous brain MRI dated 10/29/2018: 1. No significant interval change in the size and configuration of the heterogeneously enhancing, necrotic and hemorrhagic lesions within the temporal/occipital lobes and cerebellar hemispheres. 2. The edema within the brainstem and cerebellum is about stable;however there is slight progression of the hydrocephalus since the prior exam. 3. The edema within the temporal and occipital lobes has decreased. 4. New patchy area of restricted diffusion around the left temporal horn (without corresponding enhancement) may reflect superimposed ischemic changes. Recommend attention on follow-up imaging. \par \par 3/6/19\par Patient is here for a follow-up visit.  He states he is having increased frequency of headaches with minimal relief from Tylenol.  He is now staying in the shelter on [address] with his daughter.  He will  his medications from [facility] from now on.  We are awaiting arrangement for him to get an apartment.\par \par 3/27/19\par He is here for follow up. his headache is better and he takes his Dexamethasone only when he has it which helps. Last PET was in February and last MRI was in January.\par \par 4/29/19\par He is here for follow-up.  Since last visit, he presented to the ED in 4/2019 due to chest pain.  He underwent CTA chest that demonstrated unchanged left pleural effusion. Likely radiation induced necrosis changes, he states it may have been related to stress related to his daughter.  He is still awaiting placement from the shelter.  He reports his usual frequency headaches, but he takes the Decadron as needed which helps.  \par CTA Chest (4/7/19) IMPRESSION:No pulmonary emboli.20.9 cm pleural fluid collection in the left hemithorax, essentially unchanged since PET/CT dated 2/4/2019.\par MR Brain (4/2/19) IMPRESSION: Since January 20, 2019:New worse signal abnormality involving the brain and volodymyr with increased edema and worse effacement of the fourth ventricle and resulting mild hydrocephalus.Slightly worse edema involving the cerebellum.Stable bilateral posterior temporal/occipital/cerebellar heterogeneously enhancing lesions.Stable left posterior frontal deep white matter and left dorsal medullary hypointense foci, compatible with treated lesions.\par \par 5/22/19\par He came in for follow up. He has been complaining of headaches mainly in AM. He does not think dexamethasone is helping anymore. No other issues.\par \par 6/3/19\par He is here for follow up. HIs headacehs are the same and uses dexamethasone 4 mg almost dialy. He had  CT C/A/P that showed a stable effusion 5/26/19 amd MR brain showed  5/29/19: Overall no significant changes compared with the previous brain MRI dated \par 4/2/2019:\par \par 1.  No significant interval change in the size and configuration of the \par heterogeneously enhancing, necrotic and hemorrhagic lesions within the \par temporal/occipital lobes and cerebellar hemispheres.\par \par 2. Extensive edema within the cerebellar hemispheres, brainstem, parietal \par and temporal lobes is again seen. Patchy areas of enhancement are noted \par in this region. Appearance is most suggestive of posttreatment changes.\par \par 3. Stable patchy area of restricted diffusion around the left temporal \par horn again seen, nonspecific.\par \par 4. No new enhancing lesions identified.\par \par 6/24/19\par He is here for follow up. he missed his Avastin since he had family issues. Still has headaches they are the same. Will restart Avastin today,.\par \par 7/15/19\par He is here for follow up. He could not tell me if headaches are better. He does use steroids once  in  a while for headaches. States memory is the same. \par

## 2019-08-04 ENCOUNTER — FORM ENCOUNTER (OUTPATIENT)
Age: 44
End: 2019-08-04

## 2019-08-05 ENCOUNTER — OUTPATIENT (OUTPATIENT)
Dept: OUTPATIENT SERVICES | Facility: HOSPITAL | Age: 44
LOS: 1 days | Discharge: HOME | End: 2019-08-05
Payer: MEDICARE

## 2019-08-05 ENCOUNTER — APPOINTMENT (OUTPATIENT)
Dept: INFUSION THERAPY | Facility: CLINIC | Age: 44
End: 2019-08-05

## 2019-08-05 DIAGNOSIS — I67.89 OTHER CEREBROVASCULAR DISEASE: ICD-10-CM

## 2019-08-05 PROCEDURE — 70553 MRI BRAIN STEM W/O & W/DYE: CPT | Mod: 26

## 2019-08-07 ENCOUNTER — FORM ENCOUNTER (OUTPATIENT)
Age: 44
End: 2019-08-07

## 2019-08-08 ENCOUNTER — OUTPATIENT (OUTPATIENT)
Dept: OUTPATIENT SERVICES | Facility: HOSPITAL | Age: 44
LOS: 1 days | Discharge: HOME | End: 2019-08-08
Payer: MEDICARE

## 2019-08-08 DIAGNOSIS — I67.89 OTHER CEREBROVASCULAR DISEASE: ICD-10-CM

## 2019-08-08 PROCEDURE — 71260 CT THORAX DX C+: CPT | Mod: 26

## 2019-08-08 PROCEDURE — 74177 CT ABD & PELVIS W/CONTRAST: CPT | Mod: 26

## 2019-08-14 ENCOUNTER — LABORATORY RESULT (OUTPATIENT)
Age: 44
End: 2019-08-14

## 2019-08-14 ENCOUNTER — APPOINTMENT (OUTPATIENT)
Dept: HEMATOLOGY ONCOLOGY | Facility: CLINIC | Age: 44
End: 2019-08-14
Payer: MEDICARE

## 2019-08-14 VITALS
SYSTOLIC BLOOD PRESSURE: 121 MMHG | WEIGHT: 186 LBS | HEIGHT: 67 IN | RESPIRATION RATE: 14 BRPM | TEMPERATURE: 97.1 F | BODY MASS INDEX: 29.19 KG/M2 | HEART RATE: 65 BPM | DIASTOLIC BLOOD PRESSURE: 73 MMHG

## 2019-08-14 LAB
ALBUMIN SERPL ELPH-MCNC: 4.4 G/DL
ALP BLD-CCNC: 93 U/L
ALT SERPL-CCNC: 6 U/L
ANION GAP SERPL CALC-SCNC: 15 MMOL/L
AST SERPL-CCNC: 17 U/L
BILIRUB SERPL-MCNC: 1 MG/DL
BUN SERPL-MCNC: 9 MG/DL
CALCIUM SERPL-MCNC: 9.3 MG/DL
CHLORIDE SERPL-SCNC: 102 MMOL/L
CO2 SERPL-SCNC: 22 MMOL/L
CREAT SERPL-MCNC: 0.9 MG/DL
GLUCOSE SERPL-MCNC: 92 MG/DL
HCT VFR BLD CALC: 41.4 %
HGB BLD-MCNC: 13.8 G/DL
MCHC RBC-ENTMCNC: 31 PG
MCHC RBC-ENTMCNC: 33.3 G/DL
MCV RBC AUTO: 93 FL
PLATELET # BLD AUTO: 216 K/UL
PMV BLD: 10 FL
POTASSIUM SERPL-SCNC: 4.4 MMOL/L
PROT SERPL-MCNC: 7.2 G/DL
RBC # BLD: 4.45 M/UL
RBC # FLD: 13.8 %
SODIUM SERPL-SCNC: 139 MMOL/L
WBC # FLD AUTO: 8.72 K/UL

## 2019-08-14 PROCEDURE — 99214 OFFICE O/P EST MOD 30 MIN: CPT

## 2019-08-16 NOTE — HISTORY OF PRESENT ILLNESS
[Disease: _____________________] : Disease: [unfilled] [AJCC Stage: ____] : AJCC Stage: [unfilled] [de-identified] : 41/M presenting for follow-up for metastatic ALK-positive lung adenocarcinoma.  He is currently on alectinib 600 mg BID.  He initially presented in 11/2014 with multiple pulmonary nodules, mediastinal lymphadenopathy, multiple bony metastases, and brain metastases.  He was initially started on crizotinib and was switched to ceritinib in 12/2015 upon progression.  He was unable to tolerate ceritinib due to significant vomiting and was switched to alectinib in 02/2016.\par \par He had SRS to a left cerebellar hemisphere brain metastasis (08/2015) and subsequently underwent WBRT for CNS progression (10-11/2015).  He had RT to a painful metastatic lesion in the right humerus (01/2015).  He had also received Xgeva for bony metastatic disease in the past. \par \par Since 09/2016, he has been experiencing intermittent headaches being treated with periodic dexamethasone taper.  MRI brain with perfusion analysis showed bilateral cerebellar lesions, favor postRT changes rather than residual tumor.  His most recent brain MRI (11/01/2016) showed overall  stable  exam  compared  to  the  previous  brain  MRI  9/2/2016; no  significant  interval  change  in  bilateral  cerebellar  heterogeneously  enhancing  masses  with  extensive  associated  edema,  as  well  as  smaller  lesions  within  the  left  parietal  white  matter,  superficial  left  temporal  lobe  and  left  dorsal  medulla; and, stable  mild  ventriculomegaly  and  mild  periventricular  FLAIR  signal  abnormality.  He has been following with our neurosurgeon, Dr. Britt, who has recommended no surgical intervention at this time. \par \par His most recent PETCT (11/01/2016) showed no sites of pathologic FDG uptake suspicious for biologic tumor activity.\par \par He reports occasional headaches and a mild sense of imbalance.  Denies any falls.  However he only takes a prn dexamethasone, once in every three days,  which reportedly relieves his intermittent headaches. \par Today he also complains mild pain in his left great toe, likely secondary to ingrowing toe nail. 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 He changed his insurance. Did not get scans and did not take his alectinib for about 1-2 weeks. We were not notified.  Otherwise she feels well she really requires dexamethasone yesterday headache probably once or twice a week as per patient no other complaints.\par \par 4/17/17\par He presents for follow up today. He had MRI of brain that showed stable disease 4/8/2017. He has not had the PET CT yet. He received  his alecetinib on 4/4/17 and stated to take them. States his headaches are rare now and feels well otherwise.\par \par 5/15/17\par He presents for follow up. He had a PET CT on 4/21/17 that  was ucnhged.Since November 1, 2016, no new foci of pathologic FDG uptake to suggest\par biologic tumor activity. . 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 . Unchanged non-FDG avid sclerotic foci involving the right proximal humerus\par and T9 vertebral body.\par \par He  feels well. No dizziness or headache.  No new symptoms or complaints.\par \par 6/19/17\par He is doing well. He has no complaints. No headaches. No SOB. \par \par 7/24/17\par Patient comes in for follow up visit, has no complaints, has no SOB and has a mild cough which is chronic, no hemoptysis. Patient states his appetite is good, weight is stable, he has no headaches and is currently off the steroids. Patient will have repeat PET scan and MRI of the brain with contrast as well as repeat bloodwork. \par \par 8/21/17\par He is here for follow up. He had an MR brain and PET CT on 8/3 and 8/2 that did not show progression, were essentially unchanged.  He feels great otherwise.\par \par 9/21/17\par He is doing well. No complaints. No headaches\par \par October 26, 2017\par He see her for followup he feels great he has no complaints.\par \par 11/16/17\par He is here for follow up. He had an MR of brain on 11/14/17: New areas of nodular enhancement along the inferior surfaces of the temporal lobes bilaterally. These are relatively symmetric and just above the level of the cerebellar enhancing masses, suggesting that these may reflect post treatment change.\par He had to reschedule his PET and states he will do it next Wednesday.\par \par No complaints.\par \par 12/26/17\par He is here for follow up. He feels well. he  had PET/CT in end of November that does not show procession, stable pleural findings. He  feels well. \par \par 1/22/18\par He is here for follow up for his NSCLCA. He states he feels well. No SOB, no headaches no fatigue.. \par \par February 21 2018\par  he is here for followup, he had a recent PET/CT scan which is PRESLEY he also had a recent MRI of the brain. This was reviewed at the CNS tumor board although wasn't clear but the consensus was that this is posttreatment changes in the fact that he is asymptomatic and decided to observe him.\par \par He has no new complaints no headaches no weakness or tingling.\par \par \par 3/21/18\par He is here for follow up. HE states he is doing well. Has very mild headaches on occasion but otherwise doing well.\par \par 4/13/18\par He is doing well. Reports no new complaints \par \par 5/3/18\par He is here for follow up to discuss his MR brain. HE complains of mild headache. Same memory issues as before. Balance is the same.  MR brain showed : Increased size of right parietotemporal of the findings enhancement  seen lesion measuring 5.2 cm, previously 2.8 centimeters and increased  size of left inferior temporal lobe mass measuring 2.3 cm, previously 1.6  cm. these areas again demonstrate hypoperfusion and may represent post  therapeutic changes.   2.  Significant increased edema and mass effect within the right  hemisphere with 9 mm right to left midline shift.  3.  No new enhancing lesions. Stable additional bilateral cerebellar and  supratentorial lesions.\par \par I spoke with Dr. 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 He developed severe headaches, some difficulty with speech and balance issues. 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 He has no insurance so my pharmacy has been giving  him a weekly supply while we work on it. He did not have his scans yet. He feel much better now except for dizziness\par \par 5/30/18\par He is feeling much better. His neurological complaints resolved. He stopped steroids on Monday since he had swelling in legs that has resolved. \par \par 6/13/18\par He is here for follow up. he had a PET/CT on 6/7/18 that showed These images reveal, when comparing the FDG brain images to MRI of  4/27/2018 series 10, there is one small focus of increased FDG uptake in  the region of the right parietotemporal lobe lesion (series 12 image 34).  Therefore persistent biologic tumor activity in this region cannot be  excluded.  No focal abnormal FDG uptake corresponding with the left inferior  temporal lobe lesion and both cerebellar lesions. In addition most of the  right parietotemporal lobe lesion is not FDG avid Compared to normal brain glucose metabolism in the thalamus (surrogate  for normal brain metabolism) relative hypometabolism of the right  parietal, posterior parietal and occipital region and relative  hypermetabolism of the left posterior parietal region  Persistent posttreatment FDG uptake (SUV up to 11.2) along the pleural  surface of the left hemithorax consistent with posttreatment changes  associated with talc pleurodesis  One new mildly FDG avid soft tissue nodule (SUV 3.3) in the left buttock \par \par He feels well now ,no headaches.\par \par 7/16/18\par He is here for follow up. doing well. He has no headaches. No new complaints\par \par 8/13/18\par He is here for follow up.  He is doing well. has no complaints. He is considering returning to work.\par \par 9/10/18\par He is here for follow up. He had an MR brain on 8/24/18 that showed In comparison to the previous brain MRI dated 4/27/2018:  1.  No significant interval change in the size and configuration of the  heterogeneously enhancing, necrotic and hemorrhagic lesions within the  temporal/occipital lobes and cerebellar hemispheres. Enhancement pattern  suggests post-therapeutic changes.  2.  Moderately increased edema within the posterior fossa (cerebellar  hemispheres and brainstem). Increased mass effect upon the fourth  ventricle with no evidence of hydrocephalus.  3.  The left cerebral hemispheric edema has mildly increased and the  right cerebral hemispheric edema has mildly decreased.   4.  Resolution of the previously seen right to left midline shift, right  lateral ventricular compression and left lateral ventricular enlargement.  5.  Treated lesions again noted in the left frontal white matter and left  lateral medulla, without contrast enhancement.\par \par HE is doing well has no new complaints. Has no neuroglial complaints. \par \par 10/10/18\par He is here for follow up he had PET/CT on  9/28/18 that was PRESLEY. He no has headaches again. States he is voiding multiple times a day and unable to empty bladder fully.\par \par 10/30/18\par He is here for follow up. he was in the ED for.Left lower quadrant pain symptoms her was given cirpo. he had a Ct abd/pelvis in ED 10/22/18 : Stable posttreatment changes of the left hemithorax including large  loculated pleural effusion.  2.  Multiple nondilated fluid-filled small bowel loops which may reflect  evidence of enteritis.  \par He had MR brain on 10/29/18:  No new lesions, but several existing lesions have increased in size  2. Bilateral hemorrhagic cerebellar lesions and edema has increased. Mass  effect on the fourth ventricle with hydrocephalus.   2.  Essentially unchanged sizes of supratentorial lesions within the  bilateral temporooccipital lobes and mildly improved edema\par \par He is having headaches and more confusion. He took 4 flomax in am and PM instead of his alecensa. I called [facility] with him and they told us they did not deliver his pills since they could not reach him but will deliver them tomorrow. He will be home. He states he had alecensa at home but not sure.\par \par 11/12/18\par Patient presents to the office with a chief complaint of burning in his right thigh.  He has had no new medications.  The patient states it is worse in the evening but lasts all day.  The patient states that the burning sensation starts at the right knee and is ascending.  Straight leg raise negative.  Discussed that it is unlikely that it is from his Avastin treatment, but unsure the etiology.  We will send for LE doppler to rule out any DVT.\par \par 11/27/18\par He is here for follow up. His Dopplers  were negative and his pain/cramps are nearly gone only some in right thigh. His headaches are gone and he is speech is  better and states memory is better since we started Avastin.\par \par 1/9/19\par He is doing well.  He has no more pain in his legs. He is eating well. He is due for 4/4 dose of Avastin. No other complains.\par \par 2/6/19\par He is here for follow-up.  He generally feels well.  He states he still suffers from headaches.  His memory has improved minimally.\par PET/CT 2/4/19 IMPRESSION: Since 9/28/2018, No definite sites of abnormal FDG uptake to suggest biologic tumor activity. Cerebellar lesions seen on brain MRI from 1/20/2019, are not FDG avid and consistent with treated metastases. Post talc pleurodesis of the left pleura, unchanged. \par MR Brain 1/20/19 IMPRESSION: In comparison to the previous brain MRI dated 10/29/2018: 1. No significant interval change in the size and configuration of the heterogeneously enhancing, necrotic and hemorrhagic lesions within the temporal/occipital lobes and cerebellar hemispheres. 2. The edema within the brainstem and cerebellum is about stable;however there is slight progression of the hydrocephalus since the prior exam. 3. The edema within the temporal and occipital lobes has decreased. 4. New patchy area of restricted diffusion around the left temporal horn (without corresponding enhancement) may reflect superimposed ischemic changes. Recommend attention on follow-up imaging. \par \par 3/6/19\par Patient is here for a follow-up visit.  He states he is having increased frequency of headaches with minimal relief from Tylenol.  He is now staying in the shelter on [address] with his daughter.  He will  his medications from [facility] from now on.  We are awaiting arrangement for him to get an apartment.\par \par 3/27/19\par He is here for follow up. his headache is better and he takes his Dexamethasone only when he has it which helps. Last PET was in February and last MRI was in January.\par \par 4/29/19\par He is here for follow-up.  Since last visit, he presented to the ED in 4/2019 due to chest pain.  He underwent CTA chest that demonstrated unchanged left pleural effusion. Likely radiation induced necrosis changes, he states it may have been related to stress related to his daughter.  He is still awaiting placement from the shelter.  He reports his usual frequency headaches, but he takes the Decadron as needed which helps.  \par CTA Chest (4/7/19) IMPRESSION:No pulmonary emboli.20.9 cm pleural fluid collection in the left hemithorax, essentially unchanged since PET/CT dated 2/4/2019.\par MR Brain (4/2/19) IMPRESSION: Since January 20, 2019:New worse signal abnormality involving the brain and volodymyr with increased edema and worse effacement of the fourth ventricle and resulting mild hydrocephalus.Slightly worse edema involving the cerebellum.Stable bilateral posterior temporal/occipital/cerebellar heterogeneously enhancing lesions.Stable left posterior frontal deep white matter and left dorsal medullary hypointense foci, compatible with treated lesions.\par \par 5/22/19\par He came in for follow up. He has been complaining of headaches mainly in AM. He does not think dexamethasone is helping anymore. No other issues.\par \par 6/3/19\par He is here for follow up. HIs headacehs are the same and uses dexamethasone 4 mg almost dialy. He had  CT C/A/P that showed a stable effusion 5/26/19 amd MR brain showed  5/29/19: Overall no significant changes compared with the previous brain MRI dated \par 4/2/2019:\par \par 1.  No significant interval change in the size and configuration of the \par heterogeneously enhancing, necrotic and hemorrhagic lesions within the \par temporal/occipital lobes and cerebellar hemispheres.\par \par 2. Extensive edema within the cerebellar hemispheres, brainstem, parietal \par and temporal lobes is again seen. Patchy areas of enhancement are noted \par in this region. Appearance is most suggestive of posttreatment changes.\par \par 3. Stable patchy area of restricted diffusion around the left temporal \par horn again seen, nonspecific.\par \par 4. No new enhancing lesions identified.\par \par 6/24/19\par He is here for follow up. he missed his Avastin since he had family issues. Still has headaches they are the same. Will restart Avastin today,.\par \par 7/15/19\par He is here for follow up. He could not tell me if headaches are better. He does use steroids once  in  a while for headaches. States memory is the same. \par \par \par 8/14/19\par He is doing better. He Missed his Avastin doses. But he has no headaches and does not use steroids. He had a fall  and hit a rail on his chest prior to his CT scans and the finding in his CT chest is probably related to the trauma he still has pain in the area but its better. CT C/A/P and MR brain from august are bellow\par \par IMPRESSION:\par \par Since April 7, 2019\par \par Stable left pleural fluid collection measuring 9.5 x 14.1 by 18.4 cm, \par with stable compressive atelectasis of left lung and left hemidiaphragm \par inversion.\par \par New, indeterminate approximate 8 mm soft tissue density, left anterior \par chest (series 4/104).\par \par Interval development of approximate 3.6 x 1.8 by 3.9 cm homogeneous \par elliptical shaped structure, left epicardial fat pad (4/206), possibly \par proteinaceous material (HU +35).\par \par \par IMPRESSION:\par \par Within the left cardiophrenic angle, there is a new area of lobulated \par soft tissue measuring 4.0 x 2.5 cm suspicious for new metastatic disease.\par \par Please see chest CT report for chest findings\par \par IMPRESSION:\par \par Overall no significant changes compared with the previous brain MRI dated \par 5/29/2019:\par \par 1.  No significant interval change in the size and configuration of the \par heterogeneously enhancing, necrotic and hemorrhagic lesions within the \par temporal/occipital lobes and cerebellar hemispheres.\par \par 2. Extensive edema within the cerebellar hemispheres, brainstem, parietal \par and temporal lobes is again seen. Patchy areas of enhancement are noted \par in this region. Appearance is most suggestive of posttreatment changes.\par \par 3. No new enhancing lesions identified.\par \par \par \par

## 2019-08-16 NOTE — CONSULT LETTER
[Consult Letter:] : I had the pleasure of evaluating your patient, [unfilled]. [Dear  ___] : Dear  [unfilled], [Please see my note below.] : Please see my note below. [Consult Closing:] : Thank you very much for allowing me to participate in the care of this patient.  If you have any questions, please do not hesitate to contact me. [Sincerely,] : Sincerely, [FreeTextEntry3] : Roque [DrTam  ___] : Dr. WHALEN

## 2019-08-16 NOTE — REVIEW OF SYSTEMS
[Fatigue] : no fatigue [Recent Change In Weight] : ~T no recent weight change [Dizziness] : dizziness [Confused] : confusion [Difficulty Walking] : no difficulty walking [Negative] : Integumentary [de-identified] : headaches better,, memory issues,

## 2019-08-16 NOTE — PHYSICAL EXAM
[Restricted in physically strenuous activity but ambulatory and able to carry out work of a light or sedentary nature] : Status 1- Restricted in physically strenuous activity but ambulatory and able to carry out work of a light or sedentary nature, e.g., light house work, office work [Normal] : affect appropriate [de-identified] : he has decrease breath sounds on left side base, unchanged. [de-identified] :  Gait is normal good strength  in all limbs [de-identified] : forgetful, but stable

## 2019-10-01 ENCOUNTER — LABORATORY RESULT (OUTPATIENT)
Age: 44
End: 2019-10-01

## 2019-10-01 ENCOUNTER — APPOINTMENT (OUTPATIENT)
Dept: HEMATOLOGY ONCOLOGY | Facility: CLINIC | Age: 44
End: 2019-10-01
Payer: MEDICARE

## 2019-10-01 VITALS
RESPIRATION RATE: 14 BRPM | HEART RATE: 72 BPM | WEIGHT: 187 LBS | DIASTOLIC BLOOD PRESSURE: 75 MMHG | TEMPERATURE: 97.1 F | BODY MASS INDEX: 29.35 KG/M2 | HEIGHT: 67 IN | SYSTOLIC BLOOD PRESSURE: 131 MMHG

## 2019-10-01 PROCEDURE — 99213 OFFICE O/P EST LOW 20 MIN: CPT

## 2019-10-02 LAB
ALBUMIN SERPL ELPH-MCNC: 4.5 G/DL
ALP BLD-CCNC: 106 U/L
ALT SERPL-CCNC: 9 U/L
ANION GAP SERPL CALC-SCNC: 12 MMOL/L
AST SERPL-CCNC: 16 U/L
BILIRUB SERPL-MCNC: 1.4 MG/DL
BUN SERPL-MCNC: 12 MG/DL
CALCIUM SERPL-MCNC: 9.4 MG/DL
CHLORIDE SERPL-SCNC: 104 MMOL/L
CO2 SERPL-SCNC: 25 MMOL/L
CREAT SERPL-MCNC: 0.9 MG/DL
GLUCOSE SERPL-MCNC: 106 MG/DL
HCT VFR BLD CALC: 41.1 %
HGB BLD-MCNC: 13.8 G/DL
MCHC RBC-ENTMCNC: 30.1 PG
MCHC RBC-ENTMCNC: 33.6 G/DL
MCV RBC AUTO: 89.5 FL
PLATELET # BLD AUTO: 215 K/UL
PMV BLD: 10.5 FL
POTASSIUM SERPL-SCNC: 4 MMOL/L
PROT SERPL-MCNC: 7.3 G/DL
RBC # BLD: 4.59 M/UL
RBC # FLD: 13.2 %
SODIUM SERPL-SCNC: 141 MMOL/L
WBC # FLD AUTO: 6.38 K/UL

## 2019-10-03 NOTE — PHYSICAL EXAM
[Restricted in physically strenuous activity but ambulatory and able to carry out work of a light or sedentary nature] : Status 1- Restricted in physically strenuous activity but ambulatory and able to carry out work of a light or sedentary nature, e.g., light house work, office work [Normal] : affect appropriate [de-identified] : he has decrease breath sounds on left side base, unchanged. [de-identified] :  Gait is normal good strength  in all limbs [de-identified] : forgetful, but stable

## 2019-10-03 NOTE — HISTORY OF PRESENT ILLNESS
[Disease: _____________________] : Disease: [unfilled] [AJCC Stage: ____] : AJCC Stage: [unfilled] [Treatment Protocol] : Treatment Protocol [de-identified] : ALK+ lung adenocarcinoma [de-identified] : 41/M presenting for follow-up for metastatic ALK-positive lung adenocarcinoma.  He is currently on alectinib 600 mg BID.  He initially presented in 11/2014 with multiple pulmonary nodules, mediastinal lymphadenopathy, multiple bony metastases, and brain metastases.  He was initially started on crizotinib and was switched to ceritinib in 12/2015 upon progression.  He was unable to tolerate ceritinib due to significant vomiting and was switched to alectinib in 02/2016.\par \par He had SRS to a left cerebellar hemisphere brain metastasis (08/2015) and subsequently underwent WBRT for CNS progression (10-11/2015).  He had RT to a painful metastatic lesion in the right humerus (01/2015).  He had also received Xgeva for bony metastatic disease in the past. \par \par Since 09/2016, he has been experiencing intermittent headaches being treated with periodic dexamethasone taper.  MRI brain with perfusion analysis showed bilateral cerebellar lesions, favor postRT changes rather than residual tumor.  His most recent brain MRI (11/01/2016) showed overall  stable  exam  compared  to  the  previous  brain  MRI  9/2/2016; no  significant  interval  change  in  bilateral  cerebellar  heterogeneously  enhancing  masses  with  extensive  associated  edema,  as  well  as  smaller  lesions  within  the  left  parietal  white  matter,  superficial  left  temporal  lobe  and  left  dorsal  medulla; and, stable  mild  ventriculomegaly  and  mild  periventricular  FLAIR  signal  abnormality.  He has been following with our neurosurgeon, Dr. Britt, who has recommended no surgical intervention at this time. \par \par His most recent PETCT (11/01/2016) showed no sites of pathologic FDG uptake suspicious for biologic tumor activity.\par \par He reports occasional headaches and a mild sense of imbalance.  Denies any falls.  However he only takes a prn dexamethasone, once in every three days,  which reportedly relieves his intermittent headaches. \par Today he also complains mild pain in his left great toe, likely secondary to ingrowing toe nail. [de-identified] : He presented to follow up. He changed his insurance. Did not get scans and did not take his alectinib for about 1-2 weeks. We were not notified.  Otherwise she feels well she really requires dexamethasone yesterday headache probably once or twice a week as per patient no other complaints.\par \par 4/17/17\par He presents for follow up today. He had MRI of brain that showed stable disease 4/8/2017. He has not had the PET CT yet. He received  his alecetinib on 4/4/17 and stated to take them. States his headaches are rare now and feels well otherwise.\par \par 5/15/17\par He presents for follow up. He had a PET CT on 4/21/17 that  was ucnhged.Since November 1, 2016, no new foci of pathologic FDG uptake to suggest\par biologic tumor activity. . 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 . Unchanged non-FDG avid sclerotic foci involving the right proximal humerus\par and T9 vertebral body.\par \par He  feels well. No dizziness or headache.  No new symptoms or complaints.\par \par 6/19/17\par He is doing well. He has no complaints. No headaches. No SOB. \par \par 7/24/17\par Patient comes in for follow up visit, has no complaints, has no SOB and has a mild cough which is chronic, no hemoptysis. Patient states his appetite is good, weight is stable, he has no headaches and is currently off the steroids. Patient will have repeat PET scan and MRI of the brain with contrast as well as repeat bloodwork. \par \par 8/21/17\par He is here for follow up. He had an MR brain and PET CT on 8/3 and 8/2 that did not show progression, were essentially unchanged.  He feels great otherwise.\par \par 9/21/17\par He is doing well. No complaints. No headaches\par \par October 26, 2017\par He see her for followup he feels great he has no complaints.\par \par 11/16/17\par He is here for follow up. He had an MR of brain on 11/14/17: New areas of nodular enhancement along the inferior surfaces of the temporal lobes bilaterally. These are relatively symmetric and just above the level of the cerebellar enhancing masses, suggesting that these may reflect post treatment change.\par He had to reschedule his PET and states he will do it next Wednesday.\par \par No complaints.\par \par 12/26/17\par He is here for follow up. He feels well. he  had PET/CT in end of November that does not show procession, stable pleural findings. He  feels well. \par \par 1/22/18\par He is here for follow up for his NSCLCA. He states he feels well. No SOB, no headaches no fatigue.. \par \par February 21 2018\par  he is here for followup, he had a recent PET/CT scan which is PRESLEY he also had a recent MRI of the brain. This was reviewed at the CNS tumor board although wasn't clear but the consensus was that this is posttreatment changes in the fact that he is asymptomatic and decided to observe him.\par \par He has no new complaints no headaches no weakness or tingling.\par \par \par 3/21/18\par He is here for follow up. HE states he is doing well. Has very mild headaches on occasion but otherwise doing well.\par \par 4/13/18\par He is doing well. Reports no new complaints \par \par 5/3/18\par He is here for follow up to discuss his MR brain. HE complains of mild headache. Same memory issues as before. Balance is the same.  MR brain showed : Increased size of right parietotemporal of the findings enhancement  seen lesion measuring 5.2 cm, previously 2.8 centimeters and increased  size of left inferior temporal lobe mass measuring 2.3 cm, previously 1.6  cm. these areas again demonstrate hypoperfusion and may represent post  therapeutic changes.   2.  Significant increased edema and mass effect within the right  hemisphere with 9 mm right to left midline shift.  3.  No new enhancing lesions. Stable additional bilateral cerebellar and  supratentorial lesions.\par \par I spoke with Dr. 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 He developed severe headaches, some difficulty with speech and balance issues. 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 He has no insurance so my pharmacy has been giving  him a weekly supply while we work on it. He did not have his scans yet. He feel much better now except for dizziness\par \par 5/30/18\par He is feeling much better. His neurological complaints resolved. He stopped steroids on Monday since he had swelling in legs that has resolved. \par \par 6/13/18\par He is here for follow up. he had a PET/CT on 6/7/18 that showed These images reveal, when comparing the FDG brain images to MRI of  4/27/2018 series 10, there is one small focus of increased FDG uptake in  the region of the right parietotemporal lobe lesion (series 12 image 34).  Therefore persistent biologic tumor activity in this region cannot be  excluded.  No focal abnormal FDG uptake corresponding with the left inferior  temporal lobe lesion and both cerebellar lesions. In addition most of the  right parietotemporal lobe lesion is not FDG avid Compared to normal brain glucose metabolism in the thalamus (surrogate  for normal brain metabolism) relative hypometabolism of the right  parietal, posterior parietal and occipital region and relative  hypermetabolism of the left posterior parietal region  Persistent posttreatment FDG uptake (SUV up to 11.2) along the pleural  surface of the left hemithorax consistent with posttreatment changes  associated with talc pleurodesis  One new mildly FDG avid soft tissue nodule (SUV 3.3) in the left buttock \par \par He feels well now ,no headaches.\par \par 7/16/18\par He is here for follow up. doing well. He has no headaches. No new complaints\par \par 8/13/18\par He is here for follow up.  He is doing well. has no complaints. He is considering returning to work.\par \par 9/10/18\par He is here for follow up. He had an MR brain on 8/24/18 that showed In comparison to the previous brain MRI dated 4/27/2018:  1.  No significant interval change in the size and configuration of the  heterogeneously enhancing, necrotic and hemorrhagic lesions within the  temporal/occipital lobes and cerebellar hemispheres. Enhancement pattern  suggests post-therapeutic changes.  2.  Moderately increased edema within the posterior fossa (cerebellar  hemispheres and brainstem). Increased mass effect upon the fourth  ventricle with no evidence of hydrocephalus.  3.  The left cerebral hemispheric edema has mildly increased and the  right cerebral hemispheric edema has mildly decreased.   4.  Resolution of the previously seen right to left midline shift, right  lateral ventricular compression and left lateral ventricular enlargement.  5.  Treated lesions again noted in the left frontal white matter and left  lateral medulla, without contrast enhancement.\par \par HE is doing well has no new complaints. Has no neuroglial complaints. \par \par 10/10/18\par He is here for follow up he had PET/CT on  9/28/18 that was PRESLEY. He no has headaches again. States he is voiding multiple times a day and unable to empty bladder fully.\par \par 10/30/18\par He is here for follow up. he was in the ED for.Left lower quadrant pain symptoms her was given cirpo. he had a Ct abd/pelvis in ED 10/22/18 : Stable posttreatment changes of the left hemithorax including large  loculated pleural effusion.  2.  Multiple nondilated fluid-filled small bowel loops which may reflect  evidence of enteritis.  \par He had MR brain on 10/29/18:  No new lesions, but several existing lesions have increased in size  2. Bilateral hemorrhagic cerebellar lesions and edema has increased. Mass  effect on the fourth ventricle with hydrocephalus.   2.  Essentially unchanged sizes of supratentorial lesions within the  bilateral temporooccipital lobes and mildly improved edema\par \par He is having headaches and more confusion. He took 4 flomax in am and PM instead of his alecensa. I called [facility] with him and they told us they did not deliver his pills since they could not reach him but will deliver them tomorrow. He will be home. He states he had alecensa at home but not sure.\par \par 11/12/18\par Patient presents to the office with a chief complaint of burning in his right thigh.  He has had no new medications.  The patient states it is worse in the evening but lasts all day.  The patient states that the burning sensation starts at the right knee and is ascending.  Straight leg raise negative.  Discussed that it is unlikely that it is from his Avastin treatment, but unsure the etiology.  We will send for LE doppler to rule out any DVT.\par \par 11/27/18\par He is here for follow up. His Dopplers  were negative and his pain/cramps are nearly gone only some in right thigh. His headaches are gone and he is speech is  better and states memory is better since we started Avastin.\par \par 1/9/19\par He is doing well.  He has no more pain in his legs. He is eating well. He is due for 4/4 dose of Avastin. No other complains.\par \par 2/6/19\par He is here for follow-up.  He generally feels well.  He states he still suffers from headaches.  His memory has improved minimally.\par PET/CT 2/4/19 IMPRESSION: Since 9/28/2018, No definite sites of abnormal FDG uptake to suggest biologic tumor activity. Cerebellar lesions seen on brain MRI from 1/20/2019, are not FDG avid and consistent with treated metastases. Post talc pleurodesis of the left pleura, unchanged. \par MR Brain 1/20/19 IMPRESSION: In comparison to the previous brain MRI dated 10/29/2018: 1. No significant interval change in the size and configuration of the heterogeneously enhancing, necrotic and hemorrhagic lesions within the temporal/occipital lobes and cerebellar hemispheres. 2. The edema within the brainstem and cerebellum is about stable;however there is slight progression of the hydrocephalus since the prior exam. 3. The edema within the temporal and occipital lobes has decreased. 4. New patchy area of restricted diffusion around the left temporal horn (without corresponding enhancement) may reflect superimposed ischemic changes. Recommend attention on follow-up imaging. \par \par 3/6/19\par Patient is here for a follow-up visit.  He states he is having increased frequency of headaches with minimal relief from Tylenol.  He is now staying in the shelter on [address] with his daughter.  He will  his medications from [facility] from now on.  We are awaiting arrangement for him to get an apartment.\par \par 3/27/19\par He is here for follow up. his headache is better and he takes his Dexamethasone only when he has it which helps. Last PET was in February and last MRI was in January.\par \par 4/29/19\par He is here for follow-up.  Since last visit, he presented to the ED in 4/2019 due to chest pain.  He underwent CTA chest that demonstrated unchanged left pleural effusion. Likely radiation induced necrosis changes, he states it may have been related to stress related to his daughter.  He is still awaiting placement from the shelter.  He reports his usual frequency headaches, but he takes the Decadron as needed which helps.  \par CTA Chest (4/7/19) IMPRESSION:No pulmonary emboli.20.9 cm pleural fluid collection in the left hemithorax, essentially unchanged since PET/CT dated 2/4/2019.\par MR Brain (4/2/19) IMPRESSION: Since January 20, 2019:New worse signal abnormality involving the brain and volodymyr with increased edema and worse effacement of the fourth ventricle and resulting mild hydrocephalus.Slightly worse edema involving the cerebellum.Stable bilateral posterior temporal/occipital/cerebellar heterogeneously enhancing lesions.Stable left posterior frontal deep white matter and left dorsal medullary hypointense foci, compatible with treated lesions.\par \par 5/22/19\par He came in for follow up. He has been complaining of headaches mainly in AM. He does not think dexamethasone is helping anymore. No other issues.\par \par 6/3/19\par He is here for follow up. HIs headacehs are the same and uses dexamethasone 4 mg almost dialy. He had  CT C/A/P that showed a stable effusion 5/26/19 amd MR brain showed  5/29/19: Overall no significant changes compared with the previous brain MRI dated \par 4/2/2019:\par \par 1.  No significant interval change in the size and configuration of the \par heterogeneously enhancing, necrotic and hemorrhagic lesions within the \par temporal/occipital lobes and cerebellar hemispheres.\par \par 2. Extensive edema within the cerebellar hemispheres, brainstem, parietal \par and temporal lobes is again seen. Patchy areas of enhancement are noted \par in this region. Appearance is most suggestive of posttreatment changes.\par \par 3. Stable patchy area of restricted diffusion around the left temporal \par horn again seen, nonspecific.\par \par 4. No new enhancing lesions identified.\par \par 6/24/19\par He is here for follow up. he missed his Avastin since he had family issues. Still has headaches they are the same. Will restart Avastin today,.\par \par 7/15/19\par He is here for follow up. He could not tell me if headaches are better. He does use steroids once  in  a while for headaches. States memory is the same. \par \par \par 8/14/19\par He is doing better. He Missed his Avastin doses. But he has no headaches and does not use steroids. He had a fall  and hit a rail on his chest prior to his CT scans and the finding in his CT chest is probably related to the trauma he still has pain in the area but its better. CT C/A/P and MR brain from august are bellow\par \par IMPRESSION:\par \par Since April 7, 2019\par \par Stable left pleural fluid collection measuring 9.5 x 14.1 by 18.4 cm, \par with stable compressive atelectasis of left lung and left hemidiaphragm \par inversion.\par \par New, indeterminate approximate 8 mm soft tissue density, left anterior \par chest (series 4/104).\par \par Interval development of approximate 3.6 x 1.8 by 3.9 cm homogeneous \par elliptical shaped structure, left epicardial fat pad (4/206), possibly \par proteinaceous material (HU +35).\par \par \par IMPRESSION:\par \par Within the left cardiophrenic angle, there is a new area of lobulated \par soft tissue measuring 4.0 x 2.5 cm suspicious for new metastatic disease.\par \par Please see chest CT report for chest findings\par \par IMPRESSION:\par \par Overall no significant changes compared with the previous brain MRI dated \par 5/29/2019:\par \par 1.  No significant interval change in the size and configuration of the \par heterogeneously enhancing, necrotic and hemorrhagic lesions within the \par temporal/occipital lobes and cerebellar hemispheres.\par \par 2. Extensive edema within the cerebellar hemispheres, brainstem, parietal \par and temporal lobes is again seen. Patchy areas of enhancement are noted \par in this region. Appearance is most suggestive of posttreatment changes.\par \par 3. No new enhancing lesions identified.\par \par 10/01/19\par Pt presented today for routine follow up visit. He reports feeling fine and has no new complaints . Pt reports no new symptoms of weakness , headaches and confusion and is well compliant with treatment . 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

## 2019-10-03 NOTE — REVIEW OF SYSTEMS
[Confused] : confusion [Dizziness] : dizziness [Negative] : Heme/Lymph [Fatigue] : no fatigue [Recent Change In Weight] : ~T no recent weight change [Difficulty Walking] : no difficulty walking [de-identified] : headaches better,, memory issues,

## 2019-10-03 NOTE — ASSESSMENT
[Palliative] : Goals of care discussed with patient: Palliative [FreeTextEntry1] : 1.  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 Due to insurance issues he stopped it but now is back on it since 4/4/2017\par - repeat MRI brain 8/2018 radiation related changes, post treatment changes, he is asymptomatic,  and repeat PET/CT 6/5/18   is without systemic progression, it confirmed radiation necrosis. Recent PET/CT on  9/28/18 that was PRESLEY. CT abd was unchanged. I think the findings on MR brain are radiation necrosis given his symptoms as before. Recent MR brain showed more edema but he is doing well. CT chest was PRESLEY. Thus MR brain findings most likely due to  radiation necrosis that were  causing his headaches. \par \par 2.   Intermittent headaches  continues to  dexamethasone, this is due to  radiation necrosis\par \par 3. Elevated total bilirubin likely secondary to alectinib.\par - will monitor, its stable\par \par 4.  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 \par \par 5. LUTs symptoms better PSA was normal  \par \par Plan:\par - C/w Alectinib.  \par - CTs and MR brain are stable, his CT findings on old scan were due to trauama.\par -will stop avastin he is much better\par -RTC in one month\par -scans will be done  next month . \par

## 2019-11-06 ENCOUNTER — APPOINTMENT (OUTPATIENT)
Dept: HEMATOLOGY ONCOLOGY | Facility: CLINIC | Age: 44
End: 2019-11-06

## 2019-11-11 ENCOUNTER — OUTPATIENT (OUTPATIENT)
Dept: OUTPATIENT SERVICES | Facility: HOSPITAL | Age: 44
LOS: 1 days | Discharge: HOME | End: 2019-11-11

## 2019-11-11 ENCOUNTER — LABORATORY RESULT (OUTPATIENT)
Age: 44
End: 2019-11-11

## 2019-11-11 ENCOUNTER — APPOINTMENT (OUTPATIENT)
Dept: HEMATOLOGY ONCOLOGY | Facility: CLINIC | Age: 44
End: 2019-11-11
Payer: MEDICARE

## 2019-11-11 VITALS
RESPIRATION RATE: 14 BRPM | TEMPERATURE: 97.8 F | SYSTOLIC BLOOD PRESSURE: 132 MMHG | HEIGHT: 67 IN | DIASTOLIC BLOOD PRESSURE: 69 MMHG | BODY MASS INDEX: 30.45 KG/M2 | WEIGHT: 194 LBS | HEART RATE: 77 BPM

## 2019-11-11 DIAGNOSIS — I67.89 OTHER CEREBROVASCULAR DISEASE: ICD-10-CM

## 2019-11-11 DIAGNOSIS — C78.00 SECONDARY MALIGNANT NEOPLASM OF UNSPECIFIED LUNG: ICD-10-CM

## 2019-11-11 LAB
ALBUMIN SERPL ELPH-MCNC: 4.6 G/DL
ALP BLD-CCNC: 111 U/L
ALT SERPL-CCNC: 9 U/L
ANION GAP SERPL CALC-SCNC: 15 MMOL/L
AST SERPL-CCNC: 16 U/L
BILIRUB SERPL-MCNC: 1.2 MG/DL
BUN SERPL-MCNC: 14 MG/DL
CALCIUM SERPL-MCNC: 9.3 MG/DL
CHLORIDE SERPL-SCNC: 104 MMOL/L
CO2 SERPL-SCNC: 24 MMOL/L
CREAT SERPL-MCNC: 0.9 MG/DL
GLUCOSE SERPL-MCNC: 99 MG/DL
HCT VFR BLD CALC: 42.1 %
HGB BLD-MCNC: 13.8 G/DL
MCHC RBC-ENTMCNC: 29.2 PG
MCHC RBC-ENTMCNC: 32.8 G/DL
MCV RBC AUTO: 89.2 FL
PLATELET # BLD AUTO: 212 K/UL
PMV BLD: 10.7 FL
POTASSIUM SERPL-SCNC: 4.1 MMOL/L
PROT SERPL-MCNC: 7.3 G/DL
RBC # BLD: 4.72 M/UL
RBC # FLD: 14.5 %
SODIUM SERPL-SCNC: 143 MMOL/L
WBC # FLD AUTO: 8.45 K/UL

## 2019-11-11 PROCEDURE — 99213 OFFICE O/P EST LOW 20 MIN: CPT

## 2019-11-11 NOTE — HISTORY OF PRESENT ILLNESS
[Disease: _____________________] : Disease: [unfilled] [AJCC Stage: ____] : AJCC Stage: [unfilled] [Treatment Protocol] : Treatment Protocol [de-identified] : 41/M presenting for follow-up for metastatic ALK-positive lung adenocarcinoma.  He is currently on alectinib 600 mg BID.  He initially presented in 11/2014 with multiple pulmonary nodules, mediastinal lymphadenopathy, multiple bony metastases, and brain metastases.  He was initially started on crizotinib and was switched to ceritinib in 12/2015 upon progression.  He was unable to tolerate ceritinib due to significant vomiting and was switched to alectinib in 02/2016.\par \par He had SRS to a left cerebellar hemisphere brain metastasis (08/2015) and subsequently underwent WBRT for CNS progression (10-11/2015).  He had RT to a painful metastatic lesion in the right humerus (01/2015).  He had also received Xgeva for bony metastatic disease in the past. \par \par Since 09/2016, he has been experiencing intermittent headaches being treated with periodic dexamethasone taper.  MRI brain with perfusion analysis showed bilateral cerebellar lesions, favor postRT changes rather than residual tumor.  His most recent brain MRI (11/01/2016) showed overall  stable  exam  compared  to  the  previous  brain  MRI  9/2/2016; no  significant  interval  change  in  bilateral  cerebellar  heterogeneously  enhancing  masses  with  extensive  associated  edema,  as  well  as  smaller  lesions  within  the  left  parietal  white  matter,  superficial  left  temporal  lobe  and  left  dorsal  medulla; and, stable  mild  ventriculomegaly  and  mild  periventricular  FLAIR  signal  abnormality.  He has been following with our neurosurgeon, Dr. Britt, who has recommended no surgical intervention at this time. \par \par His most recent PETCT (11/01/2016) showed no sites of pathologic FDG uptake suspicious for biologic tumor activity.\par \par He reports occasional headaches and a mild sense of imbalance.  Denies any falls.  However he only takes a prn dexamethasone, once in every three days,  which reportedly relieves his intermittent headaches. \par Today he also complains mild pain in his left great toe, likely secondary to ingrowing toe nail. 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 He changed his insurance. Did not get scans and did not take his alectinib for about 1-2 weeks. We were not notified.  Otherwise she feels well she really requires dexamethasone yesterday headache probably once or twice a week as per patient no other complaints.\par \par 4/17/17\par He presents for follow up today. He had MRI of brain that showed stable disease 4/8/2017. He has not had the PET CT yet. He received  his alecetinib on 4/4/17 and stated to take them. States his headaches are rare now and feels well otherwise.\par \par 5/15/17\par He presents for follow up. He had a PET CT on 4/21/17 that  was ucnhged.Since November 1, 2016, no new foci of pathologic FDG uptake to suggest\par biologic tumor activity. . 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 . Unchanged non-FDG avid sclerotic foci involving the right proximal humerus\par and T9 vertebral body.\par \par He  feels well. No dizziness or headache.  No new symptoms or complaints.\par \par 6/19/17\par He is doing well. He has no complaints. No headaches. No SOB. \par \par 7/24/17\par Patient comes in for follow up visit, has no complaints, has no SOB and has a mild cough which is chronic, no hemoptysis. Patient states his appetite is good, weight is stable, he has no headaches and is currently off the steroids. Patient will have repeat PET scan and MRI of the brain with contrast as well as repeat bloodwork. \par \par 8/21/17\par He is here for follow up. He had an MR brain and PET CT on 8/3 and 8/2 that did not show progression, were essentially unchanged.  He feels great otherwise.\par \par 9/21/17\par He is doing well. No complaints. No headaches\par \par October 26, 2017\par He see her for followup he feels great he has no complaints.\par \par 11/16/17\par He is here for follow up. He had an MR of brain on 11/14/17: New areas of nodular enhancement along the inferior surfaces of the temporal lobes bilaterally. These are relatively symmetric and just above the level of the cerebellar enhancing masses, suggesting that these may reflect post treatment change.\par He had to reschedule his PET and states he will do it next Wednesday.\par \par No complaints.\par \par 12/26/17\par He is here for follow up. He feels well. he  had PET/CT in end of November that does not show procession, stable pleural findings. He  feels well. \par \par 1/22/18\par He is here for follow up for his NSCLCA. He states he feels well. No SOB, no headaches no fatigue.. \par \par February 21 2018\par  he is here for followup, he had a recent PET/CT scan which is PRESLEY he also had a recent MRI of the brain. This was reviewed at the CNS tumor board although wasn't clear but the consensus was that this is posttreatment changes in the fact that he is asymptomatic and decided to observe him.\par \par He has no new complaints no headaches no weakness or tingling.\par \par \par 3/21/18\par He is here for follow up. HE states he is doing well. Has very mild headaches on occasion but otherwise doing well.\par \par 4/13/18\par He is doing well. Reports no new complaints \par \par 5/3/18\par He is here for follow up to discuss his MR brain. HE complains of mild headache. Same memory issues as before. Balance is the same.  MR brain showed : Increased size of right parietotemporal of the findings enhancement  seen lesion measuring 5.2 cm, previously 2.8 centimeters and increased  size of left inferior temporal lobe mass measuring 2.3 cm, previously 1.6  cm. these areas again demonstrate hypoperfusion and may represent post  therapeutic changes.   2.  Significant increased edema and mass effect within the right  hemisphere with 9 mm right to left midline shift.  3.  No new enhancing lesions. Stable additional bilateral cerebellar and  supratentorial lesions.\par \par I spoke with Dr. 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 He developed severe headaches, some difficulty with speech and balance issues. 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 He has no insurance so my pharmacy has been giving  him a weekly supply while we work on it. He did not have his scans yet. He feel much better now except for dizziness\par \par 5/30/18\par He is feeling much better. His neurological complaints resolved. He stopped steroids on Monday since he had swelling in legs that has resolved. \par \par 6/13/18\par He is here for follow up. he had a PET/CT on 6/7/18 that showed These images reveal, when comparing the FDG brain images to MRI of  4/27/2018 series 10, there is one small focus of increased FDG uptake in  the region of the right parietotemporal lobe lesion (series 12 image 34).  Therefore persistent biologic tumor activity in this region cannot be  excluded.  No focal abnormal FDG uptake corresponding with the left inferior  temporal lobe lesion and both cerebellar lesions. In addition most of the  right parietotemporal lobe lesion is not FDG avid Compared to normal brain glucose metabolism in the thalamus (surrogate  for normal brain metabolism) relative hypometabolism of the right  parietal, posterior parietal and occipital region and relative  hypermetabolism of the left posterior parietal region  Persistent posttreatment FDG uptake (SUV up to 11.2) along the pleural  surface of the left hemithorax consistent with posttreatment changes  associated with talc pleurodesis  One new mildly FDG avid soft tissue nodule (SUV 3.3) in the left buttock \par \par He feels well now ,no headaches.\par \par 7/16/18\par He is here for follow up. doing well. He has no headaches. No new complaints\par \par 8/13/18\par He is here for follow up.  He is doing well. has no complaints. He is considering returning to work.\par \par 9/10/18\par He is here for follow up. He had an MR brain on 8/24/18 that showed In comparison to the previous brain MRI dated 4/27/2018:  1.  No significant interval change in the size and configuration of the  heterogeneously enhancing, necrotic and hemorrhagic lesions within the  temporal/occipital lobes and cerebellar hemispheres. Enhancement pattern  suggests post-therapeutic changes.  2.  Moderately increased edema within the posterior fossa (cerebellar  hemispheres and brainstem). Increased mass effect upon the fourth  ventricle with no evidence of hydrocephalus.  3.  The left cerebral hemispheric edema has mildly increased and the  right cerebral hemispheric edema has mildly decreased.   4.  Resolution of the previously seen right to left midline shift, right  lateral ventricular compression and left lateral ventricular enlargement.  5.  Treated lesions again noted in the left frontal white matter and left  lateral medulla, without contrast enhancement.\par \par HE is doing well has no new complaints. Has no neuroglial complaints. \par \par 10/10/18\par He is here for follow up he had PET/CT on  9/28/18 that was PRESLEY. He no has headaches again. States he is voiding multiple times a day and unable to empty bladder fully.\par \par 10/30/18\par He is here for follow up. he was in the ED for.Left lower quadrant pain symptoms her was given cirpo. he had a Ct abd/pelvis in ED 10/22/18 : Stable posttreatment changes of the left hemithorax including large  loculated pleural effusion.  2.  Multiple nondilated fluid-filled small bowel loops which may reflect  evidence of enteritis.  \par He had MR brain on 10/29/18:  No new lesions, but several existing lesions have increased in size  2. Bilateral hemorrhagic cerebellar lesions and edema has increased. Mass  effect on the fourth ventricle with hydrocephalus.   2.  Essentially unchanged sizes of supratentorial lesions within the  bilateral temporooccipital lobes and mildly improved edema\par \par He is having headaches and more confusion. He took 4 flomax in am and PM instead of his alecensa. I called [facility] with him and they told us they did not deliver his pills since they could not reach him but will deliver them tomorrow. He will be home. He states he had alecensa at home but not sure.\par \par 11/12/18\par Patient presents to the office with a chief complaint of burning in his right thigh.  He has had no new medications.  The patient states it is worse in the evening but lasts all day.  The patient states that the burning sensation starts at the right knee and is ascending.  Straight leg raise negative.  Discussed that it is unlikely that it is from his Avastin treatment, but unsure the etiology.  We will send for LE doppler to rule out any DVT.\par \par 11/27/18\par He is here for follow up. His Dopplers  were negative and his pain/cramps are nearly gone only some in right thigh. His headaches are gone and he is speech is  better and states memory is better since we started Avastin.\par \par 1/9/19\par He is doing well.  He has no more pain in his legs. He is eating well. He is due for 4/4 dose of Avastin. No other complains.\par \par 2/6/19\par He is here for follow-up.  He generally feels well.  He states he still suffers from headaches.  His memory has improved minimally.\par PET/CT 2/4/19 IMPRESSION: Since 9/28/2018, No definite sites of abnormal FDG uptake to suggest biologic tumor activity. Cerebellar lesions seen on brain MRI from 1/20/2019, are not FDG avid and consistent with treated metastases. Post talc pleurodesis of the left pleura, unchanged. \par MR Brain 1/20/19 IMPRESSION: In comparison to the previous brain MRI dated 10/29/2018: 1. No significant interval change in the size and configuration of the heterogeneously enhancing, necrotic and hemorrhagic lesions within the temporal/occipital lobes and cerebellar hemispheres. 2. The edema within the brainstem and cerebellum is about stable;however there is slight progression of the hydrocephalus since the prior exam. 3. The edema within the temporal and occipital lobes has decreased. 4. New patchy area of restricted diffusion around the left temporal horn (without corresponding enhancement) may reflect superimposed ischemic changes. Recommend attention on follow-up imaging. \par \par 3/6/19\par Patient is here for a follow-up visit.  He states he is having increased frequency of headaches with minimal relief from Tylenol.  He is now staying in the shelter on [address] with his daughter.  He will  his medications from [facility] from now on.  We are awaiting arrangement for him to get an apartment.\par \par 3/27/19\par He is here for follow up. his headache is better and he takes his Dexamethasone only when he has it which helps. Last PET was in February and last MRI was in January.\par \par 4/29/19\par He is here for follow-up.  Since last visit, he presented to the ED in 4/2019 due to chest pain.  He underwent CTA chest that demonstrated unchanged left pleural effusion. Likely radiation induced necrosis changes, he states it may have been related to stress related to his daughter.  He is still awaiting placement from the shelter.  He reports his usual frequency headaches, but he takes the Decadron as needed which helps.  \par CTA Chest (4/7/19) IMPRESSION:No pulmonary emboli.20.9 cm pleural fluid collection in the left hemithorax, essentially unchanged since PET/CT dated 2/4/2019.\par MR Brain (4/2/19) IMPRESSION: Since January 20, 2019:New worse signal abnormality involving the brain and volodymyr with increased edema and worse effacement of the fourth ventricle and resulting mild hydrocephalus.Slightly worse edema involving the cerebellum.Stable bilateral posterior temporal/occipital/cerebellar heterogeneously enhancing lesions.Stable left posterior frontal deep white matter and left dorsal medullary hypointense foci, compatible with treated lesions.\par \par 5/22/19\par He came in for follow up. He has been complaining of headaches mainly in AM. He does not think dexamethasone is helping anymore. No other issues.\par \par 6/3/19\par He is here for follow up. HIs headacehs are the same and uses dexamethasone 4 mg almost dialy. He had  CT C/A/P that showed a stable effusion 5/26/19 amd MR brain showed  5/29/19: Overall no significant changes compared with the previous brain MRI dated \par 4/2/2019:\par \par 1.  No significant interval change in the size and configuration of the \par heterogeneously enhancing, necrotic and hemorrhagic lesions within the \par temporal/occipital lobes and cerebellar hemispheres.\par \par 2. Extensive edema within the cerebellar hemispheres, brainstem, parietal \par and temporal lobes is again seen. Patchy areas of enhancement are noted \par in this region. Appearance is most suggestive of posttreatment changes.\par \par 3. Stable patchy area of restricted diffusion around the left temporal \par horn again seen, nonspecific.\par \par 4. No new enhancing lesions identified.\par \par 6/24/19\par He is here for follow up. he missed his Avastin since he had family issues. Still has headaches they are the same. Will restart Avastin today,.\par \par 7/15/19\par He is here for follow up. He could not tell me if headaches are better. He does use steroids once  in  a while for headaches. States memory is the same. \par \par \par 8/14/19\par He is doing better. He Missed his Avastin doses. But he has no headaches and does not use steroids. He had a fall  and hit a rail on his chest prior to his CT scans and the finding in his CT chest is probably related to the trauma he still has pain in the area but its better. CT C/A/P and MR brain from august are bellow\par \par IMPRESSION:\par \par Since April 7, 2019\par \par Stable left pleural fluid collection measuring 9.5 x 14.1 by 18.4 cm, \par with stable compressive atelectasis of left lung and left hemidiaphragm \par inversion.\par \par New, indeterminate approximate 8 mm soft tissue density, left anterior \par chest (series 4/104).\par \par Interval development of approximate 3.6 x 1.8 by 3.9 cm homogeneous \par elliptical shaped structure, left epicardial fat pad (4/206), possibly \par proteinaceous material (HU +35).\par \par \par IMPRESSION:\par \par Within the left cardiophrenic angle, there is a new area of lobulated \par soft tissue measuring 4.0 x 2.5 cm suspicious for new metastatic disease.\par \par Please see chest CT report for chest findings\par \par IMPRESSION:\par \par Overall no significant changes compared with the previous brain MRI dated \par 5/29/2019:\par \par 1.  No significant interval change in the size and configuration of the \par heterogeneously enhancing, necrotic and hemorrhagic lesions within the \par temporal/occipital lobes and cerebellar hemispheres.\par \par 2. Extensive edema within the cerebellar hemispheres, brainstem, parietal \par and temporal lobes is again seen. Patchy areas of enhancement are noted \par in this region. Appearance is most suggestive of posttreatment changes.\par \par 3. No new enhancing lesions identified.\par \par 10/01/19\par Pt presented today for routine follow up visit. He reports feeling fine and has no new complaints . Pt reports no new symptoms of weakness , headaches and confusion and is well compliant with treatment . \par \par \par 11/11/19\par He is here for follow up.   He feels well. No headaches.  Has a runny nose. No pain. No SOB. He gained a few  pounds.\par \par

## 2019-11-11 NOTE — ASSESSMENT
[Palliative] : Goals of care discussed with patient: Palliative [FreeTextEntry1] : 1.  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 Due to insurance issues he stopped it but now is back on it since 4/4/2017\par - repeat MRI brain 8/2018 radiation related changes, post treatment changes, he is asymptomatic,  and repeat PET/CT 6/5/18   is without systemic progression, it confirmed radiation necrosis. Recent PET/CT on  9/28/18 that was PRESLEY. CT abd was unchanged. I think the findings on MR brain are radiation necrosis given his symptoms as before. Recent MR brain showed more edema but he is doing well. CT chest was PRESLEY. Thus MR brain findings most likely due to  radiation necrosis that were  causing his headaches. \par \par 2.   Intermittent headaches  continues to  dexamethasone, this is due to  radiation necrosis\par \par 3. Elevated total bilirubin likely secondary to alectinib.\par - will monitor, its stable\par \par 4.  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 \par \par 5. LUTs symptoms better PSA was normal  \par \par Plan:\par - C/w Alectinib.  \par -Avastin on hold he is much better\par -RTC in one month\par -scans  ordered prior to next visit

## 2019-11-11 NOTE — PHYSICAL EXAM
[Restricted in physically strenuous activity but ambulatory and able to carry out work of a light or sedentary nature] : Status 1- Restricted in physically strenuous activity but ambulatory and able to carry out work of a light or sedentary nature, e.g., light house work, office work [Normal] : affect appropriate [de-identified] : he has decrease breath sounds on left side base, unchanged. [de-identified] :  Gait is normal good strength  in all limbs [de-identified] : forgetful, but stable

## 2019-11-11 NOTE — REVIEW OF SYSTEMS
[Negative] : Heme/Lymph [Fatigue] : no fatigue [Recent Change In Weight] : ~T no recent weight change [Confused] : no confusion [Dizziness] : no dizziness [Difficulty Walking] : no difficulty walking

## 2019-11-15 ENCOUNTER — FORM ENCOUNTER (OUTPATIENT)
Age: 44
End: 2019-11-15

## 2019-11-16 ENCOUNTER — OUTPATIENT (OUTPATIENT)
Dept: OUTPATIENT SERVICES | Facility: HOSPITAL | Age: 44
LOS: 1 days | Discharge: HOME | End: 2019-11-16
Payer: MEDICARE

## 2019-11-16 DIAGNOSIS — I67.89 OTHER CEREBROVASCULAR DISEASE: ICD-10-CM

## 2019-11-16 PROCEDURE — 70553 MRI BRAIN STEM W/O & W/DYE: CPT | Mod: 26

## 2019-12-09 ENCOUNTER — LABORATORY RESULT (OUTPATIENT)
Age: 44
End: 2019-12-09

## 2019-12-09 ENCOUNTER — APPOINTMENT (OUTPATIENT)
Dept: HEMATOLOGY ONCOLOGY | Facility: CLINIC | Age: 44
End: 2019-12-09
Payer: MEDICARE

## 2019-12-09 VITALS
HEIGHT: 67 IN | DIASTOLIC BLOOD PRESSURE: 71 MMHG | TEMPERATURE: 98.6 F | BODY MASS INDEX: 30.45 KG/M2 | SYSTOLIC BLOOD PRESSURE: 128 MMHG | WEIGHT: 194 LBS | RESPIRATION RATE: 14 BRPM | HEART RATE: 79 BPM

## 2019-12-09 LAB
ALBUMIN SERPL ELPH-MCNC: 4.5 G/DL
ALP BLD-CCNC: 113 U/L
ALT SERPL-CCNC: 9 U/L
ANION GAP SERPL CALC-SCNC: 14 MMOL/L
AST SERPL-CCNC: 16 U/L
BILIRUB SERPL-MCNC: 1.1 MG/DL
BUN SERPL-MCNC: 14 MG/DL
CALCIUM SERPL-MCNC: 9.2 MG/DL
CHLORIDE SERPL-SCNC: 101 MMOL/L
CO2 SERPL-SCNC: 26 MMOL/L
CREAT SERPL-MCNC: 0.9 MG/DL
GLUCOSE SERPL-MCNC: 91 MG/DL
HCT VFR BLD CALC: 41.4 %
HGB BLD-MCNC: 13.8 G/DL
MCHC RBC-ENTMCNC: 29.5 PG
MCHC RBC-ENTMCNC: 33.3 G/DL
MCV RBC AUTO: 88.5 FL
PLATELET # BLD AUTO: 220 K/UL
PMV BLD: 10.6 FL
POTASSIUM SERPL-SCNC: 4.2 MMOL/L
PROT SERPL-MCNC: 7.3 G/DL
RBC # BLD: 4.68 M/UL
RBC # FLD: 15 %
SODIUM SERPL-SCNC: 141 MMOL/L
WBC # FLD AUTO: 7.7 K/UL

## 2019-12-09 PROCEDURE — 99214 OFFICE O/P EST MOD 30 MIN: CPT

## 2019-12-09 NOTE — REVIEW OF SYSTEMS
[Fatigue] : no fatigue [Recent Change In Weight] : ~T no recent weight change [Confused] : no confusion [Dizziness] : no dizziness [Difficulty Walking] : no difficulty walking [Negative] : Heme/Lymph

## 2019-12-09 NOTE — PHYSICAL EXAM
[Restricted in physically strenuous activity but ambulatory and able to carry out work of a light or sedentary nature] : Status 1- Restricted in physically strenuous activity but ambulatory and able to carry out work of a light or sedentary nature, e.g., light house work, office work [de-identified] : he has decrease breath sounds on left side base, unchanged. [Normal] : affect appropriate [de-identified] : forgetful, but stable [de-identified] :  Gait is normal good strength  in all limbs

## 2019-12-09 NOTE — HISTORY OF PRESENT ILLNESS
[AJCC Stage: ____] : AJCC Stage: [unfilled] [Disease: _____________________] : Disease: [unfilled] [de-identified] : 41/M presenting for follow-up for metastatic ALK-positive lung adenocarcinoma.  He is currently on alectinib 600 mg BID.  He initially presented in 11/2014 with multiple pulmonary nodules, mediastinal lymphadenopathy, multiple bony metastases, and brain metastases.  He was initially started on crizotinib and was switched to ceritinib in 12/2015 upon progression.  He was unable to tolerate ceritinib due to significant vomiting and was switched to alectinib in 02/2016.\par \par He had SRS to a left cerebellar hemisphere brain metastasis (08/2015) and subsequently underwent WBRT for CNS progression (10-11/2015).  He had RT to a painful metastatic lesion in the right humerus (01/2015).  He had also received Xgeva for bony metastatic disease in the past. \par \par Since 09/2016, he has been experiencing intermittent headaches being treated with periodic dexamethasone taper.  MRI brain with perfusion analysis showed bilateral cerebellar lesions, favor postRT changes rather than residual tumor.  His most recent brain MRI (11/01/2016) showed overall  stable  exam  compared  to  the  previous  brain  MRI  9/2/2016; no  significant  interval  change  in  bilateral  cerebellar  heterogeneously  enhancing  masses  with  extensive  associated  edema,  as  well  as  smaller  lesions  within  the  left  parietal  white  matter,  superficial  left  temporal  lobe  and  left  dorsal  medulla; and, stable  mild  ventriculomegaly  and  mild  periventricular  FLAIR  signal  abnormality.  He has been following with our neurosurgeon, Dr. Britt, who has recommended no surgical intervention at this time. \par \par His most recent PETCT (11/01/2016) showed no sites of pathologic FDG uptake suspicious for biologic tumor activity.\par \par He reports occasional headaches and a mild sense of imbalance.  Denies any falls.  However he only takes a prn dexamethasone, once in every three days,  which reportedly relieves his intermittent headaches. \par Today he also complains mild pain in his left great toe, likely secondary to ingrowing toe nail. 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 He changed his insurance. Did not get scans and did not take his alectinib for about 1-2 weeks. We were not notified.  Otherwise she feels well she really requires dexamethasone yesterday headache probably once or twice a week as per patient no other complaints.\par \par 4/17/17\par He presents for follow up today. He had MRI of brain that showed stable disease 4/8/2017. He has not had the PET CT yet. He received  his alecetinib on 4/4/17 and stated to take them. States his headaches are rare now and feels well otherwise.\par \par 5/15/17\par He presents for follow up. He had a PET CT on 4/21/17 that  was ucnhged.Since November 1, 2016, no new foci of pathologic FDG uptake to suggest\par biologic tumor activity. . 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 . Unchanged non-FDG avid sclerotic foci involving the right proximal humerus\par and T9 vertebral body.\par \par He  feels well. No dizziness or headache.  No new symptoms or complaints.\par \par 6/19/17\par He is doing well. He has no complaints. No headaches. No SOB. \par \par 7/24/17\par Patient comes in for follow up visit, has no complaints, has no SOB and has a mild cough which is chronic, no hemoptysis. Patient states his appetite is good, weight is stable, he has no headaches and is currently off the steroids. Patient will have repeat PET scan and MRI of the brain with contrast as well as repeat bloodwork. \par \par 8/21/17\par He is here for follow up. He had an MR brain and PET CT on 8/3 and 8/2 that did not show progression, were essentially unchanged.  He feels great otherwise.\par \par 9/21/17\par He is doing well. No complaints. No headaches\par \par October 26, 2017\par He see her for followup he feels great he has no complaints.\par \par 11/16/17\par He is here for follow up. He had an MR of brain on 11/14/17: New areas of nodular enhancement along the inferior surfaces of the temporal lobes bilaterally. These are relatively symmetric and just above the level of the cerebellar enhancing masses, suggesting that these may reflect post treatment change.\par He had to reschedule his PET and states he will do it next Wednesday.\par \par No complaints.\par \par 12/26/17\par He is here for follow up. He feels well. he  had PET/CT in end of November that does not show procession, stable pleural findings. He  feels well. \par \par 1/22/18\par He is here for follow up for his NSCLCA. He states he feels well. No SOB, no headaches no fatigue.. \par \par February 21 2018\par  he is here for followup, he had a recent PET/CT scan which is PRESLEY he also had a recent MRI of the brain. This was reviewed at the CNS tumor board although wasn't clear but the consensus was that this is posttreatment changes in the fact that he is asymptomatic and decided to observe him.\par \par He has no new complaints no headaches no weakness or tingling.\par \par \par 3/21/18\par He is here for follow up. HE states he is doing well. Has very mild headaches on occasion but otherwise doing well.\par \par 4/13/18\par He is doing well. Reports no new complaints \par \par 5/3/18\par He is here for follow up to discuss his MR brain. HE complains of mild headache. Same memory issues as before. Balance is the same.  MR brain showed : Increased size of right parietotemporal of the findings enhancement  seen lesion measuring 5.2 cm, previously 2.8 centimeters and increased  size of left inferior temporal lobe mass measuring 2.3 cm, previously 1.6  cm. these areas again demonstrate hypoperfusion and may represent post  therapeutic changes.   2.  Significant increased edema and mass effect within the right  hemisphere with 9 mm right to left midline shift.  3.  No new enhancing lesions. Stable additional bilateral cerebellar and  supratentorial lesions.\par \par I spoke with Dr. 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 He developed severe headaches, some difficulty with speech and balance issues. 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 He has no insurance so my pharmacy has been giving  him a weekly supply while we work on it. He did not have his scans yet. He feel much better now except for dizziness\par \par 5/30/18\par He is feeling much better. His neurological complaints resolved. He stopped steroids on Monday since he had swelling in legs that has resolved. \par \par 6/13/18\par He is here for follow up. he had a PET/CT on 6/7/18 that showed These images reveal, when comparing the FDG brain images to MRI of  4/27/2018 series 10, there is one small focus of increased FDG uptake in  the region of the right parietotemporal lobe lesion (series 12 image 34).  Therefore persistent biologic tumor activity in this region cannot be  excluded.  No focal abnormal FDG uptake corresponding with the left inferior  temporal lobe lesion and both cerebellar lesions. In addition most of the  right parietotemporal lobe lesion is not FDG avid Compared to normal brain glucose metabolism in the thalamus (surrogate  for normal brain metabolism) relative hypometabolism of the right  parietal, posterior parietal and occipital region and relative  hypermetabolism of the left posterior parietal region  Persistent posttreatment FDG uptake (SUV up to 11.2) along the pleural  surface of the left hemithorax consistent with posttreatment changes  associated with talc pleurodesis  One new mildly FDG avid soft tissue nodule (SUV 3.3) in the left buttock \par \par He feels well now ,no headaches.\par \par 7/16/18\par He is here for follow up. doing well. He has no headaches. No new complaints\par \par 8/13/18\par He is here for follow up.  He is doing well. has no complaints. He is considering returning to work.\par \par 9/10/18\par He is here for follow up. He had an MR brain on 8/24/18 that showed In comparison to the previous brain MRI dated 4/27/2018:  1.  No significant interval change in the size and configuration of the  heterogeneously enhancing, necrotic and hemorrhagic lesions within the  temporal/occipital lobes and cerebellar hemispheres. Enhancement pattern  suggests post-therapeutic changes.  2.  Moderately increased edema within the posterior fossa (cerebellar  hemispheres and brainstem). Increased mass effect upon the fourth  ventricle with no evidence of hydrocephalus.  3.  The left cerebral hemispheric edema has mildly increased and the  right cerebral hemispheric edema has mildly decreased.   4.  Resolution of the previously seen right to left midline shift, right  lateral ventricular compression and left lateral ventricular enlargement.  5.  Treated lesions again noted in the left frontal white matter and left  lateral medulla, without contrast enhancement.\par \par HE is doing well has no new complaints. Has no neuroglial complaints. \par \par 10/10/18\par He is here for follow up he had PET/CT on  9/28/18 that was PRESLEY. He no has headaches again. States he is voiding multiple times a day and unable to empty bladder fully.\par \par 10/30/18\par He is here for follow up. he was in the ED for.Left lower quadrant pain symptoms her was given cirpo. he had a Ct abd/pelvis in ED 10/22/18 : Stable posttreatment changes of the left hemithorax including large  loculated pleural effusion.  2.  Multiple nondilated fluid-filled small bowel loops which may reflect  evidence of enteritis.  \par He had MR brain on 10/29/18:  No new lesions, but several existing lesions have increased in size  2. Bilateral hemorrhagic cerebellar lesions and edema has increased. Mass  effect on the fourth ventricle with hydrocephalus.   2.  Essentially unchanged sizes of supratentorial lesions within the  bilateral temporooccipital lobes and mildly improved edema\par \par He is having headaches and more confusion. He took 4 flomax in am and PM instead of his alecensa. I called [facility] with him and they told us they did not deliver his pills since they could not reach him but will deliver them tomorrow. He will be home. He states he had alecensa at home but not sure.\par \par 11/12/18\par Patient presents to the office with a chief complaint of burning in his right thigh.  He has had no new medications.  The patient states it is worse in the evening but lasts all day.  The patient states that the burning sensation starts at the right knee and is ascending.  Straight leg raise negative.  Discussed that it is unlikely that it is from his Avastin treatment, but unsure the etiology.  We will send for LE doppler to rule out any DVT.\par \par 11/27/18\par He is here for follow up. His Dopplers  were negative and his pain/cramps are nearly gone only some in right thigh. His headaches are gone and he is speech is  better and states memory is better since we started Avastin.\par \par 1/9/19\par He is doing well.  He has no more pain in his legs. He is eating well. He is due for 4/4 dose of Avastin. No other complains.\par \par 2/6/19\par He is here for follow-up.  He generally feels well.  He states he still suffers from headaches.  His memory has improved minimally.\par PET/CT 2/4/19 IMPRESSION: Since 9/28/2018, No definite sites of abnormal FDG uptake to suggest biologic tumor activity. Cerebellar lesions seen on brain MRI from 1/20/2019, are not FDG avid and consistent with treated metastases. Post talc pleurodesis of the left pleura, unchanged. \par MR Brain 1/20/19 IMPRESSION: In comparison to the previous brain MRI dated 10/29/2018: 1. No significant interval change in the size and configuration of the heterogeneously enhancing, necrotic and hemorrhagic lesions within the temporal/occipital lobes and cerebellar hemispheres. 2. The edema within the brainstem and cerebellum is about stable;however there is slight progression of the hydrocephalus since the prior exam. 3. The edema within the temporal and occipital lobes has decreased. 4. New patchy area of restricted diffusion around the left temporal horn (without corresponding enhancement) may reflect superimposed ischemic changes. Recommend attention on follow-up imaging. \par \par 3/6/19\par Patient is here for a follow-up visit.  He states he is having increased frequency of headaches with minimal relief from Tylenol.  He is now staying in the shelter on [address] with his daughter.  He will  his medications from [facility] from now on.  We are awaiting arrangement for him to get an apartment.\par \par 3/27/19\par He is here for follow up. his headache is better and he takes his Dexamethasone only when he has it which helps. Last PET was in February and last MRI was in January.\par \par 4/29/19\par He is here for follow-up.  Since last visit, he presented to the ED in 4/2019 due to chest pain.  He underwent CTA chest that demonstrated unchanged left pleural effusion. Likely radiation induced necrosis changes, he states it may have been related to stress related to his daughter.  He is still awaiting placement from the shelter.  He reports his usual frequency headaches, but he takes the Decadron as needed which helps.  \par CTA Chest (4/7/19) IMPRESSION:No pulmonary emboli.20.9 cm pleural fluid collection in the left hemithorax, essentially unchanged since PET/CT dated 2/4/2019.\par MR Brain (4/2/19) IMPRESSION: Since January 20, 2019:New worse signal abnormality involving the brain and volodymyr with increased edema and worse effacement of the fourth ventricle and resulting mild hydrocephalus.Slightly worse edema involving the cerebellum.Stable bilateral posterior temporal/occipital/cerebellar heterogeneously enhancing lesions.Stable left posterior frontal deep white matter and left dorsal medullary hypointense foci, compatible with treated lesions.\par \par 5/22/19\par He came in for follow up. He has been complaining of headaches mainly in AM. He does not think dexamethasone is helping anymore. No other issues.\par \par 6/3/19\par He is here for follow up. HIs headacehs are the same and uses dexamethasone 4 mg almost dialy. He had  CT C/A/P that showed a stable effusion 5/26/19 amd MR brain showed  5/29/19: Overall no significant changes compared with the previous brain MRI dated \par 4/2/2019:\par \par 1.  No significant interval change in the size and configuration of the \par heterogeneously enhancing, necrotic and hemorrhagic lesions within the \par temporal/occipital lobes and cerebellar hemispheres.\par \par 2. Extensive edema within the cerebellar hemispheres, brainstem, parietal \par and temporal lobes is again seen. Patchy areas of enhancement are noted \par in this region. Appearance is most suggestive of posttreatment changes.\par \par 3. Stable patchy area of restricted diffusion around the left temporal \par horn again seen, nonspecific.\par \par 4. No new enhancing lesions identified.\par \par 6/24/19\par He is here for follow up. he missed his Avastin since he had family issues. Still has headaches they are the same. Will restart Avastin today,.\par \par 7/15/19\par He is here for follow up. He could not tell me if headaches are better. He does use steroids once  in  a while for headaches. States memory is the same. \par \par \par 8/14/19\par He is doing better. He Missed his Avastin doses. But he has no headaches and does not use steroids. He had a fall  and hit a rail on his chest prior to his CT scans and the finding in his CT chest is probably related to the trauma he still has pain in the area but its better. CT C/A/P and MR brain from august are bellow\par \par IMPRESSION:\par \par Since April 7, 2019\par \par Stable left pleural fluid collection measuring 9.5 x 14.1 by 18.4 cm, \par with stable compressive atelectasis of left lung and left hemidiaphragm \par inversion.\par \par New, indeterminate approximate 8 mm soft tissue density, left anterior \par chest (series 4/104).\par \par Interval development of approximate 3.6 x 1.8 by 3.9 cm homogeneous \par elliptical shaped structure, left epicardial fat pad (4/206), possibly \par proteinaceous material (HU +35).\par \par \par IMPRESSION:\par \par Within the left cardiophrenic angle, there is a new area of lobulated \par soft tissue measuring 4.0 x 2.5 cm suspicious for new metastatic disease.\par \par Please see chest CT report for chest findings\par \par IMPRESSION:\par \par Overall no significant changes compared with the previous brain MRI dated \par 5/29/2019:\par \par 1.  No significant interval change in the size and configuration of the \par heterogeneously enhancing, necrotic and hemorrhagic lesions within the \par temporal/occipital lobes and cerebellar hemispheres.\par \par 2. Extensive edema within the cerebellar hemispheres, brainstem, parietal \par and temporal lobes is again seen. Patchy areas of enhancement are noted \par in this region. Appearance is most suggestive of posttreatment changes.\par \par 3. No new enhancing lesions identified.\par \par 10/01/19\par Pt presented today for routine follow up visit. He reports feeling fine and has no new complaints . Pt reports no new symptoms of weakness , headaches and confusion and is well compliant with treatment . \par \par \par 11/11/19\par He is here for follow up.   He feels well. No headaches.  Has a runny nose. No pain. No SOB. He gained a few  pounds.\par \par 12/9/19\par He is here for follow up. He had his MR brain on  11/16/19IMPRESSION: \par 1. Overall no significant changes compared with the previous brain MRI dated 8/5/2019. \par 2. Specifically, the heterogeneously enhancing, necrotic and hemorrhagic lesions within the temporal/occipital \par lobes and cerebellar hemispheres; with extensive associated edema and patchy enhancement are not \par significantly changed and most compatible with posttreatment changes. \par 3. No new enhancing lesions identified.\par \par He did not do his CTs yet. \par \par He feels well. No headaches. \par \par

## 2019-12-09 NOTE — ASSESSMENT
[Palliative] : Goals of care discussed with patient: Palliative [FreeTextEntry1] : 1.  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 Due to insurance issues he stopped it but now is back on it since 4/4/2017\par - repeat MRI brain 8/2018 radiation related changes, post treatment changes, he is asymptomatic,  and repeat PET/CT 6/5/18   is without systemic progression, it confirmed radiation necrosis. Recent PET/CT on  9/28/18 that was PRESLEY. CT abd was unchanged. I think the findings on MR brain are radiation necrosis given his symptoms as before. Recent MR brain showed more edema but he is doing well. CT chest was PRESLEY. Thus MR brain findings most likely due to  radiation necrosis that were  causing his headaches. \par \par 2.   Intermittent headaches  continues to  dexamethasone, this is due to  radiation necrosis\par \par 3. Elevated total bilirubin likely secondary to alectinib.\par - will monitor, its stable\par \par 4.  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 \par \par 5. LUTs symptoms better PSA was normal  \par \par Plan:\par - C/w Alectinib.  \par -Avastin on hold he is much better\par -RTC in one month\par -MR brain was fine in November if he has no symptoms will recheck again  February\par -CTs pending

## 2019-12-13 ENCOUNTER — FORM ENCOUNTER (OUTPATIENT)
Age: 44
End: 2019-12-13

## 2019-12-14 ENCOUNTER — OUTPATIENT (OUTPATIENT)
Dept: OUTPATIENT SERVICES | Facility: HOSPITAL | Age: 44
LOS: 1 days | Discharge: HOME | End: 2019-12-14
Payer: MEDICARE

## 2019-12-14 DIAGNOSIS — C78.00 SECONDARY MALIGNANT NEOPLASM OF UNSPECIFIED LUNG: ICD-10-CM

## 2019-12-14 DIAGNOSIS — I67.89 OTHER CEREBROVASCULAR DISEASE: ICD-10-CM

## 2019-12-14 PROCEDURE — 71260 CT THORAX DX C+: CPT | Mod: 26

## 2019-12-14 PROCEDURE — 74177 CT ABD & PELVIS W/CONTRAST: CPT | Mod: 26

## 2019-12-20 ENCOUNTER — OTHER (OUTPATIENT)
Age: 44
End: 2019-12-20

## 2019-12-27 ENCOUNTER — EMERGENCY (EMERGENCY)
Facility: HOSPITAL | Age: 44
LOS: 0 days | Discharge: HOME | End: 2019-12-27
Admitting: EMERGENCY MEDICINE
Payer: MEDICARE

## 2019-12-27 VITALS
TEMPERATURE: 97 F | OXYGEN SATURATION: 97 % | RESPIRATION RATE: 18 BRPM | DIASTOLIC BLOOD PRESSURE: 71 MMHG | SYSTOLIC BLOOD PRESSURE: 142 MMHG | HEART RATE: 76 BPM
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
## 2019-12-27 DIAGNOSIS — Z04.3 ENCOUNTER FOR EXAMINATION AND OBSERVATION FOLLOWING OTHER ACCIDENT: ICD-10-CM
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
## 2019-12-27 PROCEDURE — 99282 EMERGENCY DEPT VISIT SF MDM: CPT

## 2019-12-27 RX ORDER — ACETAMINOPHEN 500 MG
975 TABLET ORAL ONCE
Refills: 0 | Status: COMPLETED | OUTPATIENT
Start: 2019-12-27 | End: 2019-12-27

## 2019-12-27 RX ADMIN — Medication 975 MILLIGRAM(S): at 09:44

## 2019-12-27 NOTE — ED PROVIDER NOTE - PHYSICAL EXAMINATION
Gen: Alert, NAD, well appearing  Head: NC, AT, PERRL, EOMI, +mild ecchymosis to right side of face below right maxilla, no pain to TMJ bilaterally  ENT: normal hearing  Neck: +supple, no midline tenderness, +Trachea midline  Pulm: Bilateral BS, normal resp effort  CV: RRR  Abd: soft, NT  Neuro: AAOx3, GCS 15, baseline weakness on left side as compared from right due to treatment, mild speech impediment (as per patient baseline post treatment)

## 2019-12-27 NOTE — ED PROVIDER NOTE - PROGRESS NOTE DETAILS
patient awake, alert oriented x3, information obtained in patient's preferred language of Portuguese, GCS 15, follow up with pmd discussed tylenol for facial pain but sts pain is now improving since fall

## 2019-12-27 NOTE — ED PROVIDER NOTE - CLINICAL SUMMARY MEDICAL DECISION MAKING FREE TEXT BOX
hx lung ca with brain mets now on remission lives in shelter s/p fall hit right side of face 8 hours ago on heater no loc GCS 15 except for baseline no new neurological findings

## 2019-12-27 NOTE — ED PROVIDER NOTE - PATIENT PORTAL LINK FT
You can access the FollowMyHealth Patient Portal offered by [facility] by registering at the following website: [email or web address]. By joining [facility]’s FollowMyHealth portal, you will also be able to view your health information using other applications (apps) compatible with our system.

## 2019-12-27 NOTE — ED ADULT NURSE NOTE - OBJECTIVE STATEMENT
Pt c/o trip and fall in bathroom after turning lights off around 2 am, pt fell onto right side of face, denies LOC or AC use. Denies n/v/d, abd pain, back pain, cp, sob, dizziness, weakness.

## 2019-12-27 NOTE — ED PROVIDER NOTE - NS ED ROS FT
Review of Systems    Constitutional: (-) fever  Eyes/ENT: (-) blurry vision  Cardiovascular: (-) chest pain, (-) syncope  Respiratory: (-) cough  Gastrointestinal: (-) vomiting, (-) diarrhea  Musculoskeletal: (-) neck pain, (-) back pain, (-) joint pain  Integumentary: (-) rash  Neurological: (-) headache, (-) altered mental status

## 2019-12-27 NOTE — ED ADULT NURSE NOTE - EXTENSIONS OF SELF_ADULT
Call regarding: sinus congestion and headache    Caller: Kesha  Number to be reached at:  [phone] (mobile)    Patient stated that she has been having very bad headaches and sinus congestion for the past week now and would like to know what she can take to help.      
Situation: Spoke with pt. and she notes congestion in nose, forehead and eye areas and she has severe HA around eyes. Ibuprofen not working. Nasal congestion. No fever. No SOB. No wheezing. No sore throat. Symptoms started Saturday evening.     She is asking what she can take. She has hx BARRIENTOS.     Background: LOV 02/20/18 for right sided abdominal pain.     Assessment: sinus symptoms    Recommendations: RN advised pt. per protocol to try humidification, netipot, hot tea. She is already taking zyrtec for allergies and Flonase and does not feel that either is helpful for current sinus symptoms. She is using Ibuprofen for HA. RN advised pt. can use Sudafed as she is not on any medications for HTN. Nothing listed in [employer] for precautions or warnings for liver function. Pt. reviewed typical time frame and symptoms for sinus infections. Pt. is agreeable to the plan.     
None

## 2019-12-27 NOTE — ED PROVIDER NOTE - NSFOLLOWUPCLINICS_GEN_ALL_ED_FT
[facility]  Medicine  [address], [place]   Phone: [phone]  Fax:   Follow Up Time: 4-6 Days

## 2020-01-06 ENCOUNTER — FORM ENCOUNTER (OUTPATIENT)
Age: 45
End: 2020-01-06

## 2020-01-07 ENCOUNTER — OUTPATIENT (OUTPATIENT)
Dept: OUTPATIENT SERVICES | Facility: HOSPITAL | Age: 45
LOS: 1 days | Discharge: HOME | End: 2020-01-07
Payer: MEDICARE

## 2020-01-07 DIAGNOSIS — C34.82 MALIGNANT NEOPLASM OF OVERLAPPING SITES OF LEFT BRONCHUS AND LUNG: ICD-10-CM

## 2020-01-07 DIAGNOSIS — C78.00 SECONDARY MALIGNANT NEOPLASM OF UNSPECIFIED LUNG: ICD-10-CM

## 2020-01-07 LAB — GLUCOSE BLDC GLUCOMTR-MCNC: 95 MG/DL — SIGNIFICANT CHANGE UP (ref 70–99)

## 2020-01-07 PROCEDURE — 78815 PET IMAGE W/CT SKULL-THIGH: CPT | Mod: 26,PS

## 2020-01-13 ENCOUNTER — APPOINTMENT (OUTPATIENT)
Dept: HEMATOLOGY ONCOLOGY | Facility: CLINIC | Age: 45
End: 2020-01-13
Payer: MEDICARE

## 2020-01-13 ENCOUNTER — LABORATORY RESULT (OUTPATIENT)
Age: 45
End: 2020-01-13

## 2020-01-13 VITALS
RESPIRATION RATE: 14 BRPM | TEMPERATURE: 97.6 F | WEIGHT: 198 LBS | HEART RATE: 70 BPM | SYSTOLIC BLOOD PRESSURE: 127 MMHG | DIASTOLIC BLOOD PRESSURE: 92 MMHG | HEIGHT: 67 IN | BODY MASS INDEX: 31.08 KG/M2

## 2020-01-13 LAB
ALBUMIN SERPL ELPH-MCNC: 4.3 G/DL
ALP BLD-CCNC: 109 U/L
ALT SERPL-CCNC: 8 U/L
ANION GAP SERPL CALC-SCNC: 15 MMOL/L
APTT BLD: 29.9 SEC
AST SERPL-CCNC: 17 U/L
BILIRUB SERPL-MCNC: 1 MG/DL
BUN SERPL-MCNC: 12 MG/DL
CALCIUM SERPL-MCNC: 9.1 MG/DL
CHLORIDE SERPL-SCNC: 102 MMOL/L
CO2 SERPL-SCNC: 23 MMOL/L
CREAT SERPL-MCNC: 0.8 MG/DL
GLUCOSE SERPL-MCNC: 108 MG/DL
HCT VFR BLD CALC: 42.7 %
HGB BLD-MCNC: 14.2 G/DL
INR PPP: 1.04 RATIO
MCHC RBC-ENTMCNC: 29.5 PG
MCHC RBC-ENTMCNC: 33.3 G/DL
MCV RBC AUTO: 88.8 FL
PLATELET # BLD AUTO: 226 K/UL
PMV BLD: 10.4 FL
POTASSIUM SERPL-SCNC: 4.1 MMOL/L
PROT SERPL-MCNC: 7.2 G/DL
PT BLD: 12 SEC
RBC # BLD: 4.81 M/UL
RBC # FLD: 14.6 %
SODIUM SERPL-SCNC: 140 MMOL/L
WBC # FLD AUTO: 8.04 K/UL

## 2020-01-13 PROCEDURE — 99213 OFFICE O/P EST LOW 20 MIN: CPT

## 2020-01-16 NOTE — PHYSICAL EXAM
[Restricted in physically strenuous activity but ambulatory and able to carry out work of a light or sedentary nature] : Status 1- Restricted in physically strenuous activity but ambulatory and able to carry out work of a light or sedentary nature, e.g., light house work, office work [Normal] : affect appropriate [de-identified] : he has decrease breath sounds on left side base, unchanged. [de-identified] :  Gait is normal good strength  in all limbs [de-identified] : forgetful, but stable

## 2020-01-16 NOTE — ASSESSMENT
[Palliative] : Goals of care discussed with patient: Palliative [FreeTextEntry1] : 1.  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 Due to insurance issues he stopped it but now is back on it since 4/4/2017\par - repeat MRI brain 8/2018 radiation related changes, post treatment changes, he is asymptomatic,  and repeat PET/CT 6/5/18   is without systemic progression, it confirmed radiation necrosis. PET/CT on  9/28/18 that was PRESLEY. CT abd was unchanged. I think the findings on MR brain are radiation necrosis given his symptoms as before. Recent MR brain showed more edema but he is doing well. CT chest was PRESLEY. Thus MR brain findings most likely due to  radiation necrosis that were causing his headaches. \par - repeat PET/CT from 01/2020 shows new FDG avid lesions in the lung right under his left effusion on top of the diaphragm.\par - will arrange for biopsy of FDG avid lesion on recent CT imaging; pending report we will discuss whether treatment to include RT if possible with same regimen or decide to change to new  TKI, will send sequecig out again on new  biopsy if indeed lung cancer.\par \par 2.   Intermittent headaches  continues to  dexamethasone, this is due to  radiation necrosis\par \par 3. Elevated total bilirubin likely secondary to alectinib.\par - will monitor, its stable\par \par 4.  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 \par \par 5. LUTs symptoms better\par - PSA was normal  \par \par Plan:\par - C/w Alectinib for now; rx refilled\par - Avastin on hold, he is much better\par - MR brain was fine in November if he has no symptoms will recheck again February 2020\par \par RTC in 1 month, we should have biopsy back by than.

## 2020-01-16 NOTE — REVIEW OF SYSTEMS
[Negative] : Integumentary [Recent Change In Weight] : ~T no recent weight change [Fatigue] : no fatigue [Confused] : no confusion [Dizziness] : no dizziness [Difficulty Walking] : no difficulty walking

## 2020-01-16 NOTE — HISTORY OF PRESENT ILLNESS
[Disease: _____________________] : Disease: [unfilled] [AJCC Stage: ____] : AJCC Stage: [unfilled] [Treatment Protocol] : Treatment Protocol [de-identified] : 41/M presenting for follow-up for metastatic ALK-positive lung adenocarcinoma.  He is currently on alectinib 600 mg BID.  He initially presented in 11/2014 with multiple pulmonary nodules, mediastinal lymphadenopathy, multiple bony metastases, and brain metastases.  He was initially started on crizotinib and was switched to ceritinib in 12/2015 upon progression.  He was unable to tolerate ceritinib due to significant vomiting and was switched to alectinib in 02/2016.\par \par He had SRS to a left cerebellar hemisphere brain metastasis (08/2015) and subsequently underwent WBRT for CNS progression (10-11/2015).  He had RT to a painful metastatic lesion in the right humerus (01/2015).  He had also received Xgeva for bony metastatic disease in the past. \par \par Since 09/2016, he has been experiencing intermittent headaches being treated with periodic dexamethasone taper.  MRI brain with perfusion analysis showed bilateral cerebellar lesions, favor postRT changes rather than residual tumor.  His most recent brain MRI (11/01/2016) showed overall  stable  exam  compared  to  the  previous  brain  MRI  9/2/2016; no  significant  interval  change  in  bilateral  cerebellar  heterogeneously  enhancing  masses  with  extensive  associated  edema,  as  well  as  smaller  lesions  within  the  left  parietal  white  matter,  superficial  left  temporal  lobe  and  left  dorsal  medulla; and, stable  mild  ventriculomegaly  and  mild  periventricular  FLAIR  signal  abnormality.  He has been following with our neurosurgeon, Dr. Britt, who has recommended no surgical intervention at this time. \par \par His most recent PETCT (11/01/2016) showed no sites of pathologic FDG uptake suspicious for biologic tumor activity.\par \par He reports occasional headaches and a mild sense of imbalance.  Denies any falls.  However he only takes a prn dexamethasone, once in every three days,  which reportedly relieves his intermittent headaches. \par Today he also complains mild pain in his left great toe, likely secondary to ingrowing toe nail. 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 He changed his insurance. Did not get scans and did not take his alectinib for about 1-2 weeks. We were not notified.  Otherwise she feels well she really requires dexamethasone yesterday headache probably once or twice a week as per patient no other complaints.\par \par 4/17/17\par He presents for follow up today. He had MRI of brain that showed stable disease 4/8/2017. He has not had the PET CT yet. He received  his alecetinib on 4/4/17 and stated to take them. States his headaches are rare now and feels well otherwise.\par \par 5/15/17\par He presents for follow up. He had a PET CT on 4/21/17 that  was ucnhged.Since November 1, 2016, no new foci of pathologic FDG uptake to suggest\par biologic tumor activity. . 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 . Unchanged non-FDG avid sclerotic foci involving the right proximal humerus\par and T9 vertebral body.\par \par He  feels well. No dizziness or headache.  No new symptoms or complaints.\par \par 6/19/17\par He is doing well. He has no complaints. No headaches. No SOB. \par \par 7/24/17\par Patient comes in for follow up visit, has no complaints, has no SOB and has a mild cough which is chronic, no hemoptysis. Patient states his appetite is good, weight is stable, he has no headaches and is currently off the steroids. Patient will have repeat PET scan and MRI of the brain with contrast as well as repeat bloodwork. \par \par 8/21/17\par He is here for follow up. He had an MR brain and PET CT on 8/3 and 8/2 that did not show progression, were essentially unchanged.  He feels great otherwise.\par \par 9/21/17\par He is doing well. No complaints. No headaches\par \par October 26, 2017\par He see her for followup he feels great he has no complaints.\par \par 11/16/17\par He is here for follow up. He had an MR of brain on 11/14/17: New areas of nodular enhancement along the inferior surfaces of the temporal lobes bilaterally. These are relatively symmetric and just above the level of the cerebellar enhancing masses, suggesting that these may reflect post treatment change.\par He had to reschedule his PET and states he will do it next Wednesday.\par \par No complaints.\par \par 12/26/17\par He is here for follow up. He feels well. he  had PET/CT in end of November that does not show procession, stable pleural findings. He  feels well. \par \par 1/22/18\par He is here for follow up for his NSCLCA. He states he feels well. No SOB, no headaches no fatigue.. \par \par February 21 2018\par  he is here for followup, he had a recent PET/CT scan which is PRESLEY he also had a recent MRI of the brain. This was reviewed at the CNS tumor board although wasn't clear but the consensus was that this is posttreatment changes in the fact that he is asymptomatic and decided to observe him.\par \par He has no new complaints no headaches no weakness or tingling.\par \par \par 3/21/18\par He is here for follow up. HE states he is doing well. Has very mild headaches on occasion but otherwise doing well.\par \par 4/13/18\par He is doing well. Reports no new complaints \par \par 5/3/18\par He is here for follow up to discuss his MR brain. HE complains of mild headache. Same memory issues as before. Balance is the same.  MR brain showed : Increased size of right parietotemporal of the findings enhancement  seen lesion measuring 5.2 cm, previously 2.8 centimeters and increased  size of left inferior temporal lobe mass measuring 2.3 cm, previously 1.6  cm. these areas again demonstrate hypoperfusion and may represent post  therapeutic changes.   2.  Significant increased edema and mass effect within the right  hemisphere with 9 mm right to left midline shift.  3.  No new enhancing lesions. Stable additional bilateral cerebellar and  supratentorial lesions.\par \par I spoke with Dr. 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 He developed severe headaches, some difficulty with speech and balance issues. 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 He has no insurance so my pharmacy has been giving  him a weekly supply while we work on it. He did not have his scans yet. He feel much better now except for dizziness\par \par 5/30/18\par He is feeling much better. His neurological complaints resolved. He stopped steroids on Monday since he had swelling in legs that has resolved. \par \par 6/13/18\par He is here for follow up. he had a PET/CT on 6/7/18 that showed These images reveal, when comparing the FDG brain images to MRI of  4/27/2018 series 10, there is one small focus of increased FDG uptake in  the region of the right parietotemporal lobe lesion (series 12 image 34).  Therefore persistent biologic tumor activity in this region cannot be  excluded.  No focal abnormal FDG uptake corresponding with the left inferior  temporal lobe lesion and both cerebellar lesions. In addition most of the  right parietotemporal lobe lesion is not FDG avid Compared to normal brain glucose metabolism in the thalamus (surrogate  for normal brain metabolism) relative hypometabolism of the right  parietal, posterior parietal and occipital region and relative  hypermetabolism of the left posterior parietal region  Persistent posttreatment FDG uptake (SUV up to 11.2) along the pleural  surface of the left hemithorax consistent with posttreatment changes  associated with talc pleurodesis  One new mildly FDG avid soft tissue nodule (SUV 3.3) in the left buttock \par \par He feels well now ,no headaches.\par \par 7/16/18\par He is here for follow up. doing well. He has no headaches. No new complaints\par \par 8/13/18\par He is here for follow up.  He is doing well. has no complaints. He is considering returning to work.\par \par 9/10/18\par He is here for follow up. He had an MR brain on 8/24/18 that showed In comparison to the previous brain MRI dated 4/27/2018:  1.  No significant interval change in the size and configuration of the  heterogeneously enhancing, necrotic and hemorrhagic lesions within the  temporal/occipital lobes and cerebellar hemispheres. Enhancement pattern  suggests post-therapeutic changes.  2.  Moderately increased edema within the posterior fossa (cerebellar  hemispheres and brainstem). Increased mass effect upon the fourth  ventricle with no evidence of hydrocephalus.  3.  The left cerebral hemispheric edema has mildly increased and the  right cerebral hemispheric edema has mildly decreased.   4.  Resolution of the previously seen right to left midline shift, right  lateral ventricular compression and left lateral ventricular enlargement.  5.  Treated lesions again noted in the left frontal white matter and left  lateral medulla, without contrast enhancement.\par \par HE is doing well has no new complaints. Has no neuroglial complaints. \par \par 10/10/18\par He is here for follow up he had PET/CT on  9/28/18 that was PRESLEY. He no has headaches again. States he is voiding multiple times a day and unable to empty bladder fully.\par \par 10/30/18\par He is here for follow up. he was in the ED for.Left lower quadrant pain symptoms her was given cirpo. he had a Ct abd/pelvis in ED 10/22/18 : Stable posttreatment changes of the left hemithorax including large  loculated pleural effusion.  2.  Multiple nondilated fluid-filled small bowel loops which may reflect  evidence of enteritis.  \par He had MR brain on 10/29/18:  No new lesions, but several existing lesions have increased in size  2. Bilateral hemorrhagic cerebellar lesions and edema has increased. Mass  effect on the fourth ventricle with hydrocephalus.   2.  Essentially unchanged sizes of supratentorial lesions within the  bilateral temporooccipital lobes and mildly improved edema\par \par He is having headaches and more confusion. He took 4 flomax in am and PM instead of his alecensa. I called [facility] with him and they told us they did not deliver his pills since they could not reach him but will deliver them tomorrow. He will be home. He states he had alecensa at home but not sure.\par \par 11/12/18\par Patient presents to the office with a chief complaint of burning in his right thigh.  He has had no new medications.  The patient states it is worse in the evening but lasts all day.  The patient states that the burning sensation starts at the right knee and is ascending.  Straight leg raise negative.  Discussed that it is unlikely that it is from his Avastin treatment, but unsure the etiology.  We will send for LE doppler to rule out any DVT.\par \par 11/27/18\par He is here for follow up. His Dopplers  were negative and his pain/cramps are nearly gone only some in right thigh. His headaches are gone and he is speech is  better and states memory is better since we started Avastin.\par \par 1/9/19\par He is doing well.  He has no more pain in his legs. He is eating well. He is due for 4/4 dose of Avastin. No other complains.\par \par 2/6/19\par He is here for follow-up.  He generally feels well.  He states he still suffers from headaches.  His memory has improved minimally.\par PET/CT 2/4/19 IMPRESSION: Since 9/28/2018, No definite sites of abnormal FDG uptake to suggest biologic tumor activity. Cerebellar lesions seen on brain MRI from 1/20/2019, are not FDG avid and consistent with treated metastases. Post talc pleurodesis of the left pleura, unchanged. \par MR Brain 1/20/19 IMPRESSION: In comparison to the previous brain MRI dated 10/29/2018: 1. No significant interval change in the size and configuration of the heterogeneously enhancing, necrotic and hemorrhagic lesions within the temporal/occipital lobes and cerebellar hemispheres. 2. The edema within the brainstem and cerebellum is about stable;however there is slight progression of the hydrocephalus since the prior exam. 3. The edema within the temporal and occipital lobes has decreased. 4. New patchy area of restricted diffusion around the left temporal horn (without corresponding enhancement) may reflect superimposed ischemic changes. Recommend attention on follow-up imaging. \par \par 3/6/19\par Patient is here for a follow-up visit.  He states he is having increased frequency of headaches with minimal relief from Tylenol.  He is now staying in the shelter on [address] with his daughter.  He will  his medications from [facility] from now on.  We are awaiting arrangement for him to get an apartment.\par \par 3/27/19\par He is here for follow up. his headache is better and he takes his Dexamethasone only when he has it which helps. Last PET was in February and last MRI was in January.\par \par 4/29/19\par He is here for follow-up.  Since last visit, he presented to the ED in 4/2019 due to chest pain.  He underwent CTA chest that demonstrated unchanged left pleural effusion. Likely radiation induced necrosis changes, he states it may have been related to stress related to his daughter.  He is still awaiting placement from the shelter.  He reports his usual frequency headaches, but he takes the Decadron as needed which helps.  \par CTA Chest (4/7/19) IMPRESSION:No pulmonary emboli.20.9 cm pleural fluid collection in the left hemithorax, essentially unchanged since PET/CT dated 2/4/2019.\par MR Brain (4/2/19) IMPRESSION: Since January 20, 2019:New worse signal abnormality involving the brain and volodymyr with increased edema and worse effacement of the fourth ventricle and resulting mild hydrocephalus.Slightly worse edema involving the cerebellum.Stable bilateral posterior temporal/occipital/cerebellar heterogeneously enhancing lesions.Stable left posterior frontal deep white matter and left dorsal medullary hypointense foci, compatible with treated lesions.\par \par 5/22/19\par He came in for follow up. He has been complaining of headaches mainly in AM. He does not think dexamethasone is helping anymore. No other issues.\par \par 6/3/19\par He is here for follow up. HIs headacehs are the same and uses dexamethasone 4 mg almost dialy. He had  CT C/A/P that showed a stable effusion 5/26/19 amd MR brain showed  5/29/19: Overall no significant changes compared with the previous brain MRI dated \par 4/2/2019:\par \par 1.  No significant interval change in the size and configuration of the \par heterogeneously enhancing, necrotic and hemorrhagic lesions within the \par temporal/occipital lobes and cerebellar hemispheres.\par \par 2. Extensive edema within the cerebellar hemispheres, brainstem, parietal \par and temporal lobes is again seen. Patchy areas of enhancement are noted \par in this region. Appearance is most suggestive of posttreatment changes.\par \par 3. Stable patchy area of restricted diffusion around the left temporal \par horn again seen, nonspecific.\par \par 4. No new enhancing lesions identified.\par \par 6/24/19\par He is here for follow up. he missed his Avastin since he had family issues. Still has headaches they are the same. Will restart Avastin today,.\par \par 7/15/19\par He is here for follow up. He could not tell me if headaches are better. He does use steroids once  in  a while for headaches. States memory is the same. \par \par \par 8/14/19\par He is doing better. He Missed his Avastin doses. But he has no headaches and does not use steroids. He had a fall  and hit a rail on his chest prior to his CT scans and the finding in his CT chest is probably related to the trauma he still has pain in the area but its better. CT C/A/P and MR brain from august are bellow\par \par IMPRESSION:\par \par Since April 7, 2019\par \par Stable left pleural fluid collection measuring 9.5 x 14.1 by 18.4 cm, \par with stable compressive atelectasis of left lung and left hemidiaphragm \par inversion.\par \par New, indeterminate approximate 8 mm soft tissue density, left anterior \par chest (series 4/104).\par \par Interval development of approximate 3.6 x 1.8 by 3.9 cm homogeneous \par elliptical shaped structure, left epicardial fat pad (4/206), possibly \par proteinaceous material (HU +35).\par \par \par IMPRESSION:\par \par Within the left cardiophrenic angle, there is a new area of lobulated \par soft tissue measuring 4.0 x 2.5 cm suspicious for new metastatic disease.\par \par Please see chest CT report for chest findings\par \par IMPRESSION:\par \par Overall no significant changes compared with the previous brain MRI dated \par 5/29/2019:\par \par 1.  No significant interval change in the size and configuration of the \par heterogeneously enhancing, necrotic and hemorrhagic lesions within the \par temporal/occipital lobes and cerebellar hemispheres.\par \par 2. Extensive edema within the cerebellar hemispheres, brainstem, parietal \par and temporal lobes is again seen. Patchy areas of enhancement are noted \par in this region. Appearance is most suggestive of posttreatment changes.\par \par 3. No new enhancing lesions identified.\par \par 10/01/19\par Pt presented today for routine follow up visit. He reports feeling fine and has no new complaints . Pt reports no new symptoms of weakness , headaches and confusion and is well compliant with treatment . \par \par \par 11/11/19\par He is here for follow up.   He feels well. No headaches.  Has a runny nose. No pain. No SOB. He gained a few  pounds.\par \par 12/9/19\par He is here for follow up. He had his MR brain on  11/16/19IMPRESSION: \par 1. Overall no significant changes compared with the previous brain MRI dated 8/5/2019. \par 2. Specifically, the heterogeneously enhancing, necrotic and hemorrhagic lesions within the temporal/occipital \par lobes and cerebellar hemispheres; with extensive associated edema and patchy enhancement are not \par significantly changed and most compatible with posttreatment changes. \par 3. No new enhancing lesions identified.\par \par He did not do his CTs yet. \par \par He feels well. No headaches. \par \par 1/13/2020\par Patient is here for a follow-up visit for metastatic lung cancer.  He is feeling well with no new complaints.  Most recent CBC is stable.  Patient denies fever, chills, nausea, vomiting, new pain or bleeding.  We discussed that based on most recent imaging, it may be suggestive that he is progressing on current treatment regimen.  \par PET/CT (1.7.20) IMPRESSION: Since February 4, 2019:1. Redemonstration of large left pleural fluid collection with circumferential FDG uptake along the pleural surface and associated calcifications (max SUV up to 18, [id number]), consistent with posttreatment change related to talc pleurodesis. New 5.8 x 3.5 cm lesion along the left posterior costophrenic angle, with peripheral FDG avidity, max SUV14. Given talc pleurodesis, findings may represent evolving inflammatory response, however neoplasm remains a consideration, and continued follow-up or tissue sampling recommended.2. Unchanged non-FDG avid sclerotic, treated osseous metastases in the right proximal humerus and T9 vertebral body with compression deformity.\par \par I presented him in tumor board and we decided to biopsy the new area.

## 2020-02-04 ENCOUNTER — LABORATORY RESULT (OUTPATIENT)
Age: 45
End: 2020-02-04

## 2020-02-04 ENCOUNTER — APPOINTMENT (OUTPATIENT)
Dept: HEMATOLOGY ONCOLOGY | Facility: CLINIC | Age: 45
End: 2020-02-04
Payer: MEDICARE

## 2020-02-04 VITALS
WEIGHT: 200 LBS | RESPIRATION RATE: 14 BRPM | DIASTOLIC BLOOD PRESSURE: 83 MMHG | SYSTOLIC BLOOD PRESSURE: 136 MMHG | BODY MASS INDEX: 31.39 KG/M2 | TEMPERATURE: 97.4 F | HEIGHT: 67 IN | HEART RATE: 72 BPM

## 2020-02-04 PROCEDURE — 99213 OFFICE O/P EST LOW 20 MIN: CPT

## 2020-02-05 LAB
ALBUMIN SERPL ELPH-MCNC: 4.5 G/DL
ALP BLD-CCNC: 118 U/L
ALT SERPL-CCNC: 12 U/L
ANION GAP SERPL CALC-SCNC: 14 MMOL/L
APTT BLD: 29.5 SEC
AST SERPL-CCNC: 17 U/L
BILIRUB SERPL-MCNC: 1 MG/DL
BUN SERPL-MCNC: 13 MG/DL
CALCIUM SERPL-MCNC: 9 MG/DL
CHLORIDE SERPL-SCNC: 103 MMOL/L
CO2 SERPL-SCNC: 23 MMOL/L
CREAT SERPL-MCNC: 0.7 MG/DL
GLUCOSE SERPL-MCNC: 84 MG/DL
HCT VFR BLD CALC: 41.3 %
HGB BLD-MCNC: 13.6 G/DL
INR PPP: 0.98 RATIO
MCHC RBC-ENTMCNC: 29.3 PG
MCHC RBC-ENTMCNC: 32.9 G/DL
MCV RBC AUTO: 89 FL
PLATELET # BLD AUTO: 209 K/UL
PMV BLD: 10.8 FL
POTASSIUM SERPL-SCNC: 4 MMOL/L
PROT SERPL-MCNC: 7.4 G/DL
PT BLD: 11.3 SEC
RBC # BLD: 4.64 M/UL
RBC # FLD: 14.5 %
SODIUM SERPL-SCNC: 140 MMOL/L
WBC # FLD AUTO: 7.93 K/UL

## 2020-02-08 NOTE — PHYSICAL EXAM
[Restricted in physically strenuous activity but ambulatory and able to carry out work of a light or sedentary nature] : Status 1- Restricted in physically strenuous activity but ambulatory and able to carry out work of a light or sedentary nature, e.g., light house work, office work [Normal] : affect appropriate [de-identified] : he has decrease breath sounds on left side base, unchanged. [de-identified] : No CVA tenderness, No spinal or paraspinal tenderness [de-identified] :  Gait is normal good strength  in all limbs [de-identified] : forgetful, but stable

## 2020-02-08 NOTE — REVIEW OF SYSTEMS
[Negative] : Heme/Lymph [Fatigue] : no fatigue [Recent Change In Weight] : ~T no recent weight change [Confused] : no confusion [Dizziness] : no dizziness [Difficulty Walking] : no difficulty walking [FreeTextEntry9] : b/l flank pain  [de-identified] : memory deficit

## 2020-02-08 NOTE — ASSESSMENT
[Palliative] : Goals of care discussed with patient: Palliative [FreeTextEntry1] : 1.  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 Due to insurance issues he stopped it but now is back on it since 4/4/2017\par - repeat MRI brain 8/2018 radiation related changes, post treatment changes, he is asymptomatic,  and repeat PET/CT 6/5/18   is without systemic progression, it confirmed radiation necrosis. PET/CT on  9/28/18 that was PRESLEY. CT abd was unchanged. I think the findings on MR brain are radiation necrosis given his symptoms as before. Recent MR brain showed more edema but he is doing well. CT chest was PRESLEY. Thus MR brain findings most likely due to  radiation necrosis that were causing his headaches. \par - repeat PET/CT from 01/2020 shows new FDG avid lesions in the lung right under his left effusion on top of the diaphragm.\par We had requested for biopsy of the  FDG avid lesion on recent CT imaging- not done yet. Will reach out to navigator to help arrange biopsy \par Will follow up path report- we will discuss whether treatment to include RT if possible with  continuation of   alectinib, or decide to change to new  TKI(lorlatinib) , will send sequencing out again on new  biopsy if indeed lung cancer.\par \par 2.   Intermittent headaches   not common anymore  this is due to  radiation necrosis. Was on avastin and dexam in past\par \par 3. Elevated total bilirubin likely secondary to alectinib.\par - will monitor, its stable\par \par 4.  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 \par MRI was stable Nov 2019, will recheck in March 2020\par \par 5. LUTs symptoms better\par - PSA was normal  \par \par 6. His back pain seems to be MSK in origin\par -exam was benign \par -will repeat imaging in the future regardless, PET/CT in Jan did not show any new bone lesions\par \par Plan:\par - C/w Alectinib for now; \par - Avastin on hold, he is much better in regards to his radiation necrosis\par - Next MR brain is due March 2020 \par -Awaiting biopsy of new lung lesion to make decide on treatment course\par \par RTC  ideally once biopsy results are back or in one month.

## 2020-02-08 NOTE — HISTORY OF PRESENT ILLNESS
[Disease: _____________________] : Disease: [unfilled] [AJCC Stage: ____] : AJCC Stage: [unfilled] [Treatment Protocol] : Treatment Protocol [de-identified] : 41/M presenting for follow-up for metastatic ALK-positive lung adenocarcinoma.  He is currently on alectinib 600 mg BID.  He initially presented in 11/2014 with multiple pulmonary nodules, mediastinal lymphadenopathy, multiple bony metastases, and brain metastases.  He was initially started on crizotinib and was switched to ceritinib in 12/2015 upon progression.  He was unable to tolerate ceritinib due to significant vomiting and was switched to alectinib in 02/2016.\par \par He had SRS to a left cerebellar hemisphere brain metastasis (08/2015) and subsequently underwent WBRT for CNS progression (10-11/2015).  He had RT to a painful metastatic lesion in the right humerus (01/2015).  He had also received Xgeva for bony metastatic disease in the past. \par \par Since 09/2016, he has been experiencing intermittent headaches being treated with periodic dexamethasone taper.  MRI brain with perfusion analysis showed bilateral cerebellar lesions, favor postRT changes rather than residual tumor.  His most recent brain MRI (11/01/2016) showed overall  stable  exam  compared  to  the  previous  brain  MRI  9/2/2016; no  significant  interval  change  in  bilateral  cerebellar  heterogeneously  enhancing  masses  with  extensive  associated  edema,  as  well  as  smaller  lesions  within  the  left  parietal  white  matter,  superficial  left  temporal  lobe  and  left  dorsal  medulla; and, stable  mild  ventriculomegaly  and  mild  periventricular  FLAIR  signal  abnormality.  He has been following with our neurosurgeon, Dr. Britt, who has recommended no surgical intervention at this time. \par \par His most recent PETCT (11/01/2016) showed no sites of pathologic FDG uptake suspicious for biologic tumor activity.\par \par He reports occasional headaches and a mild sense of imbalance.  Denies any falls.  However he only takes a prn dexamethasone, once in every three days,  which reportedly relieves his intermittent headaches. \par Today he also complains mild pain in his left great toe, likely secondary to ingrowing toe nail. 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 He changed his insurance. Did not get scans and did not take his alectinib for about 1-2 weeks. We were not notified.  Otherwise she feels well she really requires dexamethasone yesterday headache probably once or twice a week as per patient no other complaints.\par \par 4/17/17\par He presents for follow up today. He had MRI of brain that showed stable disease 4/8/2017. He has not had the PET CT yet. He received  his alecetinib on 4/4/17 and stated to take them. States his headaches are rare now and feels well otherwise.\par \par 5/15/17\par He presents for follow up. He had a PET CT on 4/21/17 that  was ucnhged.Since November 1, 2016, no new foci of pathologic FDG uptake to suggest\par biologic tumor activity. . 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 . Unchanged non-FDG avid sclerotic foci involving the right proximal humerus\par and T9 vertebral body.\par \par He  feels well. No dizziness or headache.  No new symptoms or complaints.\par \par 6/19/17\par He is doing well. He has no complaints. No headaches. No SOB. \par \par 7/24/17\par Patient comes in for follow up visit, has no complaints, has no SOB and has a mild cough which is chronic, no hemoptysis. Patient states his appetite is good, weight is stable, he has no headaches and is currently off the steroids. Patient will have repeat PET scan and MRI of the brain with contrast as well as repeat bloodwork. \par \par 8/21/17\par He is here for follow up. He had an MR brain and PET CT on 8/3 and 8/2 that did not show progression, were essentially unchanged.  He feels great otherwise.\par \par 9/21/17\par He is doing well. No complaints. No headaches\par \par October 26, 2017\par He see her for followup he feels great he has no complaints.\par \par 11/16/17\par He is here for follow up. He had an MR of brain on 11/14/17: New areas of nodular enhancement along the inferior surfaces of the temporal lobes bilaterally. These are relatively symmetric and just above the level of the cerebellar enhancing masses, suggesting that these may reflect post treatment change.\par He had to reschedule his PET and states he will do it next Wednesday.\par \par No complaints.\par \par 12/26/17\par He is here for follow up. He feels well. he  had PET/CT in end of November that does not show procession, stable pleural findings. He  feels well. \par \par 1/22/18\par He is here for follow up for his NSCLCA. He states he feels well. No SOB, no headaches no fatigue.. \par \par February 21 2018\par  he is here for followup, he had a recent PET/CT scan which is PRESLEY he also had a recent MRI of the brain. This was reviewed at the CNS tumor board although wasn't clear but the consensus was that this is posttreatment changes in the fact that he is asymptomatic and decided to observe him.\par \par He has no new complaints no headaches no weakness or tingling.\par \par \par 3/21/18\par He is here for follow up. HE states he is doing well. Has very mild headaches on occasion but otherwise doing well.\par \par 4/13/18\par He is doing well. Reports no new complaints \par \par 5/3/18\par He is here for follow up to discuss his MR brain. HE complains of mild headache. Same memory issues as before. Balance is the same.  MR brain showed : Increased size of right parietotemporal of the findings enhancement  seen lesion measuring 5.2 cm, previously 2.8 centimeters and increased  size of left inferior temporal lobe mass measuring 2.3 cm, previously 1.6  cm. these areas again demonstrate hypoperfusion and may represent post  therapeutic changes.   2.  Significant increased edema and mass effect within the right  hemisphere with 9 mm right to left midline shift.  3.  No new enhancing lesions. Stable additional bilateral cerebellar and  supratentorial lesions.\par \par I spoke with Dr. 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 He developed severe headaches, some difficulty with speech and balance issues. 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 He has no insurance so my pharmacy has been giving  him a weekly supply while we work on it. He did not have his scans yet. He feel much better now except for dizziness\par \par 5/30/18\par He is feeling much better. His neurological complaints resolved. He stopped steroids on Monday since he had swelling in legs that has resolved. \par \par 6/13/18\par He is here for follow up. he had a PET/CT on 6/7/18 that showed These images reveal, when comparing the FDG brain images to MRI of  4/27/2018 series 10, there is one small focus of increased FDG uptake in  the region of the right parietotemporal lobe lesion (series 12 image 34).  Therefore persistent biologic tumor activity in this region cannot be  excluded.  No focal abnormal FDG uptake corresponding with the left inferior  temporal lobe lesion and both cerebellar lesions. In addition most of the  right parietotemporal lobe lesion is not FDG avid Compared to normal brain glucose metabolism in the thalamus (surrogate  for normal brain metabolism) relative hypometabolism of the right  parietal, posterior parietal and occipital region and relative  hypermetabolism of the left posterior parietal region  Persistent posttreatment FDG uptake (SUV up to 11.2) along the pleural  surface of the left hemithorax consistent with posttreatment changes  associated with talc pleurodesis  One new mildly FDG avid soft tissue nodule (SUV 3.3) in the left buttock \par \par He feels well now ,no headaches.\par \par 7/16/18\par He is here for follow up. doing well. He has no headaches. No new complaints\par \par 8/13/18\par He is here for follow up.  He is doing well. has no complaints. He is considering returning to work.\par \par 9/10/18\par He is here for follow up. He had an MR brain on 8/24/18 that showed In comparison to the previous brain MRI dated 4/27/2018:  1.  No significant interval change in the size and configuration of the  heterogeneously enhancing, necrotic and hemorrhagic lesions within the  temporal/occipital lobes and cerebellar hemispheres. Enhancement pattern  suggests post-therapeutic changes.  2.  Moderately increased edema within the posterior fossa (cerebellar  hemispheres and brainstem). Increased mass effect upon the fourth  ventricle with no evidence of hydrocephalus.  3.  The left cerebral hemispheric edema has mildly increased and the  right cerebral hemispheric edema has mildly decreased.   4.  Resolution of the previously seen right to left midline shift, right  lateral ventricular compression and left lateral ventricular enlargement.  5.  Treated lesions again noted in the left frontal white matter and left  lateral medulla, without contrast enhancement.\par \par HE is doing well has no new complaints. Has no neuroglial complaints. \par \par 10/10/18\par He is here for follow up he had PET/CT on  9/28/18 that was PRESLEY. He no has headaches again. States he is voiding multiple times a day and unable to empty bladder fully.\par \par 10/30/18\par He is here for follow up. he was in the ED for.Left lower quadrant pain symptoms her was given cirpo. he had a Ct abd/pelvis in ED 10/22/18 : Stable posttreatment changes of the left hemithorax including large  loculated pleural effusion.  2.  Multiple nondilated fluid-filled small bowel loops which may reflect  evidence of enteritis.  \par He had MR brain on 10/29/18:  No new lesions, but several existing lesions have increased in size  2. Bilateral hemorrhagic cerebellar lesions and edema has increased. Mass  effect on the fourth ventricle with hydrocephalus.   2.  Essentially unchanged sizes of supratentorial lesions within the  bilateral temporooccipital lobes and mildly improved edema\par \par He is having headaches and more confusion. He took 4 flomax in am and PM instead of his alecensa. I called [facility] with him and they told us they did not deliver his pills since they could not reach him but will deliver them tomorrow. He will be home. He states he had alecensa at home but not sure.\par \par 11/12/18\par Patient presents to the office with a chief complaint of burning in his right thigh.  He has had no new medications.  The patient states it is worse in the evening but lasts all day.  The patient states that the burning sensation starts at the right knee and is ascending.  Straight leg raise negative.  Discussed that it is unlikely that it is from his Avastin treatment, but unsure the etiology.  We will send for LE doppler to rule out any DVT.\par \par 11/27/18\par He is here for follow up. His Dopplers  were negative and his pain/cramps are nearly gone only some in right thigh. His headaches are gone and he is speech is  better and states memory is better since we started Avastin.\par \par 1/9/19\par He is doing well.  He has no more pain in his legs. He is eating well. He is due for 4/4 dose of Avastin. No other complains.\par \par 2/6/19\par He is here for follow-up.  He generally feels well.  He states he still suffers from headaches.  His memory has improved minimally.\par PET/CT 2/4/19 IMPRESSION: Since 9/28/2018, No definite sites of abnormal FDG uptake to suggest biologic tumor activity. Cerebellar lesions seen on brain MRI from 1/20/2019, are not FDG avid and consistent with treated metastases. Post talc pleurodesis of the left pleura, unchanged. \par MR Brain 1/20/19 IMPRESSION: In comparison to the previous brain MRI dated 10/29/2018: 1. No significant interval change in the size and configuration of the heterogeneously enhancing, necrotic and hemorrhagic lesions within the temporal/occipital lobes and cerebellar hemispheres. 2. The edema within the brainstem and cerebellum is about stable;however there is slight progression of the hydrocephalus since the prior exam. 3. The edema within the temporal and occipital lobes has decreased. 4. New patchy area of restricted diffusion around the left temporal horn (without corresponding enhancement) may reflect superimposed ischemic changes. Recommend attention on follow-up imaging. \par \par 3/6/19\par Patient is here for a follow-up visit.  He states he is having increased frequency of headaches with minimal relief from Tylenol.  He is now staying in the shelter on [address] with his daughter.  He will  his [facility] from [facility] from now on.  We are awaiting arrangement for him to get an apartment.\par \par 3/27/19\par He is here for follow up. his headache is better and he takes his Dexamethasone only when he has it which helps. Last PET was in February and last MRI was in January.\par \par 4/29/19\par He is here for follow-up.  Since last visit, he presented to the ED in 4/2019 due to chest pain.  He underwent CTA chest that demonstrated unchanged left pleural effusion. Likely radiation induced necrosis changes, he states it may have been related to stress related to his daughter.  He is still awaiting placement from the shelter.  He reports his usual frequency headaches, but he takes the Decadron as needed which helps.  \par CTA Chest (4/7/19) IMPRESSION:No pulmonary emboli.20.9 cm pleural fluid collection in the left hemithorax, essentially unchanged since PET/CT dated 2/4/2019.\par MR Brain (4/2/19) IMPRESSION: Since January 20, 2019:New worse signal abnormality involving the brain and volodymyr with increased edema and worse effacement of the fourth ventricle and resulting mild hydrocephalus.Slightly worse edema involving the cerebellum.Stable bilateral posterior temporal/occipital/cerebellar heterogeneously enhancing lesions.Stable left posterior frontal deep white matter and left dorsal medullary hypointense foci, compatible with treated lesions.\par \par 5/22/19\par He came in for follow up. He has been complaining of headaches mainly in AM. He does not think dexamethasone is helping anymore. No other issues.\par \par 6/3/19\par He is here for follow up. HIs headacehs are the same and uses dexamethasone 4 mg almost dialy. He had  CT C/A/P that showed a stable effusion 5/26/19 amd MR brain showed  5/29/19: Overall no significant changes compared with the previous brain MRI dated \par 4/2/2019:\par \par 1.  No significant interval change in the size and configuration of the \par heterogeneously enhancing, necrotic and hemorrhagic lesions within the \par temporal/occipital lobes and cerebellar hemispheres.\par \par 2. Extensive edema within the cerebellar hemispheres, brainstem, parietal \par and temporal lobes is again seen. Patchy areas of enhancement are noted \par in this region. Appearance is most suggestive of posttreatment changes.\par \par 3. Stable patchy area of restricted diffusion around the left temporal \par horn again seen, nonspecific.\par \par 4. No new enhancing lesions identified.\par \par 6/24/19\par He is here for follow up. he missed his Avastin since he had family issues. Still has headaches they are the same. Will restart Avastin today,.\par \par 7/15/19\par He is here for follow up. He could not tell me if headaches are better. He does use steroids once  in  a while for headaches. States memory is the same. \par \par \par 8/14/19\par He is doing better. He Missed his Avastin doses. But he has no headaches and does not use steroids. He had a fall  and hit a rail on his chest prior to his CT scans and the finding in his CT chest is probably related to the trauma he still has pain in the area but its better. CT C/A/P and MR brain from august are bellow\par \par IMPRESSION:\par \par Since April 7, 2019\par \par Stable left pleural fluid collection measuring 9.5 x 14.1 by 18.4 cm, \par with stable compressive atelectasis of left lung and left hemidiaphragm \par inversion.\par \par New, indeterminate approximate 8 mm soft tissue density, left anterior \par chest (series 4/104).\par \par Interval development of approximate 3.6 x 1.8 by 3.9 cm homogeneous \par elliptical shaped structure, left epicardial fat pad (4/206), possibly \par proteinaceous material (HU +35).\par \par \par IMPRESSION:\par \par Within the left cardiophrenic angle, there is a new area of lobulated \par soft tissue measuring 4.0 x 2.5 cm suspicious for new metastatic disease.\par \par Please see chest CT report for chest findings\par \par IMPRESSION:\par \par Overall no significant changes compared with the previous brain MRI dated \par 5/29/2019:\par \par 1.  No significant interval change in the size and configuration of the \par heterogeneously enhancing, necrotic and hemorrhagic lesions within the \par temporal/occipital lobes and cerebellar hemispheres.\par \par 2. Extensive edema within the cerebellar hemispheres, brainstem, parietal \par and temporal lobes is again seen. Patchy areas of enhancement are noted \par in this region. Appearance is most suggestive of posttreatment changes.\par \par 3. No new enhancing lesions identified.\par \par 10/01/19\par Pt presented today for routine follow up visit. He reports feeling fine and has no new complaints . Pt reports no new symptoms of weakness , headaches and confusion and is well compliant with treatment . \par \par \par 11/11/19\par He is here for follow up.   He feels well. No headaches.  Has a runny nose. No pain. No SOB. He gained a few  pounds.\par \par 12/9/19\par He is here for follow up. He had his MR brain on  11/16/19IMPRESSION: \par 1. Overall no significant changes compared with the previous brain MRI dated 8/5/2019. \par 2. Specifically, the heterogeneously enhancing, necrotic and hemorrhagic lesions within the temporal/occipital \par lobes and cerebellar hemispheres; with extensive associated edema and patchy enhancement are not \par significantly changed and most compatible with posttreatment changes. \par 3. No new enhancing lesions identified.\par \par He did not do his CTs yet. \par \par He feels well. No headaches. \par \par 1/13/2020\par Patient is here for a follow-up visit for metastatic lung cancer.  He is feeling well with no new complaints.  Most recent CBC is stable.  Patient denies fever, chills, nausea, vomiting, new pain or bleeding.  We discussed that based on most recent imaging, it may be suggestive that he is progressing on current treatment regimen.  \par PET/CT (1.7.20) IMPRESSION: Since February 4, 2019:1. Redemonstration of large left pleural fluid collection with circumferential FDG uptake along the pleural surface and associated calcifications (max SUV up to 18, [id number]), consistent with posttreatment change related to talc pleurodesis. New 5.8 x 3.5 cm lesion along the left posterior costophrenic angle, with peripheral FDG avidity, max SUV14. Given talc pleurodesis, findings may represent evolving inflammatory response, however neoplasm remains a consideration, and continued follow-up or tissue sampling recommended.2. Unchanged non-FDG avid sclerotic, treated osseous metastases in the right proximal humerus and T9 vertebral body with compression deformity.\par \par I presented him in tumor board and we decided to biopsy the new area. \par \par 2/4/2020-Interpretor services used as pt is Romansh speaking # [id number]\par  Dionisio is here for follow up complaining of b/l flank pain. No c/o fever, chills, dysuria, hematuria at home. No change in bowel habits. He describes dull pain b/l flank area that is mostly in the morning. Today during the office visit, he did not have much pain  oral

## 2020-02-19 ENCOUNTER — APPOINTMENT (OUTPATIENT)
Dept: HEMATOLOGY ONCOLOGY | Facility: CLINIC | Age: 45
End: 2020-02-19

## 2020-02-19 ENCOUNTER — OUTPATIENT (OUTPATIENT)
Dept: OUTPATIENT SERVICES | Facility: HOSPITAL | Age: 45
LOS: 1 days | Discharge: HOME | End: 2020-02-19
Payer: MEDICARE

## 2020-02-19 DIAGNOSIS — I67.89 OTHER CEREBROVASCULAR DISEASE: ICD-10-CM

## 2020-02-19 DIAGNOSIS — C78.00 SECONDARY MALIGNANT NEOPLASM OF UNSPECIFIED LUNG: ICD-10-CM

## 2020-02-19 PROCEDURE — 93010 ELECTROCARDIOGRAM REPORT: CPT

## 2020-03-01 ENCOUNTER — OUTPATIENT (OUTPATIENT)
Dept: OUTPATIENT SERVICES | Facility: HOSPITAL | Age: 45
LOS: 1 days | End: 2020-03-01
Payer: MEDICARE

## 2020-03-01 ENCOUNTER — OUTPATIENT (OUTPATIENT)
Dept: OUTPATIENT SERVICES | Facility: HOSPITAL | Age: 45
LOS: 1 days | End: 2020-03-01

## 2020-03-01 PROCEDURE — G9001: CPT

## 2020-03-09 ENCOUNTER — FORM ENCOUNTER (OUTPATIENT)
Age: 45
End: 2020-03-09

## 2020-03-10 ENCOUNTER — RESULT REVIEW (OUTPATIENT)
Age: 45
End: 2020-03-10

## 2020-03-10 ENCOUNTER — OUTPATIENT (OUTPATIENT)
Dept: OUTPATIENT SERVICES | Facility: HOSPITAL | Age: 45
LOS: 1 days | Discharge: HOME | End: 2020-03-10
Payer: MEDICARE

## 2020-03-10 PROCEDURE — 88305 TISSUE EXAM BY PATHOLOGIST: CPT | Mod: 26

## 2020-03-10 PROCEDURE — 77012 CT SCAN FOR NEEDLE BIOPSY: CPT | Mod: 26

## 2020-03-10 PROCEDURE — 88344 IMHCHEM/IMCYTCHM EA MLT ANTB: CPT | Mod: 26,59

## 2020-03-10 PROCEDURE — 99152 MOD SED SAME PHYS/QHP 5/>YRS: CPT

## 2020-03-10 PROCEDURE — 88342 IMHCHEM/IMCYTCHM 1ST ANTB: CPT | Mod: 26,59

## 2020-03-10 PROCEDURE — 88341 IMHCHEM/IMCYTCHM EA ADD ANTB: CPT | Mod: 26,59

## 2020-03-10 PROCEDURE — 88172 CYTP DX EVAL FNA 1ST EA SITE: CPT | Mod: 26

## 2020-03-10 PROCEDURE — 32405: CPT | Mod: LT

## 2020-03-11 ENCOUNTER — APPOINTMENT (OUTPATIENT)
Dept: HEMATOLOGY ONCOLOGY | Facility: CLINIC | Age: 45
End: 2020-03-11

## 2020-03-12 LAB — NON-GYNECOLOGICAL CYTOLOGY STUDY: SIGNIFICANT CHANGE UP

## 2020-03-16 DIAGNOSIS — J94.8 OTHER SPECIFIED PLEURAL CONDITIONS: ICD-10-CM

## 2020-03-16 DIAGNOSIS — Z92.21 PERSONAL HISTORY OF ANTINEOPLASTIC CHEMOTHERAPY: ICD-10-CM

## 2020-03-16 DIAGNOSIS — N40.0 BENIGN PROSTATIC HYPERPLASIA WITHOUT LOWER URINARY TRACT SYMPTOMS: ICD-10-CM

## 2020-03-16 DIAGNOSIS — C38.4 MALIGNANT NEOPLASM OF PLEURA: ICD-10-CM

## 2020-03-16 DIAGNOSIS — Z88.0 ALLERGY STATUS TO PENICILLIN: ICD-10-CM

## 2020-03-16 DIAGNOSIS — Z85.118 PERSONAL HISTORY OF OTHER MALIGNANT NEOPLASM OF BRONCHUS AND LUNG: ICD-10-CM

## 2020-03-17 ENCOUNTER — APPOINTMENT (OUTPATIENT)
Dept: HEMATOLOGY ONCOLOGY | Facility: CLINIC | Age: 45
End: 2020-03-17
Payer: MEDICARE

## 2020-03-17 ENCOUNTER — LABORATORY RESULT (OUTPATIENT)
Age: 45
End: 2020-03-17

## 2020-03-17 VITALS
HEART RATE: 73 BPM | BODY MASS INDEX: 32.02 KG/M2 | SYSTOLIC BLOOD PRESSURE: 130 MMHG | RESPIRATION RATE: 14 BRPM | TEMPERATURE: 97.8 F | HEIGHT: 67 IN | WEIGHT: 204 LBS | DIASTOLIC BLOOD PRESSURE: 76 MMHG

## 2020-03-17 PROCEDURE — 99214 OFFICE O/P EST MOD 30 MIN: CPT

## 2020-03-17 RX ORDER — ALECTINIB HYDROCHLORIDE 150 MG/1
150 CAPSULE ORAL
Refills: 0 | Status: COMPLETED | COMMUNITY
End: 2020-03-17

## 2020-03-17 RX ORDER — ALECTINIB HYDROCHLORIDE 150 MG/1
150 CAPSULE ORAL
Qty: 120 | Refills: 0 | Status: COMPLETED | COMMUNITY
Start: 2017-09-21 | End: 2020-03-17

## 2020-03-17 NOTE — HISTORY OF PRESENT ILLNESS
[Disease: _____________________] : Disease: [unfilled] [AJCC Stage: ____] : AJCC Stage: [unfilled] [Treatment Protocol] : Treatment Protocol [de-identified] : 41/M presenting for follow-up for metastatic ALK-positive lung adenocarcinoma.  He is currently on alectinib 600 mg BID.  He initially presented in 11/2014 with multiple pulmonary nodules, mediastinal lymphadenopathy, multiple bony metastases, and brain metastases.  He was initially started on crizotinib and was switched to ceritinib in 12/2015 upon progression.  He was unable to tolerate ceritinib due to significant vomiting and was switched to alectinib in 02/2016.\par \par He had SRS to a left cerebellar hemisphere brain metastasis (08/2015) and subsequently underwent WBRT for CNS progression (10-11/2015).  He had RT to a painful metastatic lesion in the right humerus (01/2015).  He had also received Xgeva for bony metastatic disease in the past. \par \par Since 09/2016, he has been experiencing intermittent headaches being treated with periodic dexamethasone taper.  MRI brain with perfusion analysis showed bilateral cerebellar lesions, favor postRT changes rather than residual tumor.  His most recent brain MRI (11/01/2016) showed overall  stable  exam  compared  to  the  previous  brain  MRI  9/2/2016; no  significant  interval  change  in  bilateral  cerebellar  heterogeneously  enhancing  masses  with  extensive  associated  edema,  as  well  as  smaller  lesions  within  the  left  parietal  white  matter,  superficial  left  temporal  lobe  and  left  dorsal  medulla; and, stable  mild  ventriculomegaly  and  mild  periventricular  FLAIR  signal  abnormality.  He has been following with our neurosurgeon, Dr. Britt, who has recommended no surgical intervention at this time. \par \par His most recent PETCT (11/01/2016) showed no sites of pathologic FDG uptake suspicious for biologic tumor activity.\par \par He reports occasional headaches and a mild sense of imbalance.  Denies any falls.  However he only takes a prn dexamethasone, once in every three days,  which reportedly relieves his intermittent headaches. \par Today he also complains mild pain in his left great toe, likely secondary to ingrowing toe nail. 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 He changed his insurance. Did not get scans and did not take his alectinib for about 1-2 weeks. We were not notified.  Otherwise she feels well she really requires dexamethasone yesterday headache probably once or twice a week as per patient no other complaints.\par \par 4/17/17\par He presents for follow up today. He had MRI of brain that showed stable disease 4/8/2017. He has not had the PET CT yet. He received  his alecetinib on 4/4/17 and stated to take them. States his headaches are rare now and feels well otherwise.\par \par 5/15/17\par He presents for follow up. He had a PET CT on 4/21/17 that  was ucnhged.Since November 1, 2016, no new foci of pathologic FDG uptake to suggest\par biologic tumor activity. . 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 . Unchanged non-FDG avid sclerotic foci involving the right proximal humerus\par and T9 vertebral body.\par \par He  feels well. No dizziness or headache.  No new symptoms or complaints.\par \par 6/19/17\par He is doing well. He has no complaints. No headaches. No SOB. \par \par 7/24/17\par Patient comes in for follow up visit, has no complaints, has no SOB and has a mild cough which is chronic, no hemoptysis. Patient states his appetite is good, weight is stable, he has no headaches and is currently off the steroids. Patient will have repeat PET scan and MRI of the brain with contrast as well as repeat bloodwork. \par \par 8/21/17\par He is here for follow up. He had an MR brain and PET CT on 8/3 and 8/2 that did not show progression, were essentially unchanged.  He feels great otherwise.\par \par 9/21/17\par He is doing well. No complaints. No headaches\par \par October 26, 2017\par He see her for followup he feels great he has no complaints.\par \par 11/16/17\par He is here for follow up. He had an MR of brain on 11/14/17: New areas of nodular enhancement along the inferior surfaces of the temporal lobes bilaterally. These are relatively symmetric and just above the level of the cerebellar enhancing masses, suggesting that these may reflect post treatment change.\par He had to reschedule his PET and states he will do it next Wednesday.\par \par No complaints.\par \par 12/26/17\par He is here for follow up. He feels well. he  had PET/CT in end of November that does not show procession, stable pleural findings. He  feels well. \par \par 1/22/18\par He is here for follow up for his NSCLCA. He states he feels well. No SOB, no headaches no fatigue.. \par \par February 21 2018\par  he is here for followup, he had a recent PET/CT scan which is PRESLEY he also had a recent MRI of the brain. This was reviewed at the CNS tumor board although wasn't clear but the consensus was that this is posttreatment changes in the fact that he is asymptomatic and decided to observe him.\par \par He has no new complaints no headaches no weakness or tingling.\par \par \par 3/21/18\par He is here for follow up. HE states he is doing well. Has very mild headaches on occasion but otherwise doing well.\par \par 4/13/18\par He is doing well. Reports no new complaints \par \par 5/3/18\par He is here for follow up to discuss his MR brain. HE complains of mild headache. Same memory issues as before. Balance is the same.  MR brain showed : Increased size of right parietotemporal of the findings enhancement  seen lesion measuring 5.2 cm, previously 2.8 centimeters and increased  size of left inferior temporal lobe mass measuring 2.3 cm, previously 1.6  cm. these areas again demonstrate hypoperfusion and may represent post  therapeutic changes.   2.  Significant increased edema and mass effect within the right  hemisphere with 9 mm right to left midline shift.  3.  No new enhancing lesions. Stable additional bilateral cerebellar and  supratentorial lesions.\par \par I spoke with Dr. 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 He developed severe headaches, some difficulty with speech and balance issues. 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 He has no insurance so my pharmacy has been giving  him a weekly supply while we work on it. He did not have his scans yet. He feel much better now except for dizziness\par \par 5/30/18\par He is feeling much better. His neurological complaints resolved. He stopped steroids on Monday since he had swelling in legs that has resolved. \par \par 6/13/18\par He is here for follow up. he had a PET/CT on 6/7/18 that showed These images reveal, when comparing the FDG brain images to MRI of  4/27/2018 series 10, there is one small focus of increased FDG uptake in  the region of the right parietotemporal lobe lesion (series 12 image 34).  Therefore persistent biologic tumor activity in this region cannot be  excluded.  No focal abnormal FDG uptake corresponding with the left inferior  temporal lobe lesion and both cerebellar lesions. In addition most of the  right parietotemporal lobe lesion is not FDG avid Compared to normal brain glucose metabolism in the thalamus (surrogate  for normal brain metabolism) relative hypometabolism of the right  parietal, posterior parietal and occipital region and relative  hypermetabolism of the left posterior parietal region  Persistent posttreatment FDG uptake (SUV up to 11.2) along the pleural  surface of the left hemithorax consistent with posttreatment changes  associated with talc pleurodesis  One new mildly FDG avid soft tissue nodule (SUV 3.3) in the left buttock \par \par He feels well now ,no headaches.\par \par 7/16/18\par He is here for follow up. doing well. He has no headaches. No new complaints\par \par 8/13/18\par He is here for follow up.  He is doing well. has no complaints. He is considering returning to work.\par \par 9/10/18\par He is here for follow up. He had an MR brain on 8/24/18 that showed In comparison to the previous brain MRI dated 4/27/2018:  1.  No significant interval change in the size and configuration of the  heterogeneously enhancing, necrotic and hemorrhagic lesions within the  temporal/occipital lobes and cerebellar hemispheres. Enhancement pattern  suggests post-therapeutic changes.  2.  Moderately increased edema within the posterior fossa (cerebellar  hemispheres and brainstem). Increased mass effect upon the fourth  ventricle with no evidence of hydrocephalus.  3.  The left cerebral hemispheric edema has mildly increased and the  right cerebral hemispheric edema has mildly decreased.   4.  Resolution of the previously seen right to left midline shift, right  lateral ventricular compression and left lateral ventricular enlargement.  5.  Treated lesions again noted in the left frontal white matter and left  lateral medulla, without contrast enhancement.\par \par HE is doing well has no new complaints. Has no neuroglial complaints. \par \par 10/10/18\par He is here for follow up he had PET/CT on  9/28/18 that was PRESLEY. He no has headaches again. States he is voiding multiple times a day and unable to empty bladder fully.\par \par 10/30/18\par He is here for follow up. he was in the ED for.Left lower quadrant pain symptoms her was given cirpo. he had a Ct abd/pelvis in ED 10/22/18 : Stable posttreatment changes of the left hemithorax including large  loculated pleural effusion.  2.  Multiple nondilated fluid-filled small bowel loops which may reflect  evidence of enteritis.  \par He had MR brain on 10/29/18:  No new lesions, but several existing lesions have increased in size  2. Bilateral hemorrhagic cerebellar lesions and edema has increased. Mass  effect on the fourth ventricle with hydrocephalus.   2.  Essentially unchanged sizes of supratentorial lesions within the  bilateral temporooccipital lobes and mildly improved edema\par \par He is having headaches and more confusion. He took 4 flomax in am and PM instead of his alecensa. I called [facility] with him and they told us they did not deliver his pills since they could not reach him but will deliver them tomorrow. He will be home. He states he had alecensa at home but not sure.\par \par 11/12/18\par Patient presents to the office with a chief complaint of burning in his right thigh.  He has had no new medications.  The patient states it is worse in the evening but lasts all day.  The patient states that the burning sensation starts at the right knee and is ascending.  Straight leg raise negative.  Discussed that it is unlikely that it is from his Avastin treatment, but unsure the etiology.  We will send for LE doppler to rule out any DVT.\par \par 11/27/18\par He is here for follow up. His Dopplers  were negative and his pain/cramps are nearly gone only some in right thigh. His headaches are gone and he is speech is  better and states memory is better since we started Avastin.\par \par 1/9/19\par He is doing well.  He has no more pain in his legs. He is eating well. He is due for 4/4 dose of Avastin. No other complains.\par \par 2/6/19\par He is here for follow-up.  He generally feels well.  He states he still suffers from headaches.  His memory has improved minimally.\par PET/CT 2/4/19 IMPRESSION: Since 9/28/2018, No definite sites of abnormal FDG uptake to suggest biologic tumor activity. Cerebellar lesions seen on brain MRI from 1/20/2019, are not FDG avid and consistent with treated metastases. Post talc pleurodesis of the left pleura, unchanged. \par MR Brain 1/20/19 IMPRESSION: In comparison to the previous brain MRI dated 10/29/2018: 1. No significant interval change in the size and configuration of the heterogeneously enhancing, necrotic and hemorrhagic lesions within the temporal/occipital lobes and cerebellar hemispheres. 2. The edema within the brainstem and cerebellum is about stable;however there is slight progression of the hydrocephalus since the prior exam. 3. The edema within the temporal and occipital lobes has decreased. 4. New patchy area of restricted diffusion around the left temporal horn (without corresponding enhancement) may reflect superimposed ischemic changes. Recommend attention on follow-up imaging. \par \par 3/6/19\par Patient is here for a follow-up visit.  He states he is having increased frequency of headaches with minimal relief from Tylenol.  He is now staying in the shelter on [address] with his daughter.  He will  his medications from [facility] from now on.  We are awaiting arrangement for him to get an apartment.\par \par 3/27/19\par He is here for follow up. his headache is better and he takes his Dexamethasone only when he has it which helps. Last PET was in February and last MRI was in January.\par \par 4/29/19\par He is here for follow-up.  Since last visit, he presented to the ED in 4/2019 due to chest pain.  He underwent CTA chest that demonstrated unchanged left pleural effusion. Likely radiation induced necrosis changes, he states it may have been related to stress related to his daughter.  He is still awaiting placement from the shelter.  He reports his usual frequency headaches, but he takes the Decadron as needed which helps.  \par CTA Chest (4/7/19) IMPRESSION:No pulmonary emboli.20.9 cm pleural fluid collection in the left hemithorax, essentially unchanged since PET/CT dated 2/4/2019.\par MR Brain (4/2/19) IMPRESSION: Since January 20, 2019:New worse signal abnormality involving the brain and volodymyr with increased edema and worse effacement of the fourth ventricle and resulting mild hydrocephalus.Slightly worse edema involving the cerebellum.Stable bilateral posterior temporal/occipital/cerebellar heterogeneously enhancing lesions.Stable left posterior frontal deep white matter and left dorsal medullary hypointense foci, compatible with treated lesions.\par \par 5/22/19\par He came in for follow up. He has been complaining of headaches mainly in AM. He does not think dexamethasone is helping anymore. No other issues.\par \par 6/3/19\par He is here for follow up. HIs headacehs are the same and uses dexamethasone 4 mg almost dialy. He had  CT C/A/P that showed a stable effusion 5/26/19 amd MR brain showed  5/29/19: Overall no significant changes compared with the previous brain MRI dated \par 4/2/2019:\par \par 1.  No significant interval change in the size and configuration of the \par heterogeneously enhancing, necrotic and hemorrhagic lesions within the \par temporal/occipital lobes and cerebellar hemispheres.\par \par 2. Extensive edema within the cerebellar hemispheres, brainstem, parietal \par and temporal lobes is again seen. Patchy areas of enhancement are noted \par in this region. Appearance is most suggestive of posttreatment changes.\par \par 3. Stable patchy area of restricted diffusion around the left temporal \par horn again seen, nonspecific.\par \par 4. No new enhancing lesions identified.\par \par 6/24/19\par He is here for follow up. he missed his Avastin since he had family issues. Still has headaches they are the same. Will restart Avastin today,.\par \par 7/15/19\par He is here for follow up. He could not tell me if headaches are better. He does use steroids once  in  a while for headaches. States memory is the same. \par \par \par 8/14/19\par He is doing better. He Missed his Avastin doses. But he has no headaches and does not use steroids. He had a fall  and hit a rail on his chest prior to his CT scans and the finding in his CT chest is probably related to the trauma he still has pain in the area but its better. CT C/A/P and MR brain from august are bellow\par \par IMPRESSION:\par \par Since April 7, 2019\par \par Stable left pleural fluid collection measuring 9.5 x 14.1 by 18.4 cm, \par with stable compressive atelectasis of left lung and left hemidiaphragm \par inversion.\par \par New, indeterminate approximate 8 mm soft tissue density, left anterior \par chest (series 4/104).\par \par Interval development of approximate 3.6 x 1.8 by 3.9 cm homogeneous \par elliptical shaped structure, left epicardial fat pad (4/206), possibly \par proteinaceous material (HU +35).\par \par \par IMPRESSION:\par \par Within the left cardiophrenic angle, there is a new area of lobulated \par soft tissue measuring 4.0 x 2.5 cm suspicious for new metastatic disease.\par \par Please see chest CT report for chest findings\par \par IMPRESSION:\par \par Overall no significant changes compared with the previous brain MRI dated \par 5/29/2019:\par \par 1.  No significant interval change in the size and configuration of the \par heterogeneously enhancing, necrotic and hemorrhagic lesions within the \par temporal/occipital lobes and cerebellar hemispheres.\par \par 2. Extensive edema within the cerebellar hemispheres, brainstem, parietal \par and temporal lobes is again seen. Patchy areas of enhancement are noted \par in this region. Appearance is most suggestive of posttreatment changes.\par \par 3. No new enhancing lesions identified.\par \par 10/01/19\par Pt presented today for routine follow up visit. He reports feeling fine and has no new complaints . Pt reports no new symptoms of weakness , headaches and confusion and is well compliant with treatment . \par \par \par 11/11/19\par He is here for follow up.   He feels well. No headaches.  Has a runny nose. No pain. No SOB. He gained a few  pounds.\par \par 12/9/19\par He is here for follow up. He had his MR brain on  11/16/19IMPRESSION: \par 1. Overall no significant changes compared with the previous brain MRI dated 8/5/2019. \par 2. Specifically, the heterogeneously enhancing, necrotic and hemorrhagic lesions within the temporal/occipital \par lobes and cerebellar hemispheres; with extensive associated edema and patchy enhancement are not \par significantly changed and most compatible with posttreatment changes. \par 3. No new enhancing lesions identified.\par \par He did not do his CTs yet. \par \par He feels well. No headaches. \par \par 1/13/2020\par Patient is here for a follow-up visit for metastatic lung cancer.  He is feeling well with no new complaints.  Most recent CBC is stable.  Patient denies fever, chills, nausea, vomiting, new pain or bleeding.  We discussed that based on most recent imaging, it may be suggestive that he is progressing on current treatment regimen.  \par PET/CT (1.7.20) IMPRESSION: Since February 4, 2019:1. Redemonstration of large left pleural fluid collection with circumferential FDG uptake along the pleural surface and associated calcifications (max SUV up to 18, [id number]), consistent with posttreatment change related to talc pleurodesis. New 5.8 x 3.5 cm lesion along the left posterior costophrenic angle, with peripheral FDG avidity, max SUV14. Given talc pleurodesis, findings may represent evolving inflammatory response, however neoplasm remains a consideration, and continued follow-up or tissue sampling recommended.2. Unchanged non-FDG avid sclerotic, treated osseous metastases in the right proximal humerus and T9 vertebral body with compression deformity.\par \par I presented him in tumor board and we decided to biopsy the new area. \par \par 2/4/2020-Interpretor services used as pt is Portuguese speaking # [id number]\par  Dionisio is here for follow up complaining of b/l flank pain. No c/o fever, chills, dysuria, hematuria at home. No change in bowel habits. He describes dull pain b/l flank area that is mostly in the morning. Today during the office visit, he did not have much pain \par \par 3/17/20\par He is here for follow up. He underwent the biopsy of the  left pleural mass on 3/12/20 that showed Poorly differentiated malignant neoplasm. IHC is pending.  he is doing well otherwise. has minimal headaches. Not dizzy

## 2020-03-17 NOTE — REVIEW OF SYSTEMS
[Negative] : Heme/Lymph [Fatigue] : no fatigue [Recent Change In Weight] : ~T no recent weight change [Confused] : no confusion [Dizziness] : no dizziness [Difficulty Walking] : no difficulty walking [FreeTextEntry9] : b/l flank pain  [de-identified] : memory deficit

## 2020-03-17 NOTE — ASSESSMENT
[Palliative] : Goals of care discussed with patient: Palliative [FreeTextEntry1] : 1.  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 Due to insurance issues he stopped it but now is back on it since 4/4/2017\par - repeat MRI brain 8/2018 radiation related changes, post treatment changes, he is asymptomatic,  and repeat PET/CT 6/5/18   is without systemic progression, it confirmed radiation necrosis. PET/CT on  9/28/18 that was PRESLEY. CT abd was unchanged. I think the findings on MR brain are radiation necrosis given his symptoms as before. Recent MR brain showed more edema but he is doing well. CT chest was PRESLEY. Thus MR brain findings most likely due to  radiation necrosis that were causing his headaches. \par - repeat PET/CT from 01/2020 shows new FDG avid lesions in the lung right under his left effusion on top of the diaphragm.\par - He underwent the biopsy of the  left pleural mass on 3/12/20 that showed Poorly differentiated malignant neoplasm. IHC is pending. \par -I spoke with Dr. Richardson and he is not a candidate for radiation to the site of progression.\par -I just spoke with Dr. Serna after Jaylen left, he called me back and this is  poorly differentiated  adeno  and ivan tart Lorlatinib\par -will recheck MR brain and CT C/A/P since imagign is now over 2 months.\par \par -in regards to Lorlatinib The FDA approval for lorlatinib is based on the results of a phase II study ([id number]) that included a subgroup of 198 patients with ALK-positive metastatic NSCLC previously treated with =1 ALK inhibitor.  The overall response rate with lorlatinib among these patients was 47 percent, with a complete response rate of 2 percent and a partial response rate of 45 percent. Efficacy according to prior treatment was as follows: post-crizotinib, KENNEY 73 percent, median PFS 11.1 months, and median duration of response not reached; post-one or more second-generation ALK inhibitors, KENNEY 40 percent, median PFS 6.9 months, median duration of response 7.1 months [66]. The most frequent adverse effects were hypercholesterolemia (81 percent), hypertriglyceridemia (61 percent), edema (43 percent), and peripheral neuropathy (30 percent). Serious treatment-related adverse effects occurred in 7 percent of patients, the most frequent being cognitive deficits in 1 percent. \par \par 2.   Intermittent headaches   not common anymore  this is due to  radiation necrosis. Was on avastin and dexam in past\par \par 3. Elevated total bilirubin likely secondary to alectinib.\par - will monitor, its stable\par \par 4.  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 \par MRI was stable Nov 2019, will recheck now.\par \par 5. LUTs symptoms better\par - PSA was normal  \par \par 6. His back pain seems to be MSK in origin\par -exam was benign \par -will repeat imaging in the future regardless, PET/CT in Jan did not show any new bone lesions\par \par Plan:\par - stop  Alectinib  start Lorlatinib side effects were dsicussed with Dionisio and paper info provided, will also call him again and let him kow. \par - Avastin on hold, he is much better in regards to his radiation necrosis\par - Next MR brain CT C/A/P  ordered \par -Awaiting biopsy of new lung lesion to make decide on treatment course\par \par RTC  in 2 weeks.

## 2020-03-17 NOTE — PHYSICAL EXAM
[Restricted in physically strenuous activity but ambulatory and able to carry out work of a light or sedentary nature] : Status 1- Restricted in physically strenuous activity but ambulatory and able to carry out work of a light or sedentary nature, e.g., light house work, office work [Normal] : affect appropriate [de-identified] : he has decrease breath sounds on left side base, unchanged. [de-identified] : No CVA tenderness, No spinal or paraspinal tenderness [de-identified] :  Gait is normal good strength  in all limbs [de-identified] : forgetful, but stable

## 2020-03-18 LAB
ALBUMIN SERPL ELPH-MCNC: 4.5 G/DL
ALP BLD-CCNC: 114 U/L
ALT SERPL-CCNC: 12 U/L
ANION GAP SERPL CALC-SCNC: 11 MMOL/L
AST SERPL-CCNC: 18 U/L
BILIRUB SERPL-MCNC: 0.9 MG/DL
BUN SERPL-MCNC: 11 MG/DL
CALCIUM SERPL-MCNC: 9.4 MG/DL
CHLORIDE SERPL-SCNC: 102 MMOL/L
CO2 SERPL-SCNC: 24 MMOL/L
CREAT SERPL-MCNC: 0.8 MG/DL
GLUCOSE SERPL-MCNC: 107 MG/DL
HCT VFR BLD CALC: 40.8 %
HGB BLD-MCNC: 13.5 G/DL
MCHC RBC-ENTMCNC: 29.2 PG
MCHC RBC-ENTMCNC: 33.1 G/DL
MCV RBC AUTO: 88.3 FL
PLATELET # BLD AUTO: 231 K/UL
PMV BLD: 10.5 FL
POTASSIUM SERPL-SCNC: 4.2 MMOL/L
PROT SERPL-MCNC: 7.4 G/DL
RBC # BLD: 4.62 M/UL
RBC # FLD: 15 %
SODIUM SERPL-SCNC: 137 MMOL/L
WBC # FLD AUTO: 7.76 K/UL

## 2020-03-20 ENCOUNTER — EMERGENCY (EMERGENCY)
Facility: HOSPITAL | Age: 45
LOS: 0 days | Discharge: HOME | End: 2020-03-20
Attending: EMERGENCY MEDICINE | Admitting: EMERGENCY MEDICINE
Payer: MEDICARE

## 2020-03-20 VITALS — RESPIRATION RATE: 17 BRPM | DIASTOLIC BLOOD PRESSURE: 72 MMHG | HEART RATE: 61 BPM | SYSTOLIC BLOOD PRESSURE: 122 MMHG

## 2020-03-20 VITALS
RESPIRATION RATE: 18 BRPM | DIASTOLIC BLOOD PRESSURE: 69 MMHG | HEART RATE: 83 BPM | TEMPERATURE: 99 F | SYSTOLIC BLOOD PRESSURE: 129 MMHG | OXYGEN SATURATION: 98 %

## 2020-03-20 DIAGNOSIS — R11.0 NAUSEA: ICD-10-CM

## 2020-03-20 DIAGNOSIS — R51 HEADACHE: ICD-10-CM

## 2020-03-20 DIAGNOSIS — Z88.1 ALLERGY STATUS TO OTHER ANTIBIOTIC AGENTS STATUS: ICD-10-CM

## 2020-03-20 DIAGNOSIS — Z92.21 PERSONAL HISTORY OF ANTINEOPLASTIC CHEMOTHERAPY: ICD-10-CM

## 2020-03-20 DIAGNOSIS — Z85.118 PERSONAL HISTORY OF OTHER MALIGNANT NEOPLASM OF BRONCHUS AND LUNG: ICD-10-CM

## 2020-03-20 PROCEDURE — 70450 CT HEAD/BRAIN W/O DYE: CPT | Mod: 26

## 2020-03-20 PROCEDURE — 99284 EMERGENCY DEPT VISIT MOD MDM: CPT

## 2020-03-20 RX ORDER — ONDANSETRON 8 MG/1
4 TABLET, FILM COATED ORAL ONCE
Refills: 0 | Status: COMPLETED | OUTPATIENT
Start: 2020-03-20 | End: 2020-03-20

## 2020-03-20 RX ORDER — SODIUM CHLORIDE 9 MG/ML
1000 INJECTION, SOLUTION INTRAVENOUS ONCE
Refills: 0 | Status: COMPLETED | OUTPATIENT
Start: 2020-03-20 | End: 2020-03-20

## 2020-03-20 RX ORDER — KETOROLAC TROMETHAMINE 30 MG/ML
15 SYRINGE (ML) INJECTION ONCE
Refills: 0 | Status: DISCONTINUED | OUTPATIENT
Start: 2020-03-20 | End: 2020-03-20

## 2020-03-20 RX ADMIN — Medication 15 MILLIGRAM(S): at 13:34

## 2020-03-20 RX ADMIN — ONDANSETRON 4 MILLIGRAM(S): 8 TABLET, FILM COATED ORAL at 13:33

## 2020-03-20 RX ADMIN — SODIUM CHLORIDE 1000 MILLILITER(S): 9 INJECTION, SOLUTION INTRAVENOUS at 13:34

## 2020-03-20 NOTE — ED ADULT NURSE NOTE - NSIMPLEMENTINTERV_GEN_ALL_ED
Implemented All Universal Safety Interventions:  [place] to call system. Call bell, personal items and telephone within reach. Instruct patient to call for assistance. Room bathroom lighting operational. Non-slip footwear when patient is off stretcher. Physically safe environment: no spills, clutter or unnecessary equipment. Stretcher in lowest position, wheels locked, appropriate side rails in place.

## 2020-03-20 NOTE — ED PROVIDER NOTE - NS ED ROS FT
Review of Systems:  	•	CONSTITUTIONAL - no fever, no diaphoresis  	•	SKIN - no rash, no lesions  	•	HEMATOLOGIC - no bleeding, no bruising  	•	EYES - no discharge, no injection  	•	ENT - no otorrhea, no rhinorrhea  	•	RESPIRATORY - no shortness of breath, no cough  	•	CARDIAC - no chest pain, no palpitations  	•	GI - no abd pain, +nausea, no vomiting, no diarrhea  	•	GENITO-URINARY - no dysuria, no hematuria  	•	MUSCULOSKELETAL - no joint pain, no swelling, no redness  	•	NEUROLOGIC - no dizziness, +headache

## 2020-03-20 NOTE — ED PROVIDER NOTE - NSFOLLOWUPINSTRUCTIONS_ED_ALL_ED_FT
Please follow up with your primary care doctor in 1-3 days for further evaluation. Return to the emergency department sooner for any new or worsening symptoms.        Headache    A headache is pain or discomfort felt around the head or neck area. The specific cause of a headache may not be found as there are many types including tension headaches, migraine headaches, and cluster headaches. Watch your condition for any changes. Things you can do to manage your pain include taking over the counter and prescription medications as instructed by your health care provider, lying down in a dark quiet room, limiting stress, getting regular sleep, and refraining from alcohol and tobacco products.    SEEK IMMEDIATE MEDICAL CARE IF YOU EXPERIENCE THE FOLLOWING SYMPTOMS: fever, vomiting, stiff neck, loss of vision, problems with speech, muscle weakness, loss of balance, trouble walking, pass out, or confusion.

## 2020-03-20 NOTE — ED PROVIDER NOTE - PHYSICAL EXAMINATION
Vital Signs: Reviewed  GEN: alert, NAD, speaks full sentences, sitting comfortably  HEAD:  normocephalic, atraumatic  EYES:  PERRLA, EOMI, no photophobia; conjunctivae without injection, drainage or discharge  ENMT: nasal mucosa moist; mouth moist without ulcerations or lesions; throat moist without erythema, exudate, ulcerations or lesions  NECK:  supple, no masses  CARDIAC:  regular rate, normal S1 and S2, no murmurs, rubs or gallops  RESP:  respiratory rate and effort appear normal for age; lungs are clear to auscultation bilaterally; no rales or wheezes  ABDOMEN:  soft, nontender, nondistended  MUSCULOSKELETAL/NEURO: CNII-XII intact, no dysmetria, no dysdiadokochinesis, strength 5/5 b/l UE LE, gait normal, Rhomberg neg; normal movement, normal tone  SKIN:  normal skin color for age and race, well-perfused; warm and dry

## 2020-03-20 NOTE — ED PROVIDER NOTE - PATIENT PORTAL LINK FT
You can access the FollowMyHealth Patient Portal offered by [facility] by registering at the following website: [email or web address]. By joining [facility]’s FollowMyHealth portal, you will also be able to view your health information using other applications (apps) compatible with our system.

## 2020-03-20 NOTE — ED PROVIDER NOTE - PROGRESS NOTE DETAILS
CT head negative for acute pathology, patient appears well, pain better, d/c home.,  Patient to be discharged from ED. Any available test results were discussed with patient and/or family. Verbal instructions given, including instructions to return to ED immediately for any new, worsening, or concerning symptoms. Patient endorsed understanding. Written discharge instructions additionally given, including follow-up plan.  Patient was given opportunity to ask questions. AG: pt reports improved headache, dizziness/nausea gone.

## 2020-03-20 NOTE — ED ADULT TRIAGE NOTE - CHIEF COMPLAINT QUOTE
nausea and headache since this morning. denies vomiting. pt on oral chemo pills for lung cancer nausea and headache since this morning. denies vomiting. pt on oral chemo pills for lung cancer. denies fever and cough.

## 2020-03-20 NOTE — ED PROVIDER NOTE - ATTENDING CONTRIBUTION TO CARE
43 yo male PMH lung cancer on oral chemo, baseline ataxia and dizziness since diagnosed with brain mets, occasional headaches c/o headache associated with mild nausea without vomiting for 3 days.  Pain is similar to prior, relieved with Tylenol, patient says HA is typically brought on by eating meat or emotional stress.  Denies fever, visual disturbances, vomiting, CP, abdominal pain, cough, SOB or any change in ambulation.  Well-appearing male, NAD, PERRL, supple neck,  nml work of breathing, abdomen soft, NT/ND, A&Ox3, ambulating with mild ataxia.  Will get CT head , treat pain, plan to d/c home.  Patient  is amenable with the plan.

## 2020-03-20 NOTE — ED ADULT NURSE NOTE - CHIEF COMPLAINT QUOTE
nausea and headache since this morning. denies vomiting. pt on oral chemo pills for lung cancer. denies fever and cough.

## 2020-03-20 NOTE — ED PROVIDER NOTE - OBJECTIVE STATEMENT
44yM pmhx NSCLC w/ mets to brain c/o headache and nausea x1 day, feels like he has to vomit but has not vomited; headache feels like ones he's had in the past, gradual onset, waxing and waning, took tylenol earlier w/ some relief. Pt had brain MRI 11/2019 and PET 01/2020 that showed no new masses in brain. Denies fever/chills, blurry vision, diarrhea, chest pain, SOB.

## 2020-03-20 NOTE — ED PROVIDER NOTE - CLINICAL SUMMARY MEDICAL DECISION MAKING FREE TEXT BOX
45 yo male with HA, seem typical of prior , no fever; resolved in ED, stable for d/c home. 45 yo male with HA, seem typical of prior , no fever; resolved in ED, stable for d/c home..

## 2020-03-24 DIAGNOSIS — Z71.89 OTHER SPECIFIED COUNSELING: ICD-10-CM

## 2020-04-01 ENCOUNTER — APPOINTMENT (OUTPATIENT)
Dept: HEMATOLOGY ONCOLOGY | Facility: CLINIC | Age: 45
End: 2020-04-01
Payer: MEDICARE

## 2020-04-01 ENCOUNTER — LABORATORY RESULT (OUTPATIENT)
Age: 45
End: 2020-04-01

## 2020-04-01 ENCOUNTER — OUTPATIENT (OUTPATIENT)
Dept: OUTPATIENT SERVICES | Facility: HOSPITAL | Age: 45
LOS: 1 days | Discharge: HOME | End: 2020-04-01

## 2020-04-01 VITALS
HEIGHT: 67 IN | TEMPERATURE: 97.3 F | HEART RATE: 84 BPM | SYSTOLIC BLOOD PRESSURE: 103 MMHG | BODY MASS INDEX: 32.02 KG/M2 | DIASTOLIC BLOOD PRESSURE: 68 MMHG | WEIGHT: 204 LBS

## 2020-04-01 DIAGNOSIS — C78.00 SECONDARY MALIGNANT NEOPLASM OF UNSPECIFIED LUNG: ICD-10-CM

## 2020-04-01 DIAGNOSIS — Z00.00 ENCOUNTER FOR GENERAL ADULT MEDICAL EXAMINATION W/OUT ABNORMAL FINDINGS: ICD-10-CM

## 2020-04-01 LAB
HCT VFR BLD CALC: 42 %
HGB BLD-MCNC: 13.8 G/DL
MCHC RBC-ENTMCNC: 29.3 PG
MCHC RBC-ENTMCNC: 32.9 G/DL
MCV RBC AUTO: 89.2 FL
PLATELET # BLD AUTO: 255 K/UL
PMV BLD: 10.1 FL
RBC # BLD: 4.71 M/UL
RBC # FLD: 14.4 %
WBC # FLD AUTO: 8.51 K/UL

## 2020-04-01 PROCEDURE — 99213 OFFICE O/P EST LOW 20 MIN: CPT

## 2020-04-01 NOTE — PHYSICAL EXAM
[Restricted in physically strenuous activity but ambulatory and able to carry out work of a light or sedentary nature] : Status 1- Restricted in physically strenuous activity but ambulatory and able to carry out work of a light or sedentary nature, e.g., light house work, office work [Normal] : affect appropriate [de-identified] : he has decrease breath sounds on left side base, unchanged. [de-identified] : No CVA tenderness, No spinal or paraspinal tenderness [de-identified] :  Gait is normal good strength  in all limbs [de-identified] : forgetful, but stable

## 2020-04-01 NOTE — HISTORY OF PRESENT ILLNESS
[Disease: _____________________] : Disease: [unfilled] [AJCC Stage: ____] : AJCC Stage: [unfilled] [Treatment Protocol] : Treatment Protocol [de-identified] : 41/M presenting for follow-up for metastatic ALK-positive lung adenocarcinoma.  He is currently on alectinib 600 mg BID.  He initially presented in 11/2014 with multiple pulmonary nodules, mediastinal lymphadenopathy, multiple bony metastases, and brain metastases.  He was initially started on crizotinib and was switched to ceritinib in 12/2015 upon progression.  He was unable to tolerate ceritinib due to significant vomiting and was switched to alectinib in 02/2016.\par \par He had SRS to a left cerebellar hemisphere brain metastasis (08/2015) and subsequently underwent WBRT for CNS progression (10-11/2015).  He had RT to a painful metastatic lesion in the right humerus (01/2015).  He had also received Xgeva for bony metastatic disease in the past. \par \par Since 09/2016, he has been experiencing intermittent headaches being treated with periodic dexamethasone taper.  MRI brain with perfusion analysis showed bilateral cerebellar lesions, favor postRT changes rather than residual tumor.  His most recent brain MRI (11/01/2016) showed overall  stable  exam  compared  to  the  previous  brain  MRI  9/2/2016; no  significant  interval  change  in  bilateral  cerebellar  heterogeneously  enhancing  masses  with  extensive  associated  edema,  as  well  as  smaller  lesions  within  the  left  parietal  white  matter,  superficial  left  temporal  lobe  and  left  dorsal  medulla; and, stable  mild  ventriculomegaly  and  mild  periventricular  FLAIR  signal  abnormality.  He has been following with our neurosurgeon, Dr. Britt, who has recommended no surgical intervention at this time. \par \par His most recent PETCT (11/01/2016) showed no sites of pathologic FDG uptake suspicious for biologic tumor activity.\par \par He reports occasional headaches and a mild sense of imbalance.  Denies any falls.  However he only takes a prn dexamethasone, once in every three days,  which reportedly relieves his intermittent headaches. \par Today he also complains mild pain in his left great toe, likely secondary to ingrowing toe nail. 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 He changed his insurance. Did not get scans and did not take his alectinib for about 1-2 weeks. We were not notified.  Otherwise she feels well she really requires dexamethasone yesterday headache probably once or twice a week as per patient no other complaints.\par \par 4/17/17\par He presents for follow up today. He had MRI of brain that showed stable disease 4/8/2017. He has not had the PET CT yet. He received  his alecetinib on 4/4/17 and stated to take them. States his headaches are rare now and feels well otherwise.\par \par 5/15/17\par He presents for follow up. He had a PET CT on 4/21/17 that  was ucnhged.Since November 1, 2016, no new foci of pathologic FDG uptake to suggest\par biologic tumor activity. . 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 . Unchanged non-FDG avid sclerotic foci involving the right proximal humerus\par and T9 vertebral body.\par \par He  feels well. No dizziness or headache.  No new symptoms or complaints.\par \par 6/19/17\par He is doing well. He has no complaints. No headaches. No SOB. \par \par 7/24/17\par Patient comes in for follow up visit, has no complaints, has no SOB and has a mild cough which is chronic, no hemoptysis. Patient states his appetite is good, weight is stable, he has no headaches and is currently off the steroids. Patient will have repeat PET scan and MRI of the brain with contrast as well as repeat bloodwork. \par \par 8/21/17\par He is here for follow up. He had an MR brain and PET CT on 8/3 and 8/2 that did not show progression, were essentially unchanged.  He feels great otherwise.\par \par 9/21/17\par He is doing well. No complaints. No headaches\par \par October 26, 2017\par He see her for followup he feels great he has no complaints.\par \par 11/16/17\par He is here for follow up. He had an MR of brain on 11/14/17: New areas of nodular enhancement along the inferior surfaces of the temporal lobes bilaterally. These are relatively symmetric and just above the level of the cerebellar enhancing masses, suggesting that these may reflect post treatment change.\par He had to reschedule his PET and states he will do it next Wednesday.\par \par No complaints.\par \par 12/26/17\par He is here for follow up. He feels well. he  had PET/CT in end of November that does not show procession, stable pleural findings. He  feels well. \par \par 1/22/18\par He is here for follow up for his NSCLCA. He states he feels well. No SOB, no headaches no fatigue.. \par \par February 21 2018\par  he is here for followup, he had a recent PET/CT scan which is PERSLEY he also had a recent MRI of the brain. This was reviewed at the CNS tumor board although wasn't clear but the consensus was that this is posttreatment changes in the fact that he is asymptomatic and decided to observe him.\par \par He has no new complaints no headaches no weakness or tingling.\par \par \par 3/21/18\par He is here for follow up. HE states he is doing well. Has very mild headaches on occasion but otherwise doing well.\par \par 4/13/18\par He is doing well. Reports no new complaints \par \par 5/3/18\par He is here for follow up to discuss his MR brain. HE complains of mild headache. Same memory issues as before. Balance is the same.  MR brain showed : Increased size of right parietotemporal of the findings enhancement  seen lesion measuring 5.2 cm, previously 2.8 centimeters and increased  size of left inferior temporal lobe mass measuring 2.3 cm, previously 1.6  cm. these areas again demonstrate hypoperfusion and may represent post  therapeutic changes.   2.  Significant increased edema and mass effect within the right  hemisphere with 9 mm right to left midline shift.  3.  No new enhancing lesions. Stable additional bilateral cerebellar and  supratentorial lesions.\par \par I spoke with Dr. 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 He developed severe headaches, some difficulty with speech and balance issues. 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 He has no insurance so my pharmacy has been giving  him a weekly supply while we work on it. He did not have his scans yet. He feel much better now except for dizziness\par \par 5/30/18\par He is feeling much better. His neurological complaints resolved. He stopped steroids on Monday since he had swelling in legs that has resolved. \par \par 6/13/18\par He is here for follow up. he had a PET/CT on 6/7/18 that showed These images reveal, when comparing the FDG brain images to MRI of  4/27/2018 series 10, there is one small focus of increased FDG uptake in  the region of the right parietotemporal lobe lesion (series 12 image 34).  Therefore persistent biologic tumor activity in this region cannot be  excluded.  No focal abnormal FDG uptake corresponding with the left inferior  temporal lobe lesion and both cerebellar lesions. In addition most of the  right parietotemporal lobe lesion is not FDG avid Compared to normal brain glucose metabolism in the thalamus (surrogate  for normal brain metabolism) relative hypometabolism of the right  parietal, posterior parietal and occipital region and relative  hypermetabolism of the left posterior parietal region  Persistent posttreatment FDG uptake (SUV up to 11.2) along the pleural  surface of the left hemithorax consistent with posttreatment changes  associated with talc pleurodesis  One new mildly FDG avid soft tissue nodule (SUV 3.3) in the left buttock \par \par He feels well now ,no headaches.\par \par 7/16/18\par He is here for follow up. doing well. He has no headaches. No new complaints\par \par 8/13/18\par He is here for follow up.  He is doing well. has no complaints. He is considering returning to work.\par \par 9/10/18\par He is here for follow up. He had an MR brain on 8/24/18 that showed In comparison to the previous brain MRI dated 4/27/2018:  1.  No significant interval change in the size and configuration of the  heterogeneously enhancing, necrotic and hemorrhagic lesions within the  temporal/occipital lobes and cerebellar hemispheres. Enhancement pattern  suggests post-therapeutic changes.  2.  Moderately increased edema within the posterior fossa (cerebellar  hemispheres and brainstem). Increased mass effect upon the fourth  ventricle with no evidence of hydrocephalus.  3.  The left cerebral hemispheric edema has mildly increased and the  right cerebral hemispheric edema has mildly decreased.   4.  Resolution of the previously seen right to left midline shift, right  lateral ventricular compression and left lateral ventricular enlargement.  5.  Treated lesions again noted in the left frontal white matter and left  lateral medulla, without contrast enhancement.\par \par HE is doing well has no new complaints. Has no neuroglial complaints. \par \par 10/10/18\par He is here for follow up he had PET/CT on  9/28/18 that was PRESLEY. He no has headaches again. States he is voiding multiple times a day and unable to empty bladder fully.\par \par 10/30/18\par He is here for follow up. he was in the ED for.Left lower quadrant pain symptoms her was given cirpo. he had a Ct abd/pelvis in ED 10/22/18 : Stable posttreatment changes of the left hemithorax including large  loculated pleural effusion.  2.  Multiple nondilated fluid-filled small bowel loops which may reflect  evidence of enteritis.  \par He had MR brain on 10/29/18:  No new lesions, but several existing lesions have increased in size  2. Bilateral hemorrhagic cerebellar lesions and edema has increased. Mass  effect on the fourth ventricle with hydrocephalus.   2.  Essentially unchanged sizes of supratentorial lesions within the  bilateral temporooccipital lobes and mildly improved edema\par \par He is having headaches and more confusion. He took 4 flomax in am and PM instead of his alecensa. I called [facility] with him and they told us they did not deliver his pills since they could not reach him but will deliver them tomorrow. He will be home. He states he had alecensa at home but not sure.\par \par 11/12/18\par Patient presents to the office with a chief complaint of burning in his right thigh.  He has had no new medications.  The patient states it is worse in the evening but lasts all day.  The patient states that the burning sensation starts at the right knee and is ascending.  Straight leg raise negative.  Discussed that it is unlikely that it is from his Avastin treatment, but unsure the etiology.  We will send for LE doppler to rule out any DVT.\par \par 11/27/18\par He is here for follow up. His Dopplers  were negative and his pain/cramps are nearly gone only some in right thigh. His headaches are gone and he is speech is  better and states memory is better since we started Avastin.\par \par 1/9/19\par He is doing well.  He has no more pain in his legs. He is eating well. He is due for 4/4 dose of Avastin. No other complains.\par \par 2/6/19\par He is here for follow-up.  He generally feels well.  He states he still suffers from headaches.  His memory has improved minimally.\par PET/CT 2/4/19 IMPRESSION: Since 9/28/2018, No definite sites of abnormal FDG uptake to suggest biologic tumor activity. Cerebellar lesions seen on brain MRI from 1/20/2019, are not FDG avid and consistent with treated metastases. Post talc pleurodesis of the left pleura, unchanged. \par MR Brain 1/20/19 IMPRESSION: In comparison to the previous brain MRI dated 10/29/2018: 1. No significant interval change in the size and configuration of the heterogeneously enhancing, necrotic and hemorrhagic lesions within the temporal/occipital lobes and cerebellar hemispheres. 2. The edema within the brainstem and cerebellum is about stable;however there is slight progression of the hydrocephalus since the prior exam. 3. The edema within the temporal and occipital lobes has decreased. 4. New patchy area of restricted diffusion around the left temporal horn (without corresponding enhancement) may reflect superimposed ischemic changes. Recommend attention on follow-up imaging. \par \par 3/6/19\par Patient is here for a follow-up visit.  He states he is having increased frequency of headaches with minimal relief from Tylenol.  He is now staying in the shelter on [address] with his daughter.  He will  his medications from [facility] from now on.  We are awaiting arrangement for him to get an apartment.\par \par 3/27/19\par He is here for follow up. his headache is better and he takes his Dexamethasone only when he has it which helps. Last PET was in February and last MRI was in January.\par \par 4/29/19\par He is here for follow-up.  Since last visit, he presented to the ED in 4/2019 due to chest pain.  He underwent CTA chest that demonstrated unchanged left pleural effusion. Likely radiation induced necrosis changes, he states it may have been related to stress related to his daughter.  He is still awaiting placement from the shelter.  He reports his usual frequency headaches, but he takes the Decadron as needed which helps.  \par CTA Chest (4/7/19) IMPRESSION:No pulmonary emboli.20.9 cm pleural fluid collection in the left hemithorax, essentially unchanged since PET/CT dated 2/4/2019.\par MR Brain (4/2/19) IMPRESSION: Since January 20, 2019:New worse signal abnormality involving the brain and volodymyr with increased edema and worse effacement of the fourth ventricle and resulting mild hydrocephalus.Slightly worse edema involving the cerebellum.Stable bilateral posterior temporal/occipital/cerebellar heterogeneously enhancing lesions.Stable left posterior frontal deep white matter and left dorsal medullary hypointense foci, compatible with treated lesions.\par \par 5/22/19\par He came in for follow up. He has been complaining of headaches mainly in AM. He does not think dexamethasone is helping anymore. No other issues.\par \par 6/3/19\par He is here for follow up. HIs headacehs are the same and uses dexamethasone 4 mg almost dialy. He had  CT C/A/P that showed a stable effusion 5/26/19 amd MR brain showed  5/29/19: Overall no significant changes compared with the previous brain MRI dated \par 4/2/2019:\par \par 1.  No significant interval change in the size and configuration of the \par heterogeneously enhancing, necrotic and hemorrhagic lesions within the \par temporal/occipital lobes and cerebellar hemispheres.\par \par 2. Extensive edema within the cerebellar hemispheres, brainstem, parietal \par and temporal lobes is again seen. Patchy areas of enhancement are noted \par in this region. Appearance is most suggestive of posttreatment changes.\par \par 3. Stable patchy area of restricted diffusion around the left temporal \par horn again seen, nonspecific.\par \par 4. No new enhancing lesions identified.\par \par 6/24/19\par He is here for follow up. he missed his Avastin since he had family issues. Still has headaches they are the same. Will restart Avastin today,.\par \par 7/15/19\par He is here for follow up. He could not tell me if headaches are better. He does use steroids once  in  a while for headaches. States memory is the same. \par \par \par 8/14/19\par He is doing better. He Missed his Avastin doses. But he has no headaches and does not use steroids. He had a fall  and hit a rail on his chest prior to his CT scans and the finding in his CT chest is probably related to the trauma he still has pain in the area but its better. CT C/A/P and MR brain from august are bellow\par \par IMPRESSION:\par \par Since April 7, 2019\par \par Stable left pleural fluid collection measuring 9.5 x 14.1 by 18.4 cm, \par with stable compressive atelectasis of left lung and left hemidiaphragm \par inversion.\par \par New, indeterminate approximate 8 mm soft tissue density, left anterior \par chest (series 4/104).\par \par Interval development of approximate 3.6 x 1.8 by 3.9 cm homogeneous \par elliptical shaped structure, left epicardial fat pad (4/206), possibly \par proteinaceous material (HU +35).\par \par \par IMPRESSION:\par \par Within the left cardiophrenic angle, there is a new area of lobulated \par soft tissue measuring 4.0 x 2.5 cm suspicious for new metastatic disease.\par \par Please see chest CT report for chest findings\par \par IMPRESSION:\par \par Overall no significant changes compared with the previous brain MRI dated \par 5/29/2019:\par \par 1.  No significant interval change in the size and configuration of the \par heterogeneously enhancing, necrotic and hemorrhagic lesions within the \par temporal/occipital lobes and cerebellar hemispheres.\par \par 2. Extensive edema within the cerebellar hemispheres, brainstem, parietal \par and temporal lobes is again seen. Patchy areas of enhancement are noted \par in this region. Appearance is most suggestive of posttreatment changes.\par \par 3. No new enhancing lesions identified.\par \par 10/01/19\par Pt presented today for routine follow up visit. He reports feeling fine and has no new complaints . Pt reports no new symptoms of weakness , headaches and confusion and is well compliant with treatment . \par \par \par 11/11/19\par He is here for follow up.   He feels well. No headaches.  Has a runny nose. No pain. No SOB. He gained a few  pounds.\par \par 12/9/19\par He is here for follow up. He had his MR brain on  11/16/19IMPRESSION: \par 1. Overall no significant changes compared with the previous brain MRI dated 8/5/2019. \par 2. Specifically, the heterogeneously enhancing, necrotic and hemorrhagic lesions within the temporal/occipital \par lobes and cerebellar hemispheres; with extensive associated edema and patchy enhancement are not \par significantly changed and most compatible with posttreatment changes. \par 3. No new enhancing lesions identified.\par \par He did not do his CTs yet. \par \par He feels well. No headaches. \par \par 1/13/2020\par Patient is here for a follow-up visit for metastatic lung cancer.  He is feeling well with no new complaints.  Most recent CBC is stable.  Patient denies fever, chills, nausea, vomiting, new pain or bleeding.  We discussed that based on most recent imaging, it may be suggestive that he is progressing on current treatment regimen.  \par PET/CT (1.7.20) IMPRESSION: Since February 4, 2019:1. Redemonstration of large left pleural fluid collection with circumferential FDG uptake along the pleural surface and associated calcifications (max SUV up to 18, [id number]), consistent with posttreatment change related to talc pleurodesis. New 5.8 x 3.5 cm lesion along the left posterior costophrenic angle, with peripheral FDG avidity, max SUV14. Given talc pleurodesis, findings may represent evolving inflammatory response, however neoplasm remains a consideration, and continued follow-up or tissue sampling recommended.2. Unchanged non-FDG avid sclerotic, treated osseous metastases in the right proximal humerus and T9 vertebral body with compression deformity.\par \par I presented him in tumor board and we decided to biopsy the new area. \par \par 2/4/2020-Interpretor services used as pt is Croatian speaking # [id number]\par  Dionisio is here for follow up complaining of b/l flank pain. No c/o fever, chills, dysuria, hematuria at home. No change in bowel habits. He describes dull pain b/l flank area that is mostly in the morning. Today during the office visit, he did not have much pain \par \par 3/17/20\par He is here for follow up. He underwent the biopsy of the  left pleural mass on 3/12/20 that showed Poorly differentiated malignant neoplasm. IHC is pending.  he is doing well otherwise. has minimal headaches. Not dizzy\par \par 4/1/2020: Dionisio is here for follow up, he started Lobrena on 3/17/2020, he is taking 100mg daily, reports no side effects. He reports "little headache" in the morning that is relieved with Tylenol, no other symptoms, no vision changes, no seizure like activity, no N/V, he reports no SOB, no palpitations, heart exam is RRR. No fevers. He will continue with Lobrena as prescribed.

## 2020-04-01 NOTE — REVIEW OF SYSTEMS
[Negative] : Heme/Lymph [Fatigue] : no fatigue [Recent Change In Weight] : ~T no recent weight change [Confused] : no confusion [Dizziness] : no dizziness [Difficulty Walking] : no difficulty walking [FreeTextEntry9] : b/l flank pain  [de-identified] : memory deficit

## 2020-04-01 NOTE — ASSESSMENT
[Palliative] : Goals of care discussed with patient: Palliative [FreeTextEntry1] : 1.  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 Due to insurance issues he stopped it but now is back on it since 4/4/2017\par - repeat MRI brain 8/2018 radiation related changes, post treatment changes, he is asymptomatic,  and repeat PET/CT 6/5/18   is without systemic progression, it confirmed radiation necrosis. PET/CT on  9/28/18 that was PRESLEY. CT abd was unchanged. I think the findings on MR brain are radiation necrosis given his symptoms as before. Recent MR brain showed more edema but he is doing well. CT chest was PRESLEY. Thus MR brain findings most likely due to  radiation necrosis that were causing his headaches. \par - repeat PET/CT from 01/2020 shows new FDG avid lesions in the lung right under his left effusion on top of the diaphragm.\par - He underwent the biopsy of the  left pleural mass on 3/12/20 that showed Poorly differentiated malignant neoplasm. IHC is pending. \par -I spoke with Dr. Richardson and he is not a candidate for radiation to the site of progression.\par -I just spoke with Dr. Serna after Jaylen left, he called me back and this is  poorly differentiated  adeno  and ivan tart Lorlatinib\par -will recheck MR brain and CT C/A/P since imagign is now over 2 months.\par \par -in regards to Lorlatinib The FDA approval for lorlatinib is based on the results of a phase II study ([id number]) that included a subgroup of 198 patients with ALK-positive metastatic NSCLC previously treated with =1 ALK inhibitor.  The overall response rate with lorlatinib among these patients was 47 percent, with a complete response rate of 2 percent and a partial response rate of 45 percent. Efficacy according to prior treatment was as follows: post-crizotinib, KENNEY 73 percent, median PFS 11.1 months, and median duration of response not reached; post-one or more second-generation ALK inhibitors, KENNEY 40 percent, median PFS 6.9 months, median duration of response 7.1 months [66]. The most frequent adverse effects were hypercholesterolemia (81 percent), hypertriglyceridemia (61 percent), edema (43 percent), and peripheral neuropathy (30 percent). Serious treatment-related adverse effects occurred in 7 percent of patients, the most frequent being cognitive deficits in 1 percent. \par \par 2.   Intermittent headaches   not common anymore  this is due to  radiation necrosis. Was on avastin and dexam in past\par \par 3. Elevated total bilirubin likely secondary to alectinib.\par - will monitor, its stable\par \par 4.  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 \par MRI was stable Nov 2019, will recheck now.\par \par 5. LUTs symptoms better\par - PSA was normal  \par \par 6. His back pain seems to be MSK in origin\par -exam was benign \par -will repeat imaging in the future regardless, PET/CT in Jan did not show any new bone lesions\par \par Plan:\par - stopped  Alectinib,  started on Lorlatinib on 3/17/2020 no side effects to date, he knows to call if any side effects develop in the interim \par - Avastin on hold, he is much better in regards to his radiation necrosis\par - Next MR brain CT C/A/P was not done yet \par \par RTC  in 4 weeks.

## 2020-04-03 ENCOUNTER — EMERGENCY (EMERGENCY)
Facility: HOSPITAL | Age: 45
LOS: 0 days | Discharge: HOME | End: 2020-04-04
Attending: EMERGENCY MEDICINE | Admitting: EMERGENCY MEDICINE
Payer: MEDICARE

## 2020-04-03 VITALS
HEIGHT: 68 IN | TEMPERATURE: 100 F | SYSTOLIC BLOOD PRESSURE: 123 MMHG | RESPIRATION RATE: 18 BRPM | WEIGHT: 210.1 LBS | HEART RATE: 100 BPM | OXYGEN SATURATION: 95 % | DIASTOLIC BLOOD PRESSURE: 71 MMHG

## 2020-04-03 DIAGNOSIS — R11.0 NAUSEA: ICD-10-CM

## 2020-04-03 DIAGNOSIS — Z85.118 PERSONAL HISTORY OF OTHER MALIGNANT NEOPLASM OF BRONCHUS AND LUNG: ICD-10-CM

## 2020-04-03 DIAGNOSIS — M62.81 MUSCLE WEAKNESS (GENERALIZED): ICD-10-CM

## 2020-04-03 DIAGNOSIS — Z88.0 ALLERGY STATUS TO PENICILLIN: ICD-10-CM

## 2020-04-03 DIAGNOSIS — Z87.891 PERSONAL HISTORY OF NICOTINE DEPENDENCE: ICD-10-CM

## 2020-04-03 DIAGNOSIS — R51 HEADACHE: ICD-10-CM

## 2020-04-03 LAB
ALBUMIN SERPL ELPH-MCNC: 4.3 G/DL
ALP BLD-CCNC: 91 U/L
ALT SERPL-CCNC: 13 U/L
ANION GAP SERPL CALC-SCNC: 13 MMOL/L
AST SERPL-CCNC: 18 U/L
BILIRUB SERPL-MCNC: 0.2 MG/DL
BUN SERPL-MCNC: 14 MG/DL
CALCIUM SERPL-MCNC: 9.4 MG/DL
CHLORIDE SERPL-SCNC: 106 MMOL/L
CO2 SERPL-SCNC: 23 MMOL/L
CREAT SERPL-MCNC: 0.7 MG/DL
GLUCOSE SERPL-MCNC: 113 MG/DL
POTASSIUM SERPL-SCNC: 4.3 MMOL/L
PROT SERPL-MCNC: 7.1 G/DL
SODIUM SERPL-SCNC: 142 MMOL/L

## 2020-04-03 PROCEDURE — 99285 EMERGENCY DEPT VISIT HI MDM: CPT

## 2020-04-04 LAB
ALBUMIN SERPL ELPH-MCNC: 4.4 G/DL — SIGNIFICANT CHANGE UP (ref 3.5–5.2)
ALP SERPL-CCNC: 81 U/L — SIGNIFICANT CHANGE UP (ref 30–115)
ALT FLD-CCNC: 16 U/L — SIGNIFICANT CHANGE UP (ref 0–41)
ANION GAP SERPL CALC-SCNC: 11 MMOL/L — SIGNIFICANT CHANGE UP (ref 7–14)
AST SERPL-CCNC: 22 U/L — SIGNIFICANT CHANGE UP (ref 0–41)
BASOPHILS # BLD AUTO: 0.03 K/UL — SIGNIFICANT CHANGE UP (ref 0–0.2)
BASOPHILS NFR BLD AUTO: 0.4 % — SIGNIFICANT CHANGE UP (ref 0–1)
BILIRUB SERPL-MCNC: 0.3 MG/DL — SIGNIFICANT CHANGE UP (ref 0.2–1.2)
BUN SERPL-MCNC: 11 MG/DL — SIGNIFICANT CHANGE UP (ref 10–20)
CALCIUM SERPL-MCNC: 9.4 MG/DL — SIGNIFICANT CHANGE UP (ref 8.5–10.1)
CHLORIDE SERPL-SCNC: 101 MMOL/L — SIGNIFICANT CHANGE UP (ref 98–110)
CO2 SERPL-SCNC: 25 MMOL/L — SIGNIFICANT CHANGE UP (ref 17–32)
CREAT SERPL-MCNC: 0.8 MG/DL — SIGNIFICANT CHANGE UP (ref 0.7–1.5)
EOSINOPHIL # BLD AUTO: 0.13 K/UL — SIGNIFICANT CHANGE UP (ref 0–0.7)
EOSINOPHIL NFR BLD AUTO: 1.9 % — SIGNIFICANT CHANGE UP (ref 0–8)
GLUCOSE SERPL-MCNC: 108 MG/DL — HIGH (ref 70–99)
HCT VFR BLD CALC: 41.6 % — LOW (ref 42–52)
HGB BLD-MCNC: 13.9 G/DL — LOW (ref 14–18)
IMM GRANULOCYTES NFR BLD AUTO: 0.4 % — HIGH (ref 0.1–0.3)
LYMPHOCYTES # BLD AUTO: 0.67 K/UL — LOW (ref 1.2–3.4)
LYMPHOCYTES # BLD AUTO: 9.9 % — LOW (ref 20.5–51.1)
MCHC RBC-ENTMCNC: 30.2 PG — SIGNIFICANT CHANGE UP (ref 27–31)
MCHC RBC-ENTMCNC: 33.4 G/DL — SIGNIFICANT CHANGE UP (ref 32–37)
MCV RBC AUTO: 90.4 FL — SIGNIFICANT CHANGE UP (ref 80–94)
MONOCYTES # BLD AUTO: 1.14 K/UL — HIGH (ref 0.1–0.6)
MONOCYTES NFR BLD AUTO: 16.8 % — HIGH (ref 1.7–9.3)
NEUTROPHILS # BLD AUTO: 4.78 K/UL — SIGNIFICANT CHANGE UP (ref 1.4–6.5)
NEUTROPHILS NFR BLD AUTO: 70.6 % — SIGNIFICANT CHANGE UP (ref 42.2–75.2)
NRBC # BLD: 0 /100 WBCS — SIGNIFICANT CHANGE UP (ref 0–0)
PLATELET # BLD AUTO: 222 K/UL — SIGNIFICANT CHANGE UP (ref 130–400)
POTASSIUM SERPL-MCNC: 3.8 MMOL/L — SIGNIFICANT CHANGE UP (ref 3.5–5)
POTASSIUM SERPL-SCNC: 3.8 MMOL/L — SIGNIFICANT CHANGE UP (ref 3.5–5)
PROT SERPL-MCNC: 7.6 G/DL — SIGNIFICANT CHANGE UP (ref 6–8)
RBC # BLD: 4.6 M/UL — LOW (ref 4.7–6.1)
RBC # FLD: 14.6 % — HIGH (ref 11.5–14.5)
SODIUM SERPL-SCNC: 137 MMOL/L — SIGNIFICANT CHANGE UP (ref 135–146)
TROPONIN T SERPL-MCNC: <0.01 NG/ML — SIGNIFICANT CHANGE UP
WBC # BLD: 6.78 K/UL — SIGNIFICANT CHANGE UP (ref 4.8–10.8)
WBC # FLD AUTO: 6.78 K/UL — SIGNIFICANT CHANGE UP (ref 4.8–10.8)

## 2020-04-04 PROCEDURE — 71045 X-RAY EXAM CHEST 1 VIEW: CPT | Mod: 26

## 2020-04-04 PROCEDURE — 93010 ELECTROCARDIOGRAM REPORT: CPT

## 2020-04-04 PROCEDURE — 70450 CT HEAD/BRAIN W/O DYE: CPT | Mod: 26

## 2020-04-04 RX ORDER — DEXAMETHASONE 0.5 MG/5ML
1 ELIXIR ORAL
Qty: 1 | Refills: 0
Start: 2020-04-04 | End: 2020-04-16

## 2020-04-04 RX ORDER — SODIUM CHLORIDE 9 MG/ML
1000 INJECTION, SOLUTION INTRAVENOUS ONCE
Refills: 0 | Status: COMPLETED | OUTPATIENT
Start: 2020-04-04 | End: 2020-04-04

## 2020-04-04 RX ADMIN — SODIUM CHLORIDE 1000 MILLILITER(S): 9 INJECTION, SOLUTION INTRAVENOUS at 01:40

## 2020-04-04 NOTE — CONSULT NOTE ADULT - ASSESSMENT
40 y/o male with known hx of metastatic lung adenocarcinoma with brain mets diagnosed on 2014. - presenting with intermittent HAs and nausea  - Hem/onc followup c/s  - Dexamethasone taper. - 4mg twice daily for 7 days, 2 mg twice daily for 7 days, 1mg twice daily for 7 days, 1mg once daily for 7 days  - MRI head as o/p as scheduled  - f/u with neurosurgery as o/p  - No acute neurosurgical intervention  - Discussed with Dr. Britt

## 2020-04-04 NOTE — ED PROVIDER NOTE - PHYSICAL EXAMINATION
CONSTITUTIONAL: Well-developed; well-nourished; in no acute distress.   SKIN: warm, dry  HEAD: Normocephalic; atraumatic.  EYES: PERRL, EOMI, normal sclera and conjunctiva   ENT: No nasal discharge; airway clear.  NECK: Supple; non tender.  CARD: S1, S2 normal; no murmurs, gallops, or rubs. tachy  RESP: No wheezes, rales or rhonchi.  ABD: soft ntnd  EXT: Normal ROM.  No clubbing, cyanosis or edema.   LYMPH: No acute cervical adenopathy.  NEURO: Alert, oriented, grossly unremarkable. normal strength and sensation upper and lower extremities intact. gait not ataxic. cranial nerves 2-12 grossly intact. heel to shin and finger to nose intact.  PSYCH: Cooperative, appropriate.

## 2020-04-04 NOTE — CONSULT NOTE ADULT - SUBJECTIVE AND OBJECTIVE BOX
INTERVAL HPI/OVERNIGHT EVENTS:    HPI:  40 y/o male with known hx of metastatic lung adenocarcinoma with brain mets diagnosed on 2014. Has hx of intermittent HAs which respond well to dexamethasone taper in the past. He recently had change in chemo meds this week. Now presents with HA which is not responding to Tylenol associated with nausea. Also complaining of an off balance feeling which is unchanged from previous. Denies any dizziness, SOB/difficulty breathing, vomiting, diarrhea, abdominal pain, paresthesias, radiculopathy, blurry vision or changes in vision.    PAST MEDICAL & SURGICAL HISTORY:  Lung cancer metastatic to brain  No significant past surgical history      MEDICATIONS  (STANDING):    MEDICATIONS  (PRN):      Allergies    Amoxil (Other)    Intolerances          Vital Signs Last 24 Hrs  T(C): 37.5 (03 Apr 2020 23:25), Max: 37.5 (03 Apr 2020 23:25)  T(F): 99.5 (03 Apr 2020 23:25), Max: 99.5 (03 Apr 2020 23:25)  HR: 100 (03 Apr 2020 23:25) (100 - 100)  BP: 123/71 (03 Apr 2020 23:25) (123/71 - 123/71)  BP(mean): --  RR: 18 (03 Apr 2020 23:25) (18 - 18)  SpO2: 95% (03 Apr 2020 23:25) (95% - 95%)    PHYSICAL EXAM:    GENERAL: NAD, well-groomed, well-developed, awake/alert  HEAD:  Atraumatic, Normocephalic  EYES: EOMI, PERRLA, conjunctiva and sclera clear  NERVOUS SYSTEM:  Awake  alert oriented to self, place situation   Fund of knowledge, recent and remote memory are intact   Mood; normal affect   CN II-XII intact PERRRL, EOMI, no ptosis, no Nystagmus, normal shoulder shrug   Face symmetrical tongue is midline speech is clear and fluent  Motor: 5/5 UE/LE all muscle groups   Sensory: No deficit to light touch   Gait is not tested          LABS:                        13.9   6.78  )-----------( 222      ( 04 Apr 2020 01:30 )             41.6     04-04    137  |  101  |  11  ----------------------------<  108<H>  3.8   |  25  |  0.8    Ca    9.4      04 Apr 2020 01:30    TPro  7.6  /  Alb  4.4  /  TBili  0.3  /  DBili  x   /  AST  22  /  ALT  16  /  AlkPhos  81  04-04          RADIOLOGY & ADDITIONAL TESTS:  < from: CT Head No Cont (04.04.20 @ 04:44) >    EXAM:  CT BRAIN            PROCEDURE DATE:  04/04/2020            INTERPRETATION:  CLINICAL HISTORY / REASON FOR EXAM: Bilateral leg weakness; history of metastatic lung cancer    COMPARISON: CT head without contrast from March 20, 2020. MRI brain from November 16, 2019    TECHNIQUE: Multiple axial CT images of the head were obtained with sagittal and coronal reformats from the base of the skull to the vertex without the administration of IV contrast.      FINDINGS:    The ventricles are mildlyenlarged out of proportion with the cerebral sulci, not significantly changed.    There is an essentially unchanged appearance of bilateral heterogeneous masses within the cerebellar hemispheres and temporal lobes with associated edema predominantly involving the cerebellum and brainstem with fourth ventricular compression. Periventricular and deep white matter hypodensities also appear unchanged. A hyperdense lesion in the posterior left frontal lobe (3/21) appears unchanged from prior MRI.    No evidence of acute intracranial hemorrhage.    There is no fracture to the calvarium. Mastoid air cells are well aerated bilaterally. The visualized portions of the sinuses are unremarkable.      IMPRESSION:    Since March 20, 2020;    No CT evidenceof acute intracranial hemorrhage or acute territorial infarct.    Grossly stable multiple heterogeneous masses with edema, most notably extending from the temporal lobes, and cerebellum to the brainstem with associated fourth ventricular compression compatible with patient's known metastatic lung cancer.                JAKE DARDEN M.D., RESIDENT RADIOLOGIST  This document has been electronically signed.  BRANDON ALEX M.D., ATTENDING RADIOLOGIST  This document has been electronically signed. Apr 4 2020  5:54AM                < end of copied text >

## 2020-04-04 NOTE — ED PROVIDER NOTE - PATIENT PORTAL LINK FT
You can access the FollowMyHealth Patient Portal offered by [facility] by registering at the following website: [email or web address]. By joining [employer]’s FollowMyHealth portal, you will also be able to view your health information using other applications (apps) compatible with our system.

## 2020-04-04 NOTE — ED PROVIDER NOTE - NS ED ROS FT
Constitutional:  see HPI  Head:  headache; no dizziness, loss of consciousness  Eyes:  no visual changes; no eye pain, redness, or discharge  ENMT:  no ear pain or discharge; no hearing problems; no mouth or throat sores or lesions; no throat pain  Cardiac: no chest pain, tachycardia or palpitations  Respiratory: no cough, wheezing, shortness of breath, chest tightness, or trouble breathing  GI: nausea; no vomiting, diarrhea or abdominal pain  :  no dysuria, frequency, or burning with urination; no change in urine output  MS: no myalgias, muscle weakness, joint pain,or  injury; no joint swelling  Neuro: b/l leg weakness; no numbness or tingling; no seizure  Skin:  no rashes or color changes; no lacerations or abrasions

## 2020-04-04 NOTE — ED PROVIDER NOTE - OBJECTIVE STATEMENT
43 yo male with hx of non-small cell lung cancer diagnosed in 2015, last chemo yesterday presenting with sudden onset occipital headache, intermittent, initially improved with tylenol but now persistent, associated with nausea and b/l LE weakness and poor balance when walking. denies cp, sob, vomiting, diarrhea, abd pain, numbness, tingling, blurry vison. 43 yo male with hx of non-small cell lung cancer w/ brain mets diagnosed in 2015, last chemo yesterday presenting with sudden onset occipital headache, intermittent, initially improved with tylenol but now persistent, associated with nausea and b/l LE weakness and poor balance when walking. denies cp, sob, vomiting, diarrhea, abd pain, numbness, tingling, blurry vison.

## 2020-04-04 NOTE — ED ADULT NURSE NOTE - NSIMPLEMENTINTERV_GEN_ALL_ED
no
Implemented All Universal Safety Interventions:  [place] to call system. Call bell, personal items and telephone within reach. Instruct patient to call for assistance. Room bathroom lighting operational. Non-slip footwear when patient is off stretcher. Physically safe environment: no spills, clutter or unnecessary equipment. Stretcher in lowest position, wheels locked, appropriate side rails in place.

## 2020-04-04 NOTE — ED PROVIDER NOTE - NSFOLLOWUPINSTRUCTIONS_ED_ALL_ED_FT
Please follow-up with your primary care physician within 24-48 hours. Please take decadron dose pack as prescribed.    Headache    A headache is pain or discomfort felt around the head or neck area. The specific cause of a headache may not be found as there are many types including tension headaches, migraine headaches, and cluster headaches. Watch your condition for any changes. Things you can do to manage your pain include taking over the counter and prescription medications as instructed by your health care provider, lying down in a dark quiet room, limiting stress, getting regular sleep, and refraining from alcohol and tobacco products.    SEEK IMMEDIATE MEDICAL CARE IF YOU EXPERIENCE THE FOLLOWING SYMPTOMS: fever, vomiting, stiff neck, loss of vision, problems with speech, muscle weakness, loss of balance, trouble walking, pass out, or confusion.

## 2020-05-04 ENCOUNTER — APPOINTMENT (OUTPATIENT)
Dept: HEMATOLOGY ONCOLOGY | Facility: CLINIC | Age: 45
End: 2020-05-04

## 2020-06-22 ENCOUNTER — LABORATORY RESULT (OUTPATIENT)
Age: 45
End: 2020-06-22

## 2020-06-22 ENCOUNTER — APPOINTMENT (OUTPATIENT)
Dept: HEMATOLOGY ONCOLOGY | Facility: CLINIC | Age: 45
End: 2020-06-22
Payer: MEDICARE

## 2020-06-22 VITALS
WEIGHT: 173 LBS | HEART RATE: 125 BPM | HEIGHT: 67 IN | RESPIRATION RATE: 14 BRPM | SYSTOLIC BLOOD PRESSURE: 126 MMHG | TEMPERATURE: 97.6 F | BODY MASS INDEX: 27.15 KG/M2 | DIASTOLIC BLOOD PRESSURE: 86 MMHG

## 2020-06-22 DIAGNOSIS — F41.9 ANXIETY DISORDER, UNSPECIFIED: ICD-10-CM

## 2020-06-22 LAB
HCT VFR BLD CALC: 43.6 %
HGB BLD-MCNC: 14.2 G/DL
MCHC RBC-ENTMCNC: 29.9 PG
MCHC RBC-ENTMCNC: 32.6 G/DL
MCV RBC AUTO: 91.8 FL
PLATELET # BLD AUTO: 231 K/UL
PMV BLD: 10.2 FL
RBC # BLD: 4.75 M/UL
RBC # FLD: 16.6 %
WBC # FLD AUTO: 8.85 K/UL

## 2020-06-22 PROCEDURE — 99215 OFFICE O/P EST HI 40 MIN: CPT

## 2020-06-22 NOTE — ASSESSMENT
[Palliative] : Goals of care discussed with patient: Palliative [FreeTextEntry1] : 1.  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 Due to insurance issues he stopped it but now is back on it since 4/4/2017\par - repeat MRI brain 8/2018 radiation related changes, post treatment changes, he is asymptomatic,  and repeat PET/CT 6/5/18   is without systemic progression, it confirmed radiation necrosis. PET/CT on  9/28/18 that was PRESLEY. CT abd was unchanged. I think the findings on MR brain are radiation necrosis given his symptoms as before. Recent MR brain showed more edema but he is doing well. CT chest was PRESLEY. Thus MR brain findings most likely due to  radiation necrosis that were causing his headaches. \par - repeat PET/CT from 01/2020 shows new FDG avid lesions in the lung right under his left effusion on top of the diaphragm.\par - He underwent the biopsy of the  left pleural mass on 3/12/20 that showed Poorly differentiated malignant neoplasm. IHC   non-samll cell carcinoma of the lung and favor adenocarcinoma, TTF 1 positive\par -I spoke with Dr. Richardson and he is not a candidate for radiation to the site of progression.\par -we did foundation one on his re-biopsy that showed EML4-Alk variant 3a/b, other mutations CDKN2a and B loss, LLL2, MTAP, SMARCa4, Tp53\par -he was starter on Loratinib in [employer] took it for maybe one Month had a prolonged COVID hospitalization and is now back on it since May 28 2020\par -in regards to Lorlatinib The FDA approval for lorlatinib is based on the results of a phase II study ([id number]) that included a subgroup of 198 patients with ALK-positive metastatic NSCLC previously treated with =1 ALK inhibitor.  The overall response rate with lorlatinib among these patients was 47 percent, with a complete response rate of 2 percent and a partial response rate of 45 percent. Efficacy according to prior treatment was as follows: post-crizotinib, KENNEY 73 percent, median PFS 11.1 months, and median duration of response not reached; post-one or more second-generation ALK inhibitors, KENNEY 40 percent, median PFS 6.9 months, median duration of response 7.1 months [66]. The most frequent adverse effects were hypercholesterolemia (81 percent), hypertriglyceridemia (61 percent), edema (43 percent), and peripheral neuropathy (30 percent). Serious treatment-related adverse effects occurred in 7 percent of patients, the most frequent being cognitive deficits in 1 percent. \par -will repeat CT C/A/P and MR brain for new baseline, c/w Lorlatinib\par \par 2.   Intermittent headaches   not common anymore  this is due to  radiation necrosis. Was on avastin and dexam in past\par \par 3. Elevated total bilirubin likely secondary to alectinib.\par - will monitor, its stable\par \par 4.  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 \par MRI was stable Nov 2019, will recheck now.\par \par 5. LUTs symptoms better\par - PSA was normal  \par \par 6. His back pain seems to be MSK  resolved\par \par 7.Dizziness due to radiation necroossi vs progression \par \par 8: Hypoxia from  COVID?\par -will check 02 sat on ambulation and resting to see if qualifies for Oxygen \par \par Plan:\par -  c/w Lorlatinib CT C/A/P and MR brain ordered\par - Avastin on hold, he is much better in regards to his radiation necrosis, if still present will restart\par \par RTC  in 4 weeks.\par \par Contact. Ex wife : [phone], Brother Maria

## 2020-06-22 NOTE — PHYSICAL EXAM
[Normal] : full range of motion and no deformities appreciated [Ambulatory and capable of all self care but unable to carry out any work activities] : Status 2- Ambulatory and capable of all self care but unable to carry out any work activities. Up and about more than 50% of waking hours [de-identified] : On NC 2L [de-identified] : No CVA tenderness, No spinal or paraspinal tenderness [de-identified] : He has minimal air movement in left lung field.  [de-identified] : Has ataxia. He stood up. He was a bit unstady with eyse closed. Romberg was negative.  [de-identified] : forgetful, but stable

## 2020-06-22 NOTE — HISTORY OF PRESENT ILLNESS
[Disease: _____________________] : Disease: [unfilled] [AJCC Stage: ____] : AJCC Stage: [unfilled] [Treatment Protocol] : Treatment Protocol [de-identified] : ALK+ lung adenocarcinoma [de-identified] : 41/M presenting for follow-up for metastatic ALK-positive lung adenocarcinoma.  He is currently on alectinib 600 mg BID.  He initially presented in 11/2014 with multiple pulmonary nodules, mediastinal lymphadenopathy, multiple bony metastases, and brain metastases.  He was initially started on crizotinib and was switched to ceritinib in 12/2015 upon progression.  He was unable to tolerate ceritinib due to significant vomiting and was switched to alectinib in 02/2016.\par \par He had SRS to a left cerebellar hemisphere brain metastasis (08/2015) and subsequently underwent WBRT for CNS progression (10-11/2015).  He had RT to a painful metastatic lesion in the right humerus (01/2015).  He had also received Xgeva for bony metastatic disease in the past. \par \par Since 09/2016, he has been experiencing intermittent headaches being treated with periodic dexamethasone taper.  MRI brain with perfusion analysis showed bilateral cerebellar lesions, favor postRT changes rather than residual tumor.  His most recent brain MRI (11/01/2016) showed overall  stable  exam  compared  to  the  previous  brain  MRI  9/2/2016; no  significant  interval  change  in  bilateral  cerebellar  heterogeneously  enhancing  masses  with  extensive  associated  edema,  as  well  as  smaller  lesions  within  the  left  parietal  white  matter,  superficial  left  temporal  lobe  and  left  dorsal  medulla; and, stable  mild  ventriculomegaly  and  mild  periventricular  FLAIR  signal  abnormality.  He has been following with our neurosurgeon, Dr. Britt, who has recommended no surgical intervention at this time. \par \par His most recent PETCT (11/01/2016) showed no sites of pathologic FDG uptake suspicious for biologic tumor activity.\par \par He reports occasional headaches and a mild sense of imbalance.  Denies any falls.  However he only takes a prn dexamethasone, once in every three days,  which reportedly relieves his intermittent headaches. \par Today he also complains mild pain in his left great toe, likely secondary to ingrowing toe nail. 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 He changed his insurance. Did not get scans and did not take his alectinib for about 1-2 weeks. We were not notified.  Otherwise she feels well she really requires dexamethasone yesterday headache probably once or twice a week as per patient no other complaints.\par \par 4/17/17\par He presents for follow up today. He had MRI of brain that showed stable disease 4/8/2017. He has not had the PET CT yet. He received  his alecetinib on 4/4/17 and stated to take them. States his headaches are rare now and feels well otherwise.\par \par 5/15/17\par He presents for follow up. He had a PET CT on 4/21/17 that  was ucnhged.Since November 1, 2016, no new foci of pathologic FDG uptake to suggest\par biologic tumor activity. . 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 . Unchanged non-FDG avid sclerotic foci involving the right proximal humerus\par and T9 vertebral body.\par \par He  feels well. No dizziness or headache.  No new symptoms or complaints.\par \par 6/19/17\par He is doing well. He has no complaints. No headaches. No SOB. \par \par 7/24/17\par Patient comes in for follow up visit, has no complaints, has no SOB and has a mild cough which is chronic, no hemoptysis. Patient states his appetite is good, weight is stable, he has no headaches and is currently off the steroids. Patient will have repeat PET scan and MRI of the brain with contrast as well as repeat bloodwork. \par \par 8/21/17\par He is here for follow up. He had an MR brain and PET CT on 8/3 and 8/2 that did not show progression, were essentially unchanged.  He feels great otherwise.\par \par 9/21/17\par He is doing well. No complaints. No headaches\par \par October 26, 2017\par He see her for followup he feels great he has no complaints.\par \par 11/16/17\par He is here for follow up. He had an MR of brain on 11/14/17: New areas of nodular enhancement along the inferior surfaces of the temporal lobes bilaterally. These are relatively symmetric and just above the level of the cerebellar enhancing masses, suggesting that these may reflect post treatment change.\par He had to reschedule his PET and states he will do it next Wednesday.\par \par No complaints.\par \par 12/26/17\par He is here for follow up. He feels well. he  had PET/CT in end of November that does not show procession, stable pleural findings. He  feels well. \par \par 1/22/18\par He is here for follow up for his NSCLCA. He states he feels well. No SOB, no headaches no fatigue.. \par \par February 21 2018\par  he is here for followup, he had a recent PET/CT scan which is PRESLEY he also had a recent MRI of the brain. This was reviewed at the CNS tumor board although wasn't clear but the consensus was that this is posttreatment changes in the fact that he is asymptomatic and decided to observe him.\par \par He has no new complaints no headaches no weakness or tingling.\par \par \par 3/21/18\par He is here for follow up. HE states he is doing well. Has very mild headaches on occasion but otherwise doing well.\par \par 4/13/18\par He is doing well. Reports no new complaints \par \par 5/3/18\par He is here for follow up to discuss his MR brain. HE complains of mild headache. Same memory issues as before. Balance is the same.  MR brain showed : Increased size of right parietotemporal of the findings enhancement  seen lesion measuring 5.2 cm, previously 2.8 centimeters and increased  size of left inferior temporal lobe mass measuring 2.3 cm, previously 1.6  cm. these areas again demonstrate hypoperfusion and may represent post  therapeutic changes.   2.  Significant increased edema and mass effect within the right  hemisphere with 9 mm right to left midline shift.  3.  No new enhancing lesions. Stable additional bilateral cerebellar and  supratentorial lesions.\par \par I spoke with Dr. 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 He developed severe headaches, some difficulty with speech and balance issues. 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 He has no insurance so my pharmacy has been giving  him a weekly supply while we work on it. He did not have his scans yet. He feel much better now except for dizziness\par \par 5/30/18\par He is feeling much better. His neurological complaints resolved. He stopped steroids on Monday since he had swelling in legs that has resolved. \par \par 6/13/18\par He is here for follow up. he had a PET/CT on 6/7/18 that showed These images reveal, when comparing the FDG brain images to MRI of  4/27/2018 series 10, there is one small focus of increased FDG uptake in  the region of the right parietotemporal lobe lesion (series 12 image 34).  Therefore persistent biologic tumor activity in this region cannot be  excluded.  No focal abnormal FDG uptake corresponding with the left inferior  temporal lobe lesion and both cerebellar lesions. In addition most of the  right parietotemporal lobe lesion is not FDG avid Compared to normal brain glucose metabolism in the thalamus (surrogate  for normal brain metabolism) relative hypometabolism of the right  parietal, posterior parietal and occipital region and relative  hypermetabolism of the left posterior parietal region  Persistent posttreatment FDG uptake (SUV up to 11.2) along the pleural  surface of the left hemithorax consistent with posttreatment changes  associated with talc pleurodesis  One new mildly FDG avid soft tissue nodule (SUV 3.3) in the left buttock \par \par He feels well now ,no headaches.\par \par 7/16/18\par He is here for follow up. doing well. He has no headaches. No new complaints\par \par 8/13/18\par He is here for follow up.  He is doing well. has no complaints. He is considering returning to work.\par \par 9/10/18\par He is here for follow up. He had an MR brain on 8/24/18 that showed In comparison to the previous brain MRI dated 4/27/2018:  1.  No significant interval change in the size and configuration of the  heterogeneously enhancing, necrotic and hemorrhagic lesions within the  temporal/occipital lobes and cerebellar hemispheres. Enhancement pattern  suggests post-therapeutic changes.  2.  Moderately increased edema within the posterior fossa (cerebellar  hemispheres and brainstem). Increased mass effect upon the fourth  ventricle with no evidence of hydrocephalus.  3.  The left cerebral hemispheric edema has mildly increased and the  right cerebral hemispheric edema has mildly decreased.   4.  Resolution of the previously seen right to left midline shift, right  lateral ventricular compression and left lateral ventricular enlargement.  5.  Treated lesions again noted in the left frontal white matter and left  lateral medulla, without contrast enhancement.\par \par HE is doing well has no new complaints. Has no neuroglial complaints. \par \par 10/10/18\par He is here for follow up he had PET/CT on  9/28/18 that was PRESLEY. He no has headaches again. States he is voiding multiple times a day and unable to empty bladder fully.\par \par 10/30/18\par He is here for follow up. he was in the ED for.Left lower quadrant pain symptoms her was given cirpo. he had a Ct abd/pelvis in ED 10/22/18 : Stable posttreatment changes of the left hemithorax including large  loculated pleural effusion.  2.  Multiple nondilated fluid-filled small bowel loops which may reflect  evidence of enteritis.  \par He had MR brain on 10/29/18:  No new lesions, but several existing lesions have increased in size  2. Bilateral hemorrhagic cerebellar lesions and edema has increased. Mass  effect on the fourth ventricle with hydrocephalus.   2.  Essentially unchanged sizes of supratentorial lesions within the  bilateral temporooccipital lobes and mildly improved edema\par \par He is having headaches and more confusion. He took 4 flomax in am and PM instead of his alecensa. I called [facility] with him and they told us they did not deliver his pills since they could not reach him but will deliver them tomorrow. He will be home. He states he had alecensa at home but not sure.\par \par 11/12/18\par Patient presents to the office with a chief complaint of burning in his right thigh.  He has had no new medications.  The patient states it is worse in the evening but lasts all day.  The patient states that the burning sensation starts at the right knee and is ascending.  Straight leg raise negative.  Discussed that it is unlikely that it is from his Avastin treatment, but unsure the etiology.  We will send for LE doppler to rule out any DVT.\par \par 11/27/18\par He is here for follow up. His Dopplers  were negative and his pain/cramps are nearly gone only some in right thigh. His headaches are gone and he is speech is  better and states memory is better since we started Avastin.\par \par 1/9/19\par He is doing well.  He has no more pain in his legs. He is eating well. He is due for 4/4 dose of Avastin. No other complains.\par \par 2/6/19\par He is here for follow-up.  He generally feels well.  He states he still suffers from headaches.  His memory has improved minimally.\par PET/CT 2/4/19 IMPRESSION: Since 9/28/2018, No definite sites of abnormal FDG uptake to suggest biologic tumor activity. Cerebellar lesions seen on brain MRI from 1/20/2019, are not FDG avid and consistent with treated metastases. Post talc pleurodesis of the left pleura, unchanged. \par MR Brain 1/20/19 IMPRESSION: In comparison to the previous brain MRI dated 10/29/2018: 1. No significant interval change in the size and configuration of the heterogeneously enhancing, necrotic and hemorrhagic lesions within the temporal/occipital lobes and cerebellar hemispheres. 2. The edema within the brainstem and cerebellum is about stable;however there is slight progression of the hydrocephalus since the prior exam. 3. The edema within the temporal and occipital lobes has decreased. 4. New patchy area of restricted diffusion around the left temporal horn (without corresponding enhancement) may reflect superimposed ischemic changes. Recommend attention on follow-up imaging. \par \par 3/6/19\par Patient is here for a follow-up visit.  He states he is having increased frequency of headaches with minimal relief from Tylenol.  He is now staying in the shelter on [address] with his daughter.  He will  his medications from [facility] from now on.  We are awaiting arrangement for him to get an apartment.\par \par 3/27/19\par He is here for follow up. his headache is better and he takes his Dexamethasone only when he has it which helps. Last PET was in February and last MRI was in January.\par \par 4/29/19\par He is here for follow-up.  Since last visit, he presented to the ED in 4/2019 due to chest pain.  He underwent CTA chest that demonstrated unchanged left pleural effusion. Likely radiation induced necrosis changes, he states it may have been related to stress related to his daughter.  He is still awaiting placement from the shelter.  He reports his usual frequency headaches, but he takes the Decadron as needed which helps.  \par CTA Chest (4/7/19) IMPRESSION:No pulmonary emboli.20.9 cm pleural fluid collection in the left hemithorax, essentially unchanged since PET/CT dated 2/4/2019.\par MR Brain (4/2/19) IMPRESSION: Since January 20, 2019:New worse signal abnormality involving the brain and volodymyr with increased edema and worse effacement of the fourth ventricle and resulting mild hydrocephalus.Slightly worse edema involving the cerebellum.Stable bilateral posterior temporal/occipital/cerebellar heterogeneously enhancing lesions.Stable left posterior frontal deep white matter and left dorsal medullary hypointense foci, compatible with treated lesions.\par \par 5/22/19\par He came in for follow up. He has been complaining of headaches mainly in AM. He does not think dexamethasone is helping anymore. No other issues.\par \par 6/3/19\par He is here for follow up. HIs headacehs are the same and uses dexamethasone 4 mg almost dialy. He had  CT C/A/P that showed a stable effusion 5/26/19 amd MR brain showed  5/29/19: Overall no significant changes compared with the previous brain MRI dated \par 4/2/2019:\par \par 1.  No significant interval change in the size and configuration of the \par heterogeneously enhancing, necrotic and hemorrhagic lesions within the \par temporal/occipital lobes and cerebellar hemispheres.\par \par 2. Extensive edema within the cerebellar hemispheres, brainstem, parietal \par and temporal lobes is again seen. Patchy areas of enhancement are noted \par in this region. Appearance is most suggestive of posttreatment changes.\par \par 3. Stable patchy area of restricted diffusion around the left temporal \par horn again seen, nonspecific.\par \par 4. No new enhancing lesions identified.\par \par 6/24/19\par He is here for follow up. he missed his Avastin since he had family issues. Still has headaches they are the same. Will restart Avastin today,.\par \par 7/15/19\par He is here for follow up. He could not tell me if headaches are better. He does use steroids once  in  a while for headaches. States memory is the same. \par \par \par 8/14/19\par He is doing better. He Missed his Avastin doses. But he has no headaches and does not use steroids. He had a fall  and hit a rail on his chest prior to his CT scans and the finding in his CT chest is probably related to the trauma he still has pain in the area but its better. CT C/A/P and MR brain from august are bellow\par \par IMPRESSION:\par \par Since April 7, 2019\par \par Stable left pleural fluid collection measuring 9.5 x 14.1 by 18.4 cm, \par with stable compressive atelectasis of left lung and left hemidiaphragm \par inversion.\par \par New, indeterminate approximate 8 mm soft tissue density, left anterior \par chest (series 4/104).\par \par Interval development of approximate 3.6 x 1.8 by 3.9 cm homogeneous \par elliptical shaped structure, left epicardial fat pad (4/206), possibly \par proteinaceous material (HU +35).\par \par \par IMPRESSION:\par \par Within the left cardiophrenic angle, there is a new area of lobulated \par soft tissue measuring 4.0 x 2.5 cm suspicious for new metastatic disease.\par \par Please see chest CT report for chest findings\par \par IMPRESSION:\par \par Overall no significant changes compared with the previous brain MRI dated \par 5/29/2019:\par \par 1.  No significant interval change in the size and configuration of the \par heterogeneously enhancing, necrotic and hemorrhagic lesions within the \par temporal/occipital lobes and cerebellar hemispheres.\par \par 2. Extensive edema within the cerebellar hemispheres, brainstem, parietal \par and temporal lobes is again seen. Patchy areas of enhancement are noted \par in this region. Appearance is most suggestive of posttreatment changes.\par \par 3. No new enhancing lesions identified.\par \par 10/01/19\par Pt presented today for routine follow up visit. He reports feeling fine and has no new complaints . Pt reports no new symptoms of weakness , headaches and confusion and is well compliant with treatment . \par \par \par 11/11/19\par He is here for follow up.   He feels well. No headaches.  Has a runny nose. No pain. No SOB. He gained a few  pounds.\par \par 12/9/19\par He is here for follow up. He had his MR brain on  11/16/19IMPRESSION: \par 1. Overall no significant changes compared with the previous brain MRI dated 8/5/2019. \par 2. Specifically, the heterogeneously enhancing, necrotic and hemorrhagic lesions within the temporal/occipital \par lobes and cerebellar hemispheres; with extensive associated edema and patchy enhancement are not \par significantly changed and most compatible with posttreatment changes. \par 3. No new enhancing lesions identified.\par \par He did not do his CTs yet. \par \par He feels well. No headaches. \par \par 1/13/2020\par Patient is here for a follow-up visit for metastatic lung cancer.  He is feeling well with no new complaints.  Most recent CBC is stable.  Patient denies fever, chills, nausea, vomiting, new pain or bleeding.  We discussed that based on most recent imaging, it may be suggestive that he is progressing on current treatment regimen.  \par PET/CT (1.7.20) IMPRESSION: Since February 4, 2019:1. Redemonstration of large left pleural fluid collection with circumferential FDG uptake along the pleural surface and associated calcifications (max SUV up to 18, [id number]), consistent with posttreatment change related to talc pleurodesis. New 5.8 x 3.5 cm lesion along the left posterior costophrenic angle, with peripheral FDG avidity, max SUV14. Given talc pleurodesis, findings may represent evolving inflammatory response, however neoplasm remains a consideration, and continued follow-up or tissue sampling recommended.2. Unchanged non-FDG avid sclerotic, treated osseous metastases in the right proximal humerus and T9 vertebral body with compression deformity.\par \par I presented him in tumor board and we decided to biopsy the new area. \par \par 2/4/2020-Interpretor services used as pt is Bengali speaking # [id number]\par  Dionisio is here for follow up complaining of b/l flank pain. No c/o fever, chills, dysuria, hematuria at home. No change in bowel habits. He describes dull pain b/l flank area that is mostly in the morning. Today during the office visit, he did not have much pain \par \par 3/17/20\par He is here for follow up. He underwent the biopsy of the  left pleural mass on 3/12/20 that showed Poorly differentiated malignant neoplasm. IHC is pending.  he is doing well otherwise. has minimal headaches. Not dizzy\par \par 4/1/2020: Dionisio is here for follow up, he started Lobrena on 3/17/2020, he is taking 100mg daily, reports no side effects. He reports "little headache" in the morning that is relieved with Tylenol, no other symptoms, no vision changes, no seizure like activity, no N/V, he reports no SOB, no palpitations, heart exam is RRR. No fevers. He will continue with Lobrena as prescribed. \par \par 6/22/2020\par Patient is here for a follow-up visit for metastatic lung cancer.  Since last visit he had COVID was in [facility].  he had a prolonged course. I spoke to his Neurosurgeon and he had MR brain that showed radiation necrosis and he had steroids. He only took Lorbrena for one month, Confirmed with pharmacy.  He is here with his ex wife. We used a pacific  Rosemary ID  [id number] \par He has been taking his Lorbreana since May 28 2020, he gets it from [facility] now, He is on [place] 2[place] from his hospitalization. He lives with his brother in law. He states he is not SOB. HE was unsteady on his feet. He felt well otherwise.

## 2020-06-22 NOTE — REASON FOR VISIT
[Follow-Up Visit] : a follow-up [Other: _____] : [unfilled] [[employer] ] : provided by [employer]   [FreeTextEntry1] : [id number]  [FreeTextEntry2] : Rosemary [TWNoteComboBox1] : Somali

## 2020-06-22 NOTE — END OF VISIT
[FreeTextEntry3] : \par  [Time Spent: ___ minutes] : I have spent [unfilled] minutes of time on the encounter. [>50% of the face to face encounter time was spent on counseling and/or coordination of care for ___] : Greater than 50% of the face to face encounter time was spent on counseling and/or coordination of care for [unfilled]

## 2020-06-22 NOTE — REVIEW OF SYSTEMS
[Negative] : Heme/Lymph [Fatigue] : no fatigue [Recent Change In Weight] : ~T no recent weight change [Cough] : no cough [SOB on Exertion] : no shortness of breath during exertion [Confused] : no confusion [Dizziness] : dizziness [Anxiety] : anxiety [Difficulty Walking] : no difficulty walking [de-identified] : memory deficit  [FreeTextEntry9] : b/l flank pain

## 2020-06-23 LAB
ALBUMIN SERPL ELPH-MCNC: 4.3 G/DL
ALP BLD-CCNC: 90 U/L
ALT SERPL-CCNC: 31 U/L
ANION GAP SERPL CALC-SCNC: 16 MMOL/L
AST SERPL-CCNC: 37 U/L
BILIRUB SERPL-MCNC: 0.3 MG/DL
BUN SERPL-MCNC: 8 MG/DL
CALCIUM SERPL-MCNC: 9.3 MG/DL
CHLORIDE SERPL-SCNC: 100 MMOL/L
CO2 SERPL-SCNC: 23 MMOL/L
CREAT SERPL-MCNC: 0.7 MG/DL
GLUCOSE SERPL-MCNC: 180 MG/DL
POTASSIUM SERPL-SCNC: 3.9 MMOL/L
PROT SERPL-MCNC: 7 G/DL
SODIUM SERPL-SCNC: 139 MMOL/L
TSH SERPL-ACNC: 1.77 UIU/ML

## 2020-07-02 ENCOUNTER — RESULT REVIEW (OUTPATIENT)
Age: 45
End: 2020-07-02

## 2020-07-02 ENCOUNTER — OUTPATIENT (OUTPATIENT)
Dept: OUTPATIENT SERVICES | Facility: HOSPITAL | Age: 45
LOS: 1 days | Discharge: HOME | End: 2020-07-02
Payer: MEDICARE

## 2020-07-02 DIAGNOSIS — C78.00 SECONDARY MALIGNANT NEOPLASM OF UNSPECIFIED LUNG: ICD-10-CM

## 2020-07-02 PROCEDURE — 70553 MRI BRAIN STEM W/O & W/DYE: CPT | Mod: 26

## 2020-07-07 ENCOUNTER — OUTPATIENT (OUTPATIENT)
Dept: OUTPATIENT SERVICES | Facility: HOSPITAL | Age: 45
LOS: 1 days | Discharge: HOME | End: 2020-07-07
Payer: MEDICARE

## 2020-07-07 ENCOUNTER — RESULT REVIEW (OUTPATIENT)
Age: 45
End: 2020-07-07

## 2020-07-07 DIAGNOSIS — C78.00 SECONDARY MALIGNANT NEOPLASM OF UNSPECIFIED LUNG: ICD-10-CM

## 2020-07-07 PROCEDURE — 74177 CT ABD & PELVIS W/CONTRAST: CPT | Mod: 26

## 2020-07-07 PROCEDURE — 71260 CT THORAX DX C+: CPT | Mod: 26

## 2020-07-20 ENCOUNTER — LABORATORY RESULT (OUTPATIENT)
Age: 45
End: 2020-07-20

## 2020-07-20 ENCOUNTER — APPOINTMENT (OUTPATIENT)
Dept: HEMATOLOGY ONCOLOGY | Facility: CLINIC | Age: 45
End: 2020-07-20
Payer: MEDICARE

## 2020-07-20 VITALS
SYSTOLIC BLOOD PRESSURE: 118 MMHG | RESPIRATION RATE: 14 BRPM | WEIGHT: 174 LBS | HEIGHT: 67 IN | TEMPERATURE: 97.4 F | DIASTOLIC BLOOD PRESSURE: 84 MMHG | HEART RATE: 111 BPM | BODY MASS INDEX: 27.31 KG/M2

## 2020-07-20 LAB
ALBUMIN SERPL ELPH-MCNC: 4.1 G/DL
ALP BLD-CCNC: 84 U/L
ALT SERPL-CCNC: 21 U/L
ANION GAP SERPL CALC-SCNC: 20 MMOL/L
AST SERPL-CCNC: 23 U/L
BILIRUB SERPL-MCNC: 0.3 MG/DL
BUN SERPL-MCNC: 8 MG/DL
CALCIUM SERPL-MCNC: 9.6 MG/DL
CHLORIDE SERPL-SCNC: 100 MMOL/L
CO2 SERPL-SCNC: 21 MMOL/L
CREAT SERPL-MCNC: 0.7 MG/DL
GLUCOSE SERPL-MCNC: 193 MG/DL
HCT VFR BLD CALC: 43.9 %
HGB BLD-MCNC: 14 G/DL
MCHC RBC-ENTMCNC: 30 PG
MCHC RBC-ENTMCNC: 31.9 G/DL
MCV RBC AUTO: 94.2 FL
PLATELET # BLD AUTO: 217 K/UL
PMV BLD: 10.2 FL
POTASSIUM SERPL-SCNC: 4.1 MMOL/L
PROT SERPL-MCNC: 7 G/DL
RBC # BLD: 4.66 M/UL
RBC # FLD: 15.5 %
SODIUM SERPL-SCNC: 141 MMOL/L
WBC # FLD AUTO: 9.43 K/UL

## 2020-07-20 PROCEDURE — 99214 OFFICE O/P EST MOD 30 MIN: CPT

## 2020-07-21 NOTE — REVIEW OF SYSTEMS
[Dizziness] : dizziness [Anxiety] : anxiety [Negative] : Integumentary [Recent Change In Weight] : ~T no recent weight change [Fatigue] : no fatigue [Cough] : no cough [SOB on Exertion] : no shortness of breath during exertion [FreeTextEntry9] : b/l flank pain  [Difficulty Walking] : no difficulty walking [Confused] : no confusion [de-identified] : memory deficit

## 2020-07-21 NOTE — END OF VISIT
[FreeTextEntry3] : Vistors CT C/A/P showed minimal change in his tumor burden the fact that he was not taking/had minimal exposure to  his treatment makes me hopeful he will  respond. His MRI showed worsening radiation changes with hydrocephalus, Dr. Britt took a look at the imaging and we decided to try Avastin again  4 doses every 3 weeks if this does not work he will possibly need a shunt for his hydrocephalus that is now symptomatic, his balance. RTC in one month. [] : Fellow

## 2020-07-21 NOTE — PHYSICAL EXAM
[Ambulatory and capable of all self care but unable to carry out any work activities] : Status 2- Ambulatory and capable of all self care but unable to carry out any work activities. Up and about more than 50% of waking hours [Normal] : clear to auscultation bilaterally, no dullness, no wheezing [de-identified] : R 5th toe appears normal. [de-identified] : Forgetful, but stable. [de-identified] : Unsteady on feet. Ambulates with walker and requires assistance.

## 2020-07-21 NOTE — REASON FOR VISIT
[Follow-Up Visit] : a follow-up [Other: _____] : [unfilled] [[employer] ] : provided by [employer]   [FreeTextEntry1] : [id number]  [FreeTextEntry2] : Rosemary [TWNoteComboBox1] : Belgian

## 2020-07-21 NOTE — HISTORY OF PRESENT ILLNESS
[Disease: _____________________] : Disease: [unfilled] [AJCC Stage: ____] : AJCC Stage: [unfilled] [Treatment Protocol] : Treatment Protocol [de-identified] : 41/M presenting for follow-up for metastatic ALK-positive lung adenocarcinoma.  He is currently on alectinib 600 mg BID.  He initially presented in 11/2014 with multiple pulmonary nodules, mediastinal lymphadenopathy, multiple bony metastases, and brain metastases.  He was initially started on crizotinib and was switched to ceritinib in 12/2015 upon progression.  He was unable to tolerate ceritinib due to significant vomiting and was switched to alectinib in 02/2016.\par \par He had SRS to a left cerebellar hemisphere brain metastasis (08/2015) and subsequently underwent WBRT for CNS progression (10-11/2015).  He had RT to a painful metastatic lesion in the right humerus (01/2015).  He had also received Xgeva for bony metastatic disease in the past. \par \par Since 09/2016, he has been experiencing intermittent headaches being treated with periodic dexamethasone taper.  MRI brain with perfusion analysis showed bilateral cerebellar lesions, favor postRT changes rather than residual tumor.  His most recent brain MRI (11/01/2016) showed overall  stable  exam  compared  to  the  previous  brain  MRI  9/2/2016; no  significant  interval  change  in  bilateral  cerebellar  heterogeneously  enhancing  masses  with  extensive  associated  edema,  as  well  as  smaller  lesions  within  the  left  parietal  white  matter,  superficial  left  temporal  lobe  and  left  dorsal  medulla; and, stable  mild  ventriculomegaly  and  mild  periventricular  FLAIR  signal  abnormality.  He has been following with our neurosurgeon, Dr. Britt, who has recommended no surgical intervention at this time. \par \par His most recent PETCT (11/01/2016) showed no sites of pathologic FDG uptake suspicious for biologic tumor activity.\par \par He reports occasional headaches and a mild sense of imbalance.  Denies any falls.  However he only takes a prn dexamethasone, once in every three days,  which reportedly relieves his intermittent headaches. \par Today he also complains mild pain in his left great toe, likely secondary to ingrowing toe nail. 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 He changed his insurance. Did not get scans and did not take his alectinib for about 1-2 weeks. We were not notified.  Otherwise she feels well she really requires dexamethasone yesterday headache probably once or twice a week as per patient no other complaints.\par \par 4/17/17\par He presents for follow up today. He had MRI of brain that showed stable disease 4/8/2017. He has not had the PET CT yet. He received  his alecetinib on 4/4/17 and stated to take them. States his headaches are rare now and feels well otherwise.\par \par 5/15/17\par He presents for follow up. He had a PET CT on 4/21/17 that  was ucnhged.Since November 1, 2016, no new foci of pathologic FDG uptake to suggest\par biologic tumor activity. . 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 . Unchanged non-FDG avid sclerotic foci involving the right proximal humerus\par and T9 vertebral body.\par \par He  feels well. No dizziness or headache.  No new symptoms or complaints.\par \par 6/19/17\par He is doing well. He has no complaints. No headaches. No SOB. \par \par 7/24/17\par Patient comes in for follow up visit, has no complaints, has no SOB and has a mild cough which is chronic, no hemoptysis. Patient states his appetite is good, weight is stable, he has no headaches and is currently off the steroids. Patient will have repeat PET scan and MRI of the brain with contrast as well as repeat bloodwork. \par \par 8/21/17\par He is here for follow up. He had an MR brain and PET CT on 8/3 and 8/2 that did not show progression, were essentially unchanged.  He feels great otherwise.\par \par 9/21/17\par He is doing well. No complaints. No headaches\par \par October 26, 2017\par He see her for followup he feels great he has no complaints.\par \par 11/16/17\par He is here for follow up. He had an MR of brain on 11/14/17: New areas of nodular enhancement along the inferior surfaces of the temporal lobes bilaterally. These are relatively symmetric and just above the level of the cerebellar enhancing masses, suggesting that these may reflect post treatment change.\par He had to reschedule his PET and states he will do it next Wednesday.\par \par No complaints.\par \par 12/26/17\par He is here for follow up. He feels well. he  had PET/CT in end of November that does not show procession, stable pleural findings. He  feels well. \par \par 1/22/18\par He is here for follow up for his NSCLCA. He states he feels well. No SOB, no headaches no fatigue.. \par \par February 21 2018\par  he is here for followup, he had a recent PET/CT scan which is PRESLEY he also had a recent MRI of the brain. This was reviewed at the CNS tumor board although wasn't clear but the consensus was that this is posttreatment changes in the fact that he is asymptomatic and decided to observe him.\par \par He has no new complaints no headaches no weakness or tingling.\par \par \par 3/21/18\par He is here for follow up. HE states he is doing well. Has very mild headaches on occasion but otherwise doing well.\par \par 4/13/18\par He is doing well. Reports no new complaints \par \par 5/3/18\par He is here for follow up to discuss his MR brain. HE complains of mild headache. Same memory issues as before. Balance is the same.  MR brain showed : Increased size of right parietotemporal of the findings enhancement  seen lesion measuring 5.2 cm, previously 2.8 centimeters and increased  size of left inferior temporal lobe mass measuring 2.3 cm, previously 1.6  cm. these areas again demonstrate hypoperfusion and may represent post  therapeutic changes.   2.  Significant increased edema and mass effect within the right  hemisphere with 9 mm right to left midline shift.  3.  No new enhancing lesions. Stable additional bilateral cerebellar and  supratentorial lesions.\par \par I spoke with Dr. 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 He developed severe headaches, some difficulty with speech and balance issues. 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 He has no insurance so my pharmacy has been giving  him a weekly supply while we work on it. He did not have his scans yet. He feel much better now except for dizziness\par \par 5/30/18\par He is feeling much better. His neurological complaints resolved. He stopped steroids on Monday since he had swelling in legs that has resolved. \par \par 6/13/18\par He is here for follow up. he had a PET/CT on 6/7/18 that showed These images reveal, when comparing the FDG brain images to MRI of  4/27/2018 series 10, there is one small focus of increased FDG uptake in  the region of the right parietotemporal lobe lesion (series 12 image 34).  Therefore persistent biologic tumor activity in this region cannot be  excluded.  No focal abnormal FDG uptake corresponding with the left inferior  temporal lobe lesion and both cerebellar lesions. In addition most of the  right parietotemporal lobe lesion is not FDG avid Compared to normal brain glucose metabolism in the thalamus (surrogate  for normal brain metabolism) relative hypometabolism of the right  parietal, posterior parietal and occipital region and relative  hypermetabolism of the left posterior parietal region  Persistent posttreatment FDG uptake (SUV up to 11.2) along the pleural  surface of the left hemithorax consistent with posttreatment changes  associated with talc pleurodesis  One new mildly FDG avid soft tissue nodule (SUV 3.3) in the left buttock \par \par He feels well now ,no headaches.\par \par 7/16/18\par He is here for follow up. doing well. He has no headaches. No new complaints\par \par 8/13/18\par He is here for follow up.  He is doing well. has no complaints. He is considering returning to work.\par \par 9/10/18\par He is here for follow up. He had an MR brain on 8/24/18 that showed In comparison to the previous brain MRI dated 4/27/2018:  1.  No significant interval change in the size and configuration of the  heterogeneously enhancing, necrotic and hemorrhagic lesions within the  temporal/occipital lobes and cerebellar hemispheres. Enhancement pattern  suggests post-therapeutic changes.  2.  Moderately increased edema within the posterior fossa (cerebellar  hemispheres and brainstem). Increased mass effect upon the fourth  ventricle with no evidence of hydrocephalus.  3.  The left cerebral hemispheric edema has mildly increased and the  right cerebral hemispheric edema has mildly decreased.   4.  Resolution of the previously seen right to left midline shift, right  lateral ventricular compression and left lateral ventricular enlargement.  5.  Treated lesions again noted in the left frontal white matter and left  lateral medulla, without contrast enhancement.\par \par HE is doing well has no new complaints. Has no neuroglial complaints. \par \par 10/10/18\par He is here for follow up he had PET/CT on  9/28/18 that was PRESLEY. He no has headaches again. States he is voiding multiple times a day and unable to empty bladder fully.\par \par 10/30/18\par He is here for follow up. he was in the ED for.Left lower quadrant pain symptoms her was given cirpo. he had a Ct abd/pelvis in ED 10/22/18 : Stable posttreatment changes of the left hemithorax including large  loculated pleural effusion.  2.  Multiple nondilated fluid-filled small bowel loops which may reflect  evidence of enteritis.  \par He had MR brain on 10/29/18:  No new lesions, but several existing lesions have increased in size  2. Bilateral hemorrhagic cerebellar lesions and edema has increased. Mass  effect on the fourth ventricle with hydrocephalus.   2.  Essentially unchanged sizes of supratentorial lesions within the  bilateral temporooccipital lobes and mildly improved edema\par \par He is having headaches and more confusion. He took 4 flomax in am and PM instead of his alecensa. I called [facility] with him and they told us they did not deliver his pills since they could not reach him but will deliver them tomorrow. He will be home. He states he had alecensa at home but not sure.\par \par 11/12/18\par Patient presents to the office with a chief complaint of burning in his right thigh.  He has had no new medications.  The patient states it is worse in the evening but lasts all day.  The patient states that the burning sensation starts at the right knee and is ascending.  Straight leg raise negative.  Discussed that it is unlikely that it is from his Avastin treatment, but unsure the etiology.  We will send for LE doppler to rule out any DVT.\par \par 11/27/18\par He is here for follow up. His Dopplers  were negative and his pain/cramps are nearly gone only some in right thigh. His headaches are gone and he is speech is  better and states memory is better since we started Avastin.\par \par 1/9/19\par He is doing well.  He has no more pain in his legs. He is eating well. He is due for 4/4 dose of Avastin. No other complains.\par \par 2/6/19\par He is here for follow-up.  He generally feels well.  He states he still suffers from headaches.  His memory has improved minimally.\par PET/CT 2/4/19 IMPRESSION: Since 9/28/2018, No definite sites of abnormal FDG uptake to suggest biologic tumor activity. Cerebellar lesions seen on brain MRI from 1/20/2019, are not FDG avid and consistent with treated metastases. Post talc pleurodesis of the left pleura, unchanged. \par MR Brain 1/20/19 IMPRESSION: In comparison to the previous brain MRI dated 10/29/2018: 1. No significant interval change in the size and configuration of the heterogeneously enhancing, necrotic and hemorrhagic lesions within the temporal/occipital lobes and cerebellar hemispheres. 2. The edema within the brainstem and cerebellum is about stable;however there is slight progression of the hydrocephalus since the prior exam. 3. The edema within the temporal and occipital lobes has decreased. 4. New patchy area of restricted diffusion around the left temporal horn (without corresponding enhancement) may reflect superimposed ischemic changes. Recommend attention on follow-up imaging. \par \par 3/6/19\par Patient is here for a follow-up visit.  He states he is having increased frequency of headaches with minimal relief from Tylenol.  He is now staying in the shelter on [address] with his daughter.  He will  his medications from [facility] from now on.  We are awaiting arrangement for him to get an apartment.\par \par 3/27/19\par He is here for follow up. his headache is better and he takes his Dexamethasone only when he has it which helps. Last PET was in February and last MRI was in January.\par \par 4/29/19\par He is here for follow-up.  Since last visit, he presented to the ED in 4/2019 due to chest pain.  He underwent CTA chest that demonstrated unchanged left pleural effusion. Likely radiation induced necrosis changes, he states it may have been related to stress related to his daughter.  He is still awaiting placement from the shelter.  He reports his usual frequency headaches, but he takes the Decadron as needed which helps.  \par CTA Chest (4/7/19) IMPRESSION:No pulmonary emboli.20.9 cm pleural fluid collection in the left hemithorax, essentially unchanged since PET/CT dated 2/4/2019.\par MR Brain (4/2/19) IMPRESSION: Since January 20, 2019:New worse signal abnormality involving the brain and volodymyr with increased edema and worse effacement of the fourth ventricle and resulting mild hydrocephalus.Slightly worse edema involving the cerebellum.Stable bilateral posterior temporal/occipital/cerebellar heterogeneously enhancing lesions.Stable left posterior frontal deep white matter and left dorsal medullary hypointense foci, compatible with treated lesions.\par \par 5/22/19\par He came in for follow up. He has been complaining of headaches mainly in AM. He does not think dexamethasone is helping anymore. No other issues.\par \par 6/3/19\par He is here for follow up. HIs headacehs are the same and uses dexamethasone 4 mg almost dialy. He had  CT C/A/P that showed a stable effusion 5/26/19 amd MR brain showed  5/29/19: Overall no significant changes compared with the previous brain MRI dated \par 4/2/2019:\par \par 1.  No significant interval change in the size and configuration of the \par heterogeneously enhancing, necrotic and hemorrhagic lesions within the \par temporal/occipital lobes and cerebellar hemispheres.\par \par 2. Extensive edema within the cerebellar hemispheres, brainstem, parietal \par and temporal lobes is again seen. Patchy areas of enhancement are noted \par in this region. Appearance is most suggestive of posttreatment changes.\par \par 3. Stable patchy area of restricted diffusion around the left temporal \par horn again seen, nonspecific.\par \par 4. No new enhancing lesions identified.\par \par 6/24/19\par He is here for follow up. he missed his Avastin since he had family issues. Still has headaches they are the same. Will restart Avastin today,.\par \par 7/15/19\par He is here for follow up. He could not tell me if headaches are better. He does use steroids once  in  a while for headaches. States memory is the same. \par \par \par 8/14/19\par He is doing better. He Missed his Avastin doses. But he has no headaches and does not use steroids. He had a fall  and hit a rail on his chest prior to his CT scans and the finding in his CT chest is probably related to the trauma he still has pain in the area but its better. CT C/A/P and MR brain from august are bellow\par \par IMPRESSION:\par \par Since April 7, 2019\par \par Stable left pleural fluid collection measuring 9.5 x 14.1 by 18.4 cm, \par with stable compressive atelectasis of left lung and left hemidiaphragm \par inversion.\par \par New, indeterminate approximate 8 mm soft tissue density, left anterior \par chest (series 4/104).\par \par Interval development of approximate 3.6 x 1.8 by 3.9 cm homogeneous \par elliptical shaped structure, left epicardial fat pad (4/206), possibly \par proteinaceous material (HU +35).\par \par \par IMPRESSION:\par \par Within the left cardiophrenic angle, there is a new area of lobulated \par soft tissue measuring 4.0 x 2.5 cm suspicious for new metastatic disease.\par \par Please see chest CT report for chest findings\par \par IMPRESSION:\par \par Overall no significant changes compared with the previous brain MRI dated \par 5/29/2019:\par \par 1.  No significant interval change in the size and configuration of the \par heterogeneously enhancing, necrotic and hemorrhagic lesions within the \par temporal/occipital lobes and cerebellar hemispheres.\par \par 2. Extensive edema within the cerebellar hemispheres, brainstem, parietal \par and temporal lobes is again seen. Patchy areas of enhancement are noted \par in this region. Appearance is most suggestive of posttreatment changes.\par \par 3. No new enhancing lesions identified.\par \par 10/01/19\par Pt presented today for routine follow up visit. He reports feeling fine and has no new complaints . Pt reports no new symptoms of weakness , headaches and confusion and is well compliant with treatment . \par \par \par 11/11/19\par He is here for follow up.   He feels well. No headaches.  Has a runny nose. No pain. No SOB. He gained a few  pounds.\par \par 12/9/19\par He is here for follow up. He had his MR brain on  11/16/19IMPRESSION: \par 1. Overall no significant changes compared with the previous brain MRI dated 8/5/2019. \par 2. Specifically, the heterogeneously enhancing, necrotic and hemorrhagic lesions within the temporal/occipital \par lobes and cerebellar hemispheres; with extensive associated edema and patchy enhancement are not \par significantly changed and most compatible with posttreatment changes. \par 3. No new enhancing lesions identified.\par \par He did not do his CTs yet. \par \par He feels well. No headaches. \par \par 1/13/2020\par Patient is here for a follow-up visit for metastatic lung cancer.  He is feeling well with no new complaints.  Most recent CBC is stable.  Patient denies fever, chills, nausea, vomiting, new pain or bleeding.  We discussed that based on most recent imaging, it may be suggestive that he is progressing on current treatment regimen.  \par PET/CT (1.7.20) IMPRESSION: Since February 4, 2019:1. Redemonstration of large left pleural fluid collection with circumferential FDG uptake along the pleural surface and associated calcifications (max SUV up to 18, [id number]), consistent with posttreatment change related to talc pleurodesis. New 5.8 x 3.5 cm lesion along the left posterior costophrenic angle, with peripheral FDG avidity, max SUV14. Given talc pleurodesis, findings may represent evolving inflammatory response, however neoplasm remains a consideration, and continued follow-up or tissue sampling recommended.2. Unchanged non-FDG avid sclerotic, treated osseous metastases in the right proximal humerus and T9 vertebral body with compression deformity.\par \par I presented him in tumor board and we decided to biopsy the new area. \par \par 2/4/2020-Interpretor services used as pt is Green[place]ic speaking # [id number]\par  Dionisio is here for follow up complaining of b/l flank pain. No c/o fever, chills, dysuria, hematuria at home. No change in bowel habits. He describes dull pain b/l flank area that is mostly in the morning. Today during the office visit, he did not have much pain \par \par 3/17/20\par He is here for follow up. He underwent the biopsy of the  left pleural mass on 3/12/20 that showed Poorly differentiated malignant neoplasm. IHC is pending.  he is doing well otherwise. has minimal headaches. Not dizzy\par \par 4/1/2020: Dionisio is here for follow up, he started Lobrena on 3/17/2020, he is taking 100mg daily, reports no side effects. He reports "little headache" in the morning that is relieved with Tylenol, no other symptoms, no vision changes, no seizure like activity, no N/V, he reports no SOB, no palpitations, heart exam is RRR. No fevers. He will continue with Lobrena as prescribed. \par \par 6/22/2020\par Patient is here for a follow-up visit for metastatic lung cancer.  Since last visit he had COVID was in [facility].  he had a prolonged course. I spoke to his Neurosurgeon and he had MR brain that showed radiation necrosis and he had steroids. He only took Lorbrena for one month, Confirmed with pharmacy.  He is here with his ex wife. We used a pacific  Rosemary ID  [id number] \par He has been taking his Lorbreana since May 28 2020, he gets it from [facility] now, He is on [place] 2[place] from his hospitalization. He lives with his brother in law. He states he is not SOB. HE was unsteady on his feet. He felt well otherwise. \par \par 7/20/2020\par Patient presents for follow up of metastatic lung cancer. He is accompanied by his ex-wife.  [id number] used to communicate with patient. Patient is unsteady on his feet, ambulates with a walker, and requires someone to remain nearby for assistance. Patient fell last week and hurt 5th toe on right foot, although it seems to be improved now. ROS is significant for foot swelling. Results of recent MRI brain, CT chest/abdomen/pelvis reviewed. He has been taking lorlatinib since 5/28/2020. Ex-wife has noticed that feet are swollen and has attributed his symptoms to ativan, stating that she has switched from giving it to him every day to every other day.

## 2020-07-21 NOTE — ASSESSMENT
[Palliative] : Goals of care discussed with patient: Palliative [FreeTextEntry1] : 1.  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 Due to insurance issues he stopped it and restarted it 4/4/2017. He progressed on alectinib and began taking lorlatinib on 5/28/2020.\par - Repeat MRI brain 8/2018 radiation related changes, post treatment changes, and repeat PET/CT 6/5/18   is without systemic progression.  It confirmed radiation necrosis. PET/CT on 9/28/18 was PRESLEY. CT abdomen was unchanged.\par - Repeat PET/CT from 01/2020 shows new FDG-avid lesions in the lung right under his left effusion on top of the diaphragm.\par - He underwent the biopsy of the  left pleural mass on 3/12/20 that showed poorly differentiated malignant neoplasm. IHC c/w non-small cell carcinoma of the lung, faovring adenocarcinoma, TTF 1 positive.\par -Per Dr. Richardson, he is not a candidate for radiation to the site of progression.\par -We did foundation one on his re-biopsy that showed EML4-Alk variant 3a/b, other mutations CDKN2a and B loss, LLL2, MTAP, SMARCa4, Tp53\par -He was started on lorlatinib in March 2020, took it for about one month, had a prolonged COVID hospitalization, restarted on 5/28/2020.\par -The FDA approval for lorlatinib is based on the results of a phase II study ([id number]) that included a subgroup of 198 patients with ALK-positive metastatic NSCLC previously treated with =1 ALK inhibitor.  The overall response rate with lorlatinib among these patients was 47 percent, with a complete response rate of 2 percent and a partial response rate of 45 percent. Efficacy according to prior treatment was as follows: post-crizotinib, KENNEY 73 percent, median PFS 11.1 months, and median duration of response not reached; post-one or more second-generation ALK inhibitors, KENNEY 40 percent, median PFS 6.9 months, median duration of response 7.1 months [66]. The most frequent adverse effects were hypercholesterolemia (81 percent), hypertriglyceridemia (61 percent), edema (43 percent), and peripheral neuropathy (30 percent). Serious treatment-related adverse effects occurred in 7 percent of patients, the most frequent being cognitive deficits in 1 percent. \par -Repeat CT C/A/P and MRI brain performed for new baseline. CT abdomen/pelvis negative. CT chest demonstrated increase in size of costophrenic mass from 6 cm on 3/10/2020 to 6.5 cm on 7/7/2020. However, patient had significant treatment interruption in interim.\par -MRI brain showed essentially stable masses with increased hydrocephalus and stable mass effect on 4th ventricle.\par -Patient previously received dexamethasone and avastin in past. Will reorder avastin x 4 doses. If no improvement, will refer to neurosurgery for shunt placement.\par -Advised that LE swelling is likely secondary to lorlatinib and not ativan. Advise continuing lorlatinib and using compression stockings and LE elevation.\par -Follow up in one month.\par \par Contact. Ex wife : [phone], Brother Caesar

## 2020-07-29 ENCOUNTER — OUTPATIENT (OUTPATIENT)
Dept: OUTPATIENT SERVICES | Facility: HOSPITAL | Age: 45
LOS: 1 days | Discharge: HOME | End: 2020-07-29

## 2020-07-29 ENCOUNTER — LABORATORY RESULT (OUTPATIENT)
Age: 45
End: 2020-07-29

## 2020-07-29 DIAGNOSIS — Z11.59 ENCOUNTER FOR SCREENING FOR OTHER VIRAL DISEASES: ICD-10-CM

## 2020-07-31 ENCOUNTER — APPOINTMENT (OUTPATIENT)
Dept: INFUSION THERAPY | Facility: CLINIC | Age: 45
End: 2020-07-31

## 2020-07-31 ENCOUNTER — LABORATORY RESULT (OUTPATIENT)
Age: 45
End: 2020-07-31

## 2020-07-31 LAB
HCT VFR BLD CALC: 43.1 %
HGB BLD-MCNC: 13.9 G/DL
MCHC RBC-ENTMCNC: 30 PG
MCHC RBC-ENTMCNC: 32.3 G/DL
MCV RBC AUTO: 92.9 FL
PLATELET # BLD AUTO: 208 K/UL
PMV BLD: 11 FL
RBC # BLD: 4.64 M/UL
RBC # FLD: 14.5 %
WBC # FLD AUTO: 7.93 K/UL

## 2020-07-31 RX ORDER — DIPHENHYDRAMINE HCL 50 MG
50 CAPSULE ORAL ONCE
Refills: 0 | Status: COMPLETED | OUTPATIENT
Start: 2020-07-31 | End: 2020-07-31

## 2020-07-31 RX ORDER — BEVACIZUMAB 400 MG/16ML
590 INJECTION, SOLUTION INTRAVENOUS ONCE
Refills: 0 | Status: COMPLETED | OUTPATIENT
Start: 2020-07-31 | End: 2020-07-31

## 2020-07-31 RX ORDER — ACETAMINOPHEN 500 MG
650 TABLET ORAL ONCE
Refills: 0 | Status: COMPLETED | OUTPATIENT
Start: 2020-07-31 | End: 2020-07-31

## 2020-07-31 RX ADMIN — BEVACIZUMAB 82.4 MILLIGRAM(S): 400 INJECTION, SOLUTION INTRAVENOUS at 15:22

## 2020-07-31 RX ADMIN — Medication 650 MILLIGRAM(S): at 14:39

## 2020-07-31 RX ADMIN — Medication 50 MILLIGRAM(S): at 14:39

## 2020-07-31 RX ADMIN — Medication 650 MILLIGRAM(S): at 16:49

## 2020-08-03 LAB
APPEARANCE: CLEAR
BILIRUBIN URINE: NEGATIVE
BLOOD URINE: NEGATIVE
COLOR: NORMAL
GLUCOSE QUALITATIVE U: NEGATIVE
KETONES URINE: NEGATIVE
LEUKOCYTE ESTERASE URINE: NEGATIVE
NITRITE URINE: NEGATIVE
PH URINE: 8.5
PROTEIN URINE: NEGATIVE
SPECIFIC GRAVITY URINE: 1.01
UROBILINOGEN URINE: NORMAL

## 2020-08-11 ENCOUNTER — EMERGENCY (EMERGENCY)
Facility: HOSPITAL | Age: 45
LOS: 0 days | Discharge: HOME | End: 2020-08-11
Attending: EMERGENCY MEDICINE | Admitting: EMERGENCY MEDICINE
Payer: MEDICARE

## 2020-08-11 VITALS
HEART RATE: 72 BPM | TEMPERATURE: 98 F | SYSTOLIC BLOOD PRESSURE: 125 MMHG | RESPIRATION RATE: 18 BRPM | OXYGEN SATURATION: 97 % | DIASTOLIC BLOOD PRESSURE: 70 MMHG

## 2020-08-11 VITALS
HEART RATE: 107 BPM | SYSTOLIC BLOOD PRESSURE: 156 MMHG | OXYGEN SATURATION: 98 % | TEMPERATURE: 98 F | DIASTOLIC BLOOD PRESSURE: 88 MMHG | RESPIRATION RATE: 18 BRPM

## 2020-08-11 DIAGNOSIS — R79.9 ABNORMAL FINDING OF BLOOD CHEMISTRY, UNSPECIFIED: ICD-10-CM

## 2020-08-11 DIAGNOSIS — R04.2 HEMOPTYSIS: ICD-10-CM

## 2020-08-11 DIAGNOSIS — Z20.828 CONTACT WITH AND (SUSPECTED) EXPOSURE TO OTHER VIRAL COMMUNICABLE DISEASES: ICD-10-CM

## 2020-08-11 DIAGNOSIS — Z88.1 ALLERGY STATUS TO OTHER ANTIBIOTIC AGENTS STATUS: ICD-10-CM

## 2020-08-11 DIAGNOSIS — Z87.891 PERSONAL HISTORY OF NICOTINE DEPENDENCE: ICD-10-CM

## 2020-08-11 LAB
ALBUMIN SERPL ELPH-MCNC: 4 G/DL — SIGNIFICANT CHANGE UP (ref 3.5–5.2)
ALP SERPL-CCNC: 76 U/L — SIGNIFICANT CHANGE UP (ref 30–115)
ALT FLD-CCNC: 16 U/L — SIGNIFICANT CHANGE UP (ref 0–41)
ANION GAP SERPL CALC-SCNC: 11 MMOL/L — SIGNIFICANT CHANGE UP (ref 7–14)
APTT BLD: 29 SEC — SIGNIFICANT CHANGE UP (ref 27–39.2)
AST SERPL-CCNC: 21 U/L — SIGNIFICANT CHANGE UP (ref 0–41)
BASOPHILS # BLD AUTO: 0.05 K/UL — SIGNIFICANT CHANGE UP (ref 0–0.2)
BASOPHILS NFR BLD AUTO: 0.6 % — SIGNIFICANT CHANGE UP (ref 0–1)
BILIRUB SERPL-MCNC: 0.2 MG/DL — SIGNIFICANT CHANGE UP (ref 0.2–1.2)
BUN SERPL-MCNC: 9 MG/DL — LOW (ref 10–20)
CALCIUM SERPL-MCNC: 9.5 MG/DL — SIGNIFICANT CHANGE UP (ref 8.5–10.1)
CHLORIDE SERPL-SCNC: 102 MMOL/L — SIGNIFICANT CHANGE UP (ref 98–110)
CO2 SERPL-SCNC: 28 MMOL/L — SIGNIFICANT CHANGE UP (ref 17–32)
CREAT SERPL-MCNC: 0.8 MG/DL — SIGNIFICANT CHANGE UP (ref 0.7–1.5)
EOSINOPHIL # BLD AUTO: 1.03 K/UL — HIGH (ref 0–0.7)
EOSINOPHIL NFR BLD AUTO: 12.8 % — HIGH (ref 0–8)
GLUCOSE SERPL-MCNC: 180 MG/DL — HIGH (ref 70–99)
HCT VFR BLD CALC: 43.1 % — SIGNIFICANT CHANGE UP (ref 42–52)
HGB BLD-MCNC: 13.7 G/DL — LOW (ref 14–18)
IMM GRANULOCYTES NFR BLD AUTO: 0.5 % — HIGH (ref 0.1–0.3)
INR BLD: 1.06 RATIO — SIGNIFICANT CHANGE UP (ref 0.65–1.3)
LACTATE SERPL-SCNC: 2.1 MMOL/L — HIGH (ref 0.7–2)
LIDOCAIN IGE QN: 45 U/L — SIGNIFICANT CHANGE UP (ref 7–60)
LYMPHOCYTES # BLD AUTO: 1.57 K/UL — SIGNIFICANT CHANGE UP (ref 1.2–3.4)
LYMPHOCYTES # BLD AUTO: 19.5 % — LOW (ref 20.5–51.1)
MAGNESIUM SERPL-MCNC: 1.9 MG/DL — SIGNIFICANT CHANGE UP (ref 1.8–2.4)
MCHC RBC-ENTMCNC: 29.5 PG — SIGNIFICANT CHANGE UP (ref 27–31)
MCHC RBC-ENTMCNC: 31.8 G/DL — LOW (ref 32–37)
MCV RBC AUTO: 92.7 FL — SIGNIFICANT CHANGE UP (ref 80–94)
MONOCYTES # BLD AUTO: 0.87 K/UL — HIGH (ref 0.1–0.6)
MONOCYTES NFR BLD AUTO: 10.8 % — HIGH (ref 1.7–9.3)
NEUTROPHILS # BLD AUTO: 4.48 K/UL — SIGNIFICANT CHANGE UP (ref 1.4–6.5)
NEUTROPHILS NFR BLD AUTO: 55.8 % — SIGNIFICANT CHANGE UP (ref 42.2–75.2)
NRBC # BLD: 0 /100 WBCS — SIGNIFICANT CHANGE UP (ref 0–0)
NT-PROBNP SERPL-SCNC: 51 PG/ML — SIGNIFICANT CHANGE UP (ref 0–300)
PLATELET # BLD AUTO: 178 K/UL — SIGNIFICANT CHANGE UP (ref 130–400)
POTASSIUM SERPL-MCNC: 4.1 MMOL/L — SIGNIFICANT CHANGE UP (ref 3.5–5)
POTASSIUM SERPL-SCNC: 4.1 MMOL/L — SIGNIFICANT CHANGE UP (ref 3.5–5)
PROT SERPL-MCNC: 6.6 G/DL — SIGNIFICANT CHANGE UP (ref 6–8)
PROTHROM AB SERPL-ACNC: 12.2 SEC — SIGNIFICANT CHANGE UP (ref 9.95–12.87)
RBC # BLD: 4.65 M/UL — LOW (ref 4.7–6.1)
RBC # FLD: 13.8 % — SIGNIFICANT CHANGE UP (ref 11.5–14.5)
SODIUM SERPL-SCNC: 141 MMOL/L — SIGNIFICANT CHANGE UP (ref 135–146)
TROPONIN T SERPL-MCNC: <0.01 NG/ML — SIGNIFICANT CHANGE UP
WBC # BLD: 8.04 K/UL — SIGNIFICANT CHANGE UP (ref 4.8–10.8)
WBC # FLD AUTO: 8.04 K/UL — SIGNIFICANT CHANGE UP (ref 4.8–10.8)

## 2020-08-11 PROCEDURE — 71275 CT ANGIOGRAPHY CHEST: CPT | Mod: 26

## 2020-08-11 PROCEDURE — 93010 ELECTROCARDIOGRAM REPORT: CPT

## 2020-08-11 PROCEDURE — 71045 X-RAY EXAM CHEST 1 VIEW: CPT | Mod: 26

## 2020-08-11 PROCEDURE — 99285 EMERGENCY DEPT VISIT HI MDM: CPT

## 2020-08-11 NOTE — ED PROVIDER NOTE - CLINICAL SUMMARY MEDICAL DECISION MAKING FREE TEXT BOX
46yo M with PMHx stage 4 lung CA with brain and bone mets sent to ED for eval of one episode of hemoptysis 4 days ago and eelvated ddimer on outpatient testing. No acute CP/SOB, hypoxia. CTA chest negative for PE, stable left fluid collection. Vitals WNL. Pt prefers to return home. Stable at time of discharge, f/u with his PMD and heme/onc outpatient as scheduled. Return precautions given.

## 2020-08-11 NOTE — ED PROVIDER NOTE - NS ED ROS FT
•	CONSTITUTIONAL - no fever, no diaphoresis, no chills  	•	SKIN - no rash  	•	HEMATOLOGIC - no bleeding, no bruising  	•	EYES - no eye pain, no blurry vision  	•	ENT - no congestion  	•	RESPIRATORY - no shortness of breath, no cough  	•	CARDIAC - no chest pain, no palpitations  	•	GI - no abd pain, no nausea, no vomiting, no diarrhea, no constipation  	•	GENITO-URINARY - no dysuria; no hematuria, no increased urinary frequency  	•	MUSCULOSKELETAL - no joint paint, no swelling, no redness  	•	NEUROLOGIC - no weakness, no headache, no paresthesias, no LOC  	•	PSYCH - no anxiety, non suicidal, non homicidal, no hallucination, no depression  	All other ROS are negative except as documented in HPI. •	CONSTITUTIONAL - no fever, no diaphoresis, no chills  	•	SKIN - no rash  	•	HEMATOLOGIC - no bleeding, no bruising  	•	EYES - no eye pain, no blurry vision  	•	ENT - no congestion  	•	RESPIRATORY - (+) hemoptysis, no shortness of breath, no cough  	•	CARDIAC - no chest pain, no palpitations  	•	GI - no abd pain, no nausea, no vomiting, no diarrhea, no constipation  	•	GENITO-URINARY - no dysuria; no hematuria, no increased urinary frequency  	•	MUSCULOSKELETAL - no joint paint, no swelling, no redness  	•	NEUROLOGIC - no weakness, no headache, no paresthesias, no LOC  	•	PSYCH - no anxiety, non suicidal, non homicidal, no hallucination, no depression  	All other ROS are negative except as documented in HPI. •	CONSTITUTIONAL - no fever, no diaphoresis, no chills  	•	SKIN - no rash  	•	HEMATOLOGIC - no bleeding, no bruising  	•	EYES - no eye pain, no blurry vision  	•	ENT - no congestion  	•	RESPIRATORY - (+) hemoptysis, no shortness of breath, no cough  	•	CARDIAC - no chest pain, no palpitations  	•	GI - no abd pain, no nausea, no vomiting, no diarrhea, no constipation  	•	GENITO-URINARY - no dysuria; no hematuria, no increased urinary frequency  	•	MUSCULOSKELETAL - no joint paint, no swelling, no redness  	•	NEUROLOGIC - no weakness, no headache, no paresthesias, no LOC  	•	PSYCH - no anxiety, no depression  	All other ROS are negative except as documented in HPI.

## 2020-08-11 NOTE — ED PROVIDER NOTE - PATIENT PORTAL LINK FT
You can access the FollowMyHealth Patient Portal offered by [facility] by registering at the following website: [email or web address]. By joining [facility]’s FollowMyHealth portal, you will also be able to view your health information using other applications (apps) compatible with our system.

## 2020-08-11 NOTE — ED PROVIDER NOTE - NSFOLLOWUPINSTRUCTIONS_ED_ALL_ED_FT
Hemoptysis     Hemoptysis is coughing up blood. It can be mild or serious. With mild hemoptysis, you may cough up blood-streaked saliva and mucus (sputum) when you have an infection in your nose, throat, or lungs (respiratory tract). Coughing up 1–2 cups (240–480 mL) of blood within 24 hours (massive hemoptysis) is a medical emergency.  The most common cause of hemoptysis is a respiratory tract infection, such as bronchitis or pneumonia. Other common causes include:  A lung tumor or upper airway tumor.A medical condition that damages the large air passageways (bronchiectasis).A blood clot in the lungs (pulmonary embolism).A medical condition that keeps your blood from clotting normally.Breathing in (inhaling) a small foreign object.Sometimes the cause is not known. Hemoptysis can be a sign of a minor or serious medical condition, so it is important to see your health care provider.  Follow these instructions at home:  Watch your condition for any changes.Take over-the-counter and prescription medicines only as told by your health care provider.If you were prescribed an antibiotic medicine, take it as told by your health care provider. Do not stop taking the antibiotic even if you start to feel better.Return to your normal activities as told by your health care provider. Ask your health care provider what activities are safe for you.Do not use any products that contain nicotine or tobacco, such as cigarettes and e-cigarettes. If you need help quitting, ask your health care provider.Keep all follow-up visits as told by your health care provider. This is important.Contact a health care provider if:  You have a fever.You cough up blood-streaked (blood-tinged) sputum.Get help right away if:  You cough up fresh blood or blood clots.You have trouble breathing.You have chest pain.This information is not intended to replace advice given to you by your health care provider. Make sure you discuss any questions you have with your health care provider.

## 2020-08-11 NOTE — ED ADULT NURSE NOTE - OBJECTIVE STATEMENT
pt with brain ca with mets to lungs sent in by PMD for elevated D -dimer. as per wife, pt is nonverbal at bedside and disoriented. pt does not appear to be SOB/ having chest pain

## 2020-08-11 NOTE — ED ADULT TRIAGE NOTE - CHIEF COMPLAINT QUOTE
pt sent for abnormal blood work, wife unsure of lab work. hx of brain CA. wife reports that pt had 1 episode of hemoptysis x 4 days ago. denies any fever

## 2020-08-11 NOTE — ED PROVIDER NOTE - PROVIDER TOKENS
PROVIDER:[TOKEN:[[id number]],FOLLOWUP:[1-3 Days]],PROVIDER:[TOKEN:[[id number]],FOLLOWUP:[1-3 Days]]

## 2020-08-11 NOTE — ED PROVIDER NOTE - ATTENDING CONTRIBUTION TO CARE
44yo M with PMHx stage 4 lung CA with brain and bone mets presents for hemoptysis, one episode 4 days ago. Went to PMD to have labs done, had a ddimer in 600s so told to go to ED for eval and r/o PE. Denies fever/chills, CP, SOB. Denies headache/lightheadedness, palpitations, nausea, vomiting, diarrhea, abd pain, leg swelling.     Vital signs reviewed  GENERAL: Patient nontoxic appearing, NAD  HEAD: NCAT  EYES: Anicteric  ENT: MMM  RESPIRATORY: Normal respiratory effort. No retractions. Left sided decreased breath sounds.   CARDIOVASCULAR: Slightly tachycardic.  ABDOMEN: Soft. Nondistended. Nontender. No guarding or rebound.   MUSCULOSKELETAL/EXTREMITIES: Brisk cap refill. Equal radial pulses. No leg edema.  SKIN:  Warm and dry  NEURO: AAOx3. Slurred speech (baseline). Moving all extremities.

## 2020-08-11 NOTE — ED PROVIDER NOTE - OBJECTIVE STATEMENT
46 y/o M with PMH of lung CA metastatic to the brain currently on Lorbrena, presents to ED for evaluation of abnormal blood work. Pt had blood work done today at his doctor’s office that showed an elevated D-dimer. Additionally pt reports he had an episode of hemoptysis 4 days ago.  No fever, chills, CP, SOB, abdominal pain, urinary sx. 46 y/o M with PMH of lung CA metastatic to the brain and bone currently on Lorbrena, presents to ED for evaluation of abnormal blood work. Pt had blood work done today at his doctor’s office that showed an elevated D-dimer in the 600s. Additionally pt reports he had an episode of hemoptysis 4 days ago.  No fever, chills, CP, SOB, abdominal pain, urinary sx.

## 2020-08-11 NOTE — ED PROVIDER NOTE - CARE PROVIDER_API CALL
Dayton Becker)  [id number] [facility]  [address] [place] [address]  Phone: [phone]  Fax: [phone]  Follow Up Time: 1-3 Days    Faustino Jara)  Hematology; Internal Medicine; Medical Oncology  [address] [place] [address]  Phone: [phone]  Fax: [phone]  Follow Up Time: 1-3 Days

## 2020-08-18 ENCOUNTER — LABORATORY RESULT (OUTPATIENT)
Age: 45
End: 2020-08-18

## 2020-08-18 ENCOUNTER — OUTPATIENT (OUTPATIENT)
Dept: OUTPATIENT SERVICES | Facility: HOSPITAL | Age: 45
LOS: 1 days | Discharge: HOME | End: 2020-08-18

## 2020-08-18 DIAGNOSIS — Z11.59 ENCOUNTER FOR SCREENING FOR OTHER VIRAL DISEASES: ICD-10-CM

## 2020-08-21 ENCOUNTER — LABORATORY RESULT (OUTPATIENT)
Age: 45
End: 2020-08-21

## 2020-08-21 ENCOUNTER — APPOINTMENT (OUTPATIENT)
Dept: INFUSION THERAPY | Facility: CLINIC | Age: 45
End: 2020-08-21

## 2020-08-21 RX ORDER — ACETAMINOPHEN 500 MG
650 TABLET ORAL ONCE
Refills: 0 | Status: COMPLETED | OUTPATIENT
Start: 2020-08-21 | End: 2020-08-21

## 2020-08-21 RX ORDER — BEVACIZUMAB 400 MG/16ML
590 INJECTION, SOLUTION INTRAVENOUS ONCE
Refills: 0 | Status: COMPLETED | OUTPATIENT
Start: 2020-08-21 | End: 2020-08-21

## 2020-08-21 RX ORDER — DIPHENHYDRAMINE HCL 50 MG
50 CAPSULE ORAL ONCE
Refills: 0 | Status: COMPLETED | OUTPATIENT
Start: 2020-08-21 | End: 2020-08-21

## 2020-08-21 RX ADMIN — Medication 650 MILLIGRAM(S): at 16:01

## 2020-08-21 RX ADMIN — Medication 650 MILLIGRAM(S): at 16:00

## 2020-08-21 RX ADMIN — BEVACIZUMAB 123.6 MILLIGRAM(S): 400 INJECTION, SOLUTION INTRAVENOUS at 16:22

## 2020-08-21 RX ADMIN — Medication 50 MILLIGRAM(S): at 16:01

## 2020-08-23 LAB
APPEARANCE: CLEAR
BILIRUBIN URINE: NEGATIVE
BLOOD URINE: NEGATIVE
COLOR: NORMAL
GLUCOSE QUALITATIVE U: NEGATIVE
HCT VFR BLD CALC: 44.2 %
HGB BLD-MCNC: 14.2 G/DL
KETONES URINE: NEGATIVE
LEUKOCYTE ESTERASE URINE: NEGATIVE
MCHC RBC-ENTMCNC: 29.3 PG
MCHC RBC-ENTMCNC: 32.1 G/DL
MCV RBC AUTO: 91.1 FL
NITRITE URINE: NEGATIVE
PH URINE: 7.5
PLATELET # BLD AUTO: 190 K/UL
PMV BLD: 11.4 FL
PROTEIN URINE: NORMAL
RBC # BLD: 4.85 M/UL
RBC # FLD: 13.4 %
SPECIFIC GRAVITY URINE: 1.02
UROBILINOGEN URINE: NORMAL
WBC # FLD AUTO: 7.55 K/UL

## 2020-08-24 ENCOUNTER — APPOINTMENT (OUTPATIENT)
Dept: HEMATOLOGY ONCOLOGY | Facility: CLINIC | Age: 45
End: 2020-08-24
Payer: MEDICARE

## 2020-08-24 ENCOUNTER — LABORATORY RESULT (OUTPATIENT)
Age: 45
End: 2020-08-24

## 2020-08-24 VITALS
SYSTOLIC BLOOD PRESSURE: 115 MMHG | DIASTOLIC BLOOD PRESSURE: 78 MMHG | BODY MASS INDEX: 29.19 KG/M2 | WEIGHT: 186 LBS | TEMPERATURE: 97.6 F | RESPIRATION RATE: 14 BRPM | HEIGHT: 67 IN | HEART RATE: 102 BPM

## 2020-08-24 DIAGNOSIS — E78.5 HYPERLIPIDEMIA, UNSPECIFIED: ICD-10-CM

## 2020-08-24 LAB
HCT VFR BLD CALC: 45 %
HGB BLD-MCNC: 14.6 G/DL
MCHC RBC-ENTMCNC: 29.4 PG
MCHC RBC-ENTMCNC: 32.4 G/DL
MCV RBC AUTO: 90.7 FL
PLATELET # BLD AUTO: 196 K/UL
PMV BLD: 11 FL
RBC # BLD: 4.96 M/UL
RBC # FLD: 13.4 %
WBC # FLD AUTO: 7.33 K/UL

## 2020-08-24 PROCEDURE — 99214 OFFICE O/P EST MOD 30 MIN: CPT

## 2020-08-24 RX ORDER — ATORVASTATIN CALCIUM 20 MG/1
20 TABLET, FILM COATED ORAL
Qty: 30 | Refills: 0 | Status: ACTIVE | COMMUNITY
Start: 2020-08-24 | End: 1900-01-01

## 2020-08-25 LAB
ALBUMIN SERPL ELPH-MCNC: 4.2 G/DL
ALP BLD-CCNC: 78 U/L
ALT SERPL-CCNC: 15 U/L
ANION GAP SERPL CALC-SCNC: 12 MMOL/L
AST SERPL-CCNC: 20 U/L
BILIRUB SERPL-MCNC: 0.2 MG/DL
BUN SERPL-MCNC: 9 MG/DL
CALCIUM SERPL-MCNC: 9.3 MG/DL
CHLORIDE SERPL-SCNC: 104 MMOL/L
CO2 SERPL-SCNC: 23 MMOL/L
CREAT SERPL-MCNC: 0.6 MG/DL
GLUCOSE SERPL-MCNC: 162 MG/DL
POTASSIUM SERPL-SCNC: 4.2 MMOL/L
PROT SERPL-MCNC: 7.1 G/DL
SODIUM SERPL-SCNC: 139 MMOL/L

## 2020-08-27 NOTE — HISTORY OF PRESENT ILLNESS
[Disease: _____________________] : Disease: [unfilled] [AJCC Stage: ____] : AJCC Stage: [unfilled] [Treatment Protocol] : Treatment Protocol [de-identified] : 41/M presenting for follow-up for metastatic ALK-positive lung adenocarcinoma.  He is currently on alectinib 600 mg BID.  He initially presented in 11/2014 with multiple pulmonary nodules, mediastinal lymphadenopathy, multiple bony metastases, and brain metastases.  He was initially started on crizotinib and was switched to ceritinib in 12/2015 upon progression.  He was unable to tolerate ceritinib due to significant vomiting and was switched to alectinib in 02/2016.\par \par He had SRS to a left cerebellar hemisphere brain metastasis (08/2015) and subsequently underwent WBRT for CNS progression (10-11/2015).  He had RT to a painful metastatic lesion in the right humerus (01/2015).  He had also received Xgeva for bony metastatic disease in the past. \par \par Since 09/2016, he has been experiencing intermittent headaches being treated with periodic dexamethasone taper.  MRI brain with perfusion analysis showed bilateral cerebellar lesions, favor postRT changes rather than residual tumor.  His most recent brain MRI (11/01/2016) showed overall  stable  exam  compared  to  the  previous  brain  MRI  9/2/2016; no  significant  interval  change  in  bilateral  cerebellar  heterogeneously  enhancing  masses  with  extensive  associated  edema,  as  well  as  smaller  lesions  within  the  left  parietal  white  matter,  superficial  left  temporal  lobe  and  left  dorsal  medulla; and, stable  mild  ventriculomegaly  and  mild  periventricular  FLAIR  signal  abnormality.  He has been following with our neurosurgeon, Dr. Britt, who has recommended no surgical intervention at this time. \par \par His most recent PETCT (11/01/2016) showed no sites of pathologic FDG uptake suspicious for biologic tumor activity.\par \par He reports occasional headaches and a mild sense of imbalance.  Denies any falls.  However he only takes a prn dexamethasone, once in every three days,  which reportedly relieves his intermittent headaches. \par Today he also complains mild pain in his left great toe, likely secondary to ingrowing toe nail. 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 He changed his insurance. Did not get scans and did not take his alectinib for about 1-2 weeks. We were not notified.  Otherwise she feels well she really requires dexamethasone yesterday headache probably once or twice a week as per patient no other complaints.\par \par 4/17/17\par He presents for follow up today. He had MRI of brain that showed stable disease 4/8/2017. He has not had the PET CT yet. He received  his alecetinib on 4/4/17 and stated to take them. States his headaches are rare now and feels well otherwise.\par \par 5/15/17\par He presents for follow up. He had a PET CT on 4/21/17 that  was ucnhged.Since November 1, 2016, no new foci of pathologic FDG uptake to suggest\par biologic tumor activity. . 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 . Unchanged non-FDG avid sclerotic foci involving the right proximal humerus\par and T9 vertebral body.\par \par He  feels well. No dizziness or headache.  No new symptoms or complaints.\par \par 6/19/17\par He is doing well. He has no complaints. No headaches. No SOB. \par \par 7/24/17\par Patient comes in for follow up visit, has no complaints, has no SOB and has a mild cough which is chronic, no hemoptysis. Patient states his appetite is good, weight is stable, he has no headaches and is currently off the steroids. Patient will have repeat PET scan and MRI of the brain with contrast as well as repeat bloodwork. \par \par 8/21/17\par He is here for follow up. He had an MR brain and PET CT on 8/3 and 8/2 that did not show progression, were essentially unchanged.  He feels great otherwise.\par \par 9/21/17\par He is doing well. No complaints. No headaches\par \par October 26, 2017\par He see her for followup he feels great he has no complaints.\par \par 11/16/17\par He is here for follow up. He had an MR of brain on 11/14/17: New areas of nodular enhancement along the inferior surfaces of the temporal lobes bilaterally. These are relatively symmetric and just above the level of the cerebellar enhancing masses, suggesting that these may reflect post treatment change.\par He had to reschedule his PET and states he will do it next Wednesday.\par \par No complaints.\par \par 12/26/17\par He is here for follow up. He feels well. he  had PET/CT in end of November that does not show procession, stable pleural findings. He  feels well. \par \par 1/22/18\par He is here for follow up for his NSCLCA. He states he feels well. No SOB, no headaches no fatigue.. \par \par February 21 2018\par  he is here for followup, he had a recent PET/CT scan which is PRESLEY he also had a recent MRI of the brain. This was reviewed at the CNS tumor board although wasn't clear but the consensus was that this is posttreatment changes in the fact that he is asymptomatic and decided to observe him.\par \par He has no new complaints no headaches no weakness or tingling.\par \par \par 3/21/18\par He is here for follow up. HE states he is doing well. Has very mild headaches on occasion but otherwise doing well.\par \par 4/13/18\par He is doing well. Reports no new complaints \par \par 5/3/18\par He is here for follow up to discuss his MR brain. HE complains of mild headache. Same memory issues as before. Balance is the same.  MR brain showed : Increased size of right parietotemporal of the findings enhancement  seen lesion measuring 5.2 cm, previously 2.8 centimeters and increased  size of left inferior temporal lobe mass measuring 2.3 cm, previously 1.6  cm. these areas again demonstrate hypoperfusion and may represent post  therapeutic changes.   2.  Significant increased edema and mass effect within the right  hemisphere with 9 mm right to left midline shift.  3.  No new enhancing lesions. Stable additional bilateral cerebellar and  supratentorial lesions.\par \par I spoke with Dr. 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 He developed severe headaches, some difficulty with speech and balance issues. 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 He has no insurance so my pharmacy has been giving  him a weekly supply while we work on it. He did not have his scans yet. He feel much better now except for dizziness\par \par 5/30/18\par He is feeling much better. His neurological complaints resolved. He stopped steroids on Monday since he had swelling in legs that has resolved. \par \par 6/13/18\par He is here for follow up. he had a PET/CT on 6/7/18 that showed These images reveal, when comparing the FDG brain images to MRI of  4/27/2018 series 10, there is one small focus of increased FDG uptake in  the region of the right parietotemporal lobe lesion (series 12 image 34).  Therefore persistent biologic tumor activity in this region cannot be  excluded.  No focal abnormal FDG uptake corresponding with the left inferior  temporal lobe lesion and both cerebellar lesions. In addition most of the  right parietotemporal lobe lesion is not FDG avid Compared to normal brain glucose metabolism in the thalamus (surrogate  for normal brain metabolism) relative hypometabolism of the right  parietal, posterior parietal and occipital region and relative  hypermetabolism of the left posterior parietal region  Persistent posttreatment FDG uptake (SUV up to 11.2) along the pleural  surface of the left hemithorax consistent with posttreatment changes  associated with talc pleurodesis  One new mildly FDG avid soft tissue nodule (SUV 3.3) in the left buttock \par \par He feels well now ,no headaches.\par \par 7/16/18\par He is here for follow up. doing well. He has no headaches. No new complaints\par \par 8/13/18\par He is here for follow up.  He is doing well. has no complaints. He is considering returning to work.\par \par 9/10/18\par He is here for follow up. He had an MR brain on 8/24/18 that showed In comparison to the previous brain MRI dated 4/27/2018:  1.  No significant interval change in the size and configuration of the  heterogeneously enhancing, necrotic and hemorrhagic lesions within the  temporal/occipital lobes and cerebellar hemispheres. Enhancement pattern  suggests post-therapeutic changes.  2.  Moderately increased edema within the posterior fossa (cerebellar  hemispheres and brainstem). Increased mass effect upon the fourth  ventricle with no evidence of hydrocephalus.  3.  The left cerebral hemispheric edema has mildly increased and the  right cerebral hemispheric edema has mildly decreased.   4.  Resolution of the previously seen right to left midline shift, right  lateral ventricular compression and left lateral ventricular enlargement.  5.  Treated lesions again noted in the left frontal white matter and left  lateral medulla, without contrast enhancement.\par \par HE is doing well has no new complaints. Has no neuroglial complaints. \par \par 10/10/18\par He is here for follow up he had PET/CT on  9/28/18 that was PRESLEY. He no has headaches again. States he is voiding multiple times a day and unable to empty bladder fully.\par \par 10/30/18\par He is here for follow up. he was in the ED for.Left lower quadrant pain symptoms her was given cirpo. he had a Ct abd/pelvis in ED 10/22/18 : Stable posttreatment changes of the left hemithorax including large  loculated pleural effusion.  2.  Multiple nondilated fluid-filled small bowel loops which may reflect  evidence of enteritis.  \par He had MR brain on 10/29/18:  No new lesions, but several existing lesions have increased in size  2. Bilateral hemorrhagic cerebellar lesions and edema has increased. Mass  effect on the fourth ventricle with hydrocephalus.   2.  Essentially unchanged sizes of supratentorial lesions within the  bilateral temporooccipital lobes and mildly improved edema\par \par He is having headaches and more confusion. He took 4 flomax in am and PM instead of his alecensa. I called [facility] with him and they told us they did not deliver his pills since they could not reach him but will deliver them tomorrow. He will be home. He states he had alecensa at home but not sure.\par \par 11/12/18\par Patient presents to the office with a chief complaint of burning in his right thigh.  He has had no new medications.  The patient states it is worse in the evening but lasts all day.  The patient states that the burning sensation starts at the right knee and is ascending.  Straight leg raise negative.  Discussed that it is unlikely that it is from his Avastin treatment, but unsure the etiology.  We will send for LE doppler to rule out any DVT.\par \par 11/27/18\par He is here for follow up. His Dopplers  were negative and his pain/cramps are nearly gone only some in right thigh. His headaches are gone and he is speech is  better and states memory is better since we started Avastin.\par \par 1/9/19\par He is doing well.  He has no more pain in his legs. He is eating well. He is due for 4/4 dose of Avastin. No other complains.\par \par 2/6/19\par He is here for follow-up.  He generally feels well.  He states he still suffers from headaches.  His memory has improved minimally.\par PET/CT 2/4/19 IMPRESSION: Since 9/28/2018, No definite sites of abnormal FDG uptake to suggest biologic tumor activity. Cerebellar lesions seen on brain MRI from 1/20/2019, are not FDG avid and consistent with treated metastases. Post talc pleurodesis of the left pleura, unchanged. \par MR Brain 1/20/19 IMPRESSION: In comparison to the previous brain MRI dated 10/29/2018: 1. No significant interval change in the size and configuration of the heterogeneously enhancing, necrotic and hemorrhagic lesions within the temporal/occipital lobes and cerebellar hemispheres. 2. The edema within the brainstem and cerebellum is about stable;however there is slight progression of the hydrocephalus since the prior exam. 3. The edema within the temporal and occipital lobes has decreased. 4. New patchy area of restricted diffusion around the left temporal horn (without corresponding enhancement) may reflect superimposed ischemic changes. Recommend attention on follow-up imaging. \par \par 3/6/19\par Patient is here for a follow-up visit.  He states he is having increased frequency of headaches with minimal relief from Tylenol.  He is now staying in the shelter on [address] with his daughter.  He will  his medications from [facility] from now on.  We are awaiting arrangement for him to get an apartment.\par \par 3/27/19\par He is here for follow up. his headache is better and he takes his Dexamethasone only when he has it which helps. Last PET was in February and last MRI was in January.\par \par 4/29/19\par He is here for follow-up.  Since last visit, he presented to the ED in 4/2019 due to chest pain.  He underwent CTA chest that demonstrated unchanged left pleural effusion. Likely radiation induced necrosis changes, he states it may have been related to stress related to his daughter.  He is still awaiting placement from the shelter.  He reports his usual frequency headaches, but he takes the Decadron as needed which helps.  \par CTA Chest (4/7/19) IMPRESSION:No pulmonary emboli.20.9 cm pleural fluid collection in the left hemithorax, essentially unchanged since PET/CT dated 2/4/2019.\par MR Brain (4/2/19) IMPRESSION: Since January 20, 2019:New worse signal abnormality involving the brain and volodymyr with increased edema and worse effacement of the fourth ventricle and resulting mild hydrocephalus.Slightly worse edema involving the cerebellum.Stable bilateral posterior temporal/occipital/cerebellar heterogeneously enhancing lesions.Stable left posterior frontal deep white matter and left dorsal medullary hypointense foci, compatible with treated lesions.\par \par 5/22/19\par He came in for follow up. He has been complaining of headaches mainly in AM. He does not think dexamethasone is helping anymore. No other issues.\par \par 6/3/19\par He is here for follow up. HIs headacehs are the same and uses dexamethasone 4 mg almost dialy. He had  CT C/A/P that showed a stable effusion 5/26/19 amd MR brain showed  5/29/19: Overall no significant changes compared with the previous brain MRI dated \par 4/2/2019:\par \par 1.  No significant interval change in the size and configuration of the \par heterogeneously enhancing, necrotic and hemorrhagic lesions within the \par temporal/occipital lobes and cerebellar hemispheres.\par \par 2. Extensive edema within the cerebellar hemispheres, brainstem, parietal \par and temporal lobes is again seen. Patchy areas of enhancement are noted \par in this region. Appearance is most suggestive of posttreatment changes.\par \par 3. Stable patchy area of restricted diffusion around the left temporal \par horn again seen, nonspecific.\par \par 4. No new enhancing lesions identified.\par \par 6/24/19\par He is here for follow up. he missed his Avastin since he had family issues. Still has headaches they are the same. Will restart Avastin today,.\par \par 7/15/19\par He is here for follow up. He could not tell me if headaches are better. He does use steroids once  in  a while for headaches. States memory is the same. \par \par \par 8/14/19\par He is doing better. He Missed his Avastin doses. But he has no headaches and does not use steroids. He had a fall  and hit a rail on his chest prior to his CT scans and the finding in his CT chest is probably related to the trauma he still has pain in the area but its better. CT C/A/P and MR brain from august are bellow\par \par IMPRESSION:\par \par Since April 7, 2019\par \par Stable left pleural fluid collection measuring 9.5 x 14.1 by 18.4 cm, \par with stable compressive atelectasis of left lung and left hemidiaphragm \par inversion.\par \par New, indeterminate approximate 8 mm soft tissue density, left anterior \par chest (series 4/104).\par \par Interval development of approximate 3.6 x 1.8 by 3.9 cm homogeneous \par elliptical shaped structure, left epicardial fat pad (4/206), possibly \par proteinaceous material (HU +35).\par \par \par IMPRESSION:\par \par Within the left cardiophrenic angle, there is a new area of lobulated \par soft tissue measuring 4.0 x 2.5 cm suspicious for new metastatic disease.\par \par Please see chest CT report for chest findings\par \par IMPRESSION:\par \par Overall no significant changes compared with the previous brain MRI dated \par 5/29/2019:\par \par 1.  No significant interval change in the size and configuration of the \par heterogeneously enhancing, necrotic and hemorrhagic lesions within the \par temporal/occipital lobes and cerebellar hemispheres.\par \par 2. Extensive edema within the cerebellar hemispheres, brainstem, parietal \par and temporal lobes is again seen. Patchy areas of enhancement are noted \par in this region. Appearance is most suggestive of posttreatment changes.\par \par 3. No new enhancing lesions identified.\par \par 10/01/19\par Pt presented today for routine follow up visit. He reports feeling fine and has no new complaints . Pt reports no new symptoms of weakness , headaches and confusion and is well compliant with treatment . \par \par \par 11/11/19\par He is here for follow up.   He feels well. No headaches.  Has a runny nose. No pain. No SOB. He gained a few  pounds.\par \par 12/9/19\par He is here for follow up. He had his MR brain on  11/16/19IMPRESSION: \par 1. Overall no significant changes compared with the previous brain MRI dated 8/5/2019. \par 2. Specifically, the heterogeneously enhancing, necrotic and hemorrhagic lesions within the temporal/occipital \par lobes and cerebellar hemispheres; with extensive associated edema and patchy enhancement are not \par significantly changed and most compatible with posttreatment changes. \par 3. No new enhancing lesions identified.\par \par He did not do his CTs yet. \par \par He feels well. No headaches. \par \par 1/13/2020\par Patient is here for a follow-up visit for metastatic lung cancer.  He is feeling well with no new complaints.  Most recent CBC is stable.  Patient denies fever, chills, nausea, vomiting, new pain or bleeding.  We discussed that based on most recent imaging, it may be suggestive that he is progressing on current treatment regimen.  \par PET/CT (1.7.20) IMPRESSION: Since February 4, 2019:1. Redemonstration of large left pleural fluid collection with circumferential FDG uptake along the pleural surface and associated calcifications (max SUV up to 18, [id number]), consistent with posttreatment change related to talc pleurodesis. New 5.8 x 3.5 cm lesion along the left posterior costophrenic angle, with peripheral FDG avidity, max SUV14. Given talc pleurodesis, findings may represent evolving inflammatory response, however neoplasm remains a consideration, and continued follow-up or tissue sampling recommended.2. Unchanged non-FDG avid sclerotic, treated osseous metastases in the right proximal humerus and T9 vertebral body with compression deformity.\par \par I presented him in tumor board and we decided to biopsy the new area. \par \par 2/4/2020-Interpretor services used as pt is [place]ic speaking # [id number]\par  Dionisio is here for follow up complaining of b/l flank pain. No c/o fever, chills, dysuria, hematuria at home. No change in bowel habits. He describes dull pain b/l flank area that is mostly in the morning. Today during the office visit, he did not have much pain \par \par 3/17/20\par He is here for follow up. He underwent the biopsy of the  left pleural mass on 3/12/20 that showed Poorly differentiated malignant neoplasm. IHC is pending.  he is doing well otherwise. has minimal headaches. Not dizzy\par \par 4/1/2020: Dionisio is here for follow up, he started Lobrena on 3/17/2020, he is taking 100mg daily, reports no side effects. He reports "little headache" in the morning that is relieved with Tylenol, no other symptoms, no vision changes, no seizure like activity, no N/V, he reports no SOB, no palpitations, heart exam is RRR. No fevers. He will continue with Lobrena as prescribed. \par \par 6/22/2020\par Patient is here for a follow-up visit for metastatic lung cancer.  Since last visit he had COVID was in [facility].  he had a prolonged course. I spoke to his Neurosurgeon and he had MR brain that showed radiation necrosis and he had steroids. He only took Lorbrena for one month, Confirmed with pharmacy.  He is here with his ex wife. We used a pacific  Rosemary ID  [id number] \par He has been taking his Lorbreana since May 28 2020, he gets it from [facility] now, He is on [place] 2L from his hospitalization. He lives with his brother in law. He states he is not SOB. HE was unsteady on his feet. He felt well otherwise. \par \par 7/20/2020\par Patient presents for follow up of metastatic lung cancer. He is accompanied by his ex-wife.  [id number] used to communicate with patient. Patient is unsteady on his feet, ambulates with a walker, and requires someone to remain nearby for assistance. Patient fell last week and hurt 5th toe on right foot, although it seems to be improved now. ROS is significant for foot swelling. Results of recent MRI brain, CT chest/abdomen/pelvis reviewed. He has been taking lorlatinib since 5/28/2020. Ex-wife has noticed that feet are swollen and has attributed his symptoms to ativan, stating that she has switched from giving it to him every day to every other day.\par \par 8/24/2020\par Patient presents for follow up of metastatic lung cancer. He is accompanied by family. Family requested  services.  [id number] used. Patient went to ED with complaints of hemoptysis. CTA chest showed no evidence of PE and showed that pulmonary mass is stable. Patient brought tissue showing small blood clots from earlier today. He continues to take lorlatinib. Family continues to express concern for leg swelling/pain. She notes increased appetite and is concerned that his cholesterol is high. Patient does note that dizziness has improved since starting avastin.

## 2020-08-27 NOTE — REASON FOR VISIT
[Follow-Up Visit] : a follow-up [Other: _____] : [unfilled] [[employer] ] : provided by [employer]   [FreeTextEntry1] : [id number]  [FreeTextEntry2] : Rosemary [TWNoteComboBox1] : [place]

## 2020-08-27 NOTE — REVIEW OF SYSTEMS
[Dizziness] : dizziness [Anxiety] : anxiety [Negative] : Heme/Lymph [Fatigue] : no fatigue [Recent Change In Weight] : ~T no recent weight change [Cough] : no cough [SOB on Exertion] : no shortness of breath during exertion [Confused] : no confusion [Difficulty Walking] : no difficulty walking [de-identified] : memory deficit, reports that dizziness is somewhat improved [FreeTextEntry9] : b/l flank pain

## 2020-08-27 NOTE — END OF VISIT
[] : Fellow [FreeTextEntry3] : He is doing well. His gait has been improving on Avastin but now has mild hemptysis he had a CTA chest that showed stable lesion. Will hold avastin for 2 weeks if the mild hemptasis resolves will restart otherwise will consider dexamethasone for  a bit. C/w  lorlatinib scan in October if doing well.

## 2020-08-27 NOTE — PHYSICAL EXAM
[Ambulatory and capable of all self care but unable to carry out any work activities] : Status 2- Ambulatory and capable of all self care but unable to carry out any work activities. Up and about more than 50% of waking hours [Normal] : normal appearance, no rash, nodules, vesicles, ulcers, erythema [de-identified] : Unsteady on feet. Ambulates with walker and requires assistance. [de-identified] : B/L LEs do not appear swollen. [de-identified] : Forgetful, but stable.

## 2020-08-27 NOTE — ASSESSMENT
[Palliative] : Goals of care discussed with patient: Palliative [FreeTextEntry1] : 1.  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 Due to insurance issues he stopped it and restarted it 4/4/2017. He progressed on alectinib and began taking lorlatinib on 5/28/2020.\par - Repeat MRI brain 8/2018 radiation related changes, post treatment changes, and repeat PET/CT 6/5/18   is without systemic progression.  It confirmed radiation necrosis. PET/CT on 9/28/18 was PRESLEY. CT abdomen was unchanged.\par - Repeat PET/CT from 01/2020 shows new FDG-avid lesions in the lung right under his left effusion on top of the diaphragm.\par - He underwent the biopsy of the  left pleural mass on 3/12/20 that showed poorly differentiated malignant neoplasm. IHC c/w non-small cell carcinoma of the lung, faovring adenocarcinoma, TTF 1 positive.\par -Per Dr. Richardson, he is not a candidate for radiation to the site of progression.\par -We did foundation one on his re-biopsy that showed EML4-Alk variant 3a/b, other mutations CDKN2a and B loss, LLL2, MTAP, SMARCa4, Tp53\par -He was started on lorlatinib in March 2020, took it for about one month, had a prolonged COVID hospitalization, restarted on 5/28/2020.\par -The FDA approval for lorlatinib is based on the results of a phase II study ([id number]) that included a subgroup of 198 patients with ALK-positive metastatic NSCLC previously treated with =1 ALK inhibitor.  The overall response rate with lorlatinib among these patients was 47 percent, with a complete response rate of 2 percent and a partial response rate of 45 percent. Efficacy according to prior treatment was as follows: post-crizotinib, KENNEY 73 percent, median PFS 11.1 months, and median duration of response not reached; post-one or more second-generation ALK inhibitors, KENNEY 40 percent, median PFS 6.9 months, median duration of response 7.1 months [66]. The most frequent adverse effects were hypercholesterolemia (81 percent), hypertriglyceridemia (61 percent), edema (43 percent), and peripheral neuropathy (30 percent). Serious treatment-related adverse effects occurred in 7 percent of patients, the most frequent being cognitive deficits in 1 percent. \par -Repeat CT C/A/P and MRI brain performed for new baseline. CT abdomen/pelvis negative. CT chest demonstrated increase in size of costophrenic mass from 6 cm on 3/10/2020 to 6.5 cm on 7/7/2020. However, patient had significant treatment interruption in interim.\par -MRI brain showed essentially stable masses with increased hydrocephalus and stable mass effect on 4th ventricle.\par -Patient previously received dexamethasone and avastin in past. Reordered avastin x 4 doses. Patient has received 2 doses so far. He is now having hemoptysis, so will hold remaining doses. Will reassess in two weeks. If hemoptysis has resolved, will consider restarting avastin. Patient and family know to call in the interim if neurologic symptoms worsen. If necessary, will prescribe dexamethasone BID.\par -If unable to control neurologic symptoms with medical management, neurosurgeon Dr. Britt will do shunt placement.\par -Recent 8/2020 CTA chest performed in ED showed that lung mass is stable. Will replan to restage with CT scans and MRI brain in 10/2020.\par -Advised that LE swelling is likely secondary to lorlatinib. Advised continuing lorlatinib and using compression stockings and LE elevation.\par -Also recommended that patient be permitted to eat desired diet despite taking statin for hyperlipidemia.\par \par Follow up in two weeks.\par \par Contact. Ex wife: [phone], Brother Caesar

## 2020-09-08 ENCOUNTER — LABORATORY RESULT (OUTPATIENT)
Age: 45
End: 2020-09-08

## 2020-09-08 ENCOUNTER — OUTPATIENT (OUTPATIENT)
Dept: OUTPATIENT SERVICES | Facility: HOSPITAL | Age: 45
LOS: 1 days | Discharge: HOME | End: 2020-09-08

## 2020-09-08 DIAGNOSIS — Z11.59 ENCOUNTER FOR SCREENING FOR OTHER VIRAL DISEASES: ICD-10-CM

## 2020-09-11 ENCOUNTER — LABORATORY RESULT (OUTPATIENT)
Age: 45
End: 2020-09-11

## 2020-09-11 ENCOUNTER — OUTPATIENT (OUTPATIENT)
Dept: OUTPATIENT SERVICES | Facility: HOSPITAL | Age: 45
LOS: 1 days | Discharge: HOME | End: 2020-09-11

## 2020-09-11 ENCOUNTER — APPOINTMENT (OUTPATIENT)
Dept: INFUSION THERAPY | Facility: CLINIC | Age: 45
End: 2020-09-11

## 2020-09-11 DIAGNOSIS — F41.9 ANXIETY DISORDER, UNSPECIFIED: ICD-10-CM

## 2020-09-11 DIAGNOSIS — I67.89 OTHER CEREBROVASCULAR DISEASE: ICD-10-CM

## 2020-09-11 DIAGNOSIS — C78.00 SECONDARY MALIGNANT NEOPLASM OF UNSPECIFIED LUNG: ICD-10-CM

## 2020-09-11 RX ORDER — BEVACIZUMAB 400 MG/16ML
590 INJECTION, SOLUTION INTRAVENOUS ONCE
Refills: 0 | Status: COMPLETED | OUTPATIENT
Start: 2020-09-11 | End: 2020-09-11

## 2020-09-11 RX ORDER — DIPHENHYDRAMINE HCL 50 MG
50 CAPSULE ORAL ONCE
Refills: 0 | Status: COMPLETED | OUTPATIENT
Start: 2020-09-11 | End: 2020-09-11

## 2020-09-11 RX ORDER — ACETAMINOPHEN 500 MG
650 TABLET ORAL ONCE
Refills: 0 | Status: COMPLETED | OUTPATIENT
Start: 2020-09-11 | End: 2020-09-11

## 2020-09-11 RX ADMIN — Medication 650 MILLIGRAM(S): at 10:25

## 2020-09-11 RX ADMIN — BEVACIZUMAB 590 MILLIGRAM(S): 400 INJECTION, SOLUTION INTRAVENOUS at 11:30

## 2020-09-11 RX ADMIN — BEVACIZUMAB 123.6 MILLIGRAM(S): 400 INJECTION, SOLUTION INTRAVENOUS at 10:59

## 2020-09-11 RX ADMIN — Medication 50 MILLIGRAM(S): at 10:24

## 2020-09-14 ENCOUNTER — APPOINTMENT (OUTPATIENT)
Dept: HEMATOLOGY ONCOLOGY | Facility: CLINIC | Age: 45
End: 2020-09-14

## 2020-09-14 LAB
ALBUMIN SERPL ELPH-MCNC: 4 G/DL
ALP BLD-CCNC: 80 U/L
ALT SERPL-CCNC: 13 U/L
ANION GAP SERPL CALC-SCNC: 13 MMOL/L
APPEARANCE: CLEAR
AST SERPL-CCNC: 17 U/L
BILIRUB SERPL-MCNC: 0.3 MG/DL
BILIRUBIN URINE: NEGATIVE
BLOOD URINE: NEGATIVE
BUN SERPL-MCNC: 6 MG/DL
CALCIUM SERPL-MCNC: 9.6 MG/DL
CHLORIDE SERPL-SCNC: 104 MMOL/L
CO2 SERPL-SCNC: 24 MMOL/L
COLOR: COLORLESS
CREAT SERPL-MCNC: 0.6 MG/DL
GLUCOSE QUALITATIVE U: NEGATIVE
GLUCOSE SERPL-MCNC: 146 MG/DL
HCT VFR BLD CALC: 42.7 %
HGB BLD-MCNC: 14.1 G/DL
KETONES URINE: NEGATIVE
LEUKOCYTE ESTERASE URINE: NEGATIVE
MCHC RBC-ENTMCNC: 29.2 PG
MCHC RBC-ENTMCNC: 33 G/DL
MCV RBC AUTO: 88.4 FL
NITRITE URINE: NEGATIVE
PH URINE: 6
PLATELET # BLD AUTO: 169 K/UL
PMV BLD: 11 FL
POTASSIUM SERPL-SCNC: 3.8 MMOL/L
PROT SERPL-MCNC: 7 G/DL
PROTEIN URINE: NEGATIVE
RBC # BLD: 4.83 M/UL
RBC # FLD: 13.2 %
SODIUM SERPL-SCNC: 141 MMOL/L
SPECIFIC GRAVITY URINE: 1.01
UROBILINOGEN URINE: NORMAL
WBC # FLD AUTO: 6.09 K/UL

## 2020-09-30 ENCOUNTER — APPOINTMENT (OUTPATIENT)
Dept: HEMATOLOGY ONCOLOGY | Facility: CLINIC | Age: 45
End: 2020-09-30
Payer: MEDICARE

## 2020-09-30 ENCOUNTER — LABORATORY RESULT (OUTPATIENT)
Age: 45
End: 2020-09-30

## 2020-09-30 VITALS
HEART RATE: 91 BPM | HEIGHT: 67 IN | TEMPERATURE: 97.1 F | WEIGHT: 194 LBS | SYSTOLIC BLOOD PRESSURE: 141 MMHG | BODY MASS INDEX: 30.45 KG/M2 | DIASTOLIC BLOOD PRESSURE: 90 MMHG | RESPIRATION RATE: 14 BRPM

## 2020-09-30 PROCEDURE — 99214 OFFICE O/P EST MOD 30 MIN: CPT

## 2020-09-30 RX ORDER — LORAZEPAM 1 MG/1
1 TABLET ORAL
Qty: 60 | Refills: 0 | Status: ACTIVE | COMMUNITY
Start: 2020-06-22 | End: 1900-01-01

## 2020-10-01 LAB
ALBUMIN SERPL ELPH-MCNC: 4.3 G/DL
ALP BLD-CCNC: 71 U/L
ALT SERPL-CCNC: 14 U/L
ANION GAP SERPL CALC-SCNC: 9 MMOL/L
AST SERPL-CCNC: 20 U/L
BILIRUB SERPL-MCNC: 0.3 MG/DL
BUN SERPL-MCNC: 7 MG/DL
CALCIUM SERPL-MCNC: 9.6 MG/DL
CHLORIDE SERPL-SCNC: 102 MMOL/L
CO2 SERPL-SCNC: 29 MMOL/L
CREAT SERPL-MCNC: 0.7 MG/DL
GLUCOSE SERPL-MCNC: 122 MG/DL
HCT VFR BLD CALC: 46.3 %
HGB BLD-MCNC: 14.7 G/DL
MCHC RBC-ENTMCNC: 27.9 PG
MCHC RBC-ENTMCNC: 31.7 G/DL
MCV RBC AUTO: 87.9 FL
PLATELET # BLD AUTO: 184 K/UL
PMV BLD: 11.1 FL
POTASSIUM SERPL-SCNC: 4 MMOL/L
PROT SERPL-MCNC: 7.5 G/DL
RBC # BLD: 5.27 M/UL
RBC # FLD: 13.7 %
SODIUM SERPL-SCNC: 140 MMOL/L
WBC # FLD AUTO: 7.42 K/UL

## 2020-10-02 ENCOUNTER — APPOINTMENT (OUTPATIENT)
Dept: INFUSION THERAPY | Facility: CLINIC | Age: 45
End: 2020-10-02

## 2020-10-02 NOTE — PHYSICAL EXAM
[Ambulatory and capable of all self care but unable to carry out any work activities] : Status 2- Ambulatory and capable of all self care but unable to carry out any work activities. Up and about more than 50% of waking hours [Normal] : affect appropriate [de-identified] : B/L LEs do not appear swollen. [de-identified] : Unsteady on feet. Ambulates with walker and requires assistance. Memory and balance is better [de-identified] : Forgetful, but stable.

## 2020-10-02 NOTE — ASSESSMENT
[FreeTextEntry1] : 1.  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 Due to insurance issues he stopped it and restarted it 4/4/2017. He progressed on alectinib and began taking lorlatinib on 5/28/2020.\par - Repeat MRI brain 8/2018 radiation related changes, post treatment changes, and repeat PET/CT 6/5/18   is without systemic progression.  It confirmed radiation necrosis. PET/CT on 9/28/18 was PRESLEY. CT abdomen was unchanged.\par - Repeat PET/CT from 01/2020 shows new FDG-avid lesions in the lung right under his left effusion on top of the diaphragm.\par - He underwent the biopsy of the  left pleural mass on 3/12/20 that showed poorly differentiated malignant neoplasm. IHC c/w non-small cell carcinoma of the lung, faovring adenocarcinoma, TTF 1 positive.\par -Per Dr. Richardson, he is not a candidate for radiation to the site of progression.\par -We did foundation one on his re-biopsy that showed EML4-Alk variant 3a/b, other mutations CDKN2a and B loss, LLL2, MTAP, SMARCa4, Tp53\par -He was started on lorlatinib in March 2020, took it for about one month, had a prolonged COVID hospitalization, restarted on 5/28/2020.\par -The FDA approval for lorlatinib is based on the results of a phase II study ([id number]) that included a subgroup of 198 patients with ALK-positive metastatic NSCLC previously treated with =1 ALK inhibitor.  The overall response rate with lorlatinib among these patients was 47 percent, with a complete response rate of 2 percent and a partial response rate of 45 percent. Efficacy according to prior treatment was as follows: post-crizotinib, KENNEY 73 percent, median PFS 11.1 months, and median duration of response not reached; post-one or more second-generation ALK inhibitors, KENNEY 40 percent, median PFS 6.9 months, median duration of response 7.1 months [66]. The most frequent adverse effects were hypercholesterolemia (81 percent), hypertriglyceridemia (61 percent), edema (43 percent), and peripheral neuropathy (30 percent). Serious treatment-related adverse effects occurred in 7 percent of patients, the most frequent being cognitive deficits in 1 percent. \par -Repeat CT C/A/P and MRI brain performed for new baseline. CT abdomen/pelvis negative. CT chest demonstrated increase in size of costophrenic mass from 6 cm on 3/10/2020 to 6.5 cm on 7/7/2020. However, patient had significant treatment interruption in interim.\par -MRI brain showed essentially stable masses with increased hydrocephalus and stable mass effect on 4th ventricle.\par -Patient previously received dexamethasone and avastin in past. Reordered avastin x 4 doses. Patient has received 2 doses so far. He is now having hemoptysis, so will hold remaining doses since feels better.\par -If unable to control neurologic symptoms with medical management, neurosurgeon Dr. Britt will do shunt placement.\par -Recent 8/2020 CTA chest performed in ED showed that lung mass is stable. Will replan to restage with CT scans and MRI brain now. \par -Advised that LE swelling is likely secondary to lorlatinib. Advised continuing lorlatinib and using compression stockings and LE elevation.\par -Also recommended that patient be permitted to eat desired diet despite taking statin for hyperlipidemia.\par \par RTC in one Month\par \par Contact. Ex wife: [phone], Brother Caesar  [Palliative] : Goals of care discussed with patient: Palliative

## 2020-10-02 NOTE — HISTORY OF PRESENT ILLNESS
[Disease: _____________________] : Disease: [unfilled] [AJCC Stage: ____] : AJCC Stage: [unfilled] [de-identified] : 41/M presenting for follow-up for metastatic ALK-positive lung adenocarcinoma.  He is currently on alectinib 600 mg BID.  He initially presented in 11/2014 with multiple pulmonary nodules, mediastinal lymphadenopathy, multiple bony metastases, and brain metastases.  He was initially started on crizotinib and was switched to ceritinib in 12/2015 upon progression.  He was unable to tolerate ceritinib due to significant vomiting and was switched to alectinib in 02/2016.\par \par He had SRS to a left cerebellar hemisphere brain metastasis (08/2015) and subsequently underwent WBRT for CNS progression (10-11/2015).  He had RT to a painful metastatic lesion in the right humerus (01/2015).  He had also received Xgeva for bony metastatic disease in the past. \par \par Since 09/2016, he has been experiencing intermittent headaches being treated with periodic dexamethasone taper.  MRI brain with perfusion analysis showed bilateral cerebellar lesions, favor postRT changes rather than residual tumor.  His most recent brain MRI (11/01/2016) showed overall  stable  exam  compared  to  the  previous  brain  MRI  9/2/2016; no  significant  interval  change  in  bilateral  cerebellar  heterogeneously  enhancing  masses  with  extensive  associated  edema,  as  well  as  smaller  lesions  within  the  left  parietal  white  matter,  superficial  left  temporal  lobe  and  left  dorsal  medulla; and, stable  mild  ventriculomegaly  and  mild  periventricular  FLAIR  signal  abnormality.  He has been following with our neurosurgeon, Dr. Britt, who has recommended no surgical intervention at this time. \par \par His most recent PETCT (11/01/2016) showed no sites of pathologic FDG uptake suspicious for biologic tumor activity.\par \par He reports occasional headaches and a mild sense of imbalance.  Denies any falls.  However he only takes a prn dexamethasone, once in every three days,  which reportedly relieves his intermittent headaches. \par Today he also complains mild pain in his left great toe, likely secondary to ingrowing toe nail. 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 He changed his insurance. Did not get scans and did not take his alectinib for about 1-2 weeks. We were not notified.  Otherwise she feels well she really requires dexamethasone yesterday headache probably once or twice a week as per patient no other complaints.\par \par 4/17/17\par He presents for follow up today. He had MRI of brain that showed stable disease 4/8/2017. He has not had the PET CT yet. He received  his alecetinib on 4/4/17 and stated to take them. States his headaches are rare now and feels well otherwise.\par \par 5/15/17\par He presents for follow up. He had a PET CT on 4/21/17 that  was ucnhged.Since November 1, 2016, no new foci of pathologic FDG uptake to suggest\par biologic tumor activity. . 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 . Unchanged non-FDG avid sclerotic foci involving the right proximal humerus\par and T9 vertebral body.\par \par He  feels well. No dizziness or headache.  No new symptoms or complaints.\par \par 6/19/17\par He is doing well. He has no complaints. No headaches. No SOB. \par \par 7/24/17\par Patient comes in for follow up visit, has no complaints, has no SOB and has a mild cough which is chronic, no hemoptysis. Patient states his appetite is good, weight is stable, he has no headaches and is currently off the steroids. Patient will have repeat PET scan and MRI of the brain with contrast as well as repeat bloodwork. \par \par 8/21/17\par He is here for follow up. He had an MR brain and PET CT on 8/3 and 8/2 that did not show progression, were essentially unchanged.  He feels great otherwise.\par \par 9/21/17\par He is doing well. No complaints. No headaches\par \par October 26, 2017\par He see her for followup he feels great he has no complaints.\par \par 11/16/17\par He is here for follow up. He had an MR of brain on 11/14/17: New areas of nodular enhancement along the inferior surfaces of the temporal lobes bilaterally. These are relatively symmetric and just above the level of the cerebellar enhancing masses, suggesting that these may reflect post treatment change.\par He had to reschedule his PET and states he will do it next Wednesday.\par \par No complaints.\par \par 12/26/17\par He is here for follow up. He feels well. he  had PET/CT in end of November that does not show procession, stable pleural findings. He  feels well. \par \par 1/22/18\par He is here for follow up for his NSCLCA. He states he feels well. No SOB, no headaches no fatigue.. \par \par February 21 2018\par  he is here for followup, he had a recent PET/CT scan which is PRESLEY he also had a recent MRI of the brain. This was reviewed at the CNS tumor board although wasn't clear but the consensus was that this is posttreatment changes in the fact that he is asymptomatic and decided to observe him.\par \par He has no new complaints no headaches no weakness or tingling.\par \par \par 3/21/18\par He is here for follow up. HE states he is doing well. Has very mild headaches on occasion but otherwise doing well.\par \par 4/13/18\par He is doing well. Reports no new complaints \par \par 5/3/18\par He is here for follow up to discuss his MR brain. HE complains of mild headache. Same memory issues as before. Balance is the same.  MR brain showed : Increased size of right parietotemporal of the findings enhancement  seen lesion measuring 5.2 cm, previously 2.8 centimeters and increased  size of left inferior temporal lobe mass measuring 2.3 cm, previously 1.6  cm. these areas again demonstrate hypoperfusion and may represent post  therapeutic changes.   2.  Significant increased edema and mass effect within the right  hemisphere with 9 mm right to left midline shift.  3.  No new enhancing lesions. Stable additional bilateral cerebellar and  supratentorial lesions.\par \par I spoke with Dr. 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 He developed severe headaches, some difficulty with speech and balance issues. 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 He has no insurance so my pharmacy has been giving  him a weekly supply while we work on it. He did not have his scans yet. He feel much better now except for dizziness\par \par 5/30/18\par He is feeling much better. His neurological complaints resolved. He stopped steroids on Monday since he had swelling in legs that has resolved. \par \par 6/13/18\par He is here for follow up. he had a PET/CT on 6/7/18 that showed These images reveal, when comparing the FDG brain images to MRI of  4/27/2018 series 10, there is one small focus of increased FDG uptake in  the region of the right parietotemporal lobe lesion (series 12 image 34).  Therefore persistent biologic tumor activity in this region cannot be  excluded.  No focal abnormal FDG uptake corresponding with the left inferior  temporal lobe lesion and both cerebellar lesions. In addition most of the  right parietotemporal lobe lesion is not FDG avid Compared to normal brain glucose metabolism in the thalamus (surrogate  for normal brain metabolism) relative hypometabolism of the right  parietal, posterior parietal and occipital region and relative  hypermetabolism of the left posterior parietal region  Persistent posttreatment FDG uptake (SUV up to 11.2) along the pleural  surface of the left hemithorax consistent with posttreatment changes  associated with talc pleurodesis  One new mildly FDG avid soft tissue nodule (SUV 3.3) in the left buttock \par \par He feels well now ,no headaches.\par \par 7/16/18\par He is here for follow up. doing well. He has no headaches. No new complaints\par \par 8/13/18\par He is here for follow up.  He is doing well. has no complaints. He is considering returning to work.\par \par 9/10/18\par He is here for follow up. He had an MR brain on 8/24/18 that showed In comparison to the previous brain MRI dated 4/27/2018:  1.  No significant interval change in the size and configuration of the  heterogeneously enhancing, necrotic and hemorrhagic lesions within the  temporal/occipital lobes and cerebellar hemispheres. Enhancement pattern  suggests post-therapeutic changes.  2.  Moderately increased edema within the posterior fossa (cerebellar  hemispheres and brainstem). Increased mass effect upon the fourth  ventricle with no evidence of hydrocephalus.  3.  The left cerebral hemispheric edema has mildly increased and the  right cerebral hemispheric edema has mildly decreased.   4.  Resolution of the previously seen right to left midline shift, right  lateral ventricular compression and left lateral ventricular enlargement.  5.  Treated lesions again noted in the left frontal white matter and left  lateral medulla, without contrast enhancement.\par \par HE is doing well has no new complaints. Has no neuroglial complaints. \par \par 10/10/18\par He is here for follow up he had PET/CT on  9/28/18 that was PRESLEY. He no has headaches again. States he is voiding multiple times a day and unable to empty bladder fully.\par \par 10/30/18\par He is here for follow up. he was in the ED for.Left lower quadrant pain symptoms her was given cirpo. he had a Ct abd/pelvis in ED 10/22/18 : Stable posttreatment changes of the left hemithorax including large  loculated pleural effusion.  2.  Multiple nondilated fluid-filled small bowel loops which may reflect  evidence of enteritis.  \par He had MR brain on 10/29/18:  No new lesions, but several existing lesions have increased in size  2. Bilateral hemorrhagic cerebellar lesions and edema has increased. Mass  effect on the fourth ventricle with hydrocephalus.   2.  Essentially unchanged sizes of supratentorial lesions within the  bilateral temporooccipital lobes and mildly improved edema\par \par He is having headaches and more confusion. He took 4 flomax in am and PM instead of his alecensa. I called [facility] with him and they told us they did not deliver his pills since they could not reach him but will deliver them tomorrow. He will be home. He states he had alecensa at home but not sure.\par \par 11/12/18\par Patient presents to the office with a chief complaint of burning in his right thigh.  He has had no new medications.  The patient states it is worse in the evening but lasts all day.  The patient states that the burning sensation starts at the right knee and is ascending.  Straight leg raise negative.  Discussed that it is unlikely that it is from his Avastin treatment, but unsure the etiology.  We will send for LE doppler to rule out any DVT.\par \par 11/27/18\par He is here for follow up. His Dopplers  were negative and his pain/cramps are nearly gone only some in right thigh. His headaches are gone and he is speech is  better and states memory is better since we started Avastin.\par \par 1/9/19\par He is doing well.  He has no more pain in his legs. He is eating well. He is due for 4/4 dose of Avastin. No other complains.\par \par 2/6/19\par He is here for follow-up.  He generally feels well.  He states he still suffers from headaches.  His memory has improved minimally.\par PET/CT 2/4/19 IMPRESSION: Since 9/28/2018, No definite sites of abnormal FDG uptake to suggest biologic tumor activity. Cerebellar lesions seen on brain MRI from 1/20/2019, are not FDG avid and consistent with treated metastases. Post talc pleurodesis of the left pleura, unchanged. \par MR Brain 1/20/19 IMPRESSION: In comparison to the previous brain MRI dated 10/29/2018: 1. No significant interval change in the size and configuration of the heterogeneously enhancing, necrotic and hemorrhagic lesions within the temporal/occipital lobes and cerebellar hemispheres. 2. The edema within the brainstem and cerebellum is about stable;however there is slight progression of the hydrocephalus since the prior exam. 3. The edema within the temporal and occipital lobes has decreased. 4. New patchy area of restricted diffusion around the left temporal horn (without corresponding enhancement) may reflect superimposed ischemic changes. Recommend attention on follow-up imaging. \par \par 3/6/19\par Patient is here for a follow-up visit.  He states he is having increased frequency of headaches with minimal relief from Tylenol.  He is now staying in the shelter on [address] with his daughter.  He will  his medications from [facility] from now on.  We are awaiting arrangement for him to get an apartment.\par \par 3/27/19\par He is here for follow up. his headache is better and he takes his Dexamethasone only when he has it which helps. Last PET was in February and last MRI was in January.\par \par 4/29/19\par He is here for follow-up.  Since last visit, he presented to the ED in 4/2019 due to chest pain.  He underwent CTA chest that demonstrated unchanged left pleural effusion. Likely radiation induced necrosis changes, he states it may have been related to stress related to his daughter.  He is still awaiting placement from the shelter.  He reports his usual frequency headaches, but he takes the Decadron as needed which helps.  \par CTA Chest (4/7/19) IMPRESSION:No pulmonary emboli.20.9 cm pleural fluid collection in the left hemithorax, essentially unchanged since PET/CT dated 2/4/2019.\par MR Brain (4/2/19) IMPRESSION: Since January 20, 2019:New worse signal abnormality involving the brain and volodymyr with increased edema and worse effacement of the fourth ventricle and resulting mild hydrocephalus.Slightly worse edema involving the cerebellum.Stable bilateral posterior temporal/occipital/cerebellar heterogeneously enhancing lesions.Stable left posterior frontal deep white matter and left dorsal medullary hypointense foci, compatible with treated lesions.\par \par 5/22/19\par He came in for follow up. He has been complaining of headaches mainly in AM. He does not think dexamethasone is helping anymore. No other issues.\par \par 6/3/19\par He is here for follow up. HIs headacehs are the same and uses dexamethasone 4 mg almost dialy. He had  CT C/A/P that showed a stable effusion 5/26/19 amd MR brain showed  5/29/19: Overall no significant changes compared with the previous brain MRI dated \par 4/2/2019:\par \par 1.  No significant interval change in the size and configuration of the \par heterogeneously enhancing, necrotic and hemorrhagic lesions within the \par temporal/occipital lobes and cerebellar hemispheres.\par \par 2. Extensive edema within the cerebellar hemispheres, brainstem, parietal \par and temporal lobes is again seen. Patchy areas of enhancement are noted \par in this region. Appearance is most suggestive of posttreatment changes.\par \par 3. Stable patchy area of restricted diffusion around the left temporal \par horn again seen, nonspecific.\par \par 4. No new enhancing lesions identified.\par \par 6/24/19\par He is here for follow up. he missed his Avastin since he had family issues. Still has headaches they are the same. Will restart Avastin today,.\par \par 7/15/19\par He is here for follow up. He could not tell me if headaches are better. He does use steroids once  in  a while for headaches. States memory is the same. \par \par \par 8/14/19\par He is doing better. He Missed his Avastin doses. But he has no headaches and does not use steroids. He had a fall  and hit a rail on his chest prior to his CT scans and the finding in his CT chest is probably related to the trauma he still has pain in the area but its better. CT C/A/P and MR brain from august are bellow\par \par IMPRESSION:\par \par Since April 7, 2019\par \par Stable left pleural fluid collection measuring 9.5 x 14.1 by 18.4 cm, \par with stable compressive atelectasis of left lung and left hemidiaphragm \par inversion.\par \par New, indeterminate approximate 8 mm soft tissue density, left anterior \par chest (series 4/104).\par \par Interval development of approximate 3.6 x 1.8 by 3.9 cm homogeneous \par elliptical shaped structure, left epicardial fat pad (4/206), possibly \par proteinaceous material (HU +35).\par \par \par IMPRESSION:\par \par Within the left cardiophrenic angle, there is a new area of lobulated \par soft tissue measuring 4.0 x 2.5 cm suspicious for new metastatic disease.\par \par Please see chest CT report for chest findings\par \par IMPRESSION:\par \par Overall no significant changes compared with the previous brain MRI dated \par 5/29/2019:\par \par 1.  No significant interval change in the size and configuration of the \par heterogeneously enhancing, necrotic and hemorrhagic lesions within the \par temporal/occipital lobes and cerebellar hemispheres.\par \par 2. Extensive edema within the cerebellar hemispheres, brainstem, parietal \par and temporal lobes is again seen. Patchy areas of enhancement are noted \par in this region. Appearance is most suggestive of posttreatment changes.\par \par 3. No new enhancing lesions identified.\par \par 10/01/19\par Pt presented today for routine follow up visit. He reports feeling fine and has no new complaints . Pt reports no new symptoms of weakness , headaches and confusion and is well compliant with treatment . \par \par \par 11/11/19\par He is here for follow up.   He feels well. No headaches.  Has a runny nose. No pain. No SOB. He gained a few  pounds.\par \par 12/9/19\par He is here for follow up. He had his MR brain on  11/16/19IMPRESSION: \par 1. Overall no significant changes compared with the previous brain MRI dated 8/5/2019. \par 2. Specifically, the heterogeneously enhancing, necrotic and hemorrhagic lesions within the temporal/occipital \par lobes and cerebellar hemispheres; with extensive associated edema and patchy enhancement are not \par significantly changed and most compatible with posttreatment changes. \par 3. No new enhancing lesions identified.\par \par He did not do his CTs yet. \par \par He feels well. No headaches. \par \par 1/13/2020\par Patient is here for a follow-up visit for metastatic lung cancer.  He is feeling well with no new complaints.  Most recent CBC is stable.  Patient denies fever, chills, nausea, vomiting, new pain or bleeding.  We discussed that based on most recent imaging, it may be suggestive that he is progressing on current treatment regimen.  \par PET/CT (1.7.20) IMPRESSION: Since February 4, 2019:1. Redemonstration of large left pleural fluid collection with circumferential FDG uptake along the pleural surface and associated calcifications (max SUV up to 18, [id number]), consistent with posttreatment change related to talc pleurodesis. New 5.8 x 3.5 cm lesion along the left posterior costophrenic angle, with peripheral FDG avidity, max SUV14. Given talc pleurodesis, findings may represent evolving inflammatory response, however neoplasm remains a consideration, and continued follow-up or tissue sampling recommended.2. Unchanged non-FDG avid sclerotic, treated osseous metastases in the right proximal humerus and T9 vertebral body with compression deformity.\par \par I presented him in tumor board and we decided to biopsy the new area. \par \par 2/4/2020-Interpretor services used as pt is Bengali speaking # [id number]\par  Dionisio is here for follow up complaining of b/l flank pain. No c/o fever, chills, dysuria, hematuria at home. No change in bowel habits. He describes dull pain b/l flank area that is mostly in the morning. Today during the office visit, he did not have much pain \par \par 3/17/20\par He is here for follow up. He underwent the biopsy of the  left pleural mass on 3/12/20 that showed Poorly differentiated malignant neoplasm. IHC is pending.  he is doing well otherwise. has minimal headaches. Not dizzy\par \par 4/1/2020: Dionisio is here for follow up, he started Lobrena on 3/17/2020, he is taking 100mg daily, reports no side effects. He reports "little headache" in the morning that is relieved with Tylenol, no other symptoms, no vision changes, no seizure like activity, no N/V, he reports no SOB, no palpitations, heart exam is RRR. No fevers. He will continue with Lobrena as prescribed. \par \par 6/22/2020\par Patient is here for a follow-up visit for metastatic lung cancer.  Since last visit he had COVID was in [facility].  he had a prolonged course. I spoke to his Neurosurgeon and he had MR brain that showed radiation necrosis and he had steroids. He only took Lorbrena for one month, Confirmed with pharmacy.  He is here with his ex wife. We used a pacific  Rosemary ID  [id number] \par He has been taking his Lorbreana since May 28 2020, he gets it from [facility] now, He is on [place] 2[place] from his hospitalization. He lives with his brother in law. He states he is not SOB. HE was unsteady on his feet. He felt well otherwise. \par \par 7/20/2020\par Patient presents for follow up of metastatic lung cancer. He is accompanied by his ex-wife.  [id number] used to communicate with patient. Patient is unsteady on his feet, ambulates with a walker, and requires someone to remain nearby for assistance. Patient fell last week and hurt 5th toe on right foot, although it seems to be improved now. ROS is significant for foot swelling. Results of recent MRI brain, CT chest/abdomen/pelvis reviewed. He has been taking lorlatinib since 5/28/2020. Ex-wife has noticed that feet are swollen and has attributed his symptoms to ativan, stating that she has switched from giving it to him every day to every other day.\par \par 8/24/2020\par Patient presents for follow up of metastatic lung cancer. He is accompanied by family. Family requested  services.  [id number] used. Patient went to ED with complaints of hemoptysis. CTA chest showed no evidence of PE and showed that pulmonary mass is stable. Patient brought tissue showing small blood clots from earlier today. He continues to take lorlatinib. Family continues to express concern for leg swelling/pain. She notes increased appetite and is concerned that his cholesterol is high. Patient does note that dizziness has improved since starting avastin.\par \par 9/30/20\par He is here for follow up.  HE is here with his wife. We used a  but Dionisio was answering all the questions in English. He is much better in regards to balance, and memory and communication. He only has mild occasional blood tinged sputum

## 2020-10-02 NOTE — REVIEW OF SYSTEMS
[Fatigue] : no fatigue [Recent Change In Weight] : ~T no recent weight change [Cough] : no cough [SOB on Exertion] : no shortness of breath during exertion [Confused] : no confusion [Dizziness] : dizziness [Difficulty Walking] : no difficulty walking [Anxiety] : anxiety [Negative] : Heme/Lymph [FreeTextEntry9] : b/l flank pain  [de-identified] : memory deficit, reports that dizziness is  much better.

## 2020-10-02 NOTE — REASON FOR VISIT
[Follow-Up Visit] : a follow-up [Other: _____] : [unfilled] [[employer] ] : provided by [employer]   [FreeTextEntry1] : [id number]  [FreeTextEntry2] : Rosemary [TWNoteComboBox1] : Latvian

## 2020-10-13 ENCOUNTER — OUTPATIENT (OUTPATIENT)
Dept: OUTPATIENT SERVICES | Facility: HOSPITAL | Age: 45
LOS: 1 days | Discharge: HOME | End: 2020-10-13
Payer: MEDICARE

## 2020-10-13 ENCOUNTER — RESULT REVIEW (OUTPATIENT)
Age: 45
End: 2020-10-13

## 2020-10-13 DIAGNOSIS — C78.00 SECONDARY MALIGNANT NEOPLASM OF UNSPECIFIED LUNG: ICD-10-CM

## 2020-10-13 PROCEDURE — 70552 MRI BRAIN STEM W/DYE: CPT | Mod: 26

## 2020-10-21 ENCOUNTER — OUTPATIENT (OUTPATIENT)
Dept: OUTPATIENT SERVICES | Facility: HOSPITAL | Age: 45
LOS: 1 days | Discharge: HOME | End: 2020-10-21
Payer: MEDICARE

## 2020-10-21 ENCOUNTER — RESULT REVIEW (OUTPATIENT)
Age: 45
End: 2020-10-21

## 2020-10-21 DIAGNOSIS — C78.00 SECONDARY MALIGNANT NEOPLASM OF UNSPECIFIED LUNG: ICD-10-CM

## 2020-10-21 PROCEDURE — 74177 CT ABD & PELVIS W/CONTRAST: CPT | Mod: 26

## 2020-10-21 PROCEDURE — 71260 CT THORAX DX C+: CPT | Mod: 26

## 2020-10-28 ENCOUNTER — APPOINTMENT (OUTPATIENT)
Dept: HEMATOLOGY ONCOLOGY | Facility: CLINIC | Age: 45
End: 2020-10-28
Payer: MEDICARE

## 2020-10-28 ENCOUNTER — OUTPATIENT (OUTPATIENT)
Dept: OUTPATIENT SERVICES | Facility: HOSPITAL | Age: 45
LOS: 1 days | Discharge: HOME | End: 2020-10-28

## 2020-10-28 VITALS
BODY MASS INDEX: 30.76 KG/M2 | HEART RATE: 70 BPM | TEMPERATURE: 97.1 F | WEIGHT: 196 LBS | HEIGHT: 67 IN | RESPIRATION RATE: 14 BRPM | DIASTOLIC BLOOD PRESSURE: 90 MMHG | SYSTOLIC BLOOD PRESSURE: 145 MMHG

## 2020-10-28 DIAGNOSIS — I67.89 OTHER CEREBROVASCULAR DISEASE: ICD-10-CM

## 2020-10-28 DIAGNOSIS — C78.00 SECONDARY MALIGNANT NEOPLASM OF UNSPECIFIED LUNG: ICD-10-CM

## 2020-10-28 LAB
ALBUMIN SERPL ELPH-MCNC: 4.5 G/DL
ALP BLD-CCNC: 79 U/L
ALT SERPL-CCNC: 17 U/L
ANION GAP SERPL CALC-SCNC: 13 MMOL/L
AST SERPL-CCNC: 22 U/L
BILIRUB SERPL-MCNC: 0.3 MG/DL
BUN SERPL-MCNC: 6 MG/DL
CALCIUM SERPL-MCNC: 9.6 MG/DL
CHLORIDE SERPL-SCNC: 105 MMOL/L
CO2 SERPL-SCNC: 23 MMOL/L
CREAT SERPL-MCNC: 0.6 MG/DL
GLUCOSE SERPL-MCNC: 138 MG/DL
POTASSIUM SERPL-SCNC: 4.2 MMOL/L
PROT SERPL-MCNC: 7.6 G/DL
SODIUM SERPL-SCNC: 141 MMOL/L

## 2020-10-28 PROCEDURE — 99214 OFFICE O/P EST MOD 30 MIN: CPT

## 2020-10-29 ENCOUNTER — NON-APPOINTMENT (OUTPATIENT)
Age: 45
End: 2020-10-29

## 2020-10-29 NOTE — HISTORY OF PRESENT ILLNESS
[Disease: _____________________] : Disease: [unfilled] [AJCC Stage: ____] : AJCC Stage: [unfilled] [Treatment Protocol] : Treatment Protocol [de-identified] : 41/M presenting for follow-up for metastatic ALK-positive lung adenocarcinoma.  He is currently on alectinib 600 mg BID.  He initially presented in 11/2014 with multiple pulmonary nodules, mediastinal lymphadenopathy, multiple bony metastases, and brain metastases.  He was initially started on crizotinib and was switched to ceritinib in 12/2015 upon progression.  He was unable to tolerate ceritinib due to significant vomiting and was switched to alectinib in 02/2016.\par \par He had SRS to a left cerebellar hemisphere brain metastasis (08/2015) and subsequently underwent WBRT for CNS progression (10-11/2015).  He had RT to a painful metastatic lesion in the right humerus (01/2015).  He had also received Xgeva for bony metastatic disease in the past. \par \par Since 09/2016, he has been experiencing intermittent headaches being treated with periodic dexamethasone taper.  MRI brain with perfusion analysis showed bilateral cerebellar lesions, favor postRT changes rather than residual tumor.  His most recent brain MRI (11/01/2016) showed overall  stable  exam  compared  to  the  previous  brain  MRI  9/2/2016; no  significant  interval  change  in  bilateral  cerebellar  heterogeneously  enhancing  masses  with  extensive  associated  edema,  as  well  as  smaller  lesions  within  the  left  parietal  white  matter,  superficial  left  temporal  lobe  and  left  dorsal  medulla; and, stable  mild  ventriculomegaly  and  mild  periventricular  FLAIR  signal  abnormality.  He has been following with our neurosurgeon, Dr. Britt, who has recommended no surgical intervention at this time. \par \par His most recent PETCT (11/01/2016) showed no sites of pathologic FDG uptake suspicious for biologic tumor activity.\par \par He reports occasional headaches and a mild sense of imbalance.  Denies any falls.  However he only takes a prn dexamethasone, once in every three days,  which reportedly relieves his intermittent headaches. \par Today he also complains mild pain in his left great toe, likely secondary to ingrowing toe nail. 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 He changed his insurance. Did not get scans and did not take his alectinib for about 1-2 weeks. We were not notified.  Otherwise she feels well she really requires dexamethasone yesterday headache probably once or twice a week as per patient no other complaints.\par \par 4/17/17\par He presents for follow up today. He had MRI of brain that showed stable disease 4/8/2017. He has not had the PET CT yet. He received  his alecetinib on 4/4/17 and stated to take them. States his headaches are rare now and feels well otherwise.\par \par 5/15/17\par He presents for follow up. He had a PET CT on 4/21/17 that  was ucnhged.Since November 1, 2016, no new foci of pathologic FDG uptake to suggest\par biologic tumor activity. . 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 . Unchanged non-FDG avid sclerotic foci involving the right proximal humerus\par and T9 vertebral body.\par \par He  feels well. No dizziness or headache.  No new symptoms or complaints.\par \par 6/19/17\par He is doing well. He has no complaints. No headaches. No SOB. \par \par 7/24/17\par Patient comes in for follow up visit, has no complaints, has no SOB and has a mild cough which is chronic, no hemoptysis. Patient states his appetite is good, weight is stable, he has no headaches and is currently off the steroids. Patient will have repeat PET scan and MRI of the brain with contrast as well as repeat bloodwork. \par \par 8/21/17\par He is here for follow up. He had an MR brain and PET CT on 8/3 and 8/2 that did not show progression, were essentially unchanged.  He feels great otherwise.\par \par 9/21/17\par He is doing well. No complaints. No headaches\par \par October 26, 2017\par He see her for followup he feels great he has no complaints.\par \par 11/16/17\par He is here for follow up. He had an MR of brain on 11/14/17: New areas of nodular enhancement along the inferior surfaces of the temporal lobes bilaterally. These are relatively symmetric and just above the level of the cerebellar enhancing masses, suggesting that these may reflect post treatment change.\par He had to reschedule his PET and states he will do it next Wednesday.\par \par No complaints.\par \par 12/26/17\par He is here for follow up. He feels well. he  had PET/CT in end of November that does not show procession, stable pleural findings. He  feels well. \par \par 1/22/18\par He is here for follow up for his NSCLCA. He states he feels well. No SOB, no headaches no fatigue.. \par \par February 21 2018\par  he is here for followup, he had a recent PET/CT scan which is PRESLEY he also had a recent MRI of the brain. This was reviewed at the CNS tumor board although wasn't clear but the consensus was that this is posttreatment changes in the fact that he is asymptomatic and decided to observe him.\par \par He has no new complaints no headaches no weakness or tingling.\par \par \par 3/21/18\par He is here for follow up. HE states he is doing well. Has very mild headaches on occasion but otherwise doing well.\par \par 4/13/18\par He is doing well. Reports no new complaints \par \par 5/3/18\par He is here for follow up to discuss his MR brain. HE complains of mild headache. Same memory issues as before. Balance is the same.  MR brain showed : Increased size of right parietotemporal of the findings enhancement  seen lesion measuring 5.2 cm, previously 2.8 centimeters and increased  size of left inferior temporal lobe mass measuring 2.3 cm, previously 1.6  cm. these areas again demonstrate hypoperfusion and may represent post  therapeutic changes.   2.  Significant increased edema and mass effect within the right  hemisphere with 9 mm right to left midline shift.  3.  No new enhancing lesions. Stable additional bilateral cerebellar and  supratentorial lesions.\par \par I spoke with Dr. 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 He developed severe headaches, some difficulty with speech and balance issues. 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 He has no insurance so my pharmacy has been giving  him a weekly supply while we work on it. He did not have his scans yet. He feel much better now except for dizziness\par \par 5/30/18\par He is feeling much better. His neurological complaints resolved. He stopped steroids on Monday since he had swelling in legs that has resolved. \par \par 6/13/18\par He is here for follow up. he had a PET/CT on 6/7/18 that showed These images reveal, when comparing the FDG brain images to MRI of  4/27/2018 series 10, there is one small focus of increased FDG uptake in  the region of the right parietotemporal lobe lesion (series 12 image 34).  Therefore persistent biologic tumor activity in this region cannot be  excluded.  No focal abnormal FDG uptake corresponding with the left inferior  temporal lobe lesion and both cerebellar lesions. In addition most of the  right parietotemporal lobe lesion is not FDG avid Compared to normal brain glucose metabolism in the thalamus (surrogate  for normal brain metabolism) relative hypometabolism of the right  parietal, posterior parietal and occipital region and relative  hypermetabolism of the left posterior parietal region  Persistent posttreatment FDG uptake (SUV up to 11.2) along the pleural  surface of the left hemithorax consistent with posttreatment changes  associated with talc pleurodesis  One new mildly FDG avid soft tissue nodule (SUV 3.3) in the left buttock \par \par He feels well now ,no headaches.\par \par 7/16/18\par He is here for follow up. doing well. He has no headaches. No new complaints\par \par 8/13/18\par He is here for follow up.  He is doing well. has no complaints. He is considering returning to work.\par \par 9/10/18\par He is here for follow up. He had an MR brain on 8/24/18 that showed In comparison to the previous brain MRI dated 4/27/2018:  1.  No significant interval change in the size and configuration of the  heterogeneously enhancing, necrotic and hemorrhagic lesions within the  temporal/occipital lobes and cerebellar hemispheres. Enhancement pattern  suggests post-therapeutic changes.  2.  Moderately increased edema within the posterior fossa (cerebellar  hemispheres and brainstem). Increased mass effect upon the fourth  ventricle with no evidence of hydrocephalus.  3.  The left cerebral hemispheric edema has mildly increased and the  right cerebral hemispheric edema has mildly decreased.   4.  Resolution of the previously seen right to left midline shift, right  lateral ventricular compression and left lateral ventricular enlargement.  5.  Treated lesions again noted in the left frontal white matter and left  lateral medulla, without contrast enhancement.\par \par HE is doing well has no new complaints. Has no neuroglial complaints. \par \par 10/10/18\par He is here for follow up he had PET/CT on  9/28/18 that was PRESLEY. He no has headaches again. States he is voiding multiple times a day and unable to empty bladder fully.\par \par 10/30/18\par He is here for follow up. he was in the ED for.Left lower quadrant pain symptoms her was given cirpo. he had a Ct abd/pelvis in ED 10/22/18 : Stable posttreatment changes of the left hemithorax including large  loculated pleural effusion.  2.  Multiple nondilated fluid-filled small bowel loops which may reflect  evidence of enteritis.  \par He had MR brain on 10/29/18:  No new lesions, but several existing lesions have increased in size  2. Bilateral hemorrhagic cerebellar lesions and edema has increased. Mass  effect on the fourth ventricle with hydrocephalus.   2.  Essentially unchanged sizes of supratentorial lesions within the  bilateral temporooccipital lobes and mildly improved edema\par \par He is having headaches and more confusion. He took 4 flomax in am and PM instead of his alecensa. I called [facility] with him and they told us they did not deliver his pills since they could not reach him but will deliver them tomorrow. He will be home. He states he had alecensa at home but not sure.\par \par 11/12/18\par Patient presents to the office with a chief complaint of burning in his right thigh.  He has had no new medications.  The patient states it is worse in the evening but lasts all day.  The patient states that the burning sensation starts at the right knee and is ascending.  Straight leg raise negative.  Discussed that it is unlikely that it is from his Avastin treatment, but unsure the etiology.  We will send for LE doppler to rule out any DVT.\par \par 11/27/18\par He is here for follow up. His Dopplers  were negative and his pain/cramps are nearly gone only some in right thigh. His headaches are gone and he is speech is  better and states memory is better since we started Avastin.\par \par 1/9/19\par He is doing well.  He has no more pain in his legs. He is eating well. He is due for 4/4 dose of Avastin. No other complains.\par \par 2/6/19\par He is here for follow-up.  He generally feels well.  He states he still suffers from headaches.  His memory has improved minimally.\par PET/CT 2/4/19 IMPRESSION: Since 9/28/2018, No definite sites of abnormal FDG uptake to suggest biologic tumor activity. Cerebellar lesions seen on brain MRI from 1/20/2019, are not FDG avid and consistent with treated metastases. Post talc pleurodesis of the left pleura, unchanged. \par MR Brain 1/20/19 IMPRESSION: In comparison to the previous brain MRI dated 10/29/2018: 1. No significant interval change in the size and configuration of the heterogeneously enhancing, necrotic and hemorrhagic lesions within the temporal/occipital lobes and cerebellar hemispheres. 2. The edema within the brainstem and cerebellum is about stable;however there is slight progression of the hydrocephalus since the prior exam. 3. The edema within the temporal and occipital lobes has decreased. 4. New patchy area of restricted diffusion around the left temporal horn (without corresponding enhancement) may reflect superimposed ischemic changes. Recommend attention on follow-up imaging. \par \par 3/6/19\par Patient is here for a follow-up visit.  He states he is having increased frequency of headaches with minimal relief from Tylenol.  He is now staying in the shelter on [address] with his daughter.  He will  his medications from [facility] from now on.  We are awaiting arrangement for him to get an apartment.\par \par 3/27/19\par He is here for follow up. his headache is better and he takes his Dexamethasone only when he has it which helps. Last PET was in February and last MRI was in January.\par \par 4/29/19\par He is here for follow-up.  Since last visit, he presented to the ED in 4/2019 due to chest pain.  He underwent CTA chest that demonstrated unchanged left pleural effusion. Likely radiation induced necrosis changes, he states it may have been related to stress related to his daughter.  He is still awaiting placement from the shelter.  He reports his usual frequency headaches, but he takes the Decadron as needed which helps.  \par CTA Chest (4/7/19) IMPRESSION:No pulmonary emboli.20.9 cm pleural fluid collection in the left hemithorax, essentially unchanged since PET/CT dated 2/4/2019.\par MR Brain (4/2/19) IMPRESSION: Since January 20, 2019:New worse signal abnormality involving the brain and volodymyr with increased edema and worse effacement of the fourth ventricle and resulting mild hydrocephalus.Slightly worse edema involving the cerebellum.Stable bilateral posterior temporal/occipital/cerebellar heterogeneously enhancing lesions.Stable left posterior frontal deep white matter and left dorsal medullary hypointense foci, compatible with treated lesions.\par \par 5/22/19\par He came in for follow up. He has been complaining of headaches mainly in AM. He does not think dexamethasone is helping anymore. No other issues.\par \par 6/3/19\par He is here for follow up. HIs headacehs are the same and uses dexamethasone 4 mg almost dialy. He had  CT C/A/P that showed a stable effusion 5/26/19 amd MR brain showed  5/29/19: Overall no significant changes compared with the previous brain MRI dated \par 4/2/2019:\par \par 1.  No significant interval change in the size and configuration of the \par heterogeneously enhancing, necrotic and hemorrhagic lesions within the \par temporal/occipital lobes and cerebellar hemispheres.\par \par 2. Extensive edema within the cerebellar hemispheres, brainstem, parietal \par and temporal lobes is again seen. Patchy areas of enhancement are noted \par in this region. Appearance is most suggestive of posttreatment changes.\par \par 3. Stable patchy area of restricted diffusion around the left temporal \par horn again seen, nonspecific.\par \par 4. No new enhancing lesions identified.\par \par 6/24/19\par He is here for follow up. he missed his Avastin since he had family issues. Still has headaches they are the same. Will restart Avastin today,.\par \par 7/15/19\par He is here for follow up. He could not tell me if headaches are better. He does use steroids once  in  a while for headaches. States memory is the same. \par \par \par 8/14/19\par He is doing better. He Missed his Avastin doses. But he has no headaches and does not use steroids. He had a fall  and hit a rail on his chest prior to his CT scans and the finding in his CT chest is probably related to the trauma he still has pain in the area but its better. CT C/A/P and MR brain from august are bellow\par \par IMPRESSION:\par \par Since April 7, 2019\par \par Stable left pleural fluid collection measuring 9.5 x 14.1 by 18.4 cm, \par with stable compressive atelectasis of left lung and left hemidiaphragm \par inversion.\par \par New, indeterminate approximate 8 mm soft tissue density, left anterior \par chest (series 4/104).\par \par Interval development of approximate 3.6 x 1.8 by 3.9 cm homogeneous \par elliptical shaped structure, left epicardial fat pad (4/206), possibly \par proteinaceous material (HU +35).\par \par \par IMPRESSION:\par \par Within the left cardiophrenic angle, there is a new area of lobulated \par soft tissue measuring 4.0 x 2.5 cm suspicious for new metastatic disease.\par \par Please see chest CT report for chest findings\par \par IMPRESSION:\par \par Overall no significant changes compared with the previous brain MRI dated \par 5/29/2019:\par \par 1.  No significant interval change in the size and configuration of the \par heterogeneously enhancing, necrotic and hemorrhagic lesions within the \par temporal/occipital lobes and cerebellar hemispheres.\par \par 2. Extensive edema within the cerebellar hemispheres, brainstem, parietal \par and temporal lobes is again seen. Patchy areas of enhancement are noted \par in this region. Appearance is most suggestive of posttreatment changes.\par \par 3. No new enhancing lesions identified.\par \par 10/01/19\par Pt presented today for routine follow up visit. He reports feeling fine and has no new complaints . Pt reports no new symptoms of weakness , headaches and confusion and is well compliant with treatment . \par \par \par 11/11/19\par He is here for follow up.   He feels well. No headaches.  Has a runny nose. No pain. No SOB. He gained a few  pounds.\par \par 12/9/19\par He is here for follow up. He had his MR brain on  11/16/19IMPRESSION: \par 1. Overall no significant changes compared with the previous brain MRI dated 8/5/2019. \par 2. Specifically, the heterogeneously enhancing, necrotic and hemorrhagic lesions within the temporal/occipital \par lobes and cerebellar hemispheres; with extensive associated edema and patchy enhancement are not \par significantly changed and most compatible with posttreatment changes. \par 3. No new enhancing lesions identified.\par \par He did not do his CTs yet. \par \par He feels well. No headaches. \par \par 1/13/2020\par Patient is here for a follow-up visit for metastatic lung cancer.  He is feeling well with no new complaints.  Most recent CBC is stable.  Patient denies fever, chills, nausea, vomiting, new pain or bleeding.  We discussed that based on most recent imaging, it may be suggestive that he is progressing on current treatment regimen.  \par PET/CT (1.7.20) IMPRESSION: Since February 4, 2019:1. Redemonstration of large left pleural fluid collection with circumferential FDG uptake along the pleural surface and associated calcifications (max SUV up to 18, [id number]), consistent with posttreatment change related to talc pleurodesis. New 5.8 x 3.5 cm lesion along the left posterior costophrenic angle, with peripheral FDG avidity, max SUV14. Given talc pleurodesis, findings may represent evolving inflammatory response, however neoplasm remains a consideration, and continued follow-up or tissue sampling recommended.2. Unchanged non-FDG avid sclerotic, treated osseous metastases in the right proximal humerus and T9 vertebral body with compression deformity.\par \par I presented him in tumor board and we decided to biopsy the new area. \par \par 2/4/2020-Interpretor services used as pt is [place] speaking # [id number]\par  Dionisio is here for follow up complaining of b/l flank pain. No c/o fever, chills, dysuria, hematuria at home. No change in bowel habits. He describes dull pain b/l flank area that is mostly in the morning. Today during the office visit, he did not have much pain \par \par 3/17/20\par He is here for follow up. He underwent the biopsy of the  left pleural mass on 3/12/20 that showed Poorly differentiated malignant neoplasm. IHC is pending.  he is doing well otherwise. has minimal headaches. Not dizzy\par \par 4/1/2020: Dionisio is here for follow up, he started Lobrena on 3/17/2020, he is taking 100mg daily, reports no side effects. He reports "little headache" in the morning that is relieved with Tylenol, no other symptoms, no vision changes, no seizure like activity, no N/V, he reports no SOB, no palpitations, heart exam is RRR. No fevers. He will continue with Lobrena as prescribed. \par \par 6/22/2020\par Patient is here for a follow-up visit for metastatic lung cancer.  Since last visit he had COVID was in [facility].  he had a prolonged course. I spoke to his Neurosurgeon and he had MR brain that showed radiation necrosis and he had steroids. He only took Lorbrena for one month, Confirmed with pharmacy.  He is here with his ex wife. We used a pacific  Rosemary ID  [id number] \par He has been taking his Lorbreana since May 28 2020, he gets it from [facility] now, He is on [place] 2[place] from his hospitalization. He lives with his brother in law. He states he is not SOB. HE was unsteady on his feet. He felt well otherwise. \par \par 7/20/2020\par Patient presents for follow up of metastatic lung cancer. He is accompanied by his ex-wife.  [id number] used to communicate with patient. Patient is unsteady on his feet, ambulates with a walker, and requires someone to remain nearby for assistance. Patient fell last week and hurt 5th toe on right foot, although it seems to be improved now. ROS is significant for foot swelling. Results of recent MRI brain, CT chest/abdomen/pelvis reviewed. He has been taking lorlatinib since 5/28/2020. Ex-wife has noticed that feet are swollen and has attributed his symptoms to ativan, stating that she has switched from giving it to him every day to every other day.\par \par 8/24/2020\par Patient presents for follow up of metastatic lung cancer. He is accompanied by family. Family requested  services.  [id number] used. Patient went to ED with complaints of hemoptysis. CTA chest showed no evidence of PE and showed that pulmonary mass is stable. Patient brought tissue showing small blood clots from earlier today. He continues to take lorlatinib. Family continues to express concern for leg swelling/pain. She notes increased appetite and is concerned that his cholesterol is high. Patient does note that dizziness has improved since starting avastin.\par \par 9/30/20\par He is here for follow up.  HE is here with his wife. We used a  but Dionisio was answering all the questions in English. He is much better in regards to balance, and memory and communication. He only has mild occasional blood tinged sputum \par \par 10/28/2020\par Patient presents for follow up of metastatic lung cancer, accompanied by family members.  He has been tolerating the Lobrena once daily.  We reviewed findings of most recent imaging which shows essentially stable findings in the brain and abdomen/pelvis.  The official report for the CT Chest is still pending but may warrant PET/CT to determine if any true progression.  Patient denies fever, chills, nausea, vomiting, dizziness, seizure activity or bleeding.  \par CT A/P (10.21.2020) IMPRESSION:1.  No findings of new or progressive metastatic disease in the abdomen or pelvis. 2.  Left posterior chest mass broadly abutting/invading the diaphragm - see separately dictated CT chest report for detailed evaluation including complete assessment of the thorax.\par MR Head (10.13.2020) IMPRESSION:Since prior MR of 7/2/2020,Decreased periventricular T2 and FLAIR hyperintensities, compatible with evolving posttreatment changes.Grossly stable bilateral cerebellar hemorrhagic masses. Unchanged mass effect upon the fourth ventricle and stable ventriculomegaly.No evidence of new enhancing intracranial lesions.

## 2020-10-29 NOTE — END OF VISIT
[FreeTextEntry3] : I was physically present for the key portions of the evaluation and management service provided.  I agree with the history and physical, and plan which I have reviewed and edited where appropriate.\par \par His CT chest came back after the visit. It showed slight increase in size of his disease. He is doing well. He remains a bit frail would like to repeat CT chest in 2 months. If continues to have progression he will need  chemoimmunotherapy.

## 2020-10-29 NOTE — REASON FOR VISIT
[Follow-Up Visit] : a follow-up [Other: _____] : [unfilled] [[employer] ] : provided by [employer]   [FreeTextEntry1] : [id number]  [FreeTextEntry2] : Rosemary [TWNoteComboBox1] : [place]

## 2020-10-29 NOTE — ASSESSMENT
[Palliative] : Goals of care discussed with patient: Palliative [FreeTextEntry1] : 1.  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 Due to insurance issues he stopped it and restarted it 4/4/2017. He progressed on alectinib and began taking lorlatinib on 5/28/2020.\par - Repeat MRI brain 8/2018 radiation related changes, post treatment changes, and repeat PET/CT 6/5/18   is without systemic progression.  It confirmed radiation necrosis. PET/CT on 9/28/18 was PRESLEY. CT abdomen was unchanged.\par - Repeat PET/CT from 01/2020 shows new FDG-avid lesions in the lung right under his left effusion on top of the diaphragm.\par - He underwent the biopsy of the  left pleural mass on 3/12/20 that showed poorly differentiated malignant neoplasm. IHC c/w non-small cell carcinoma of the lung, faovring adenocarcinoma, TTF 1 positive.\par - Per Dr. Richardson, he is not a candidate for radiation to the site of progression.\par - We did foundation one on his re-biopsy that showed EML4-Alk variant 3a/b, other mutations CDKN2a and B loss, LLL2, MTAP, SMARCa4, Tp53\par - He was started on lorlatinib in March 2020, took it for about one month, had a prolonged COVID hospitalization, restarted on 5/28/2020.\par - The FDA approval for lorlatinib is based on the results of a phase II study ([id number]) that included a subgroup of 198 patients with ALK-positive metastatic NSCLC previously treated with =1 ALK inhibitor.  The overall response rate with lorlatinib among these patients was 47 percent, with a complete response rate of 2 percent and a partial response rate of 45 percent. Efficacy according to prior treatment was as follows: post-crizotinib, KENNEY 73 percent, median PFS 11.1 months, and median duration of response not reached; post-one or more second-generation ALK inhibitors, KENNEY 40 percent, median PFS 6.9 months, median duration of response 7.1 months [66]. The most frequent adverse effects were hypercholesterolemia (81 percent), hypertriglyceridemia (61 percent), edema (43 percent), and peripheral neuropathy (30 percent). Serious treatment-related adverse effects occurred in 7 percent of patients, the most frequent being cognitive deficits in 1 percent. \par \par - Repeat CT C/A/P and MRI brain performed for new baseline. CT abdomen/pelvis negative. CT chest demonstrated increase in size of costophrenic mass from 6 cm on 3/10/2020 to 6.5 cm on 7/7/2020. 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 Reordered Avastin x 4 doses. Patient received 2 doses then developed hemoptysis, so held remaining doses \par - If unable to control neurologic symptoms with medical management, neurosurgeon Dr. Britt will do shunt placement.\par - Recent 8/2020 CTa chest performed in ED showed that lung mass is stable.\par - CT A/P and MR Head from 10/2020 essentially show stable disease; CT chest final report is still pending.  We discussed treatment options if determined to have true intrathoracic progression including chemo + immunotherapy.\par - Advised continuing lorlatinib and using compression stockings and LE elevation as needed; LE swelling has improved\par \par RTC in 1 month, will call with results of scan\par \par Patient requiring assistance with ambulation.  He can benefit from rollator for safety with ambulation.  Will write script for  to help arrange.  \par \par Contact. Ex wife: [phone], Brother Caesar

## 2020-10-29 NOTE — REVIEW OF SYSTEMS
[Anxiety] : anxiety [Negative] : Heme/Lymph [Fever] : no fever [Fatigue] : no fatigue [Recent Change In Weight] : ~T no recent weight change [Shortness Of Breath] : no shortness of breath [Cough] : no cough [SOB on Exertion] : no shortness of breath during exertion [Confused] : no confusion [Dizziness] : no dizziness [Difficulty Walking] : no difficulty walking [Easy Bleeding] : no tendency for easy bleeding [FreeTextEntry9] : b/l flank pain  [de-identified] : memory deficit, reports that dizziness is  much better.

## 2020-10-29 NOTE — PHYSICAL EXAM
[Ambulatory and capable of all self care but unable to carry out any work activities] : Status 2- Ambulatory and capable of all self care but unable to carry out any work activities. Up and about more than 50% of waking hours [Normal] : affect appropriate [de-identified] : B/L LEs do not appear swollen. [de-identified] : Unsteady on feet. Ambulates with walker and requires assistance. Memory and balance is better [de-identified] : Forgetful, but stable.

## 2020-11-02 ENCOUNTER — NON-APPOINTMENT (OUTPATIENT)
Age: 45
End: 2020-11-02

## 2020-11-05 ENCOUNTER — INPATIENT (INPATIENT)
Facility: HOSPITAL | Age: 45
LOS: 3 days | Discharge: HOME | End: 2020-11-09
Attending: INTERNAL MEDICINE | Admitting: INTERNAL MEDICINE
Payer: MEDICARE

## 2020-11-05 VITALS
HEIGHT: 68 IN | RESPIRATION RATE: 20 BRPM | OXYGEN SATURATION: 97 % | SYSTOLIC BLOOD PRESSURE: 145 MMHG | TEMPERATURE: 98 F | DIASTOLIC BLOOD PRESSURE: 89 MMHG | HEART RATE: 109 BPM

## 2020-11-05 DIAGNOSIS — C79.31 SECONDARY MALIGNANT NEOPLASM OF BRAIN: ICD-10-CM

## 2020-11-05 DIAGNOSIS — J90 PLEURAL EFFUSION, NOT ELSEWHERE CLASSIFIED: ICD-10-CM

## 2020-11-05 DIAGNOSIS — R07.9 CHEST PAIN, UNSPECIFIED: ICD-10-CM

## 2020-11-05 DIAGNOSIS — C34.90 MALIGNANT NEOPLASM OF UNSPECIFIED PART OF UNSPECIFIED BRONCHUS OR LUNG: ICD-10-CM

## 2020-11-05 DIAGNOSIS — R00.0 TACHYCARDIA, UNSPECIFIED: ICD-10-CM

## 2020-11-05 DIAGNOSIS — Z88.0 ALLERGY STATUS TO PENICILLIN: ICD-10-CM

## 2020-11-05 DIAGNOSIS — C79.51 SECONDARY MALIGNANT NEOPLASM OF BONE: ICD-10-CM

## 2020-11-05 DIAGNOSIS — I10 ESSENTIAL (PRIMARY) HYPERTENSION: ICD-10-CM

## 2020-11-05 LAB
ALBUMIN SERPL ELPH-MCNC: 4.1 G/DL — SIGNIFICANT CHANGE UP (ref 3.5–5.2)
ALP SERPL-CCNC: 76 U/L — SIGNIFICANT CHANGE UP (ref 30–115)
ALT FLD-CCNC: 11 U/L — SIGNIFICANT CHANGE UP (ref 0–41)
ANION GAP SERPL CALC-SCNC: 8 MMOL/L — SIGNIFICANT CHANGE UP (ref 7–14)
AST SERPL-CCNC: 16 U/L — SIGNIFICANT CHANGE UP (ref 0–41)
BASOPHILS # BLD AUTO: 0.03 K/UL — SIGNIFICANT CHANGE UP (ref 0–0.2)
BASOPHILS NFR BLD AUTO: 0.4 % — SIGNIFICANT CHANGE UP (ref 0–1)
BILIRUB SERPL-MCNC: 0.2 MG/DL — SIGNIFICANT CHANGE UP (ref 0.2–1.2)
BUN SERPL-MCNC: 8 MG/DL — LOW (ref 10–20)
CALCIUM SERPL-MCNC: 9.5 MG/DL — SIGNIFICANT CHANGE UP (ref 8.5–10.1)
CHLORIDE SERPL-SCNC: 104 MMOL/L — SIGNIFICANT CHANGE UP (ref 98–110)
CO2 SERPL-SCNC: 27 MMOL/L — SIGNIFICANT CHANGE UP (ref 17–32)
CREAT SERPL-MCNC: 0.7 MG/DL — SIGNIFICANT CHANGE UP (ref 0.7–1.5)
D DIMER BLD IA.RAPID-MCNC: 707 NG/ML DDU — HIGH (ref 0–230)
EOSINOPHIL # BLD AUTO: 0.3 K/UL — SIGNIFICANT CHANGE UP (ref 0–0.7)
EOSINOPHIL NFR BLD AUTO: 4.4 % — SIGNIFICANT CHANGE UP (ref 0–8)
GLUCOSE SERPL-MCNC: 116 MG/DL — HIGH (ref 70–99)
HCT VFR BLD CALC: 44.5 % — SIGNIFICANT CHANGE UP (ref 42–52)
HGB BLD-MCNC: 13.8 G/DL — LOW (ref 14–18)
IMM GRANULOCYTES NFR BLD AUTO: 0.3 % — SIGNIFICANT CHANGE UP (ref 0.1–0.3)
LYMPHOCYTES # BLD AUTO: 1.37 K/UL — SIGNIFICANT CHANGE UP (ref 1.2–3.4)
LYMPHOCYTES # BLD AUTO: 20.2 % — LOW (ref 20.5–51.1)
MAGNESIUM SERPL-MCNC: 1.8 MG/DL — SIGNIFICANT CHANGE UP (ref 1.8–2.4)
MCHC RBC-ENTMCNC: 27 PG — SIGNIFICANT CHANGE UP (ref 27–31)
MCHC RBC-ENTMCNC: 31 G/DL — LOW (ref 32–37)
MCV RBC AUTO: 87.1 FL — SIGNIFICANT CHANGE UP (ref 80–94)
MONOCYTES # BLD AUTO: 0.85 K/UL — HIGH (ref 0.1–0.6)
MONOCYTES NFR BLD AUTO: 12.5 % — HIGH (ref 1.7–9.3)
NEUTROPHILS # BLD AUTO: 4.21 K/UL — SIGNIFICANT CHANGE UP (ref 1.4–6.5)
NEUTROPHILS NFR BLD AUTO: 62.2 % — SIGNIFICANT CHANGE UP (ref 42.2–75.2)
NRBC # BLD: 0 /100 WBCS — SIGNIFICANT CHANGE UP (ref 0–0)
NT-PROBNP SERPL-SCNC: 17 PG/ML — SIGNIFICANT CHANGE UP (ref 0–300)
PLATELET # BLD AUTO: 194 K/UL — SIGNIFICANT CHANGE UP (ref 130–400)
POTASSIUM SERPL-MCNC: 4.4 MMOL/L — SIGNIFICANT CHANGE UP (ref 3.5–5)
POTASSIUM SERPL-SCNC: 4.4 MMOL/L — SIGNIFICANT CHANGE UP (ref 3.5–5)
PROT SERPL-MCNC: 7.3 G/DL — SIGNIFICANT CHANGE UP (ref 6–8)
RBC # BLD: 5.11 M/UL — SIGNIFICANT CHANGE UP (ref 4.7–6.1)
RBC # FLD: 14.8 % — HIGH (ref 11.5–14.5)
SARS-COV-2 RNA SPEC QL NAA+PROBE: SIGNIFICANT CHANGE UP
SODIUM SERPL-SCNC: 139 MMOL/L — SIGNIFICANT CHANGE UP (ref 135–146)
TROPONIN T SERPL-MCNC: <0.01 NG/ML — SIGNIFICANT CHANGE UP
WBC # BLD: 6.78 K/UL — SIGNIFICANT CHANGE UP (ref 4.8–10.8)
WBC # FLD AUTO: 6.78 K/UL — SIGNIFICANT CHANGE UP (ref 4.8–10.8)

## 2020-11-05 PROCEDURE — 71045 X-RAY EXAM CHEST 1 VIEW: CPT | Mod: 26

## 2020-11-05 PROCEDURE — 99285 EMERGENCY DEPT VISIT HI MDM: CPT

## 2020-11-05 PROCEDURE — 93010 ELECTROCARDIOGRAM REPORT: CPT

## 2020-11-05 PROCEDURE — 71275 CT ANGIOGRAPHY CHEST: CPT | Mod: 26

## 2020-11-05 RX ORDER — ATORVASTATIN CALCIUM 80 MG/1
1 TABLET, FILM COATED ORAL
Qty: 0 | Refills: 0 | DISCHARGE

## 2020-11-05 RX ORDER — KETOROLAC TROMETHAMINE 30 MG/ML
30 SYRINGE (ML) INJECTION ONCE
Refills: 0 | Status: DISCONTINUED | OUTPATIENT
Start: 2020-11-05 | End: 2020-11-05

## 2020-11-05 RX ORDER — ATORVASTATIN CALCIUM 80 MG/1
20 TABLET, FILM COATED ORAL AT BEDTIME
Refills: 0 | Status: DISCONTINUED | OUTPATIENT
Start: 2020-11-05 | End: 2020-11-09

## 2020-11-05 RX ORDER — LORLATINIB 100 MG/1
1 TABLET, FILM COATED ORAL
Qty: 0 | Refills: 0 | DISCHARGE

## 2020-11-05 RX ORDER — PANTOPRAZOLE SODIUM 20 MG/1
40 TABLET, DELAYED RELEASE ORAL
Refills: 0 | Status: DISCONTINUED | OUTPATIENT
Start: 2020-11-05 | End: 2020-11-09

## 2020-11-05 RX ORDER — ENOXAPARIN SODIUM 100 MG/ML
40 INJECTION SUBCUTANEOUS DAILY
Refills: 0 | Status: DISCONTINUED | OUTPATIENT
Start: 2020-11-05 | End: 2020-11-06

## 2020-11-05 RX ADMIN — ATORVASTATIN CALCIUM 20 MILLIGRAM(S): 80 TABLET, FILM COATED ORAL at 23:06

## 2020-11-05 RX ADMIN — Medication 30 MILLIGRAM(S): at 15:16

## 2020-11-05 RX ADMIN — ENOXAPARIN SODIUM 40 MILLIGRAM(S): 100 INJECTION SUBCUTANEOUS at 23:06

## 2020-11-05 NOTE — ED ADULT NURSE NOTE - OBJECTIVE STATEMENT
Pt presents to ED c/o intermittent left sided CP and left arm pain for 1 week. Pt placed on cardiac monitor upon arrival to ED. Pt denies injury to area.

## 2020-11-05 NOTE — ED PROVIDER NOTE - OBJECTIVE STATEMENT
Patient is a 45 y Male w/ PMH of lung cancer on chemotherapy & HLD presents to the ED c/o L arm pain of one week duration. Pt describes pain as intermittent, sharp, 9/10 intensity, pleuritic in nature radiating to L chest, SOB w/ deep breaths, nothing in specific makes it better or worse. Patient denies recent surgery, trauma, calf swelling, weakness, fever, cough, hx of DVT, pitting edema, N/V/D. Patient is a 45 y Male w/ PMH of lung cancer on chemotherapy & HLD presents to the ED c/o L arm pain of one week duration. Pt describes pain as intermittent, sharp, 9/10 intensity, pleuritic in nature radiating to L chest, SOB w/ deep breaths, nothing in specific makes it better or worse. Patient denies recent surgery, trauma, calf swelling, weakness, fever, cough, hx of DVT, pitting edema, N/V/D. Onc-

## 2020-11-05 NOTE — H&P ADULT - NSHPPHYSICALEXAM_GEN_ALL_CORE
CONSTITUTIONAL: No acute distress, well-developed, well-groomed, AAOx3  HEAD: Atraumatic, normocephalic  EYES: EOM intact, PERRLA, conjunctiva and sclera clear  ENT: Supple, no masses, no thyromegaly, no bruits, no JVD; moist mucous membranes  PULMONARY: Clear to auscultation bilaterally; no wheezes, rales, or rhonchi  CARDIOVASCULAR: Regular rate and rhythm; no murmurs, rubs, or gallops  GASTROINTESTINAL: Soft, non-tender, non-distended; bowel sounds present  MUSCULOSKELETAL: 2+ peripheral pulses; no clubbing, no cyanosis, no edema  NEUROLOGY: non-focal  SKIN: No rashes or lesions; warm and dry

## 2020-11-05 NOTE — H&P ADULT - ATTENDING COMMENTS
HPI:  46 YO M with PMHx of metastatic ALK-positive lung adenocarcinoma (currently on Lorbrena) & HLD presented to the ED c/c of Left arm and chest pain x 1 week. He described the pain as intermittent, sharp, 9/10 intensity, worse with deep breaths, associated with SOB on and off. Patient reports that the pain gets worse when he is lying on his left side  Patient denied recent surgery, trauma, calf swelling, weakness, fever, cough, hx of DVT, pitting edema, N/V/D.   In the ED vitals were unremarkable. CT Angio Chest PE Protocol w/ IV Cont showed "In comparison to recent CT chest 10/21/2020, no filling defects in the main pulmonary artery or segmental branches to suggest pulmonary embolus. New loculated pericardial fluid measuring 4.5 x 3.2 cm."  ED contacted CT surgery and imaging was reviewed with Dr. Hercules. As per them loculated pleural effusion is not within pericardial space but pleural space.    Hx of lung adenocarcinoma: He initially presented in 11/2014 with multiple pulmonary nodules, mediastinal lymphadenopathy, multiple bony metastases, and brain metastasis. He was initially started on crizotinib and was switched to ceritinib in 12/2015 upon progression. He was unable to tolerate ceritinib due to significant vomiting and was switched to alectinib in 02/2016.  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 He had RT to a painful metastatic lesion in the right humerus (01/2015). He had also received Xgeva for bony metastatic disease in the past.   Since 09/2016, he has been experiencing intermittent headaches being treated with periodic dexamethasone taper. MRI brain with perfusion analysis showed bilateral cerebellar lesions, favor post-RT changes rather than residual tumor. Repeat brain MRI (11/01/2016) showed overall stable exam compared to the previous brain MRI 9/2/2016; no significant interval change in bilateral cerebellar heterogeneously enhancing masses with extensive associated edema, as well as smaller lesions within the left parietal white matter, superficial left temporal lobe and left dorsal medulla; and, stable mild ventriculomegaly and mild periventricular FLAIR signal abnormality. He has been following with neurosurgeon, Dr. Britt, who has recommended no surgical intervention at this time.   His PET CT (11/01/2016) showed no sites of pathologic FDG uptake suspicious for biologic tumor activity.  He reported occasional headaches and a mild sense of imbalance. Takes a PRN dexamethasone, once in every three days, which reportedly relieves his intermittent headaches.        (05 Nov 2020 21:26)    REVIEW OF SYSTEMS:    CONSTITUTIONAL: No weakness, fevers or chills  EYES/ENT: No visual changes;  No vertigo or throat pain   NECK: No pain or stiffness  RESPIRATORY: No cough, wheezing, hemoptysis; No shortness of breath  CARDIOVASCULAR: No chest pain or palpitations  GASTROINTESTINAL: No abdominal or epigastric pain. No nausea, vomiting, or hematemesis; No diarrhea or constipation. No melena or hematochezia.  GENITOURINARY: No dysuria, frequency or hematuria  NEUROLOGICAL: No numbness or weakness  SKIN: No itching, rashes HPI:  46 YO M with PMHx of metastatic ALK-positive lung adenocarcinoma (currently on Lorbrena) & HLD presented to the ED c/c of Left arm and chest pain x 1 week. He described the pain as intermittent, sharp, 9/10 intensity, worse with deep breaths, associated with SOB on and off. Patient reports that the pain gets worse when he is lying on his left side  Patient denied recent surgery, trauma, calf swelling, weakness, fever, cough, hx of DVT, pitting edema, N/V/D.   In the ED vitals were unremarkable. CT Angio Chest PE Protocol w/ IV Cont showed "In comparison to recent CT chest 10/21/2020, no filling defects in the main pulmonary artery or segmental branches to suggest pulmonary embolus. New loculated pericardial fluid measuring 4.5 x 3.2 cm."  ED contacted CT surgery and imaging was reviewed with Dr. Hercules. As per them loculated pleural effusion is not within pericardial space but pleural space.    Hx of lung adenocarcinoma: He initially presented in 11/2014 with multiple pulmonary nodules, mediastinal lymphadenopathy, multiple bony metastases, and brain metastasis. He was initially started on crizotinib and was switched to ceritinib in 12/2015 upon progression. He was unable to tolerate ceritinib due to significant vomiting and was switched to alectinib in 02/2016.  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 He had RT to a painful metastatic lesion in the right humerus (01/2015). He had also received Xgeva for bony metastatic disease in the past.   Since 09/2016, he has been experiencing intermittent headaches being treated with periodic dexamethasone taper. MRI brain with perfusion analysis showed bilateral cerebellar lesions, favor post-RT changes rather than residual tumor. Repeat brain MRI (11/01/2016) showed overall stable exam compared to the previous brain MRI 9/2/2016; no significant interval change in bilateral cerebellar heterogeneously enhancing masses with extensive associated edema, as well as smaller lesions within the left parietal white matter, superficial left temporal lobe and left dorsal medulla; and, stable mild ventriculomegaly and mild periventricular FLAIR signal abnormality. He has been following with neurosurgeon, Dr. Britt, who has recommended no surgical intervention at this time.   His PET CT (11/01/2016) showed no sites of pathologic FDG uptake suspicious for biologic tumor activity.  He reported occasional headaches and a mild sense of imbalance. Takes a PRN dexamethasone, once in every three days, which reportedly relieves his intermittent headaches.        (05 Nov 2020 21:26)    REVIEW OF SYSTEMS: see cc/HPI   CONSTITUTIONAL: No weakness, fevers or chills  EYES/ENT: No visual changes;  No vertigo or throat pain   NECK: No pain or stiffness  RESPIRATORY: No cough, wheezing, hemoptysis; No shortness of breath  CARDIOVASCULAR: No chest pain or palpitations  GASTROINTESTINAL: No abdominal or epigastric pain. No nausea, vomiting, or hematemesis; No diarrhea or constipation. No melena or hematochezia.  GENITOURINARY: No dysuria, frequency or hematuria  NEUROLOGICAL: No numbness or weakness, slow speech   SKIN: No itching, rashes    Physical Exam: Patient interviewed in Japanese  General: WN/WD NAD  Neurology: A&Ox3, nonfocal, follows commands  Eyes: PERRLA/ EOMI  ENT/Neck: Neck supple, trachea midline, No JVD  Respiratory: CTA B/L, No wheezing, rales, rhonchi  CV: Normal rate regular rhythm, S1S2, no murmurs, rubs or gallops  Abdominal: Soft, NT, ND +BS,   Extremities: No edema, + peripheral pulses  Skin: No Rashes, Hematoma, Ecchymosis  Incisions: n/a  Tubes: n/a    A/p  Atypical CP in the setting of metastatic lung cancer   ??? pericardial effusion - CT surgery after reviewing the imaging study disagrees.  -Admit ot tele   -serial CE and EKG  -fasting lipids and A1c  -2D echo to be followed  -CT surgery follow up     Metastatic lung caner -custodial positive adenocarcinoma   Mets to brain stable   -c/w outpatient Rx    DVT prophylaxis

## 2020-11-05 NOTE — ED PROVIDER NOTE - CARE PLAN
Principal Discharge DX:	Chest pain  Secondary Diagnosis:	Arm pain  Secondary Diagnosis:	Effusion, other site

## 2020-11-05 NOTE — ED ADULT NURSE NOTE - NSIMPLEMENTINTERV_GEN_ALL_ED
Implemented All Universal Safety Interventions:  [place] to call system. Call bell, personal items and telephone within reach. Instruct patient to call for assistance. Room bathroom lighting operational. Non-slip footwear when patient is off stretcher. Physically safe environment: no spills, clutter or unnecessary equipment. Stretcher in lowest position, wheels locked, appropriate side rails in place.

## 2020-11-05 NOTE — ED PROVIDER NOTE - PROGRESS NOTE ADDITIONAL1
Patient resting on cart. TV turned on for patient. Patient denies needs.    Additional Progress Note...

## 2020-11-05 NOTE — H&P ADULT - NSHPLABSRESULTS_GEN_ALL_CORE
VITAL SIGNS: Last 24 Hours  T(C): 36.6 (05 Nov 2020 20:12), Max: 36.8 (05 Nov 2020 13:49)  T(F): 97.9 (05 Nov 2020 20:12), Max: 98.2 (05 Nov 2020 13:49)  HR: 75 (05 Nov 2020 20:12) (75 - 109)  BP: 115/58 (05 Nov 2020 20:12) (115/58 - 145/89)  BP(mean): --  RR: 20 (05 Nov 2020 20:12) (20 - 20)  SpO2: 97% (05 Nov 2020 20:12) (97% - 97%)    LABS:                        13.8   6.78  )-----------( 194      ( 05 Nov 2020 14:40 )             44.5     11-05    139  |  104  |  8<L>  ----------------------------<  116<H>  4.4   |  27  |  0.7    Ca    9.5      05 Nov 2020 14:40  Mg     1.8     11-05    TPro  7.3  /  Alb  4.1  /  TBili  0.2  /  DBili  x   /  AST  16  /  ALT  11  /  AlkPhos  76  11-05          Troponin T, Serum: <0.01 ng/mL (11-05-20 @ 14:40)      CARDIAC MARKERS ( 05 Nov 2020 14:40 )  x     / <0.01 ng/mL / x     / x     / x          RADIOLOGY:  < from: CT Angio Chest PE Protocol w/ IV Cont (11.05.20 @ 18:09) >    In comparison to recent CT chest 10/21/2020, no filling defects in the main pulmonary artery or segmental branches to suggest pulmonary embolus. New loculated pericardial fluid measuring 4.5 x 3.2 cm. Additional findings are unchanged in this short interval follow-up CT.    < end of copied text >

## 2020-11-05 NOTE — ED PROVIDER NOTE - PHYSICAL EXAMINATION
CONSTITUTIONAL: Well-developed; well-nourished; in no acute distress.   SKIN: warm, dry  HEAD: Normocephalic; atraumatic.  EYES: PERRL, EOMI, no conjunctival erythema  ENT: No nasal discharge; airway clear.  NECK: Supple; non tender.  CARD: S1, S2 normal; no murmurs, gallops, or rubs. Regular rate and rhythm.   RESP: + decreased BS in left side of the lung, No wheezes, rales or rhonchi.  ABD: soft ntnd  EXT: Normal ROM.  No clubbing, cyanosis or edema.   LYMPH: No acute cervical adenopathy.  NEURO: Alert, oriented, grossly unremarkable  PSYCH: Cooperative, appropriate.

## 2020-11-05 NOTE — ED ADULT TRIAGE NOTE - CHIEF COMPLAINT QUOTE
c/o left arm pain x 1 week. +slight chest pain Hx lung CA c/o left arm pain x 1 week. +full range of motion to left arm. +slight chest pain Hx lung CA

## 2020-11-05 NOTE — H&P ADULT - ASSESSMENT
#Metastatic ALK-positive lung adenocarcinoma   - Initially with good systemic response to alectinib, started in February 2016. Due to insurance issues he stopped it and restarted it 4/4/2017. Switched to lorlatinib in March 2020  - Biopsy of the left pleural mass on 03/12/2020 that showed poorly differentiated malignant neoplasm. IHC c/w non-small cell carcinoma of the lung, favoring adenocarcinoma, TTF 1 positive.  - Foundation of one on his re-biopsy that showed EML4-Alk variant 3a/b, other mutations CDKN2a and B loss, LLL2, MTAP, SMARCa4, Tp53  - He was started on lorlatinib in March 2020, took it for about one month, had a prolonged COVID hospitalization, restarted on 5/28/2020.  - MRI brain showed essentially stable masses with increased hydrocephalus and stable mass effect on 4th ventricle.  - Patient previously received dexamethasone and Avastin in past. Reordered Avastin x 4 doses. Patient received 2 doses then developed hemoptysis, so held remaining doses   - CTA chest 08/2020 performed in ED showed that lung mass is stable.  - CT A/P and MR Head from 10/2020 essentially show stable disease; CT chest final report is still pending. We discussed treatment options if determined to have true intrathoracic progression including chemo + immunotherapy.  - Continue lorlatinib and using compression stockings and LE elevation as needed    #Misc  - DVT Prophylaxis: Lovenox  - Diet: Regular  - GI Prophylaxis: Pantoprazole   - Activity: AAT  - Code Status: Full Code    Dispo: Still acute. Follow echo   44 YO M with PMHx of metastatic ALK-positive lung adenocarcinoma (currently on Lorbrena) & HLD presented to the ED c/c of Left arm and chest pain x 1 week.    #Left sided arm and chest pain in the setting of lung adenocarcinoma   - CT Angio Chest PE Protocol w/ IV Cont showed "In comparison to recent CT chest 10/21/2020, no filling defects in the main pulmonary artery or segmental branches to suggest pulmonary embolus. New loculated pericardial fluid measuring 4.5 x 3.2 cm."  - As per CT surgery no pericardial fluid collection. Likely pleural effussion  - EKG non-ischemic  - Trops -ve x 1. Follow repeat in AM  - Follow official note from CT surgery   - Follow official echo    #Metastatic ALK-positive lung adenocarcinoma   - Initially with good systemic response to alectinib, started in February 2016. Due to insurance issues he stopped it and restarted it 4/4/2017. Switched to lorlatinib in March 2020  - Biopsy of the left pleural mass on 03/12/2020 that showed poorly differentiated malignant neoplasm. IHC c/w non-small cell carcinoma of the lung, favoring adenocarcinoma, TTF 1 positive.  - Foundation of one on his re-biopsy that showed EML4-Alk variant 3a/b  - He was started on lorlatinib in March 2020, took it for about one month, had a prolonged COVID hospitalization, restarted on 5/28/2020.  - MRI brain showed essentially stable masses with increased hydrocephalus and stable mass effect on 4th ventricle.  - CTA chest 08/2020 performed in ED showed that lung mass is stable  - Continue lorlatinib and using compression stockings and LE elevation as needed  - Hem/Onc follow up (Dr Torres)    #Misc  - DVT Prophylaxis: Lovenox  - Diet: Regular  - GI Prophylaxis: Pantoprazole   - Activity: AAT  - Code Status: Full Code    Dispo: Still acute. Follow echo   44 YO M with PMHx of metastatic ALK-positive lung adenocarcinoma (currently on Lorbrena) & HLD presented to the ED c/c of Left arm and chest pain x 1 week.    #Left sided arm and chest pain in the setting of lung adenocarcinoma   - CT Angio Chest PE Protocol w/ IV Cont showed "In comparison to recent CT chest 10/21/2020, no filling defects in the main pulmonary artery or segmental branches to suggest pulmonary embolus. New loculated pericardial fluid measuring 4.5 x 3.2 cm."  - As per CT surgery no pericardial fluid collection. Likely pleural effussion  - EKG non-ischemic  - Trops -ve x 1. Follow repeat in AM  - Follow official note from CT surgery   - Follow official echo    #Metastatic ALK-positive lung adenocarcinoma   - Initially with good systemic response to alectinib, started in February 2016. Due to insurance issues he stopped it and restarted it 4/4/2017. Switched to lorlatinib in March 2020  - Biopsy of the left pleural mass on 03/12/2020 that showed poorly differentiated malignant neoplasm. IHC c/w non-small cell carcinoma of the lung, favoring adenocarcinoma, TTF 1 positive.  - Foundation of one on his re-biopsy that showed EML4-Alk variant 3a/b  - He was started on lorlatinib in March 2020, took it for about one month, had a prolonged COVID hospitalization, restarted on 5/28/2020.  - MRI brain showed essentially stable masses with increased hydrocephalus and stable mass effect on 4th ventricle.  - CTA chest 08/2020 performed in ED showed that lung mass is stable  - Continue lorlatinib and and LE elevation as needed. Patient will speak with family to bring medication tomorrow. Not available in the hospital pharmacy  - Hem/Onc follow up (Dr Torres)    #Misc  - DVT Prophylaxis: Lovenox  - Diet: Regular  - GI Prophylaxis: Pantoprazole   - Activity: AAT  - Code Status: Full Code    Dispo: Still acute. Follow echo

## 2020-11-05 NOTE — H&P ADULT - NSHPREVIEWOFSYSTEMS_GEN_ALL_CORE
CONSTITUTIONAL: No weakness, fevers or chills; No headaches  EYES: No visual changes, eye pain, or discharge  ENT: No vertigo; No ear pain or change in hearing; No sore throat or difficulty swallowing  NECK: No pain or stiffness  RESPIRATORY: No cough, wheezing, or hemoptysis; No shortness of breath  CARDIOVASCULAR: No chest pain or palpitations  GASTROINTESTINAL: No abdominal or epigastric pain; No nausea, vomiting, or hematemesis; No diarrhea or constipation; No melena or hematochezia  GENITOURINARY: No dysuria, frequency or hematuria  MUSCULOSKELETAL: No joint pain, no muscle pain, no weakness  NEUROLOGICAL: No numbness or weakness  SKIN: No itching or rashes CONSTITUTIONAL: No weakness, fevers or chills; No headaches  EYES: No visual changes, eye pain, or discharge  ENT: No vertigo; No ear pain or change in hearing; No sore throat or difficulty swallowing  NECK: No pain or stiffness  RESPIRATORY: Shortness of breath  CARDIOVASCULAR: Chest pain   GASTROINTESTINAL: No abdominal or epigastric pain; No nausea, vomiting, or hematemesis; No diarrhea or constipation; No melena or hematochezia  GENITOURINARY: No dysuria, frequency or hematuria  MUSCULOSKELETAL: L arm pain  NEUROLOGICAL: No numbness or weakness  SKIN: No itching or rashes

## 2020-11-05 NOTE — ED PROVIDER NOTE - PROGRESS NOTE DETAILS
ATTENDING NOTE: 44 y/o M PMH lung CA on daily chemo here for evaluation of CP. Pt states that he has had this chest pain for the past week that radiates from his L arm to the chest. Pain is not positional and has some pleuritic component to it, but no back pain. No sweating, nausea, vomiting, fever or chills. On exam: CON: AO x 3, HENMT: clear oropharynx, neck supple, CV: (+) S1S2 Tachy, equal pulses B/L, RESP: CTAb/l, GI:  soft, non-tender, no rebound, no guarding, SKIN: no rash, MSK: no deformities. Neuro: (+) Chronic weakness to LUE from chemo. Impression: L arm pain, and CP. Pt with a history of CA. Pt is tachy. Will check labs, D-Dimer and reevaluate. ATTENDING NOTE: 44 y/o M PMH lung CA on daily chemo here for evaluation of CP. Pt states that he has had this chest pain for the past week that radiates from his L arm to the chest. Pain is not positional and has some pleuritic component to it, but no back pain. No sweating, nausea, vomiting, fever or chills. On exam: CON: AO x 3, HENMT: clear oropharynx, neck supple, CV: (+) S1S2 Tachy, equal pulses B/L, RESP: (+) Decreased Breath sounds L side, GI:  soft, non-tender, no rebound, no guarding, SKIN: no rash, MSK: no deformities. Neuro: (+) Chronic weakness to LUE from chemo. Impression: L arm pain, and CP. Pt with a history of CA. Pt is tachy. Will check labs, D-Dimer and reevaluate. Progress Authored by Dr. Kendall Aguirre: Bedside US concerning for a pneumothorax. Awaiting XR. Progress Authored by Dr. Kendall Aguirre: Call placed for radiology and page placed for pulmonology. Progress Authored by Dr. Kendall Aguirre: Upon further review, Pt has recent CT showing large Loculated effusion to the L side. Progress Authored by Dr. Kendall Aguirre: Bedside sono shows no effusion, but XR shows opacification similar to previous, will pursue CT. Marcela- isabel CT surgery, patient with loculated pericardial fluid (4.5 x 3.2 cm). Marcela- Received signout from Dr. Rodriguez pending CTA scan. Per CTA report, patient with loculated pericardial fluid (4.5 x 3.2 cm). Paged CT surgery. Marcela- reviewed imaging with CT surgery, Dr. Hercules, in ED. States loculated pleural effusion is not within pericardial space but pleural space. Patient is hemodynamically stable, afebrile, well appearing, and not currently endorsing any pain. Pt's CT was read as pericardial effusion, however images were reviewed by cardiothoracic surgeon who finds there is no acute intervention needed and does not believe it is pericardial effusion. Recommended echo, will admit for further evaluation of pt’s sx and findings. Pt's CT was read as pericardial effusion, however images were reviewed by cardiothoracic surgeon Dr. Hercules who finds there is no acute intervention needed and does not believe it is pericardial effusion. Recommended echo, will admit for further evaluation of pt’s sx and findings. ATTENDING NOTE: 44 y/o M PMH lung CA on daily chemo here for evaluation of CP. Pt states that he has had this chest pain for the past week that radiates from his L arm to the chest. Pain is not positional and has some pleuritic component to it, but no back pain. No sweating, nausea, vomiting, fever or chills. On exam: CON: AO x 3, HENMT: clear oropharynx, neck supple, CV: (+) S1S2 Tachy, equal pulses B/L, RESP: (+) Decreased Breath sounds L side, GI:  soft, non-tender, no rebound, no guarding, SKIN: no rash, MSK: no deformities. Neuro: Chronic weakness to LUE metasatic cancer. Impression: L arm pain, and CP. Pt with a history of CA. Pt is tachy. Will check labs, D-Dimer and reevaluate. JG: “Wrap-up note: Pt here with CP and left arm pain. D-dimer done to exclude perk. CT shows new pericardial fluid. Pt discussed CT with Dr. Hercules who felt no acute intervention is needed at this time, recommended echo and is not strongly convinced it was pericardial effusion. Pt admitted for further evaluation.” JG: “Wrap-up note: Pt here with CP and left arm pain. D-dimer done to exclude PE. CT shows new pericardial fluid. Pt discussed CT with Dr. Hercules who felt no acute intervention is needed at this time, recommended echo and is not strongly convinced it was NOTa  pericardial effusion. Pt admitted for further evaluation.”

## 2020-11-05 NOTE — ED PROVIDER NOTE - NS ED ROS FT
Eyes:  No visual changes, eye pain or discharge.  ENMT:  No hearing changes, pain, no sore throat or runny nose, no difficulty swallowing  Cardiac:  + chest pain, No chest pain with exertion.  Respiratory: + shortness of breath, No cough or respiratory distress. No hemoptysis. No history of asthma or RAD.  GI:  No nausea, vomiting, diarrhea or abdominal pain.  :  No dysuria, frequency or burning.  MS:  No myalgia, muscle weakness, joint pain or back pain.  Neuro:  No headache or weakness.  No LOC.  Skin:  No skin rash.   Endocrine: No history of thyroid disease or diabetes.

## 2020-11-06 ENCOUNTER — TRANSCRIPTION ENCOUNTER (OUTPATIENT)
Age: 45
End: 2020-11-06

## 2020-11-06 LAB
ALBUMIN SERPL ELPH-MCNC: 3.9 G/DL — SIGNIFICANT CHANGE UP (ref 3.5–5.2)
ALP SERPL-CCNC: 71 U/L — SIGNIFICANT CHANGE UP (ref 30–115)
ALT FLD-CCNC: 12 U/L — SIGNIFICANT CHANGE UP (ref 0–41)
ANION GAP SERPL CALC-SCNC: 10 MMOL/L — SIGNIFICANT CHANGE UP (ref 7–14)
AST SERPL-CCNC: 17 U/L — SIGNIFICANT CHANGE UP (ref 0–41)
BASOPHILS # BLD AUTO: 0.02 K/UL — SIGNIFICANT CHANGE UP (ref 0–0.2)
BASOPHILS NFR BLD AUTO: 0.3 % — SIGNIFICANT CHANGE UP (ref 0–1)
BILIRUB SERPL-MCNC: 0.3 MG/DL — SIGNIFICANT CHANGE UP (ref 0.2–1.2)
BUN SERPL-MCNC: 7 MG/DL — LOW (ref 10–20)
CALCIUM SERPL-MCNC: 9.2 MG/DL — SIGNIFICANT CHANGE UP (ref 8.5–10.1)
CHLORIDE SERPL-SCNC: 105 MMOL/L — SIGNIFICANT CHANGE UP (ref 98–110)
CO2 SERPL-SCNC: 27 MMOL/L — SIGNIFICANT CHANGE UP (ref 17–32)
CREAT SERPL-MCNC: 0.7 MG/DL — SIGNIFICANT CHANGE UP (ref 0.7–1.5)
EOSINOPHIL # BLD AUTO: 0.36 K/UL — SIGNIFICANT CHANGE UP (ref 0–0.7)
EOSINOPHIL NFR BLD AUTO: 5.2 % — SIGNIFICANT CHANGE UP (ref 0–8)
GLUCOSE SERPL-MCNC: 101 MG/DL — HIGH (ref 70–99)
HCT VFR BLD CALC: 45.6 % — SIGNIFICANT CHANGE UP (ref 42–52)
HGB BLD-MCNC: 14.2 G/DL — SIGNIFICANT CHANGE UP (ref 14–18)
IMM GRANULOCYTES NFR BLD AUTO: 0.3 % — SIGNIFICANT CHANGE UP (ref 0.1–0.3)
LYMPHOCYTES # BLD AUTO: 1.8 K/UL — SIGNIFICANT CHANGE UP (ref 1.2–3.4)
LYMPHOCYTES # BLD AUTO: 25.8 % — SIGNIFICANT CHANGE UP (ref 20.5–51.1)
MAGNESIUM SERPL-MCNC: 1.8 MG/DL — SIGNIFICANT CHANGE UP (ref 1.8–2.4)
MCHC RBC-ENTMCNC: 27.3 PG — SIGNIFICANT CHANGE UP (ref 27–31)
MCHC RBC-ENTMCNC: 31.1 G/DL — LOW (ref 32–37)
MCV RBC AUTO: 87.7 FL — SIGNIFICANT CHANGE UP (ref 80–94)
MONOCYTES # BLD AUTO: 0.8 K/UL — HIGH (ref 0.1–0.6)
MONOCYTES NFR BLD AUTO: 11.5 % — HIGH (ref 1.7–9.3)
NEUTROPHILS # BLD AUTO: 3.98 K/UL — SIGNIFICANT CHANGE UP (ref 1.4–6.5)
NEUTROPHILS NFR BLD AUTO: 56.9 % — SIGNIFICANT CHANGE UP (ref 42.2–75.2)
NRBC # BLD: 0 /100 WBCS — SIGNIFICANT CHANGE UP (ref 0–0)
PHOSPHATE SERPL-MCNC: 3.2 MG/DL — SIGNIFICANT CHANGE UP (ref 2.1–4.9)
PLATELET # BLD AUTO: 198 K/UL — SIGNIFICANT CHANGE UP (ref 130–400)
POTASSIUM SERPL-MCNC: 3.8 MMOL/L — SIGNIFICANT CHANGE UP (ref 3.5–5)
POTASSIUM SERPL-SCNC: 3.8 MMOL/L — SIGNIFICANT CHANGE UP (ref 3.5–5)
PROT SERPL-MCNC: 6.9 G/DL — SIGNIFICANT CHANGE UP (ref 6–8)
RBC # BLD: 5.2 M/UL — SIGNIFICANT CHANGE UP (ref 4.7–6.1)
RBC # FLD: 15.2 % — HIGH (ref 11.5–14.5)
SODIUM SERPL-SCNC: 142 MMOL/L — SIGNIFICANT CHANGE UP (ref 135–146)
TROPONIN T SERPL-MCNC: <0.01 NG/ML — SIGNIFICANT CHANGE UP
WBC # BLD: 6.98 K/UL — SIGNIFICANT CHANGE UP (ref 4.8–10.8)
WBC # FLD AUTO: 6.98 K/UL — SIGNIFICANT CHANGE UP (ref 4.8–10.8)

## 2020-11-06 PROCEDURE — 99222 1ST HOSP IP/OBS MODERATE 55: CPT

## 2020-11-06 PROCEDURE — 99223 1ST HOSP IP/OBS HIGH 75: CPT

## 2020-11-06 PROCEDURE — 93306 TTE W/DOPPLER COMPLETE: CPT | Mod: 26

## 2020-11-06 RX ORDER — OXYCODONE AND ACETAMINOPHEN 5; 325 MG/1; MG/1
1 TABLET ORAL EVERY 6 HOURS
Refills: 0 | Status: DISCONTINUED | OUTPATIENT
Start: 2020-11-06 | End: 2020-11-09

## 2020-11-06 RX ORDER — PANTOPRAZOLE SODIUM 20 MG/1
1 TABLET, DELAYED RELEASE ORAL
Qty: 30 | Refills: 0
Start: 2020-11-06 | End: 2020-12-05

## 2020-11-06 RX ADMIN — PANTOPRAZOLE SODIUM 40 MILLIGRAM(S): 20 TABLET, DELAYED RELEASE ORAL at 06:10

## 2020-11-06 RX ADMIN — ATORVASTATIN CALCIUM 20 MILLIGRAM(S): 80 TABLET, FILM COATED ORAL at 22:01

## 2020-11-06 RX ADMIN — Medication 500 MILLIGRAM(S): at 18:01

## 2020-11-06 RX ADMIN — Medication 500 MILLIGRAM(S): at 17:00

## 2020-11-06 NOTE — DISCHARGE NOTE PROVIDER - CARE PROVIDER_API CALL
Faustino Jara)  Hematology; Internal Medicine; Medical Oncology  [address] [place] [address]  Phone: [phone]  Fax: [phone]  Established Patient  Follow Up Time: 1 week

## 2020-11-06 NOTE — PROGRESS NOTE ADULT - SUBJECTIVE AND OBJECTIVE BOX
YEIMY FREEMAN 45y Male  MRN#: [id number]       44 YO M with PMHx of metastatic ALK-positive lung adenocarcinoma (currently on Lorbrena) & HLD presented to the ED c/c of Left arm and chest pain x 1 week. He described the pain as intermittent, sharp, 9/10 intensity, worse with deep breaths, associated with SOB on and off. Patient reports that the pain gets worse when he is lying on his left side  Patient denied recent surgery, trauma, calf swelling, weakness, fever, cough, hx of DVT, pitting edema, N/V/D.   In the ED vitals were unremarkable. CT Angio Chest PE Protocol w/ IV Cont showed "In comparison to recent CT chest 10/21/2020, no filling defects in the main pulmonary artery or segmental branches to suggest pulmonary embolus. New loculated pericardial fluid measuring 4.5 x 3.2 cm."  ED contacted CT surgery and imaging was reviewed with Dr. Hercules. As per them loculated pleural effusion is not within pericardial space but pleural space.    Hx of lung adenocarcinoma: He initially presented in 11/2014 with multiple pulmonary nodules, mediastinal lymphadenopathy, multiple bony metastases, and brain metastasis. He was initially started on crizotinib and was switched to ceritinib in 12/2015 upon progression. He was unable to tolerate ceritinib due to significant vomiting and was switched to alectinib in 02/2016.  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 He had RT to a painful metastatic lesion in the right humerus (01/2015). He had also received Xgeva for bony metastatic disease in the past.   Since 09/2016, he has been experiencing intermittent headaches being treated with periodic dexamethasone taper. MRI brain with perfusion analysis showed bilateral cerebellar lesions, favor post-RT changes rather than residual tumor. Repeat brain MRI (11/01/2016) showed overall stable exam compared to the previous brain MRI 9/2/2016; no significant interval change in bilateral cerebellar heterogeneously enhancing masses with extensive associated edema, as well as smaller lesions within the left parietal white matter, superficial left temporal lobe and left dorsal medulla; and, stable mild ventriculomegaly and mild periventricular FLAIR signal abnormality. He has been following with neurosurgeon, Dr. Britt, who has recommended no surgical intervention at this time.   His PET CT (11/01/2016) showed no sites of pathologic FDG uptake suspicious for biologic tumor activity.  He reported occasional headaches and a mild sense of imbalance. Takes a PRN dexamethasone, once in every three days, which reportedly relieves his intermittent headaches.   OBJECTIVE  PAST MEDICAL & SURGICAL HISTORY  Lung cancer metastatic to brain    No significant past surgical history      ALLERGIES:  Amoxil (Other)    MEDICATIONS:  STANDING MEDICATIONS  atorvastatin 20 milliGRAM(s) Oral at bedtime  enoxaparin Injectable 40 milliGRAM(s) SubCutaneous daily  naproxen 500 milliGRAM(s) Oral two times a day  pantoprazole    Tablet 40 milliGRAM(s) Oral before breakfast    PRN MEDICATIONS  oxycodone    5 mG/acetaminophen 325 mG 1 Tablet(s) Oral every 6 hours PRN      VITAL SIGNS: Last 24 Hours  T(C): 36.6 (06 Nov 2020 05:44), Max: 36.8 (05 Nov 2020 13:49)  T(F): 97.9 (06 Nov 2020 05:44), Max: 98.2 (05 Nov 2020 13:49)  HR: 82 (06 Nov 2020 05:44) (71 - 109)  BP: 114/69 (06 Nov 2020 05:44) (111/76 - 145/89)  BP(mean): --  RR: 18 (06 Nov 2020 05:44) (18 - 20)  SpO2: 97% (05 Nov 2020 20:12) (97% - 97%)      Physical Exam   CONSTITUTIONAL: No acute distress, well-developed, well-groomed, AAOx3  HEAD: Atraumatic, normocephalic  EYES: EOM intact, PERRLA, conjunctiva and sclera clear  ENT: Supple, no masses, no thyromegaly, no bruits, no JVD; moist mucous membranes  PULMONARY: Clear to auscultation bilaterally; no wheezes, rales, or rhonchi  CARDIOVASCULAR: Regular rate and rhythm; no murmurs, rubs, or gallops  GASTROINTESTINAL: Soft, non-tender, non-distended; bowel sounds present  MUSCULOSKELETAL: 2+ peripheral pulses; no clubbing, no cyanosis, no edema  NEUROLOGY: non-focal  SKIN: No rashes or lesions; warm and dry    LABS:                        14.2   6.98  )-----------( 198      ( 06 Nov 2020 06:48 )             45.6     11-06    142  |  105  |  7<L>  ----------------------------<  101<H>  3.8   |  27  |  0.7    Ca    9.2      06 Nov 2020 06:48  Phos  3.2     11-06  Mg     1.8     11-06    TPro  6.9  /  Alb  3.9  /  TBili  0.3  /  DBili  x   /  AST  17  /  ALT  12  /  AlkPhos  71  11-06          Troponin T, Serum: <0.01 ng/mL (11-06-20 @ 06:48)  Troponin T, Serum: <0.01 ng/mL (11-05-20 @ 14:40)      CARDIAC MARKERS ( 06 Nov 2020 06:48 )  x     / <0.01 ng/mL / x     / x     / x      CARDIAC MARKERS ( 05 Nov 2020 14:40 )  x     / <0.01 ng/mL / x     / x     / x          RADIOLOGY:  RADIOLOGY:  < from: CT Angio Chest PE Protocol w/ IV Cont (11.05.20 @ 18:09) >    In comparison to recent CT chest 10/21/2020, no filling defects in the main pulmonary artery or segmental branches to suggest pulmonary embolus. New loculated pericardial fluid measuring 4.5 x 3.2 cm. Additional findings are unchanged in this short interval follow-up CT.    < end of copied text >     YEIMY FEREMAN 45y Male  MRN#: [id number]       46 YO M with PMHx of metastatic ALK-positive lung adenocarcinoma (currently on Lorbrena) & HLD presented to the ED c/c of Left arm and chest pain x 1 week. He described the pain as intermittent, sharp, 9/10 intensity, worse with deep breaths, associated with SOB on and off. Patient reports that the pain gets worse when he is lying on his left side  Patient denied recent surgery, trauma, calf swelling, weakness, fever, cough, hx of DVT, pitting edema, N/V/D.   In the ED vitals were unremarkable. CT Angio Chest PE Protocol w/ IV Cont showed "In comparison to recent CT chest 10/21/2020, no filling defects in the main pulmonary artery or segmental branches to suggest pulmonary embolus. New loculated pericardial fluid measuring 4.5 x 3.2 cm."  ED contacted CT surgery and imaging was reviewed with Dr. Hercules. As per them loculated pleural effusion is not within pericardial space but pleural space.    Hx of lung adenocarcinoma: He initially presented in 11/2014 with multiple pulmonary nodules, mediastinal lymphadenopathy, multiple bony metastases, and brain metastasis. He was initially started on crizotinib and was switched to ceritinib in 12/2015 upon progression. He was unable to tolerate ceritinib due to significant vomiting and was switched to alectinib in 02/2016.  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 He had RT to a painful metastatic lesion in the right humerus (01/2015). He had also received Xgeva for bony metastatic disease in the past.   Since 09/2016, he has been experiencing intermittent headaches being treated with periodic dexamethasone taper. MRI brain with perfusion analysis showed bilateral cerebellar lesions, favor post-RT changes rather than residual tumor. Repeat brain MRI (11/01/2016) showed overall stable exam compared to the previous brain MRI 9/2/2016; no significant interval change in bilateral cerebellar heterogeneously enhancing masses with extensive associated edema, as well as smaller lesions within the left parietal white matter, superficial left temporal lobe and left dorsal medulla; and, stable mild ventriculomegaly and mild periventricular FLAIR signal abnormality. He has been following with neurosurgeon, Dr. Britt, who has recommended no surgical intervention at this time.   His PET CT (11/01/2016) showed no sites of pathologic FDG uptake suspicious for biologic tumor activity.  He reported occasional headaches and a mild sense of imbalance. Takes a PRN dexamethasone, once in every three days, which reportedly relieves his intermittent headaches.   OBJECTIVE  PAST MEDICAL & SURGICAL HISTORY  Lung cancer metastatic to brain    No significant past surgical history      ALLERGIES:  Amoxil (Other)    MEDICATIONS:  STANDING MEDICATIONS  atorvastatin 20 milliGRAM(s) Oral at bedtime  enoxaparin Injectable 40 milliGRAM(s) SubCutaneous daily  naproxen 500 milliGRAM(s) Oral two times a day  pantoprazole    Tablet 40 milliGRAM(s) Oral before breakfast    PRN MEDICATIONS  oxycodone    5 mG/acetaminophen 325 mG 1 Tablet(s) Oral every 6 hours PRN      VITAL SIGNS: Last 24 Hours  T(C): 36.6 (06 Nov 2020 05:44), Max: 36.8 (05 Nov 2020 13:49)  T(F): 97.9 (06 Nov 2020 05:44), Max: 98.2 (05 Nov 2020 13:49)  HR: 82 (06 Nov 2020 05:44) (71 - 109)  BP: 114/69 (06 Nov 2020 05:44) (111/76 - 145/89)  BP(mean): --  RR: 18 (06 Nov 2020 05:44) (18 - 20)  SpO2: 97% (05 Nov 2020 20:12) (97% - 97%)      Physical Exam   CONSTITUTIONAL: No acute distress, well-developed, well-groomed, AAOx3  HEAD: Atraumatic, normocephalic  EYES: EOM intact, PERRLA, conjunctiva and sclera clear  ENT: Supple, no masses, no thyromegaly, no bruits, no JVD; moist mucous membranes  PULMONARY: Clear to auscultation bilaterally; no wheezes, rales, or rhonchi  CARDIOVASCULAR: Regular rate and rhythm; no murmurs, rubs, or gallops.   GASTROINTESTINAL: Soft, non-tender, non-distended; bowel sounds present  MUSCULOSKELETAL: L parasternal area TTP  , 2+ peripheral pulses; no clubbing, no cyanosis, no edema  NEUROLOGY: non-focal  SKIN: No rashes or lesions; warm and dry    LABS:                        14.2   6.98  )-----------( 198      ( 06 Nov 2020 06:48 )             45.6     11-06    142  |  105  |  7<L>  ----------------------------<  101<H>  3.8   |  27  |  0.7    Ca    9.2      06 Nov 2020 06:48  Phos  3.2     11-06  Mg     1.8     11-06    TPro  6.9  /  Alb  3.9  /  TBili  0.3  /  DBili  x   /  AST  17  /  ALT  12  /  AlkPhos  71  11-06          Troponin T, Serum: <0.01 ng/mL (11-06-20 @ 06:48)  Troponin T, Serum: <0.01 ng/mL (11-05-20 @ 14:40)      CARDIAC MARKERS ( 06 Nov 2020 06:48 )  x     / <0.01 ng/mL / x     / x     / x      CARDIAC MARKERS ( 05 Nov 2020 14:40 )  x     / <0.01 ng/mL / x     / x     / x          RADIOLOGY:  RADIOLOGY:  < from: CT Angio Chest PE Protocol w/ IV Cont (11.05.20 @ 18:09) >    In comparison to recent CT chest 10/21/2020, no filling defects in the main pulmonary artery or segmental branches to suggest pulmonary embolus. New loculated pericardial fluid measuring 4.5 x 3.2 cm. Additional findings are unchanged in this short interval follow-up CT.    < end of copied text >

## 2020-11-06 NOTE — DISCHARGE NOTE PROVIDER - NSDCMRMEDTOKEN_GEN_ALL_CORE_FT
atorvastatin 20 mg oral tablet: 1 tab(s) orally once a day  Lorbrena 100 mg oral tablet: 1 tab(s) orally once a day  naproxen 500 mg oral tablet: 1 tab(s) orally 2 times a day  oxycodone-acetaminophen 5 mg-325 mg oral tablet: 1 tab(s) orally every 6 hours, As needed, Severe Pain (7 - 10) MDD:20mg-1300mg   atorvastatin 20 mg oral tablet: 1 tab(s) orally once a day  Lorbrena 100 mg oral tablet: 1 tab(s) orally once a day  naproxen 500 mg oral tablet: 1 tab(s) orally 2 times a day  oxycodone-acetaminophen 5 mg-325 mg oral tablet: 1 tab(s) orally every 6 hours, As needed, Severe Pain (7 - 10) MDD:20mg-1300mg  pantoprazole 40 mg oral delayed release tablet: 1 tab(s) orally once a day (before a meal)

## 2020-11-06 NOTE — CONSULT NOTE ADULT - ASSESSMENT
45 year old male with PMHx of metastatic ALK-positive lung adenocarcinoma (currently on Lorbrena), mets to brain s/p RT; HLD presented to the ED c/c of Left arm and chest pain x 1 week.   CTA chest in ED was negative for PE but reportedly with a new pericardial effusion since last CT 2 weeks prior. Per ED documentation, Dr Hercules reviewed imaging and concluded that new effusion is not within the pericardial space but rather pleural space.     Initially diagnosed in 2014 with multiple pulmonary nodules. Found to have bony and brain mets.   Biopsy of the left pleural mass on 03/12/2020 that showed poorly differentiated malignant neoplasm. IHC c/w non-small cell carcinoma of the lung, favoring adenocarcinoma, TTF 1 positive. 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 Due to insurance issues he stopped it and restarted it 4/4/2017. Patient's disease progressed and was switched to Lorbrena in March 2020, took for about a month but had prolonged SARS-CoV-2 hospitalization, restarted on 5/28/20  - MRI brain 10/2020 showed essentially stable masses with increased hydrocephalus and stable mass effect on 4th ventricle.  - Recent CT chest showed evidence of intrathoracic progression.   - Continue Lorbrena  - Will discuss with patient regarding starting chemotherapy    THIS IS AN INCOMPLETE NOT AND THE ABOVE WAS NOT DISCUSSED WITH ATTENDING ONCOLOGY YET 45 year old male with PMHx of metastatic ALK-positive lung adenocarcinoma (currently on Lorbrena), mets to brain s/p RT; HLD presented to the ED c/c of Left arm and chest pain x 1 week.   CTA chest in ED was negative for PE but reportedly with a new pericardial effusion since last CT 2 weeks prior. Per ED documentation, Dr Hercules reviewed imaging and concluded that new effusion is not within the pericardial space but rather pleural space.     Initially diagnosed in 2014 with multiple pulmonary nodules. Found to have bony and brain mets.   Biopsy of the left pleural mass on 03/12/2020 that showed poorly differentiated malignant neoplasm. IHC c/w non-small cell carcinoma of the lung, favoring adenocarcinoma, TTF 1 positive. Foundation of one of his re-biopsy that showed EML4-Alk variant 3a/b    Metastatic ALK-positive lung adenocarcinoma   - Initially, patient had good systemic response to alectinib, started in February 2016. Due to insurance issues he stopped it and restarted it 4/4/2017. Patient's disease progressed and was switched to Lorbrena in March 2020, took for about a month but had prolonged SARS-CoV-2 hospitalization, restarted on 5/28/20  - MRI brain 10/2020 showed essentially stable masses with increased hydrocephalus and stable mass effect on 4th ventricle.  - Recent CT chest reported with some intrathoracic progression, however on personal review change is insignificant (less than 20% change) - will review with attending radiologist.  - Continue Lorbrena  - If significant intrathoracic progression is evident, will consider starting immunochemotherapy as outpatient  - Would recommend against therapeutic thoracentesis at this point, loculated effusion has been present for several years without significant effect/discomfort. Patient is also s/p talc pleurodesis.   - Social work eval for social living situation with brother and dispute with home health aide 45 year old male with PMHx of metastatic ALK-positive lung adenocarcinoma (currently on Lorbrena), mets to brain s/p RT; HLD presented to the ED c/c of Left arm and chest pain x 1 week.   CTA chest in ED was negative for PE but reportedly with a new pericardial effusion since last CT 2 weeks prior. Per ED documentation, Dr Hercules reviewed imaging and concluded that new effusion is not within the pericardial space but rather pleural space.     Initially diagnosed in 2014 with multiple pulmonary nodules. Found to have bony and brain mets.   Biopsy of the left pleural mass on 03/12/2020 that showed poorly differentiated malignant neoplasm. IHC c/w non-small cell carcinoma of the lung, favoring adenocarcinoma, TTF 1 positive. Foundation of one of his re-biopsy that showed EML4-Alk variant 3a/b    Metastatic ALK-positive lung adenocarcinoma   - Initially, patient had good systemic response to alectinib, started in February 2016. Due to insurance issues he stopped it and restarted it 4/4/2017. Patient's disease progressed and was switched to Lorbrena in March 2020, took for about a month but had prolonged SARS-CoV-2 hospitalization, restarted on 5/28/20  - MRI brain 10/2020 showed essentially stable masses with increased hydrocephalus and stable mass effect on 4th ventricle.  - Recent CT chest reported with some intrathoracic progression, however on personal review change is insignificant (less than 20% change) - will review with attending radiologist.  - Continue Lorbrena  - If significant intrathoracic progression is evident, will consider starting immunochemotherapy as outpatient  - Would recommend against therapeutic thoracentesis at this point, loculated effusion has been present for several years without significant effect/discomfort. Patient is also s/p talc pleurodesis.   - Can discharge home with outpatient followup, from oncology perspective.   - Social work eval for social living situation with brother and dispute with home health aide

## 2020-11-06 NOTE — PROGRESS NOTE ADULT - ASSESSMENT
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    #Left sided arm and pleuritic chest pain   - CT Angio Chest PE Protocol w/ IV Cont showed "In comparison to recent CT chest 10/21/2020, no filling defects in the main pulmonary artery or segmental branches to suggest pulmonary embolus. New loculated pericardial fluid measuring 4.5 x 3.2 cm."  - As per CT surgery no pericardial fluid collection. Likely pleural effusion  - tender to palpation on the L parasternal area , worse when laying on left side. Most likely cancer pain vs MSK pain.  - EKG non-ischemic  - Trops -ve x 1. FU repeat   - Follow official note from CT surgery   - pending echo  - FU fasting lipids and A1c  #Metastatic ALK-positive lung adenocarcinoma with stable brain mets and bony mets to the humerus   - Biopsy of the left pleural mass on 03/12/2020 that showed poorly differentiated malignant neoplasm. IHC c/w non-small cell carcinoma of the lung, favoring adenocarcinoma, TTF 1 positive,  EML4-Alk variant 3a/b  - CW  lorlatinib for chemotherapy   - MRI brain showed essentially stable masses with increased hydrocephalus and stable mass effect on 4th ventricle.  - CTA chest 08/2020 performed in ED showed that lung mass is stable  - Hem/Onc follow up (Dr Torres)    #Misc  - DVT Prophylaxis: Lovenox  - Diet: Regular  - GI Prophylaxis: Pantoprazole   - Activity: AAT  - Code Status: Full Code    Dispo: Still acute. Follow echo     Assessment:   44 YO M with PMHx of metastatic ALK-positive lung adenocarcinoma (currently on Lorbrena) & HLD presented to the ED c/c of Left arm and chest pain x 1 week.    #Left sided arm and pleuritic chest pain   - CT Angio Chest PE Protocol w/ IV Cont showed "In comparison to recent CT chest 10/21/2020, no filling defects in the main pulmonary artery or segmental branches to suggest pulmonary embolus. New loculated pericardial fluid measuring 4.5 x 3.2 cm."  - As per CT surgery no pericardial fluid collection. Likely pleural effusion  - tender to palpation on the L parasternal area , worse when laying on left side. Most likely cancer pain vs MSK pain.  - EKG non-ischemic  - Trops -ve x 1. FU repeat   - Follow official note from CT surgery   - pending echo  - FU fasting lipids and A1c  -ketorolac 500mg Q12  and percocet 1mg Q6 PRN for pain   #Metastatic ALK-positive lung adenocarcinoma with stable brain mets and bony mets to the humerus   - Biopsy of the left pleural mass on 03/12/2020 that showed poorly differentiated malignant neoplasm. IHC c/w non-small cell carcinoma of the lung, favoring adenocarcinoma, TTF 1 positive,  EML4-Alk variant 3a/b  - CW  lorlatinib for chemotherapy   - MRI brain showed essentially stable masses with increased hydrocephalus and stable mass effect on 4th ventricle.  - CTA chest 08/2020 performed in ED showed that lung mass is stable  - Hem/Onc follow up (Dr Torres)    #Misc  - DVT Prophylaxis: Lovenox  - Diet: Regular  - GI Prophylaxis: Pantoprazole   - Activity: AAT  - Code Status: Full Code    Dispo: Still acute. Follow echo     Assessment:   46 YO M with PMHx of metastatic ALK-positive lung adenocarcinoma (currently on Lorbrena) & HLD presented to the ED c/c of Left arm and chest pain x 1 week.    #Left sided arm and pleuritic chest pain   - CT Angio Chest PE Protocol w/ IV Cont showed "In comparison to recent CT chest 10/21/2020, no filling defects in the main pulmonary artery or segmental branches to suggest pulmonary embolus. New loculated pericardial fluid measuring 4.5 x 3.2 cm."  - As per CT surgery no pericardial fluid collection. Likely pleural effusion  - tender to palpation on the L parasternal area , worse when laying on left side. Most likely cancer pain vs MSK pain.  - EKG non-ischemic  - Trops -ve x 1. FU repeat   - Follow official note from CT surgery   - pending echo  - FU fasting lipids and A1c  - naproxen 500mg Q12  and percocet  Q6 PRN for pain   #Metastatic ALK-positive lung adenocarcinoma with stable brain mets and bony mets to the humerus   - Biopsy of the left pleural mass on 03/12/2020 that showed poorly differentiated malignant neoplasm. IHC c/w non-small cell carcinoma of the lung, favoring adenocarcinoma, TTF 1 positive,  EML4-Alk variant 3a/b  - CW  lorlatinib for chemotherapy   - MRI brain showed essentially stable masses with increased hydrocephalus and stable mass effect on 4th ventricle.  - CTA chest 08/2020 performed in ED showed that lung mass is stable  - Hem/Onc follow up (Dr Torres)    #Misc  - DVT Prophylaxis: Lovenox  - Diet: Regular  - GI Prophylaxis: Pantoprazole   - Activity: AAT  - Code Status: Full Code    Dispo: Still acute. Follow echo

## 2020-11-06 NOTE — DISCHARGE NOTE PROVIDER - NSDCCPCAREPLAN_GEN_ALL_CORE_FT
PRINCIPAL DISCHARGE DIAGNOSIS  Diagnosis: Chest pain  Assessment and Plan of Treatment: Your chest pain is most likely from your cancer . No new findings were found on your pulmonary / cancer workup. Your pain improved on naproxen and percocet . We have prescribed this to you and sent it to your local [facility] on [address]. Please take as prescribed and follow up with your primary care and cancer doctors within one week.      SECONDARY DISCHARGE DIAGNOSES  Diagnosis: Effusion, other site  Assessment and Plan of Treatment:     Diagnosis: Arm pain  Assessment and Plan of Treatment:      PRINCIPAL DISCHARGE DIAGNOSIS  Diagnosis: Chest pain  Assessment and Plan of Treatment: Your chest pain is most likely from your cancer . No new findings were found on your pulmonary / cancer workup. Your pain improved on naproxen and percocet . We have prescribed this to you and sent it to your local [facility] on [address]. Please take as prescribed and follow up with your primary care and cancer doctors within one week.      SECONDARY DISCHARGE DIAGNOSES  Diagnosis: Effusion, other site  Assessment and Plan of Treatment: you have a large pleural effusion on the left side of the lung unchanged compared to prevous imaging. Oncology recommended against draining it since it was stable. Follow up with your oncologist in the clinics.

## 2020-11-06 NOTE — DISCHARGE NOTE NURSING/CASE MANAGEMENT/SOCIAL WORK - PATIENT PORTAL LINK FT
You can access the FollowMyHealth Patient Portal offered by [facility] by registering at the following website: [email or web address]. By joining [email or web address] FollowMyHealth portal, you will also be able to view your health information using other applications (apps) compatible with our system.

## 2020-11-06 NOTE — DISCHARGE NOTE PROVIDER - HOSPITAL COURSE
Pt seen by Dr Awad this AM. Case d/w residents and RN on rounds.    44 yo w/ ALK+ AdenoCa w/ brain mets since 2014 (was already metastatic then- doing well on ALK inhib - currently on Lorbrena)    case d/w pulm fellow and h/o attd    chest pain to left arm likely 2/2 neopl-related pain, better after NSAID (vs more of social admission)  naprosyn 500mg po q12 + PPI po q24  percocent 5/325mg po q6 prn pain    pericardial (unlikely) vs pl eff, loculated (on CTA Chest); no PE  CT Sx (Dr Saba) does not believe eff is pericardial  pulm eval - Dr Herrera  pl effusion is large and loculated, but is very old and stable for many years  echo - pending  if pericard eff -> recall CT Sx to review echo  if not pericard eff -> then would just leave alone and send pt home    labs are fine    had hemorrhagic cerebellar masses on MRI mid-Oct  would d/c lovenox  use SCDs and amb pt  no decadron needed 46 yo w/ ALK+ AdenoCa w/ brain mets since 2014 (was already metastatic then- doing well on ALK inhib - currently on Lorbrena) admitted for chest pain.  CTA chest done and showed a large loculated pleural effusion that is unchanged compared to previous imaging.  Hem/onc consulted, they recommended against drainage since the effusion is stable and not likely causing the pain.  chest pain to left arm likely 2/2 neopl-related pain, better after NSAID   oncology recommended continuing Lorbrena.  patient is stable and ready for discharge. He is gonna be discharged to a shelter and will follow up with hem/onc in the clinics. 44 yo w/ ALK+ AdenoCa w/ brain mets since 2014 (was already metastatic then- doing well on ALK inhib - currently on Lorbrena) admitted for chest pain.  CTA chest done and showed a large loculated pleural effusion that is unchanged compared to previous imaging.  Hem/onc consulted, they recommended against drainage since the effusion is stable and not likely causing the pain.  chest pain to left arm likely 2/2 neopl-related pain, better after NSAID   oncology recommended continuing Lorbrena.  patient is stable and ready for discharge. He is going  be discharged to a shelter and will follow up with hem/onc in the clinics. 44 yo w/ ALK+ AdenoCa w/ brain mets since 2014 (was already metastatic then- doing well on ALK inhib - currently on Lorbrena) admitted for chest pain.  CTA chest done and showed a large loculated pleural effusion that is unchanged compared to previous imaging.  Hem/onc consulted, they recommended against drainage since the effusion is stable and not likely causing the pain.  chest pain to left arm likely 2/2 neopl-related pain, better after NSAID   oncology recommended continuing Lorbrena.  patient is stable and ready for discharge. He will follow up with hem/onc in the clinics.

## 2020-11-06 NOTE — CONSULT NOTE ADULT - SUBJECTIVE AND OBJECTIVE BOX
Patient is a 45y old  Male who presents with a chief complaint of LUE pain. Chest Pain. SOB (05 Nov 2020 21:26)      ADMISSION HPI:  44 YO M with PMHx of metastatic ALK-positive lung adenocarcinoma (currently on Lorbrena) & HLD presented to the ED c/c of Left arm and chest pain x 1 week. He described the pain as intermittent, sharp, 9/10 intensity, worse with deep breaths, associated with SOB on and off. Patient reports that the pain gets worse when he is lying on his left side  Patient denied recent surgery, trauma, calf swelling, weakness, fever, cough, hx of DVT, pitting edema, N/V/D.   In the ED vitals were unremarkable. CT Angio Chest PE Protocol w/ IV Cont showed "In comparison to recent CT chest 10/21/2020, no filling defects in the main pulmonary artery or segmental branches to suggest pulmonary embolus. New loculated pericardial fluid measuring 4.5 x 3.2 cm."  ED contacted CT surgery and imaging was reviewed with Dr. Hercules. As per them loculated pleural effusion is not within pericardial space but pleural space.    Hx of lung adenocarcinoma: He initially presented in 11/2014 with multiple pulmonary nodules, mediastinal lymphadenopathy, multiple bony metastases, and brain metastasis. He was initially started on crizotinib and was switched to ceritinib in 12/2015 upon progression. He was unable to tolerate ceritinib due to significant vomiting and was switched to alectinib in 02/2016.  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 He had RT to a painful metastatic lesion in the right humerus (01/2015). He had also received Xgeva for bony metastatic disease in the past.   Since 09/2016, he has been experiencing intermittent headaches being treated with periodic dexamethasone taper. MRI brain with perfusion analysis showed bilateral cerebellar lesions, favor post-RT changes rather than residual tumor. Repeat brain MRI (11/01/2016) showed overall stable exam compared to the previous brain MRI 9/2/2016; no significant interval change in bilateral cerebellar heterogeneously enhancing masses with extensive associated edema, as well as smaller lesions within the left parietal white matter, superficial left temporal lobe and left dorsal medulla; and, stable mild ventriculomegaly and mild periventricular FLAIR signal abnormality. He has been following with neurosurgeon, Dr. Britt, who has recommended no surgical intervention at this time.   His PET CT (11/01/2016) showed no sites of pathologic FDG uptake suspicious for biologic tumor activity.  He reported occasional headaches and a mild sense of imbalance. Takes a PRN dexamethasone, once in every three days, which reportedly relieves his intermittent headaches.      (05 Nov 2020 21:26)     ROS:  Negative except for: Above    PAST MEDICAL & SURGICAL HISTORY:  Lung cancer metastatic to brain    No significant past surgical history        SOCIAL HISTORY:  Denies smoking, alcohol or illicit drug use     FAMILY HISTORY:      MEDICATIONS  (STANDING):  atorvastatin 20 milliGRAM(s) Oral at bedtime  enoxaparin Injectable 40 milliGRAM(s) SubCutaneous daily  naproxen 500 milliGRAM(s) Oral two times a day  pantoprazole    Tablet 40 milliGRAM(s) Oral before breakfast    MEDICATIONS  (PRN):  oxycodone    5 mG/acetaminophen 325 mG 1 Tablet(s) Oral every 6 hours PRN Severe Pain (7 - 10)    Height (cm): 172.7 (11-05-20 @ 13:49)  Allergies    Amoxil (Other)    Intolerances        Vital Signs Last 24 Hrs  T(C): 36.6 (06 Nov 2020 05:44), Max: 36.8 (05 Nov 2020 13:49)  T(F): 97.9 (06 Nov 2020 05:44), Max: 98.2 (05 Nov 2020 13:49)  HR: 82 (06 Nov 2020 05:44) (71 - 109)  BP: 114/69 (06 Nov 2020 05:44) (111/76 - 145/89)  BP(mean): --  RR: 18 (06 Nov 2020 05:44) (18 - 20)  SpO2: 97% (05 Nov 2020 20:12) (97% - 97%)    PHYSICAL EXAM  General: adult in NAD  HEENT: clear oropharynx, anicteric sclera, pink conjunctiva  Neck: supple  CV: normal S1/S2 with no murmur rubs or gallops  Lungs: positive air movement b/l ant lungs,clear to auscultation, no wheezes, no rales  Abdomen: soft non-tender non-distended, no hepatosplenomegaly  Ext: no clubbing cyanosis or edema  Skin: no rashes and no petechiae  Neuro: alert and oriented X 4, no focal deficits      LABS:                          14.2   6.98  )-----------( 198      ( 06 Nov 2020 06:48 )             45.6         Mean Cell Volume : 87.7 fL  Mean Cell Hemoglobin : 27.3 pg  Mean Cell Hemoglobin Concentration : 31.1 g/dL  Auto Neutrophil # : 3.98 K/uL  Auto Lymphocyte # : 1.80 K/uL  Auto Monocyte # : 0.80 K/uL  Auto Eosinophil # : 0.36 K/uL  Auto Basophil # : 0.02 K/uL  Auto Neutrophil % : 56.9 %  Auto Lymphocyte % : 25.8 %  Auto Monocyte % : 11.5 %  Auto Eosinophil % : 5.2 %  Auto Basophil % : 0.3 %      Serial CBC's  11-06 @ 06:48  Hct-45.6 / Hgb-14.2 / Plat-198 / RBC-5.20 / WBC-6.98  Serial CBC's  11-05 @ 14:40  Hct-44.5 / Hgb-13.8 / Plat-194 / RBC-5.11 / WBC-6.78      11-06    142  |  105  |  7<L>  ----------------------------<  101<H>  3.8   |  27  |  0.7    Ca    9.2      06 Nov 2020 06:48  Phos  3.2     11-06  Mg     1.8     11-06    TPro  6.9  /  Alb  3.9  /  TBili  0.3  /  DBili  x   /  AST  17  /  ALT  12  /  AlkPhos  71  11-06                      BLOOD SMEAR INTERPRETATION:       RADIOLOGY & ADDITIONAL STUDIES:    < from: CT Angio Chest PE Protocol w/ IV Cont (11.05.20 @ 18:09) >  Findings/  impression:  In comparison to recent CT chest 10/21/2020, no filling defects in the main pulmonary artery or segmental branches to suggest pulmonary embolus. New loculated pericardial fluid measuring 4.5 x 3.2 cm. Additional findings are unchanged in this short interval follow-up CT.    < end of copied text >    < from: CT Chest w/ IV Cont (10.21.20 @ 14:21) >  IMPRESSION:    Interval enlargement of low-density masses since 7/7/2020 along the costophrenic recess as described above.    Large left loculated pleural effusion is grossly stable in appearance and size.    < end of copied text >   Patient is a 45y old  Male who presents with a chief complaint of LUE pain. Chest Pain. SOB (05 Nov 2020 21:26)      ADMISSION HPI:  46 YO M with PMHx of metastatic ALK-positive lung adenocarcinoma (currently on Lorbrena) & HLD presented to the ED c/c of Left arm and chest pain x 1 week. He described the pain as intermittent, sharp, 9/10 intensity, worse with deep breaths, associated with SOB on and off. Patient reports that the pain gets worse when he is lying on his left side  Patient denied recent surgery, trauma, calf swelling, weakness, fever, cough, hx of DVT, pitting edema, N/V/D.   In the ED vitals were unremarkable. CT Angio Chest PE Protocol w/ IV Cont showed "In comparison to recent CT chest 10/21/2020, no filling defects in the main pulmonary artery or segmental branches to suggest pulmonary embolus. New loculated pericardial fluid measuring 4.5 x 3.2 cm."  ED contacted CT surgery and imaging was reviewed with Dr. Hercules. As per them loculated pleural effusion is not within pericardial space but pleural space.    Hx of lung adenocarcinoma: He initially presented in 11/2014 with multiple pulmonary nodules, mediastinal lymphadenopathy, multiple bony metastases, and brain metastasis. He was initially started on crizotinib and was switched to ceritinib in 12/2015 upon progression. He was unable to tolerate ceritinib due to significant vomiting and was switched to alectinib in 02/2016.  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 He had RT to a painful metastatic lesion in the right humerus (01/2015). He had also received Xgeva for bony metastatic disease in the past.   Since 09/2016, he has been experiencing intermittent headaches being treated with periodic dexamethasone taper. MRI brain with perfusion analysis showed bilateral cerebellar lesions, favor post-RT changes rather than residual tumor. Repeat brain MRI (11/01/2016) showed overall stable exam compared to the previous brain MRI 9/2/2016; no significant interval change in bilateral cerebellar heterogeneously enhancing masses with extensive associated edema, as well as smaller lesions within the left parietal white matter, superficial left temporal lobe and left dorsal medulla; and, stable mild ventriculomegaly and mild periventricular FLAIR signal abnormality. He has been following with neurosurgeon, Dr. Britt, who has recommended no surgical intervention at this time.   His PET CT (11/01/2016) showed no sites of pathologic FDG uptake suspicious for biologic tumor activity.  He reported occasional headaches and a mild sense of imbalance. Takes a PRN dexamethasone, once in every three days, which reportedly relieves his intermittent headaches.      (05 Nov 2020 21:26)     ROS:  Negative except for: Above    PAST MEDICAL & SURGICAL HISTORY:  Lung cancer metastatic to brain    No significant past surgical history        SOCIAL HISTORY:  Denies smoking, alcohol or illicit drug use     FAMILY HISTORY:      MEDICATIONS  (STANDING):  atorvastatin 20 milliGRAM(s) Oral at bedtime  enoxaparin Injectable 40 milliGRAM(s) SubCutaneous daily  naproxen 500 milliGRAM(s) Oral two times a day  pantoprazole    Tablet 40 milliGRAM(s) Oral before breakfast    MEDICATIONS  (PRN):  oxycodone    5 mG/acetaminophen 325 mG 1 Tablet(s) Oral every 6 hours PRN Severe Pain (7 - 10)    Height (cm): 172.7 (11-05-20 @ 13:49)  Allergies    Amoxil (Other)    Intolerances        Vital Signs Last 24 Hrs  T(C): 36.6 (06 Nov 2020 05:44), Max: 36.8 (05 Nov 2020 13:49)  T(F): 97.9 (06 Nov 2020 05:44), Max: 98.2 (05 Nov 2020 13:49)  HR: 82 (06 Nov 2020 05:44) (71 - 109)  BP: 114/69 (06 Nov 2020 05:44) (111/76 - 145/89)  BP(mean): --  RR: 18 (06 Nov 2020 05:44) (18 - 20)  SpO2: 97% (05 Nov 2020 20:12) (97% - 97%)    PHYSICAL EXAM  General: adult in NAD  HEENT: clear oropharynx, anicteric sclera, pink conjunctiva  Neck: supple  CV: normal S1/S2 with no murmur rubs or gallops  Lungs: positive air movement with decreased breath sounds to left lung fields. no wheezes, no rales  Abdomen: soft non-tender non-distended, no hepatosplenomegaly  Ext: no clubbing cyanosis or edema  Skin: no rashes and no petechiae  Neuro: alert and oriented X 4, no focal deficits      LABS:                          14.2   6.98  )-----------( 198      ( 06 Nov 2020 06:48 )             45.6         Mean Cell Volume : 87.7 fL  Mean Cell Hemoglobin : 27.3 pg  Mean Cell Hemoglobin Concentration : 31.1 g/dL  Auto Neutrophil # : 3.98 K/uL  Auto Lymphocyte # : 1.80 K/uL  Auto Monocyte # : 0.80 K/uL  Auto Eosinophil # : 0.36 K/uL  Auto Basophil # : 0.02 K/uL  Auto Neutrophil % : 56.9 %  Auto Lymphocyte % : 25.8 %  Auto Monocyte % : 11.5 %  Auto Eosinophil % : 5.2 %  Auto Basophil % : 0.3 %      Serial CBC's  11-06 @ 06:48  Hct-45.6 / Hgb-14.2 / Plat-198 / RBC-5.20 / WBC-6.98  Serial CBC's  11-05 @ 14:40  Hct-44.5 / Hgb-13.8 / Plat-194 / RBC-5.11 / WBC-6.78      11-06    142  |  105  |  7<L>  ----------------------------<  101<H>  3.8   |  27  |  0.7    Ca    9.2      06 Nov 2020 06:48  Phos  3.2     11-06  Mg     1.8     11-06    TPro  6.9  /  Alb  3.9  /  TBili  0.3  /  DBili  x   /  AST  17  /  ALT  12  /  AlkPhos  71  11-06                      BLOOD SMEAR INTERPRETATION:       RADIOLOGY & ADDITIONAL STUDIES:    < from: CT Angio Chest PE Protocol w/ IV Cont (11.05.20 @ 18:09) >  Findings/  impression:  In comparison to recent CT chest 10/21/2020, no filling defects in the main pulmonary artery or segmental branches to suggest pulmonary embolus. New loculated pericardial fluid measuring 4.5 x 3.2 cm. Additional findings are unchanged in this short interval follow-up CT.    < end of copied text >    < from: CT Chest w/ IV Cont (10.21.20 @ 14:21) >  IMPRESSION:    Interval enlargement of low-density masses since 7/7/2020 along the costophrenic recess as described above.    Large left loculated pleural effusion is grossly stable in appearance and size.    < end of copied text >
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
## 2020-11-07 LAB
ANION GAP SERPL CALC-SCNC: 11 MMOL/L — SIGNIFICANT CHANGE UP (ref 7–14)
BUN SERPL-MCNC: 12 MG/DL — SIGNIFICANT CHANGE UP (ref 10–20)
CALCIUM SERPL-MCNC: 9.1 MG/DL — SIGNIFICANT CHANGE UP (ref 8.5–10.1)
CHLORIDE SERPL-SCNC: 105 MMOL/L — SIGNIFICANT CHANGE UP (ref 98–110)
CO2 SERPL-SCNC: 26 MMOL/L — SIGNIFICANT CHANGE UP (ref 17–32)
CREAT SERPL-MCNC: 0.7 MG/DL — SIGNIFICANT CHANGE UP (ref 0.7–1.5)
GLUCOSE SERPL-MCNC: 90 MG/DL — SIGNIFICANT CHANGE UP (ref 70–99)
HCT VFR BLD CALC: 45.4 % — SIGNIFICANT CHANGE UP (ref 42–52)
HGB BLD-MCNC: 14 G/DL — SIGNIFICANT CHANGE UP (ref 14–18)
MAGNESIUM SERPL-MCNC: 1.8 MG/DL — SIGNIFICANT CHANGE UP (ref 1.8–2.4)
MCHC RBC-ENTMCNC: 27.2 PG — SIGNIFICANT CHANGE UP (ref 27–31)
MCHC RBC-ENTMCNC: 30.8 G/DL — LOW (ref 32–37)
MCV RBC AUTO: 88.3 FL — SIGNIFICANT CHANGE UP (ref 80–94)
NRBC # BLD: 0 /100 WBCS — SIGNIFICANT CHANGE UP (ref 0–0)
PLATELET # BLD AUTO: 190 K/UL — SIGNIFICANT CHANGE UP (ref 130–400)
POTASSIUM SERPL-MCNC: 4.2 MMOL/L — SIGNIFICANT CHANGE UP (ref 3.5–5)
POTASSIUM SERPL-SCNC: 4.2 MMOL/L — SIGNIFICANT CHANGE UP (ref 3.5–5)
RBC # BLD: 5.14 M/UL — SIGNIFICANT CHANGE UP (ref 4.7–6.1)
RBC # FLD: 15.2 % — HIGH (ref 11.5–14.5)
SODIUM SERPL-SCNC: 142 MMOL/L — SIGNIFICANT CHANGE UP (ref 135–146)
WBC # BLD: 6.73 K/UL — SIGNIFICANT CHANGE UP (ref 4.8–10.8)
WBC # FLD AUTO: 6.73 K/UL — SIGNIFICANT CHANGE UP (ref 4.8–10.8)

## 2020-11-07 PROCEDURE — 99233 SBSQ HOSP IP/OBS HIGH 50: CPT

## 2020-11-07 RX ADMIN — PANTOPRAZOLE SODIUM 40 MILLIGRAM(S): 20 TABLET, DELAYED RELEASE ORAL at 05:36

## 2020-11-07 RX ADMIN — Medication 500 MILLIGRAM(S): at 18:49

## 2020-11-07 RX ADMIN — Medication 500 MILLIGRAM(S): at 05:36

## 2020-11-07 RX ADMIN — Medication 500 MILLIGRAM(S): at 17:48

## 2020-11-07 RX ADMIN — ATORVASTATIN CALCIUM 20 MILLIGRAM(S): 80 TABLET, FILM COATED ORAL at 21:51

## 2020-11-07 RX ADMIN — Medication 500 MILLIGRAM(S): at 06:12

## 2020-11-07 NOTE — PHYSICAL THERAPY INITIAL EVALUATION ADULT - GENERAL OBSERVATIONS, REHAB EVAL
[id number] Patient encountered standing in [address] with rolling walker + IV lock, NAD , receptive to PT

## 2020-11-07 NOTE — PROGRESS NOTE ADULT - SUBJECTIVE AND OBJECTIVE BOX
Patient is a 45y old  Male who presents with a chief complaint of LUE pain. Chest Pain. SOB (06 Nov 2020 15:18)    HPI:  46 YO M with PMHx of metastatic ALK-positive lung adenocarcinoma (currently on Lorbrena) & HLD presented to the ED c/c of Left arm and chest pain x 1 week. He described the pain as intermittent, sharp, 9/10 intensity, worse with deep breaths, associated with SOB on and off. Patient reports that the pain gets worse when he is lying on his left side  Patient denied recent surgery, trauma, calf swelling, weakness, fever, cough, hx of DVT, pitting edema, N/V/D.   In the ED vitals were unremarkable. CT Angio Chest PE Protocol w/ IV Cont showed "In comparison to recent CT chest 10/21/2020, no filling defects in the main pulmonary artery or segmental branches to suggest pulmonary embolus. New loculated pericardial fluid measuring 4.5 x 3.2 cm."  ED contacted CT surgery and imaging was reviewed with Dr. Hercules. As per them loculated pleural effusion is not within pericardial space but pleural space.    Hx of lung adenocarcinoma: He initially presented in 11/2014 with multiple pulmonary nodules, mediastinal lymphadenopathy, multiple bony metastases, and brain metastasis. He was initially started on crizotinib and was switched to ceritinib in 12/2015 upon progression. He was unable to tolerate ceritinib due to significant vomiting and was switched to alectinib in 02/2016.  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 He had RT to a painful metastatic lesion in the right humerus (01/2015). He had also received Xgeva for bony metastatic disease in the past.   Since 09/2016, he has been experiencing intermittent headaches being treated with periodic dexamethasone taper. MRI brain with perfusion analysis showed bilateral cerebellar lesions, favor post-RT changes rather than residual tumor. Repeat brain MRI (11/01/2016) showed overall stable exam compared to the previous brain MRI 9/2/2016; no significant interval change in bilateral cerebellar heterogeneously enhancing masses with extensive associated edema, as well as smaller lesions within the left parietal white matter, superficial left temporal lobe and left dorsal medulla; and, stable mild ventriculomegaly and mild periventricular FLAIR signal abnormality. He has been following with neurosurgeon, Dr. Britt, who has recommended no surgical intervention at this time.   His PET CT (11/01/2016) showed no sites of pathologic FDG uptake suspicious for biologic tumor activity.  He reported occasional headaches and a mild sense of imbalance. Takes a PRN dexamethasone, once in every three days, which reportedly relieves his intermittent headaches.    (05 Nov 2020 21:26)    patient seen and examined independently on morning rounds for the first time today, chart reviewed and discussed with the medicine resident and on interdisciplinary rounds.    no overnight events--patient says he has nowhere to go on discharge and when assessed by PT found to have unsteady gait      Vital Signs Last 24 Hrs  T(C): 36.2 (07 Nov 2020 05:32), Max: 36.8 (06 Nov 2020 20:43)  T(F): 97.1 (07 Nov 2020 05:32), Max: 98.3 (06 Nov 2020 20:43)  HR: 76 (07 Nov 2020 05:32) (76 - 80)  BP: 123/63 (07 Nov 2020 05:32) (109/74 - 123/63)  BP(mean): --  RR: 18 (07 Nov 2020 05:32) (18 - 18)  SpO2: --             PE:  GEN-NAD, AAOx3, Sudanese speaking  PULM-  fair air entry decreased bs bilateral bases  CVS- +s1/s2 RRR no murmurs  GI- soft NT ND +bs, no rebound, no guarding  EXT- no edema               14.0   6.73  )-----------( 190      ( 07 Nov 2020 07:10 )             45.4     11-07    142  |  105  |  12  ----------------------------<  90  4.2   |  26  |  0.7    Ca    9.1      07 Nov 2020 07:10  Phos  3.2     11-06  Mg     1.8     11-07    TPro  6.9  /  Alb  3.9  /  TBili  0.3  /  DBili  x   /  AST  17  /  ALT  12  /  AlkPhos  71  11-06    CARDIAC MARKERS ( 06 Nov 2020 06:48 )  x     / <0.01 ng/mL / x     / x     / x      CARDIAC MARKERS ( 05 Nov 2020 14:40 )  x     / <0.01 ng/mL / x     / x     / x        ECHO- 11/6/20: Left ventricular ejection fraction, by visual estimation, is 55 to 60%.   2. The left ventricular diastolic function could not be assessed in this study.   3. Normal left atrial size.   4. Normal right atrial size.   5. No evidence of mitral valve regurgitation        MEDICATIONS  (STANDING):  atorvastatin 20 milliGRAM(s) Oral at bedtime  naproxen 500 milliGRAM(s) Oral two times a day  pantoprazole    Tablet 40 milliGRAM(s) Oral before breakfast

## 2020-11-07 NOTE — PHYSICAL THERAPY INITIAL EVALUATION ADULT - GAIT DEVIATIONS NOTED, PT EVAL
R LE dysmetria , and decreased foot clearance , decreased heel toe progressison/decreased henry/decreased velocity of limb motion

## 2020-11-07 NOTE — PHYSICAL THERAPY INITIAL EVALUATION ADULT - PERTINENT HX OF CURRENT PROBLEM, REHAB EVAL
46 YO M with PMHx of metastatic ALK-positive lung adenocarcinoma (currently on Lorbrena) & HLD presented to the ED c/c of Left arm and chest pain x 1 week.

## 2020-11-07 NOTE — PROGRESS NOTE ADULT - ASSESSMENT
a/p:  46 yo man with metastatic lung adenocarcinoma (brain mets) who was a/w LUE chest pain.]    #chest pain- pericardial vs loculated malignant pl effusion  -f/u with pulm/ct surgery  -echo normal without significant pericardial effusion    #metastatic adenocarcinoma (lung with cerebellar mass)  -use scd for dvt ppx (off lovenox 2/2 h/o hemorrhagic conversion of mass)  -PT eval---patient was very unsteady when ambulating today---may need STR (patient says he is unable to be discharged today or tomorrow due to family not being home---and unable to go to friends house either  -f/u with CM and SW  -fall precautions    DVT/GI ppx  anticipate likely dc within next 24-48 hrs once seen by PT And safe discharge plan in place (patient was going to go to friends house however says he is not sure if they available this weekend)

## 2020-11-08 PROCEDURE — 99233 SBSQ HOSP IP/OBS HIGH 50: CPT

## 2020-11-08 RX ADMIN — Medication 500 MILLIGRAM(S): at 17:26

## 2020-11-08 RX ADMIN — Medication 500 MILLIGRAM(S): at 05:48

## 2020-11-08 RX ADMIN — PANTOPRAZOLE SODIUM 40 MILLIGRAM(S): 20 TABLET, DELAYED RELEASE ORAL at 05:48

## 2020-11-08 RX ADMIN — ATORVASTATIN CALCIUM 20 MILLIGRAM(S): 80 TABLET, FILM COATED ORAL at 21:52

## 2020-11-08 NOTE — PROGRESS NOTE ADULT - SUBJECTIVE AND OBJECTIVE BOX
pt seen and examined.   he has severe pain on his upper ext. 9/10  has gait problem   Vital Signs Last 24 Hrs  T(C): 35.8 (08 Nov 2020 05:02), Max: 36.6 (07 Nov 2020 14:38)  T(F): 96.5 (08 Nov 2020 05:02), Max: 97.9 (07 Nov 2020 14:38)  HR: 68 (08 Nov 2020 05:02) (68 - 85)  BP: 115/69 (08 Nov 2020 05:02) (115/69 - 136/73)  BP(mean): --  RR: 18 (08 Nov 2020 05:02) (18 - 18)  SpO2: 95% (07 Nov 2020 17:45) (95% - 95%)    Physical exam:   constitutional NAD, AAOX3, slightly slurred speech( per pt not new) , Respiratory  lungs bilat decreased breath sounds, CVS heart RRR, GI: abdomen Soft NT, ND, BS+, skin: intact  neuro exam, gait disturnbance. cannot cooperate with full neuro exam due to pain     < from: MR Head w/ IV Cont (10.13.20 @ 10:55) >  Decreased periventricular T2 and FLAIR hyperintensities, compatible with evolving posttreatment changes.    Grossly stable bilateral cerebellar hemorrhagic masses. Unchanged mass effect upon the fourth ventricle and stable ventriculomegaly.    No evidence of new enhancing intracranial lesions.    < end of copied text >    < from: CT Angio Chest PE Protocol w/ IV Cont (11.05.20 @ 18:09) >  In comparison to recent CT chest 10/21/2020, no filling defects in the main pulmonary artery or segmental branches to suggest pulmonary embolus. New loculated pericardial fluid measuring 4.5 x 3.2 cm. Additional findings are unchanged in this short interval follow-up CT.    < end of copied text >      a/p  # cerebrellar masses, metastatic cancer, with mass effect on the 4th ventricle, and ventriculopathy , cont current meds per neuro surgery , fu oncology   # left arm pain, palliative care consult  # loculated pericardial effusion , ct surgery consult , pt is hemodynamically stable     poor prognosis  full code
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
## 2020-11-09 VITALS
RESPIRATION RATE: 18 BRPM | TEMPERATURE: 99 F | DIASTOLIC BLOOD PRESSURE: 77 MMHG | SYSTOLIC BLOOD PRESSURE: 120 MMHG | HEART RATE: 94 BPM

## 2020-11-09 LAB
ALBUMIN SERPL ELPH-MCNC: 3.9 G/DL — SIGNIFICANT CHANGE UP (ref 3.5–5.2)
ALP SERPL-CCNC: 69 U/L — SIGNIFICANT CHANGE UP (ref 30–115)
ALT FLD-CCNC: 13 U/L — SIGNIFICANT CHANGE UP (ref 0–41)
ANION GAP SERPL CALC-SCNC: 9 MMOL/L — SIGNIFICANT CHANGE UP (ref 7–14)
AST SERPL-CCNC: 18 U/L — SIGNIFICANT CHANGE UP (ref 0–41)
BASOPHILS # BLD AUTO: 0.02 K/UL — SIGNIFICANT CHANGE UP (ref 0–0.2)
BASOPHILS NFR BLD AUTO: 0.3 % — SIGNIFICANT CHANGE UP (ref 0–1)
BILIRUB SERPL-MCNC: 0.4 MG/DL — SIGNIFICANT CHANGE UP (ref 0.2–1.2)
BUN SERPL-MCNC: 12 MG/DL — SIGNIFICANT CHANGE UP (ref 10–20)
CALCIUM SERPL-MCNC: 9.3 MG/DL — SIGNIFICANT CHANGE UP (ref 8.5–10.1)
CHLORIDE SERPL-SCNC: 105 MMOL/L — SIGNIFICANT CHANGE UP (ref 98–110)
CO2 SERPL-SCNC: 28 MMOL/L — SIGNIFICANT CHANGE UP (ref 17–32)
CREAT SERPL-MCNC: 0.6 MG/DL — LOW (ref 0.7–1.5)
EOSINOPHIL # BLD AUTO: 0.38 K/UL — SIGNIFICANT CHANGE UP (ref 0–0.7)
EOSINOPHIL NFR BLD AUTO: 6.4 % — SIGNIFICANT CHANGE UP (ref 0–8)
GLUCOSE SERPL-MCNC: 86 MG/DL — SIGNIFICANT CHANGE UP (ref 70–99)
HCT VFR BLD CALC: 43.9 % — SIGNIFICANT CHANGE UP (ref 42–52)
HGB BLD-MCNC: 13.6 G/DL — LOW (ref 14–18)
IMM GRANULOCYTES NFR BLD AUTO: 0.2 % — SIGNIFICANT CHANGE UP (ref 0.1–0.3)
LYMPHOCYTES # BLD AUTO: 1.39 K/UL — SIGNIFICANT CHANGE UP (ref 1.2–3.4)
LYMPHOCYTES # BLD AUTO: 23.3 % — SIGNIFICANT CHANGE UP (ref 20.5–51.1)
MCHC RBC-ENTMCNC: 27.4 PG — SIGNIFICANT CHANGE UP (ref 27–31)
MCHC RBC-ENTMCNC: 31 G/DL — LOW (ref 32–37)
MCV RBC AUTO: 88.5 FL — SIGNIFICANT CHANGE UP (ref 80–94)
MONOCYTES # BLD AUTO: 0.62 K/UL — HIGH (ref 0.1–0.6)
MONOCYTES NFR BLD AUTO: 10.4 % — HIGH (ref 1.7–9.3)
NEUTROPHILS # BLD AUTO: 3.54 K/UL — SIGNIFICANT CHANGE UP (ref 1.4–6.5)
NEUTROPHILS NFR BLD AUTO: 59.4 % — SIGNIFICANT CHANGE UP (ref 42.2–75.2)
NRBC # BLD: 0 /100 WBCS — SIGNIFICANT CHANGE UP (ref 0–0)
PLATELET # BLD AUTO: 187 K/UL — SIGNIFICANT CHANGE UP (ref 130–400)
POTASSIUM SERPL-MCNC: 3.8 MMOL/L — SIGNIFICANT CHANGE UP (ref 3.5–5)
POTASSIUM SERPL-SCNC: 3.8 MMOL/L — SIGNIFICANT CHANGE UP (ref 3.5–5)
PROT SERPL-MCNC: 6.9 G/DL — SIGNIFICANT CHANGE UP (ref 6–8)
RBC # BLD: 4.96 M/UL — SIGNIFICANT CHANGE UP (ref 4.7–6.1)
RBC # FLD: 15.1 % — HIGH (ref 11.5–14.5)
SARS-COV-2 RNA SPEC QL NAA+PROBE: SIGNIFICANT CHANGE UP
SODIUM SERPL-SCNC: 142 MMOL/L — SIGNIFICANT CHANGE UP (ref 135–146)
WBC # BLD: 5.96 K/UL — SIGNIFICANT CHANGE UP (ref 4.8–10.8)
WBC # FLD AUTO: 5.96 K/UL — SIGNIFICANT CHANGE UP (ref 4.8–10.8)

## 2020-11-09 PROCEDURE — 99239 HOSP IP/OBS DSCHRG MGMT >30: CPT

## 2020-11-09 RX ADMIN — Medication 500 MILLIGRAM(S): at 17:09

## 2020-11-09 RX ADMIN — Medication 500 MILLIGRAM(S): at 05:50

## 2020-11-09 RX ADMIN — PANTOPRAZOLE SODIUM 40 MILLIGRAM(S): 20 TABLET, DELAYED RELEASE ORAL at 05:50
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
## 2020-11-09 NOTE — PROGRESS NOTE ADULT - REASON FOR ADMISSION
LUE pain. Chest Pain. SOB

## 2020-11-09 NOTE — PROGRESS NOTE ADULT - SUBJECTIVE AND OBJECTIVE BOX
YEIMY FREEMAN  45y  Male  ***My note supersedes ALL resident notes that I sign.  My corrections for their notes are in my note.***    I can be reached directly on "[employer]" [phone]. My office number is [phone]. My personal cell number is [phone].    INTERVAL EVENTS: Here for f/u of lung cancer. Pt is stable. Awaiting placement. He c/o same left arm pain around elbow. Able to use left arm.    T(F): 98.1 (11-09-20 @ 05:05), Max: 98.1 (11-09-20 @ 05:05)  HR: 73 (11-09-20 @ 05:05) (73 - 96)  BP: 118/71 (11-09-20 @ 05:05) (118/71 - 140/78)  RR: 18 (11-09-20 @ 05:05) (18 - 18)  SpO2: --    Gen: NAD  HEENT: PERRL, EOMI, mouth clr, nose clr  Neck: no nodes, no JVD, thyroid nl  lungs: decr BS on left; no wheeze  hrt: s1 s2 rrr no murmur  abd: soft, NT/ND, no HS megaly  ext: no edema, no c/c  neuro: aa ox3, cn intact, can move all 4 ext    LABS:                      13.6    (    88.5   5.96  )-----------( ---------      187      ( 09 Nov 2020 06:15 )             43.9    (    15.1     142   (   105   (   86      11-09-20 @ 06:15  ----------------------               3.8   (   28   (   12                             -----                        0.6  Ca  9.3   Mg  --    P   --     LFT  6.9  (  0.4  (  18       11-09-20 @ 06:15  -------------------------  3.9  (  69  (  13    RADIOLOGY & ADDITIONAL TESTS:  < from: CT Angio Chest PE Protocol w/ IV Cont (11.05.20 @ 18:09) >  impression:  In comparison to recent CT chest 10/21/2020, no filling defects in the main pulmonary artery or segmental branches to suggest pulmonary embolus. New loculated pericardial fluid measuring 4.5 x 3.2 cm. Additional findings are unchanged in this short interval follow-up CT.    < end of copied text >    < from: Xray Chest 1 View- PORTABLE-Urgent (11.05.20 @ 15:54) >  Impression:    Complete opacification the left jonathan-thorax, consistent large loculated pleural effusion.    No pneumothorax.    < end of copied text >    < from: TTE Echo Complete w/o Contrast w/ Doppler (11.06.20 @ 13:50) >  Summary:   1. Left ventricular ejection fraction, by visual estimation, is 55 to 60%.   2. The left ventricular diastolic function could not be assessed in this study.   3. Normal left atrial size.   4. Normal right atrial size.   5. No evidence of mitral valve regurgitation.    < end of copied text >      MEDICATIONS:    atorvastatin 20 milliGRAM(s) Oral at bedtime  naproxen 500 milliGRAM(s) Oral two times a day  oxycodone    5 mG/acetaminophen 325 mG 1 Tablet(s) Oral every 6 hours PRN  pantoprazole    Tablet 40 milliGRAM(s) Oral before breakfast

## 2020-12-02 ENCOUNTER — LABORATORY RESULT (OUTPATIENT)
Age: 45
End: 2020-12-02

## 2020-12-02 ENCOUNTER — APPOINTMENT (OUTPATIENT)
Dept: HEMATOLOGY ONCOLOGY | Facility: CLINIC | Age: 45
End: 2020-12-02
Payer: MEDICARE

## 2020-12-02 DIAGNOSIS — I67.89 OTHER CEREBROVASCULAR DISEASE: ICD-10-CM
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
## 2020-12-02 LAB
HCT VFR BLD CALC: 45.3 %
HGB BLD-MCNC: 14.2 G/DL
MCHC RBC-ENTMCNC: 27.5 PG
MCHC RBC-ENTMCNC: 31.3 G/DL
MCV RBC AUTO: 87.6 FL
PLATELET # BLD AUTO: 195 K/UL
PMV BLD: 11 FL
RBC # BLD: 5.17 M/UL
RBC # FLD: 15.2 %
WBC # FLD AUTO: 7.06 K/UL

## 2020-12-02 PROCEDURE — 99214 OFFICE O/P EST MOD 30 MIN: CPT

## 2020-12-02 NOTE — PHYSICAL EXAM
[Ambulatory and capable of all self care but unable to carry out any work activities] : Status 2- Ambulatory and capable of all self care but unable to carry out any work activities. Up and about more than 50% of waking hours [Normal] : affect appropriate [de-identified] : B/L LEs do not appear swollen. [de-identified] : Unsteady on feet. Ambulates with walker and requires assistance. Memory and balance is better [de-identified] : Forgetful, but stable.

## 2020-12-02 NOTE — REVIEW OF SYSTEMS
[Anxiety] : anxiety [Negative] : Heme/Lymph [Fever] : no fever [Fatigue] : no fatigue [Recent Change In Weight] : ~T no recent weight change [Shortness Of Breath] : no shortness of breath [Cough] : no cough [SOB on Exertion] : no shortness of breath during exertion [Confused] : no confusion [Dizziness] : no dizziness [Difficulty Walking] : no difficulty walking [Easy Bleeding] : no tendency for easy bleeding [FreeTextEntry9] : b/l flank pain  [de-identified] : memory deficit, reports that dizziness is  much better.

## 2020-12-02 NOTE — REASON FOR VISIT
[Follow-Up Visit] : a follow-up [Other: _____] : [unfilled] [[employer] ] : provided by [employer]   [FreeTextEntry1] : [id number]  [FreeTextEntry2] : Rosemary [TWNoteComboBox1] : [place]

## 2020-12-02 NOTE — HISTORY OF PRESENT ILLNESS
[Disease: _____________________] : Disease: [unfilled] [AJCC Stage: ____] : AJCC Stage: [unfilled] [Treatment Protocol] : Treatment Protocol [de-identified] : 41/M presenting for follow-up for metastatic ALK-positive lung adenocarcinoma.  He is currently on alectinib 600 mg BID.  He initially presented in 11/2014 with multiple pulmonary nodules, mediastinal lymphadenopathy, multiple bony metastases, and brain metastases.  He was initially started on crizotinib and was switched to ceritinib in 12/2015 upon progression.  He was unable to tolerate ceritinib due to significant vomiting and was switched to alectinib in 02/2016.\par \par He had SRS to a left cerebellar hemisphere brain metastasis (08/2015) and subsequently underwent WBRT for CNS progression (10-11/2015).  He had RT to a painful metastatic lesion in the right humerus (01/2015).  He had also received Xgeva for bony metastatic disease in the past. \par \par Since 09/2016, he has been experiencing intermittent headaches being treated with periodic dexamethasone taper.  MRI brain with perfusion analysis showed bilateral cerebellar lesions, favor postRT changes rather than residual tumor.  His most recent brain MRI (11/01/2016) showed overall  stable  exam  compared  to  the  previous  brain  MRI  9/2/2016; no  significant  interval  change  in  bilateral  cerebellar  heterogeneously  enhancing  masses  with  extensive  associated  edema,  as  well  as  smaller  lesions  within  the  left  parietal  white  matter,  superficial  left  temporal  lobe  and  left  dorsal  medulla; and, stable  mild  ventriculomegaly  and  mild  periventricular  FLAIR  signal  abnormality.  He has been following with our neurosurgeon, Dr. Britt, who has recommended no surgical intervention at this time. \par \par His most recent PETCT (11/01/2016) showed no sites of pathologic FDG uptake suspicious for biologic tumor activity.\par \par He reports occasional headaches and a mild sense of imbalance.  Denies any falls.  However he only takes a prn dexamethasone, once in every three days,  which reportedly relieves his intermittent headaches. \par Today he also complains mild pain in his left great toe, likely secondary to ingrowing toe nail. 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 He changed his insurance. Did not get scans and did not take his alectinib for about 1-2 weeks. We were not notified.  Otherwise she feels well she really requires dexamethasone yesterday headache probably once or twice a week as per patient no other complaints.\par \par 4/17/17\par He presents for follow up today. He had MRI of brain that showed stable disease 4/8/2017. He has not had the PET CT yet. He received  his alecetinib on 4/4/17 and stated to take them. States his headaches are rare now and feels well otherwise.\par \par 5/15/17\par He presents for follow up. He had a PET CT on 4/21/17 that  was ucnhged.Since November 1, 2016, no new foci of pathologic FDG uptake to suggest\par biologic tumor activity. . 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 . Unchanged non-FDG avid sclerotic foci involving the right proximal humerus\par and T9 vertebral body.\par \par He  feels well. No dizziness or headache.  No new symptoms or complaints.\par \par 6/19/17\par He is doing well. He has no complaints. No headaches. No SOB. \par \par 7/24/17\par Patient comes in for follow up visit, has no complaints, has no SOB and has a mild cough which is chronic, no hemoptysis. Patient states his appetite is good, weight is stable, he has no headaches and is currently off the steroids. Patient will have repeat PET scan and MRI of the brain with contrast as well as repeat bloodwork. \par \par 8/21/17\par He is here for follow up. He had an MR brain and PET CT on 8/3 and 8/2 that did not show progression, were essentially unchanged.  He feels great otherwise.\par \par 9/21/17\par He is doing well. No complaints. No headaches\par \par October 26, 2017\par He see her for followup he feels great he has no complaints.\par \par 11/16/17\par He is here for follow up. He had an MR of brain on 11/14/17: New areas of nodular enhancement along the inferior surfaces of the temporal lobes bilaterally. These are relatively symmetric and just above the level of the cerebellar enhancing masses, suggesting that these may reflect post treatment change.\par He had to reschedule his PET and states he will do it next Wednesday.\par \par No complaints.\par \par 12/26/17\par He is here for follow up. He feels well. he  had PET/CT in end of November that does not show procession, stable pleural findings. He  feels well. \par \par 1/22/18\par He is here for follow up for his NSCLCA. He states he feels well. No SOB, no headaches no fatigue.. \par \par February 21 2018\par  he is here for followup, he had a recent PET/CT scan which is PRESLEY he also had a recent MRI of the brain. This was reviewed at the CNS tumor board although wasn't clear but the consensus was that this is posttreatment changes in the fact that he is asymptomatic and decided to observe him.\par \par He has no new complaints no headaches no weakness or tingling.\par \par \par 3/21/18\par He is here for follow up. HE states he is doing well. Has very mild headaches on occasion but otherwise doing well.\par \par 4/13/18\par He is doing well. Reports no new complaints \par \par 5/3/18\par He is here for follow up to discuss his MR brain. HE complains of mild headache. Same memory issues as before. Balance is the same.  MR brain showed : Increased size of right parietotemporal of the findings enhancement  seen lesion measuring 5.2 cm, previously 2.8 centimeters and increased  size of left inferior temporal lobe mass measuring 2.3 cm, previously 1.6  cm. these areas again demonstrate hypoperfusion and may represent post  therapeutic changes.   2.  Significant increased edema and mass effect within the right  hemisphere with 9 mm right to left midline shift.  3.  No new enhancing lesions. Stable additional bilateral cerebellar and  supratentorial lesions.\par \par I spoke with Dr. 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 He developed severe headaches, some difficulty with speech and balance issues. 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 He has no insurance so my pharmacy has been giving  him a weekly supply while we work on it. He did not have his scans yet. He feel much better now except for dizziness\par \par 5/30/18\par He is feeling much better. His neurological complaints resolved. He stopped steroids on Monday since he had swelling in legs that has resolved. \par \par 6/13/18\par He is here for follow up. he had a PET/CT on 6/7/18 that showed These images reveal, when comparing the FDG brain images to MRI of  4/27/2018 series 10, there is one small focus of increased FDG uptake in  the region of the right parietotemporal lobe lesion (series 12 image 34).  Therefore persistent biologic tumor activity in this region cannot be  excluded.  No focal abnormal FDG uptake corresponding with the left inferior  temporal lobe lesion and both cerebellar lesions. In addition most of the  right parietotemporal lobe lesion is not FDG avid Compared to normal brain glucose metabolism in the thalamus (surrogate  for normal brain metabolism) relative hypometabolism of the right  parietal, posterior parietal and occipital region and relative  hypermetabolism of the left posterior parietal region  Persistent posttreatment FDG uptake (SUV up to 11.2) along the pleural  surface of the left hemithorax consistent with posttreatment changes  associated with talc pleurodesis  One new mildly FDG avid soft tissue nodule (SUV 3.3) in the left buttock \par \par He feels well now ,no headaches.\par \par 7/16/18\par He is here for follow up. doing well. He has no headaches. No new complaints\par \par 8/13/18\par He is here for follow up.  He is doing well. has no complaints. He is considering returning to work.\par \par 9/10/18\par He is here for follow up. He had an MR brain on 8/24/18 that showed In comparison to the previous brain MRI dated 4/27/2018:  1.  No significant interval change in the size and configuration of the  heterogeneously enhancing, necrotic and hemorrhagic lesions within the  temporal/occipital lobes and cerebellar hemispheres. Enhancement pattern  suggests post-therapeutic changes.  2.  Moderately increased edema within the posterior fossa (cerebellar  hemispheres and brainstem). Increased mass effect upon the fourth  ventricle with no evidence of hydrocephalus.  3.  The left cerebral hemispheric edema has mildly increased and the  right cerebral hemispheric edema has mildly decreased.   4.  Resolution of the previously seen right to left midline shift, right  lateral ventricular compression and left lateral ventricular enlargement.  5.  Treated lesions again noted in the left frontal white matter and left  lateral medulla, without contrast enhancement.\par \par HE is doing well has no new complaints. Has no neuroglial complaints. \par \par 10/10/18\par He is here for follow up he had PET/CT on  9/28/18 that was PRESLEY. He no has headaches again. States he is voiding multiple times a day and unable to empty bladder fully.\par \par 10/30/18\par He is here for follow up. he was in the ED for.Left lower quadrant pain symptoms her was given cirpo. he had a Ct abd/pelvis in ED 10/22/18 : Stable posttreatment changes of the left hemithorax including large  loculated pleural effusion.  2.  Multiple nondilated fluid-filled small bowel loops which may reflect  evidence of enteritis.  \par He had MR brain on 10/29/18:  No new lesions, but several existing lesions have increased in size  2. Bilateral hemorrhagic cerebellar lesions and edema has increased. Mass  effect on the fourth ventricle with hydrocephalus.   2.  Essentially unchanged sizes of supratentorial lesions within the  bilateral temporooccipital lobes and mildly improved edema\par \par He is having headaches and more confusion. He took 4 flomax in am and PM instead of his alecensa. I called [facility] with him and they told us they did not deliver his pills since they could not reach him but will deliver them tomorrow. He will be home. He states he had alecensa at home but not sure.\par \par 11/12/18\par Patient presents to the office with a chief complaint of burning in his right thigh.  He has had no new medications.  The patient states it is worse in the evening but lasts all day.  The patient states that the burning sensation starts at the right knee and is ascending.  Straight leg raise negative.  Discussed that it is unlikely that it is from his Avastin treatment, but unsure the etiology.  We will send for LE doppler to rule out any DVT.\par \par 11/27/18\par He is here for follow up. His Dopplers  were negative and his pain/cramps are nearly gone only some in right thigh. His headaches are gone and he is speech is  better and states memory is better since we started Avastin.\par \par 1/9/19\par He is doing well.  He has no more pain in his legs. He is eating well. He is due for 4/4 dose of Avastin. No other complains.\par \par 2/6/19\par He is here for follow-up.  He generally feels well.  He states he still suffers from headaches.  His memory has improved minimally.\par PET/CT 2/4/19 IMPRESSION: Since 9/28/2018, No definite sites of abnormal FDG uptake to suggest biologic tumor activity. Cerebellar lesions seen on brain MRI from 1/20/2019, are not FDG avid and consistent with treated metastases. Post talc pleurodesis of the left pleura, unchanged. \par MR Brain 1/20/19 IMPRESSION: In comparison to the previous brain MRI dated 10/29/2018: 1. No significant interval change in the size and configuration of the heterogeneously enhancing, necrotic and hemorrhagic lesions within the temporal/occipital lobes and cerebellar hemispheres. 2. The edema within the brainstem and cerebellum is about stable;however there is slight progression of the hydrocephalus since the prior exam. 3. The edema within the temporal and occipital lobes has decreased. 4. New patchy area of restricted diffusion around the left temporal horn (without corresponding enhancement) may reflect superimposed ischemic changes. Recommend attention on follow-up imaging. \par \par 3/6/19\par Patient is here for a follow-up visit.  He states he is having increased frequency of headaches with minimal relief from Tylenol.  He is now staying in the shelter on [address] with his daughter.  He will  his medications from [facility] from now on.  We are awaiting arrangement for him to get an apartment.\par \par 3/27/19\par He is here for follow up. his headache is better and he takes his Dexamethasone only when he has it which helps. Last PET was in February and last MRI was in January.\par \par 4/29/19\par He is here for follow-up.  Since last visit, he presented to the ED in 4/2019 due to chest pain.  He underwent CTA chest that demonstrated unchanged left pleural effusion. Likely radiation induced necrosis changes, he states it may have been related to stress related to his daughter.  He is still awaiting placement from the shelter.  He reports his usual frequency headaches, but he takes the Decadron as needed which helps.  \par CTA Chest (4/7/19) IMPRESSION:No pulmonary emboli.20.9 cm pleural fluid collection in the left hemithorax, essentially unchanged since PET/CT dated 2/4/2019.\par MR Brain (4/2/19) IMPRESSION: Since January 20, 2019:New worse signal abnormality involving the brain and volodymyr with increased edema and worse effacement of the fourth ventricle and resulting mild hydrocephalus.Slightly worse edema involving the cerebellum.Stable bilateral posterior temporal/occipital/cerebellar heterogeneously enhancing lesions.Stable left posterior frontal deep white matter and left dorsal medullary hypointense foci, compatible with treated lesions.\par \par 5/22/19\par He came in for follow up. He has been complaining of headaches mainly in AM. He does not think dexamethasone is helping anymore. No other issues.\par \par 6/3/19\par He is here for follow up. HIs headacehs are the same and uses dexamethasone 4 mg almost dialy. He had  CT C/A/P that showed a stable effusion 5/26/19 amd MR brain showed  5/29/19: Overall no significant changes compared with the previous brain MRI dated \par 4/2/2019:\par \par 1.  No significant interval change in the size and configuration of the \par heterogeneously enhancing, necrotic and hemorrhagic lesions within the \par temporal/occipital lobes and cerebellar hemispheres.\par \par 2. Extensive edema within the cerebellar hemispheres, brainstem, parietal \par and temporal lobes is again seen. Patchy areas of enhancement are noted \par in this region. Appearance is most suggestive of posttreatment changes.\par \par 3. Stable patchy area of restricted diffusion around the left temporal \par horn again seen, nonspecific.\par \par 4. No new enhancing lesions identified.\par \par 6/24/19\par He is here for follow up. he missed his Avastin since he had family issues. Still has headaches they are the same. Will restart Avastin today,.\par \par 7/15/19\par He is here for follow up. He could not tell me if headaches are better. He does use steroids once  in  a while for headaches. States memory is the same. \par \par \par 8/14/19\par He is doing better. He Missed his Avastin doses. But he has no headaches and does not use steroids. He had a fall  and hit a rail on his chest prior to his CT scans and the finding in his CT chest is probably related to the trauma he still has pain in the area but its better. CT C/A/P and MR brain from august are bellow\par \par IMPRESSION:\par \par Since April 7, 2019\par \par Stable left pleural fluid collection measuring 9.5 x 14.1 by 18.4 cm, \par with stable compressive atelectasis of left lung and left hemidiaphragm \par inversion.\par \par New, indeterminate approximate 8 mm soft tissue density, left anterior \par chest (series 4/104).\par \par Interval development of approximate 3.6 x 1.8 by 3.9 cm homogeneous \par elliptical shaped structure, left epicardial fat pad (4/206), possibly \par proteinaceous material (HU +35).\par \par \par IMPRESSION:\par \par Within the left cardiophrenic angle, there is a new area of lobulated \par soft tissue measuring 4.0 x 2.5 cm suspicious for new metastatic disease.\par \par Please see chest CT report for chest findings\par \par IMPRESSION:\par \par Overall no significant changes compared with the previous brain MRI dated \par 5/29/2019:\par \par 1.  No significant interval change in the size and configuration of the \par heterogeneously enhancing, necrotic and hemorrhagic lesions within the \par temporal/occipital lobes and cerebellar hemispheres.\par \par 2. Extensive edema within the cerebellar hemispheres, brainstem, parietal \par and temporal lobes is again seen. Patchy areas of enhancement are noted \par in this region. Appearance is most suggestive of posttreatment changes.\par \par 3. No new enhancing lesions identified.\par \par 10/01/19\par Pt presented today for routine follow up visit. He reports feeling fine and has no new complaints . Pt reports no new symptoms of weakness , headaches and confusion and is well compliant with treatment . \par \par \par 11/11/19\par He is here for follow up.   He feels well. No headaches.  Has a runny nose. No pain. No SOB. He gained a few  pounds.\par \par 12/9/19\par He is here for follow up. He had his MR brain on  11/16/19IMPRESSION: \par 1. Overall no significant changes compared with the previous brain MRI dated 8/5/2019. \par 2. Specifically, the heterogeneously enhancing, necrotic and hemorrhagic lesions within the temporal/occipital \par lobes and cerebellar hemispheres; with extensive associated edema and patchy enhancement are not \par significantly changed and most compatible with posttreatment changes. \par 3. No new enhancing lesions identified.\par \par He did not do his CTs yet. \par \par He feels well. No headaches. \par \par 1/13/2020\par Patient is here for a follow-up visit for metastatic lung cancer.  He is feeling well with no new complaints.  Most recent CBC is stable.  Patient denies fever, chills, nausea, vomiting, new pain or bleeding.  We discussed that based on most recent imaging, it may be suggestive that he is progressing on current treatment regimen.  \par PET/CT (1.7.20) IMPRESSION: Since February 4, 2019:1. Redemonstration of large left pleural fluid collection with circumferential FDG uptake along the pleural surface and associated calcifications (max SUV up to 18, [id number]), consistent with posttreatment change related to talc pleurodesis. New 5.8 x 3.5 cm lesion along the left posterior costophrenic angle, with peripheral FDG avidity, max SUV14. Given talc pleurodesis, findings may represent evolving inflammatory response, however neoplasm remains a consideration, and continued follow-up or tissue sampling recommended.2. Unchanged non-FDG avid sclerotic, treated osseous metastases in the right proximal humerus and T9 vertebral body with compression deformity.\par \par I presented him in tumor board and we decided to biopsy the new area. \par \par 2/4/2020-Interpretor services used as pt is Lithuanian speaking # [id number]\par  Dionisio is here for follow up complaining of b/l flank pain. No c/o fever, chills, dysuria, hematuria at home. No change in bowel habits. He describes dull pain b/l flank area that is mostly in the morning. Today during the office visit, he did not have much pain \par \par 3/17/20\par He is here for follow up. He underwent the biopsy of the  left pleural mass on 3/12/20 that showed Poorly differentiated malignant neoplasm. IHC is pending.  he is doing well otherwise. has minimal headaches. Not dizzy\par \par 4/1/2020: Dionisio is here for follow up, he started Lobrena on 3/17/2020, he is taking 100mg daily, reports no side effects. He reports "little headache" in the morning that is relieved with Tylenol, no other symptoms, no vision changes, no seizure like activity, no N/V, he reports no SOB, no palpitations, heart exam is RRR. No fevers. He will continue with Lobrena as prescribed. \par \par 6/22/2020\par Patient is here for a follow-up visit for metastatic lung cancer.  Since last visit he had COVID was in [facility].  he had a prolonged course. I spoke to his Neurosurgeon and he had MR brain that showed radiation necrosis and he had steroids. He only took Lorbrena for one month, Confirmed with pharmacy.  He is here with his ex wife. We used a pacific  Rosemary ID  [id number] \par He has been taking his Lorbreana since May 28 2020, he gets it from [facility] now, He is on [place] 2[place] from his hospitalization. He lives with his brother in law. He states he is not SOB. HE was unsteady on his feet. He felt well otherwise. \par \par 7/20/2020\par Patient presents for follow up of metastatic lung cancer. He is accompanied by his ex-wife.  [id number] used to communicate with patient. Patient is unsteady on his feet, ambulates with a walker, and requires someone to remain nearby for assistance. Patient fell last week and hurt 5th toe on right foot, although it seems to be improved now. ROS is significant for foot swelling. Results of recent MRI brain, CT chest/abdomen/pelvis reviewed. He has been taking lorlatinib since 5/28/2020. Ex-wife has noticed that feet are swollen and has attributed his symptoms to ativan, stating that she has switched from giving it to him every day to every other day.\par \par 8/24/2020\par Patient presents for follow up of metastatic lung cancer. He is accompanied by family. Family requested  services.  [id number] used. Patient went to ED with complaints of hemoptysis. CTA chest showed no evidence of PE and showed that pulmonary mass is stable. Patient brought tissue showing small blood clots from earlier today. He continues to take lorlatinib. Family continues to express concern for leg swelling/pain. She notes increased appetite and is concerned that his cholesterol is high. Patient does note that dizziness has improved since starting avastin.\par \par 9/30/20\par He is here for follow up.  HE is here with his wife. We used a  but Dionisio was answering all the questions in English. He is much better in regards to balance, and memory and communication. He only has mild occasional blood tinged sputum \par \par 10/28/2020\par Patient presents for follow up of metastatic lung cancer, accompanied by family members.  He has been tolerating the Lobrena once daily.  We reviewed findings of most recent imaging which shows essentially stable findings in the brain and abdomen/pelvis.  The official report for the CT Chest is still pending but may warrant PET/CT to determine if any true progression.  Patient denies fever, chills, nausea, vomiting, dizziness, seizure activity or bleeding.  \par CT A/P (10.21.2020) IMPRESSION:1.  No findings of new or progressive metastatic disease in the abdomen or pelvis. 2.  Left posterior chest mass broadly abutting/invading the diaphragm - see separately dictated CT chest report for detailed evaluation including complete assessment of the thorax.\par MR Head (10.13.2020) IMPRESSION:Since prior MR of 7/2/2020,Decreased periventricular T2 and FLAIR hyperintensities, compatible with evolving posttreatment changes.Grossly stable bilateral cerebellar hemorrhagic masses. Unchanged mass effect upon the fourth ventricle and stable ventriculomegaly.No evidence of new enhancing intracranial lesions.\par \par 12/2/2020\par He is here for follow up. He was recenly admitted to [facility] due to chest pain. Work up was negative. It was more of a social admission and he is now in Nursing home. \par \par His CT chest from 10/21/20: Posterior costophrenic cystic masses have increased in size since 7/7/2020 and were not within the field-of-view on the prior exam from 8/11/2020.\par For example, the left posterior costophrenic angle mass now measures approximately 4.8 x 3.8 cm and axial planes, previously 4.3 x 3.2 cm on comparable re-measurements (series 4/214).\par The medial posterior costophrenic angle mass now measures approximately 3.5 x 3.4 cm, previously 3.2 x 2.9 cm on comparable re-measurements (4/211).\par Anterior calcified recess mass measures approximately 3.2 x 1.9 cm, previously 2.3 x 1.9 cm of (series 602/130).\par \par \par He had CTA chest done on 11/5/20 when he was admitted: Finding impression:\par In comparison to recent CT chest 10/21/2020, no filling defects in the main pulmonary artery or segmental branches to suggest pulmonary embolus. New loculated pericardial fluid measuring 4.5 x 3.2 cm. Additional findings are unchanged in this short interval follow-up CT.\par \par An echo did not demonstrate a pericardial effusion it was his known pleural effusion.\par \par He states memorry and balance is fine. His main complaints are dissatisfaction with facility due to food in particular.  He had blood work done in facility that showed  11/10  normal CBC and CMP ( scanned) Covid PCR negative as well. \par

## 2020-12-02 NOTE — ASSESSMENT
[Palliative] : Goals of care discussed with patient: Palliative [FreeTextEntry1] : 1.  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 Due to insurance issues he stopped it and restarted it 4/4/2017. He progressed on alectinib and began taking lorlatinib on 5/28/2020.\par - Repeat MRI brain 8/2018 radiation related changes, post treatment changes, and repeat PET/CT 6/5/18   is without systemic progression.  It confirmed radiation necrosis. PET/CT on 9/28/18 was PRESLEY. CT abdomen was unchanged.\par - Repeat PET/CT from 01/2020 shows new FDG-avid lesions in the lung right under his left effusion on top of the diaphragm.\par - He underwent the biopsy of the  left pleural mass on 3/12/20 that showed poorly differentiated malignant neoplasm. IHC c/w non-small cell carcinoma of the lung, faovring adenocarcinoma, TTF 1 positive.\par - Per Dr. Richardson, he is not a candidate for radiation to the site of progression.\par - We did foundation one on his re-biopsy that showed EML4-Alk variant 3a/b, other mutations CDKN2a and B loss, LLL2, MTAP, SMARCa4, Tp53\par - He was started on lorlatinib in March 2020, took it for about one month, had a prolonged COVID hospitalization, restarted on 5/28/2020.\par - The FDA approval for lorlatinib is based on the results of a phase II study ([id number]) that included a subgroup of 198 patients with ALK-positive metastatic NSCLC previously treated with =1 ALK inhibitor.  The overall response rate with lorlatinib among these patients was 47 percent, with a complete response rate of 2 percent and a partial response rate of 45 percent. Efficacy according to prior treatment was as follows: post-crizotinib, KENNEY 73 percent, median PFS 11.1 months, and median duration of response not reached; post-one or more second-generation ALK inhibitors, KENNEY 40 percent, median PFS 6.9 months, median duration of response 7.1 months [66]. The most frequent adverse effects were hypercholesterolemia (81 percent), hypertriglyceridemia (61 percent), edema (43 percent), and peripheral neuropathy (30 percent). Serious treatment-related adverse effects occurred in 7 percent of patients, the most frequent being cognitive deficits in 1 percent. \par - Repeat CT C/A/P and MRI brain performed for new baseline. CT abdomen/pelvis negative. CT chest demonstrated increase in size of costophrenic mass from 6 cm on 3/10/2020 to 6.5 cm on 7/7/2020. 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 Reordered Avastin x 4 doses. Patient received 2 doses then developed hemoptysis, so held remaining doses \par - If unable to control neurologic symptoms with medical management, neurosurgeon Dr. Britt will do shunt placement.\par -  8/2020 CTA chest performed in ED showed that lung mass is stable.\par - CT A/P and MR Head from 10/2020 essentially show stable disease; CT chest showed possible progression but CTA chest that was done on recent admission did not mention progression, I asked it to get reviewed but it was not done. Clincally he is doing well I did speak to the radiologist who read the CT chest in  October and it it was not entirely clear if he has definitive progression  thus I decided to [place] or[place] will repeat a CT C/A/P prior to next visit\par \par  We discussed treatment options if determined to have true intrathoracic progression including chemo + immunotherapy.\par -\par \par RTC in 1 month, will call with results of scan if they show progression\par \par He is thinking about moving to [place] to live with his sister \par \par Contact. Ex wife: [phone], Brother Caesar

## 2020-12-03 LAB
ALBUMIN SERPL ELPH-MCNC: 4.5 G/DL
ALP BLD-CCNC: 76 U/L
ALT SERPL-CCNC: 12 U/L
ANION GAP SERPL CALC-SCNC: 12 MMOL/L
AST SERPL-CCNC: 16 U/L
BILIRUB SERPL-MCNC: 0.3 MG/DL
BUN SERPL-MCNC: 13 MG/DL
CALCIUM SERPL-MCNC: 9.5 MG/DL
CHLORIDE SERPL-SCNC: 103 MMOL/L
CO2 SERPL-SCNC: 27 MMOL/L
CREAT SERPL-MCNC: 0.8 MG/DL
GLUCOSE SERPL-MCNC: 129 MG/DL
POTASSIUM SERPL-SCNC: 4.1 MMOL/L
PROT SERPL-MCNC: 7.8 G/DL
SODIUM SERPL-SCNC: 142 MMOL/L

## 2020-12-08 ENCOUNTER — NON-APPOINTMENT (OUTPATIENT)
Age: 45
End: 2020-12-08

## 2020-12-08 NOTE — ED ADULT TRIAGE NOTE - AS HEIGHT TYPE
1. Allergic dermatitis  - we'll plan for patch testing to photosensitive medications as well as airborne   - please bring you topical medications, oral medications, and aleve with tylenol  - in the interim, please use the mometasone cream daily for 4-5 days   - afterwards, please switch to elidel for twice a day use (Mon-Fri)   - do mometasone on the weekends daily  - dilute the solution from the compounding pharmacy in a ratio of 1:10   - put in fridge   - put on towel and make compress to use twice a day  - we'll also do some intradermal testing       Patch Testing Information  What are allergen patch tests?    The test is done to look for skin allergies that may be causing rashes and irritation.    A patch test is a way of identifying whether a substance has caused a delayed reaction with skin inflammation, such as contact eczema or delayed (after days) reactions to drugs.     We will use various types of test compounds, which may include common allergens you may come in contact with in daily life such as preservatives, fragrances or even drugs.     Most of the time we will use standardized, prefabricated test solutions. The choice of the substances and series tested will depend on your history of reactions. Sometimes we will test your own products as well.     In order to avoid severe toxic reactions we need detailed information or safety sheets for each of the test compounds.    The test panels are set up with a small amount of common substances that cause skin allergies. They are taped to your skin with a clear hypoallergenic bandage and reinforced hypoallergenic tape.    The substances are numbered, so it is easy to tell what is causing a skin reaction.  What do I need to do in preparation for the test?    Stop all systemic steroids 1 month prior to the testing.     Stop applying topical steroids to the test area one week prior to the test. It is to use them elsewhere throughout the testing process. If this  is not possible then discuss options with the Allergy specialist.    Do not go sunbathing or tanning for one week prior to testing    It is okay to take antihistamines as normal.     Please wear dark colors the week of the test since we will write on you with a dark marker that may transfer and stain clothing or bedding.     Some medications can affect the reactions to allergens during the tests. Therefore reveal all the medications you take to the allergy team. The doctor will discuss the medications with you before the tests.     Eat how you normally would.    Avoid the following:    You cannot get the test area wet during the entire test period. This means no bathing or swimming the entire test period.    No strenuous exercise that may cause sweating.    Do not scratch the test area, this can cause the allergen to spread and give us false positives.     Avoid exposure to UV irradiation. This means no tanning or UV treatment during the testing period.   What can I expect?    Patch testing is done over three appointments.   o The usual schedule is: Monday (Allergen patches are placed), Wednesday (Patches are removed and skin is examined by the MD), and Friday (Skin is examined by the MD)      If you are allergic, there will be an area of irritation where the test was placed.    Itching or burning at the test site might happen if you are allergic to the allergen.  Do not rub or scratch the test site since this may spread the allergen and possibly cause false positives. If itching or burning is not tolerable please contact the clinic.    The marker we draw on your back with ma take up to 5 weeks to wash off completely. Rubbing alcohol can help speed up this process.     Reactions can occur 1 to 2 weeks after the tests are applied. If this happens please take photos of the area and contact the clinic.  What should I do after the tests are placed?    Keep the area dry. No showering or getting the test area wet from the  time we see you at your first visit until after your third appointment. If you get the test area wet you are washing off the test and we could get false negative reactions.    If you notice any of the test patches coming loose put on more tape to re-secure the test.    If the marker that is applied fades you can use a dark pen to es around the panel sites.    Cover the test area when you are outside to avoid any sun exposure while the test is in place.     You can remove the tests only if there is a severe reaction (itch, pain, blistering). Please report this to your doctor immediately. If you have to remove the tests please es the locations of each test field with a grid so we can identify the allergen.  WHAT ARE THE POSSIBLE RISKS OF PATCH TESTS     If you are allergic to a compound tested you will develop a localized skin reaction similar to your previous reaction, this may take days to develop. These reactions include a formation of red, itchy skin lesions that could be about a centimeter with small vesicles or possibly even blisters. The lesions will fade over time, this may take weeks. The test might leave some skin pigmentation for a few months.     In rare cases a localized reaction to patch testing can become generalized.     The tests with your own products might have some risks because they are not standardized and unanticipated reactions could occur. We need as much information as possible to evaluate if your own products are safe to test and under what conditions. This has to be evaluated for each individual product.   Useful Contact Information    To contact your doctor you can either send a [employer] message or call [phone]    Address  o [address] [place] [address], [address]    If you develop any serious or adverse allergic reaction after office hours please seek immediate medical assistance at the nearest clinic, urgent care, or emergency room.    Removal of Patch Tests on Day  3:    1. Remove patches and tape from one test area (one rectangle)          2. Using the purple surgical markers provided (or other permanent marker), draw a grid around the test area so that the circular indentation is in the center of each square, as below. Try to be as neat as possible and keep the lines as straight as you can (you can use a ruler if you need to)          3. Redraw the number that was underneath the tape above the grids, as shown below. Try to print clearly.          4. Repeat the process for the remaining test areas.          5. Photograph the entire area then take close-up photos of any possible reactions. Examples of possible reactions are spots that look like these:          6. Return to clinic for evaluation as instructed. You should continue to avoid getting the area wet (no showering or strenuous exercise), exposing the area to UV light, using topical steroids, or scratching.         Who should I call with questions?    [facility], [place]: [phone]    For urgent needs outside of business hours call the [facility] at [phone] and ask for the dermatology resident on call      If you develop any serious or adverse allergic reaction after office hours please seek immediate medical assistance at the nearest clinic or emergency room        WHAT IS A SKIN PRICK TEST?   A skin prick test is a simple and reliable diagnostic test used to identify substances ( allergens ) responsible for triggering the symptoms of allergy. The basic skin prick testing panel includes common allergens, such as house dust mites, molds, dog and cat allergens and pollen allergens. We have other more specialized series for various food allergens and sometimes we test your own foods directly on your skin as well. Tests will usually be performed on the skin of the forearm. The skin may develop a red and itchy reaction which can be an indication of an allergy to the tested  substance, but will be confirmed by discussion with the allergy specialist     HOW IS A SKIN PRICK TEST PERFORMED?   The skin will be disinfected with alcohol, then marked and numbered with a pen to identify the areas where the specific allergens will be tested.   Afterwards a drop of the allergen solution will be placed on the skin and then the skin gently pricked using the tip of a specially designed prick needle.   With this procedure the most superficial part of the skin will be pierced to allow the test solution to diffuse into the skin and make contact with the reactive immune cells.   After 20 min the area will be examined and evaluated for possible allergic reactions.     WHAT ARE THE POSSIBLE RISKS OF A SKIN PRICK TEST?   If the skin reacts it will develop red, itchy wheals of 5-30mm diameter.   The wheal and itch will usually disappear spontaneously after 1-2 hours.   Sometimes there might be a delayed reactions after days and this has to be reported to the treating allergy specialist (could be another kind of reaction pattern and important piece of information).   Rarely there are more serious generalized allergic reactions observed and therefore you will be under observation of the allergy team during the entire procedure.   There is a small risk for some bleeding and skin infection by the SPT.    WHAT DO I NEED TO DO BEFORE SKIN PRICK TEST?  Stop all antihistamines for at lease 1 week.  Stop all oral steroids at lease 1 month prior to testing.     WHAT IS AN INTRADERMAL TEST (IDT)?   An intradermal test (IDT) is basically a continuation of the SPT and is sometimes proposed if the skin prick test is negative and the person is strongly suspected to have an allergy to a particular substance. IDT is particularly used for diagnosis of drug allergies and only sterile solutions will be tested by IDT. Because more allergen is delivered to the skin than in SPT the IDT can add more sensitivity to the allergy  tests, but with a higher potential risk.     HOW IS AN INTRADERMAL TEST (IDT) PERFORMED?   A small amount (~ 0.05ml) of the suspected allergen is injected with a very fine insulin needle just beneath the skin. The injections are made at spaced intervals after disinfection and marking of the skin areas. The tests are usually performed on the forearm (rarely back). The initial test will be started with a very diluted solution and if the results are negatives the procedure might be repeated with serial dilutions of higher concentrations. Therefore, the estimated duration of this test is about 45-60 min     WHAT ARE THE POSSIBLE RISKS OF IDT?   If the skin reacts it will develop red, itchy wheals of 5-30mm diameter. The wheal and itch will usually disappear spontaneously after 1-2 hours. Sometimes there might be a delayed reactions after days and this has to be reported to the treating allergy specialist (coult be another kind of reaction pattern and important piece of information). Rarely there are more serious generalized allergic reactions observed and therefore you will be under observation of the allergy team during the entire procedure. Only sterile solutions will be used for injections, but there is still a small risk for infections or unpredictable local toxic reactions by the allergens. Some transient bleeding might occur.       stated

## 2020-12-14 NOTE — ED PROVIDER NOTE - NSTIMEPROVIDERCAREINITIATE_GEN_ER
02-Jul-2018 15:04 [facility] at [address], [address] [facility] [address] [facility]  Ranjith Pears: [phone]  F: [phone]      Reason for Visit:   Crys Diaz is a 39 y.o. female who is seen by synchronous (real-time) audio-video technology in consultation at the request of Skiff NP for evaluation of high ferritin. Treatment History:   None from us    History of Present Illness:   Crys Diaz is a pleasant 39 y.o. female who presents today for evaluation of elevated ferritin. Pt states she is having chronic diarrhea. Has joint pains in hands. C/o fatigue. Has DIAZ and sees GI. Has gastroparesis. Had CT in ER 9/20 DIAZ with enhancing lesions in right hepatic lobe of uncertain significance. Ferritin 206  Iron profile sat normal 21%. No hx of blood problems or cancer. Feels ok today. No F/C/CP/SOB/N/V/D/pain. .       Past Medical History:   Diagnosis Date    Adverse effect of anesthesia     hard to wake up    Anxiety     Arthritis     back    Asperger syndrome     Asthma     Depression     Gastroparesis     Hypercholesterolemia     IBD (inflammatory bowel disease)     Ill-defined condition     discomfort hips shoulders knees and feet and hands MAURICIO was positive    Irritable bowel disease 1995    Neurological disorder     Psychiatric disorder     anxiety and depression      Past Surgical History:   Procedure Laterality Date    HX HYSTERECTOMY  4/14/16    da Dotty Robotic Total Laparoscopic Hysterectomy with bilateral  salpingectomy more than 250 grams. Cystoscopy.     HX ORTHOPAEDIC      Spinal fusion, back surgery,rods and screws    HX ORTHOPAEDIC      achilles lenghtening    HX ORTHOPAEDIC      laminectomy    HX OTHER SURGICAL      tumor removed from nose as child    HX OTHER SURGICAL      dental extraction      Social History     Tobacco Use    Smoking status: Former Smoker     Packs/day: 1.00     Years: 10.00     Pack years: 10.00     Last attempt to quit: 2004     Years since quittin.6    Smokeless tobacco: Never Used   Substance Use Topics    Alcohol use: Not Currently      Family History   Problem Relation Age of Onset    Cancer Mother     Heart Disease Father     Depression Father     Cancer Sister     Alzheimer Maternal Grandmother     Diabetes Maternal Grandfather     Heart Surgery Paternal Grandmother     Heart Surgery Paternal Grandfather      Current Outpatient Medications   Medication Sig    pantoprazole (PROTONIX) 20 mg tablet Take 1 Tab by mouth daily.  sertraline (ZOLOFT) 100 mg tablet Take  by mouth daily.  traZODone (DESYREL) 50 mg tablet Take 1 Tab by mouth nightly.  atorvastatin (LIPITOR) 20 mg tablet Take 1 Tab by mouth daily.  LORazepam (ATIVAN) 1 mg tablet Take 1 Tab by mouth two (2) times a day. Max Daily Amount: 2 mg. Prn anxiety. DO NOT USE DAILY  Indications: anxious    albuterol (PROVENTIL HFA, VENTOLIN HFA, PROAIR HFA) 90 mcg/actuation inhaler Take 1 Puff by inhalation every six (6) hours as needed for Wheezing.  diclofenac (VOLTAREN) 1 % gel Apply 2 g to affected area four (4) times daily.  aspirin delayed-release 81 mg tablet Take  by mouth daily.  CETIRIZINE HCL (ZYRTEC PO) Take  by mouth as needed. No current facility-administered medications for this visit. Allergies   Allergen Reactions    Latex Rash     cellulitis    Sulfa (Sulfonamide Antibiotics) Anaphylaxis    Morphine Rash    Neomycin-Bacitracin-Polymyxin Other (comments)    Neosporin [Benzalkonium Chloride] Rash    Adhesive Tape-Silicones Rash        Review of Systems: A complete review of systems was obtained, negative except as described above.     Physical Exam:       Constitutional: [x] Appears well-developed and well-nourished [x] No apparent distress      [] Abnormal -     Mental status: [x] Alert and awake  [x] Oriented to person/place/time [x] Able to follow commands    [] Abnormal -     Eyes:   EOM    [x]  Normal [] Abnormal -   Sclera  [x]  Normal    [] Abnormal -          Discharge [x]  None visible   [] Abnormal -     HENT: [x] Normocephalic, atraumatic  [] Abnormal -   [x] Mouth/Throat: Mucous membranes are moist    External Ears [x] Normal  [] Abnormal -    Neck: [x] No visualized mass [] Abnormal -     Pulmonary/Chest: [x] Respiratory effort normal   [x] No visualized signs of difficulty breathing or respiratory distress        [] Abnormal -      Musculoskeletal:   [x] Normal gait with no signs of ataxia         [x] Normal range of motion of neck        [] Abnormal -     Neurological:        [x] No Facial Asymmetry (Cranial nerve 7 motor function) (limited exam due to video visit)          [x] No gaze palsy        [] Abnormal -          Skin:        [x] No significant exanthematous lesions or discoloration noted on facial skin         [] Abnormal -            Psychiatric:       [x] Normal Affect [] Abnormal -        [x] No Hallucinations    Other pertinent observable physical exam findings:-    Due to this being a TeleHealth evaluation (During OVKUT-10 public health emergency), many elements of the physical examination are unable to be assessed. Evaluation of the following organ systems was limited: Vitals/Constitutional/EENT/Resp/CV/GI//MS/Neuro/Skin/Heme-Lymph-Imm. Results:     Lab Results   Component Value Date/Time    WBC 9.3 09/25/2020 04:47 PM    HGB 15.6 09/25/2020 04:47 PM    HCT 46.5 09/25/2020 04:47 PM    PLATELET 840 52/54/2266 04:47 PM    MCV 86.0 09/25/2020 04:47 PM    ABS.  NEUTROPHILS 6.1 09/25/2020 04:47 PM     Lab Results   Component Value Date/Time    Sodium 140 10/12/2020 09:48 AM    Potassium 4.2 10/12/2020 09:48 AM    Chloride 103 10/12/2020 09:48 AM    CO2 22 10/12/2020 09:48 AM    Glucose 98 10/12/2020 09:48 AM    BUN 10 10/12/2020 09:48 AM    Creatinine 0.80 10/12/2020 09:48 AM    GFR est  10/12/2020 09:48 AM    GFR est non-AA 90 10/12/2020 09:48 AM    Calcium 10.2 10/12/2020 09:48 AM     Lab Results   Component Value Date/Time    Bilirubin, total 0.7 10/12/2020 09:48 AM    ALT (SGPT) 36 (H) 10/12/2020 09:48 AM    Alk. phosphatase 87 10/12/2020 09:48 AM    Protein, total 7.1 10/12/2020 09:48 AM    Albumin 4.7 10/12/2020 09:48 AM    Globulin 4.1 (H) 09/25/2020 04:47 PM         Records reviewed and summarized above. Pathology report(s) reviewed above. Radiology report(s) reviewed above. Assessment/PLAN:     1. Elevated ferritin/ normal sat. Ferritin 206. Iron sat normal.   CBC normal with normal diff. Ferritin is likely reactive. Will re check labs. Recommend fu with GI/ Liver team/ PCP. 2) DIAZ  CT 9/20  IMPRESSION:  1. Diffuse hepatic steatosis. Numerous subcentimeter enhancing lesions in right  hepatic lobe of uncertain significance. 2. Status post cholecystectomy and hysterectomy. 3. Small bilateral inguinal hernias containing fat. Reviewed with pt today. Pt is for liver MRI but did not know she had this ordered. Pt will do and fu with GI. 3) HTN/ gastroparesis, hypercholesterolemia, anxiety, inflammatory bowel disease/ diarrhea/ calcified LNs/ elevated LFTs. .  Per PCP/ GI. Fu here in a month if needed  Call if questions    I appreciate the opportunity to participate in Ms. Jak Olivera's care. Signed By: Stacey Orellana DO      I was in office while conducting this encounter. The patient was at her home. Consent:  She and/or her healthcare decision maker is aware that this patient-initiated Telehealth encounter is a billable service, with coverage as determined by her insurance carrier.  She is aware that she may receive a bill and has provided verbal consent to proceed: yes    Pursuant to the emergency declaration under the [employer] and the [facility], 1135 waiver authority and the [employer] and [employer] General Act, this Virtual  Visit was conducted, with patient's (and/or legal guardian's) consent, to reduce the patient's risk of exposure to COVID-19 and provide necessary medical care. Services were provided through a video synchronous discussion virtually to substitute for in-person clinic visit.

## 2021-01-04 ENCOUNTER — NON-APPOINTMENT (OUTPATIENT)
Age: 46
End: 2021-01-04

## 2021-01-11 ENCOUNTER — RESULT REVIEW (OUTPATIENT)
Age: 46
End: 2021-01-11

## 2021-01-11 ENCOUNTER — OUTPATIENT (OUTPATIENT)
Dept: OUTPATIENT SERVICES | Facility: HOSPITAL | Age: 46
LOS: 1 days | Discharge: HOME | End: 2021-01-11
Payer: MEDICARE

## 2021-01-11 DIAGNOSIS — C78.00 SECONDARY MALIGNANT NEOPLASM OF UNSPECIFIED LUNG: ICD-10-CM

## 2021-01-11 PROCEDURE — 71260 CT THORAX DX C+: CPT | Mod: 26

## 2021-01-11 PROCEDURE — 74177 CT ABD & PELVIS W/CONTRAST: CPT | Mod: 26

## 2021-01-13 ENCOUNTER — APPOINTMENT (OUTPATIENT)
Dept: HEMATOLOGY ONCOLOGY | Facility: CLINIC | Age: 46
End: 2021-01-13
Payer: MEDICARE

## 2021-01-13 ENCOUNTER — LABORATORY RESULT (OUTPATIENT)
Age: 46
End: 2021-01-13

## 2021-01-13 PROCEDURE — 99214 OFFICE O/P EST MOD 30 MIN: CPT

## 2021-01-13 NOTE — REASON FOR VISIT
[Follow-Up Visit] : a follow-up [Other: _____] : [unfilled] [[employer] ] : provided by [employer]   [FreeTextEntry1] : 191746  [FreeTextEntry2] : Rosemary [TWNoteComboBox1] : [place]

## 2021-01-13 NOTE — REVIEW OF SYSTEMS
[Fever] : no fever [Fatigue] : no fatigue [Recent Change In Weight] : ~T no recent weight change [Shortness Of Breath] : no shortness of breath [Cough] : no cough [SOB on Exertion] : no shortness of breath during exertion [Confused] : no confusion [Dizziness] : no dizziness [Difficulty Walking] : no difficulty walking [Anxiety] : anxiety [Easy Bleeding] : no tendency for easy bleeding [Negative] : Heme/Lymph [de-identified] : memory deficit, reports that dizziness is  much better.

## 2021-01-13 NOTE — HISTORY OF PRESENT ILLNESS
[Disease: _____________________] : Disease: [unfilled] [AJCC Stage: ____] : AJCC Stage: [unfilled] [de-identified] : ALK+ lung adenocarcinoma [de-identified] : 41/M presenting for follow-up for metastatic ALK-positive lung adenocarcinoma.  He is currently on alectinib 600 mg BID.  He initially presented in 11/2014 with multiple pulmonary nodules, mediastinal lymphadenopathy, multiple bony metastases, and brain metastases.  He was initially started on crizotinib and was switched to ceritinib in 12/2015 upon progression.  He was unable to tolerate ceritinib due to significant vomiting and was switched to alectinib in 02/2016.\par \par He had SRS to a left cerebellar hemisphere brain metastasis (08/2015) and subsequently underwent WBRT for CNS progression (10-11/2015).  He had RT to a painful metastatic lesion in the right humerus (01/2015).  He had also received Xgeva for bony metastatic disease in the past. \par \par Since 09/2016, he has been experiencing intermittent headaches being treated with periodic dexamethasone taper.  MRI brain with perfusion analysis showed bilateral cerebellar lesions, favor postRT changes rather than residual tumor.  His most recent brain MRI (11/01/2016) showed overall  stable  exam  compared  to  the  previous  brain  MRI  9/2/2016; no  significant  interval  change  in  bilateral  cerebellar  heterogeneously  enhancing  masses  with  extensive  associated  edema,  as  well  as  smaller  lesions  within  the  left  parietal  white  matter,  superficial  left  temporal  lobe  and  left  dorsal  medulla; and, stable  mild  ventriculomegaly  and  mild  periventricular  FLAIR  signal  abnormality.  He has been following with our neurosurgeon, Dr. Britt, who has recommended no surgical intervention at this time. \par \par His most recent PETCT (11/01/2016) showed no sites of pathologic FDG uptake suspicious for biologic tumor activity.\par \par He reports occasional headaches and a mild sense of imbalance.  Denies any falls.  However he only takes a prn dexamethasone, once in every three days,  which reportedly relieves his intermittent headaches. \par Today he also complains mild pain in his left great toe, likely secondary to ingrowing toe nail. 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 He changed his insurance. Did not get scans and did not take his alectinib for about 1-2 weeks. We were not notified.  Otherwise she feels well she really requires dexamethasone yesterday headache probably once or twice a week as per patient no other complaints.\par \par 4/17/17\par He presents for follow up today. He had MRI of brain that showed stable disease 4/8/2017. He has not had the PET CT yet. He received  his alecetinib on 4/4/17 and stated to take them. States his headaches are rare now and feels well otherwise.\par \par 5/15/17\par He presents for follow up. He had a PET CT on 4/21/17 that  was ucnhged.Since November 1, 2016, no new foci of pathologic FDG uptake to suggest\par biologic tumor activity. . 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 . Unchanged non-FDG avid sclerotic foci involving the right proximal humerus\par and T9 vertebral body.\par \par He  feels well. No dizziness or headache.  No new symptoms or complaints.\par \par 6/19/17\par He is doing well. He has no complaints. No headaches. No SOB. \par \par 7/24/17\par Patient comes in for follow up visit, has no complaints, has no SOB and has a mild cough which is chronic, no hemoptysis. Patient states his appetite is good, weight is stable, he has no headaches and is currently off the steroids. Patient will have repeat PET scan and MRI of the brain with contrast as well as repeat bloodwork. \par \par 8/21/17\par He is here for follow up. He had an MR brain and PET CT on 8/3 and 8/2 that did not show progression, were essentially unchanged.  He feels great otherwise.\par \par 9/21/17\par He is doing well. No complaints. No headaches\par \par October 26, 2017\par He see her for followup he feels great he has no complaints.\par \par 11/16/17\par He is here for follow up. He had an MR of brain on 11/14/17: New areas of nodular enhancement along the inferior surfaces of the temporal lobes bilaterally. These are relatively symmetric and just above the level of the cerebellar enhancing masses, suggesting that these may reflect post treatment change.\par He had to reschedule his PET and states he will do it next Wednesday.\par \par No complaints.\par \par 12/26/17\par He is here for follow up. He feels well. he  had PET/CT in end of November that does not show procession, stable pleural findings. He  feels well. \par \par 1/22/18\par He is here for follow up for his NSCLCA. He states he feels well. No SOB, no headaches no fatigue.. \par \par February 21 2018\par  he is here for followup, he had a recent PET/CT scan which is PRESLEY he also had a recent MRI of the brain. This was reviewed at the CNS tumor board although wasn't clear but the consensus was that this is posttreatment changes in the fact that he is asymptomatic and decided to observe him.\par \par He has no new complaints no headaches no weakness or tingling.\par \par \par 3/21/18\par He is here for follow up. HE states he is doing well. Has very mild headaches on occasion but otherwise doing well.\par \par 4/13/18\par He is doing well. Reports no new complaints \par \par 5/3/18\par He is here for follow up to discuss his MR brain. HE complains of mild headache. Same memory issues as before. Balance is the same.  MR brain showed : Increased size of right parietotemporal of the findings enhancement  seen lesion measuring 5.2 cm, previously 2.8 centimeters and increased  size of left inferior temporal lobe mass measuring 2.3 cm, previously 1.6  cm. these areas again demonstrate hypoperfusion and may represent post  therapeutic changes.   2.  Significant increased edema and mass effect within the right  hemisphere with 9 mm right to left midline shift.  3.  No new enhancing lesions. Stable additional bilateral cerebellar and  supratentorial lesions.\par \par I spoke with Dr. 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 He developed severe headaches, some difficulty with speech and balance issues. 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 He has no insurance so my pharmacy has been giving  him a weekly supply while we work on it. He did not have his scans yet. He feel much better now except for dizziness\par \par 5/30/18\par He is feeling much better. His neurological complaints resolved. He stopped steroids on Monday since he had swelling in legs that has resolved. \par \par 6/13/18\par He is here for follow up. he had a PET/CT on 6/7/18 that showed These images reveal, when comparing the FDG brain images to MRI of  4/27/2018 series 10, there is one small focus of increased FDG uptake in  the region of the right parietotemporal lobe lesion (series 12 image 34).  Therefore persistent biologic tumor activity in this region cannot be  excluded.  No focal abnormal FDG uptake corresponding with the left inferior  temporal lobe lesion and both cerebellar lesions. In addition most of the  right parietotemporal lobe lesion is not FDG avid Compared to normal brain glucose metabolism in the thalamus (surrogate  for normal brain metabolism) relative hypometabolism of the right  parietal, posterior parietal and occipital region and relative  hypermetabolism of the left posterior parietal region  Persistent posttreatment FDG uptake (SUV up to 11.2) along the pleural  surface of the left hemithorax consistent with posttreatment changes  associated with talc pleurodesis  One new mildly FDG avid soft tissue nodule (SUV 3.3) in the left buttock \par \par He feels well now ,no headaches.\par \par 7/16/18\par He is here for follow up. doing well. He has no headaches. No new complaints\par \par 8/13/18\par He is here for follow up.  He is doing well. has no complaints. He is considering returning to work.\par \par 9/10/18\par He is here for follow up. He had an MR brain on 8/24/18 that showed In comparison to the previous brain MRI dated 4/27/2018:  1.  No significant interval change in the size and configuration of the  heterogeneously enhancing, necrotic and hemorrhagic lesions within the  temporal/occipital lobes and cerebellar hemispheres. Enhancement pattern  suggests post-therapeutic changes.  2.  Moderately increased edema within the posterior fossa (cerebellar  hemispheres and brainstem). Increased mass effect upon the fourth  ventricle with no evidence of hydrocephalus.  3.  The left cerebral hemispheric edema has mildly increased and the  right cerebral hemispheric edema has mildly decreased.   4.  Resolution of the previously seen right to left midline shift, right  lateral ventricular compression and left lateral ventricular enlargement.  5.  Treated lesions again noted in the left frontal white matter and left  lateral medulla, without contrast enhancement.\par \par HE is doing well has no new complaints. Has no neuroglial complaints. \par \par 10/10/18\par He is here for follow up he had PET/CT on  9/28/18 that was PRESLEY. He no has headaches again. States he is voiding multiple times a day and unable to empty bladder fully.\par \par 10/30/18\par He is here for follow up. he was in the ED for.Left lower quadrant pain symptoms her was given cirpo. he had a Ct abd/pelvis in ED 10/22/18 : Stable posttreatment changes of the left hemithorax including large  loculated pleural effusion.  2.  Multiple nondilated fluid-filled small bowel loops which may reflect  evidence of enteritis.  \par He had MR brain on 10/29/18:  No new lesions, but several existing lesions have increased in size  2. Bilateral hemorrhagic cerebellar lesions and edema has increased. Mass  effect on the fourth ventricle with hydrocephalus.   2.  Essentially unchanged sizes of supratentorial lesions within the  bilateral temporooccipital lobes and mildly improved edema\par \par He is having headaches and more confusion. He took 4 flomax in am and PM instead of his alecensa. I called [facility] with him and they told us they did not deliver his pills since they could not reach him but will deliver them tomorrow. He will be home. He states he had alecensa at home but not sure.\par \par 11/12/18\par Patient presents to the office with a chief complaint of burning in his right thigh.  He has had no new medications.  The patient states it is worse in the evening but lasts all day.  The patient states that the burning sensation starts at the right knee and is ascending.  Straight leg raise negative.  Discussed that it is unlikely that it is from his Avastin treatment, but unsure the etiology.  We will send for LE doppler to rule out any DVT.\par \par 11/27/18\par He is here for follow up. His Dopplers  were negative and his pain/cramps are nearly gone only some in right thigh. His headaches are gone and he is speech is  better and states memory is better since we started Avastin.\par \par 1/9/19\par He is doing well.  He has no more pain in his legs. He is eating well. He is due for 4/4 dose of Avastin. No other complains.\par \par 2/6/19\par He is here for follow-up.  He generally feels well.  He states he still suffers from headaches.  His memory has improved minimally.\par PET/CT 2/4/19 IMPRESSION: Since 9/28/2018, No definite sites of abnormal FDG uptake to suggest biologic tumor activity. Cerebellar lesions seen on brain MRI from 1/20/2019, are not FDG avid and consistent with treated metastases. Post talc pleurodesis of the left pleura, unchanged. \par MR Brain 1/20/19 IMPRESSION: In comparison to the previous brain MRI dated 10/29/2018: 1. No significant interval change in the size and configuration of the heterogeneously enhancing, necrotic and hemorrhagic lesions within the temporal/occipital lobes and cerebellar hemispheres. 2. The edema within the brainstem and cerebellum is about stable;however there is slight progression of the hydrocephalus since the prior exam. 3. The edema within the temporal and occipital lobes has decreased. 4. New patchy area of restricted diffusion around the left temporal horn (without corresponding enhancement) may reflect superimposed ischemic changes. Recommend attention on follow-up imaging. \par \par 3/6/19\par Patient is here for a follow-up visit.  He states he is having increased frequency of headaches with minimal relief from Tylenol.  He is now staying in the shelter on [address] with his daughter.  He will  his medications from [facility] from now on.  We are awaiting arrangement for him to get an apartment.\par \par 3/27/19\par He is here for follow up. his headache is better and he takes his Dexamethasone only when he has it which helps. Last PET was in February and last MRI was in January.\par \par 4/29/19\par He is here for follow-up.  Since last visit, he presented to the ED in 4/2019 due to chest pain.  He underwent CTA chest that demonstrated unchanged left pleural effusion. Likely radiation induced necrosis changes, he states it may have been related to stress related to his daughter.  He is still awaiting placement from the shelter.  He reports his usual frequency headaches, but he takes the Decadron as needed which helps.  \par CTA Chest (4/7/19) IMPRESSION:No pulmonary emboli.20.9 cm pleural fluid collection in the left hemithorax, essentially unchanged since PET/CT dated 2/4/2019.\par MR Brain (4/2/19) IMPRESSION: Since January 20, 2019:New worse signal abnormality involving the brain and volodymyr with increased edema and worse effacement of the fourth ventricle and resulting mild hydrocephalus.Slightly worse edema involving the cerebellum.Stable bilateral posterior temporal/occipital/cerebellar heterogeneously enhancing lesions.Stable left posterior frontal deep white matter and left dorsal medullary hypointense foci, compatible with treated lesions.\par \par 5/22/19\par He came in for follow up. He has been complaining of headaches mainly in AM. He does not think dexamethasone is helping anymore. No other issues.\par \par 6/3/19\par He is here for follow up. HIs headacehs are the same and uses dexamethasone 4 mg almost dialy. He had  CT C/A/P that showed a stable effusion 5/26/19 amd MR brain showed  5/29/19: Overall no significant changes compared with the previous brain MRI dated \par 4/2/2019:\par \par 1.  No significant interval change in the size and configuration of the \par heterogeneously enhancing, necrotic and hemorrhagic lesions within the \par temporal/occipital lobes and cerebellar hemispheres.\par \par 2. Extensive edema within the cerebellar hemispheres, brainstem, parietal \par and temporal lobes is again seen. Patchy areas of enhancement are noted \par in this region. Appearance is most suggestive of posttreatment changes.\par \par 3. Stable patchy area of restricted diffusion around the left temporal \par horn again seen, nonspecific.\par \par 4. No new enhancing lesions identified.\par \par 6/24/19\par He is here for follow up. he missed his Avastin since he had family issues. Still has headaches they are the same. Will restart Avastin today,.\par \par 7/15/19\par He is here for follow up. He could not tell me if headaches are better. He does use steroids once  in  a while for headaches. States memory is the same. \par \par \par 8/14/19\par He is doing better. He Missed his Avastin doses. But he has no headaches and does not use steroids. He had a fall  and hit a rail on his chest prior to his CT scans and the finding in his CT chest is probably related to the trauma he still has pain in the area but its better. CT C/A/P and MR brain from august are bellow\par \par IMPRESSION:\par \par Since April 7, 2019\par \par Stable left pleural fluid collection measuring 9.5 x 14.1 by 18.4 cm, \par with stable compressive atelectasis of left lung and left hemidiaphragm \par inversion.\par \par New, indeterminate approximate 8 mm soft tissue density, left anterior \par chest (series 4/104).\par \par Interval development of approximate 3.6 x 1.8 by 3.9 cm homogeneous \par elliptical shaped structure, left epicardial fat pad (4/206), possibly \par proteinaceous material (HU +35).\par \par \par IMPRESSION:\par \par Within the left cardiophrenic angle, there is a new area of lobulated \par soft tissue measuring 4.0 x 2.5 cm suspicious for new metastatic disease.\par \par Please see chest CT report for chest findings\par \par IMPRESSION:\par \par Overall no significant changes compared with the previous brain MRI dated \par 5/29/2019:\par \par 1.  No significant interval change in the size and configuration of the \par heterogeneously enhancing, necrotic and hemorrhagic lesions within the \par temporal/occipital lobes and cerebellar hemispheres.\par \par 2. Extensive edema within the cerebellar hemispheres, brainstem, parietal \par and temporal lobes is again seen. Patchy areas of enhancement are noted \par in this region. Appearance is most suggestive of posttreatment changes.\par \par 3. No new enhancing lesions identified.\par \par 10/01/19\par Pt presented today for routine follow up visit. He reports feeling fine and has no new complaints . Pt reports no new symptoms of weakness , headaches and confusion and is well compliant with treatment . \par \par \par 11/11/19\par He is here for follow up.   He feels well. No headaches.  Has a runny nose. No pain. No SOB. He gained a few  pounds.\par \par 12/9/19\par He is here for follow up. He had his MR brain on  11/16/19IMPRESSION: \par 1. Overall no significant changes compared with the previous brain MRI dated 8/5/2019. \par 2. Specifically, the heterogeneously enhancing, necrotic and hemorrhagic lesions within the temporal/occipital \par lobes and cerebellar hemispheres; with extensive associated edema and patchy enhancement are not \par significantly changed and most compatible with posttreatment changes. \par 3. No new enhancing lesions identified.\par \par He did not do his CTs yet. \par \par He feels well. No headaches. \par \par 1/13/2020\par Patient is here for a follow-up visit for metastatic lung cancer.  He is feeling well with no new complaints.  Most recent CBC is stable.  Patient denies fever, chills, nausea, vomiting, new pain or bleeding.  We discussed that based on most recent imaging, it may be suggestive that he is progressing on current treatment regimen.  \par PET/CT (1.7.20) IMPRESSION: Since February 4, 2019:1. Redemonstration of large left pleural fluid collection with circumferential FDG uptake along the pleural surface and associated calcifications (max SUV up to 18, [id number]), consistent with posttreatment change related to talc pleurodesis. New 5.8 x 3.5 cm lesion along the left posterior costophrenic angle, with peripheral FDG avidity, max SUV14. Given talc pleurodesis, findings may represent evolving inflammatory response, however neoplasm remains a consideration, and continued follow-up or tissue sampling recommended.2. Unchanged non-FDG avid sclerotic, treated osseous metastases in the right proximal humerus and T9 vertebral body with compression deformity.\par \par I presented him in tumor board and we decided to biopsy the new area. \par \par 2/4/2020-Interpretor services used as pt is Romanian speaking # [id number]\par  Dionisio is here for follow up complaining of b/l flank pain. No c/o fever, chills, dysuria, hematuria at home. No change in bowel habits. He describes dull pain b/l flank area that is mostly in the morning. Today during the office visit, he did not have much pain \par \par 3/17/20\par He is here for follow up. He underwent the biopsy of the  left pleural mass on 3/12/20 that showed Poorly differentiated malignant neoplasm. IHC is pending.  he is doing well otherwise. has minimal headaches. Not dizzy\par \par 4/1/2020: Dionisio is here for follow up, he started Lobrena on 3/17/2020, he is taking 100mg daily, reports no side effects. He reports "little headache" in the morning that is relieved with Tylenol, no other symptoms, no vision changes, no seizure like activity, no N/V, he reports no SOB, no palpitations, heart exam is RRR. No fevers. He will continue with Lobrena as prescribed. \par \par 6/22/2020\par Patient is here for a follow-up visit for metastatic lung cancer.  Since last visit he had COVID was in [facility].  he had a prolonged course. I spoke to his Neurosurgeon and he had MR brain that showed radiation necrosis and he had steroids. He only took Lorbrena for one month, Confirmed with pharmacy.  He is here with his ex wife. We used a pacific  Rosemary ID  [id number] \par He has been taking his Lorbreana since May 28 2020, he gets it from [facility] now, He is on [place] 2[place] from his hospitalization. He lives with his brother in law. He states he is not SOB. HE was unsteady on his feet. He felt well otherwise. \par \par 7/20/2020\par Patient presents for follow up of metastatic lung cancer. He is accompanied by his ex-wife.  [id number] used to communicate with patient. Patient is unsteady on his feet, ambulates with a walker, and requires someone to remain nearby for assistance. Patient fell last week and hurt 5th toe on right foot, although it seems to be improved now. ROS is significant for foot swelling. Results of recent MRI brain, CT chest/abdomen/pelvis reviewed. He has been taking lorlatinib since 5/28/2020. Ex-wife has noticed that feet are swollen and has attributed his symptoms to ativan, stating that she has switched from giving it to him every day to every other day.\par \par 8/24/2020\par Patient presents for follow up of metastatic lung cancer. He is accompanied by family. Family requested  services.  [id number] used. Patient went to ED with complaints of hemoptysis. CTA chest showed no evidence of PE and showed that pulmonary mass is stable. Patient brought tissue showing small blood clots from earlier today. He continues to take lorlatinib. Family continues to express concern for leg swelling/pain. She notes increased appetite and is concerned that his cholesterol is high. Patient does note that dizziness has improved since starting avastin.\par \par 9/30/20\par He is here for follow up.  HE is here with his wife. We used a  but Dionisio was answering all the questions in English. He is much better in regards to balance, and memory and communication. He only has mild occasional blood tinged sputum \par \par 10/28/2020\par Patient presents for follow up of metastatic lung cancer, accompanied by family members.  He has been tolerating the Lobrena once daily.  We reviewed findings of most recent imaging which shows essentially stable findings in the brain and abdomen/pelvis.  The official report for the CT Chest is still pending but may warrant PET/CT to determine if any true progression.  Patient denies fever, chills, nausea, vomiting, dizziness, seizure activity or bleeding.  \par CT A/P (10.21.2020) IMPRESSION:1.  No findings of new or progressive metastatic disease in the abdomen or pelvis. 2.  Left posterior chest mass broadly abutting/invading the diaphragm - see separately dictated CT chest report for detailed evaluation including complete assessment of the thorax.\par MR Head (10.13.2020) IMPRESSION:Since prior MR of 7/2/2020,Decreased periventricular T2 and FLAIR hyperintensities, compatible with evolving posttreatment changes.Grossly stable bilateral cerebellar hemorrhagic masses. Unchanged mass effect upon the fourth ventricle and stable ventriculomegaly.No evidence of new enhancing intracranial lesions.\par \par 12/2/2020\par He is here for follow up. He was recenly admitted to [facility] due to chest pain. Work up was negative. It was more of a social admission and he is now in Nursing home. \par \par His CT chest from 10/21/20: Posterior costophrenic cystic masses have increased in size since 7/7/2020 and were not within the field-of-view on the prior exam from 8/11/2020.\par For example, the left posterior costophrenic angle mass now measures approximately 4.8 x 3.8 cm and axial planes, previously 4.3 x 3.2 cm on comparable re-measurements (series 4/214).\par The medial posterior costophrenic angle mass now measures approximately 3.5 x 3.4 cm, previously 3.2 x 2.9 cm on comparable re-measurements (4/211).\par Anterior calcified recess mass measures approximately 3.2 x 1.9 cm, previously 2.3 x 1.9 cm of (series 602/130).\par \par \par He had CTA chest done on 11/5/20 when he was admitted: Finding impression:\par In comparison to recent CT chest 10/21/2020, no filling defects in the main pulmonary artery or segmental branches to suggest pulmonary embolus. New loculated pericardial fluid measuring 4.5 x 3.2 cm. Additional findings are unchanged in this short interval follow-up CT.\par \par An echo did not demonstrate a pericardial effusion it was his known pleural effusion.\par \par He states memorry and balance is fine. His main complaints are dissatisfaction with facility due to food in particular.  He had blood work done in facility that showed  11/10  normal CBC and CMP ( scanned) Covid PCR negative as well. \par \par \par 1/13/21\par HE is doing well. Here with a walker and his aid. He had restating CT C/A/P  on 1/11/21 only sublet growth in his mass. \par Subtle interval growth of low attenuating masses, abutting left hemidiaphragm/spleen\par (series 4/216 versus series 4/211 from October 21, 2020 study.). Current measurements approximately 9 x 9 cm, previously 8.6 x 7.7 cm..\par \par Hypoattenuating left prevascular structure (4/108), corresponding to the site of previously loculated pericardial fluid collection.\par No other significant interval change.\par

## 2021-01-13 NOTE — PHYSICAL EXAM
[Ambulatory and capable of all self care but unable to carry out any work activities] : Status 2- Ambulatory and capable of all self care but unable to carry out any work activities. Up and about more than 50% of waking hours [Normal] : affect appropriate [de-identified] : B/L LEs do not appear swollen. [de-identified] : Unsteady on feet. Ambulates with walker and requires assistance. Memory and balance is better [de-identified] : Forgetful, but stable.

## 2021-01-13 NOTE — ASSESSMENT
[FreeTextEntry1] : 1.  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 Due to insurance issues he stopped it and restarted it 4/4/2017. He progressed on alectinib and began taking lorlatinib on 5/28/2020.\par - Repeat MRI brain 8/2018 radiation related changes, post treatment changes, and repeat PET/CT 6/5/18   is without systemic progression.  It confirmed radiation necrosis. PET/CT on 9/28/18 was PRESLEY. CT abdomen was unchanged.\par - Repeat PET/CT from 01/2020 shows new FDG-avid lesions in the lung right under his left effusion on top of the diaphragm.\par - He underwent the biopsy of the  left pleural mass on 3/12/20 that showed poorly differentiated malignant neoplasm. IHC c/w non-small cell carcinoma of the lung, faovring adenocarcinoma, TTF 1 positive.\par - Per Dr. Richardson, he is not a candidate for radiation to the site of progression.\par - We did foundation one on his re-biopsy that showed EML4-Alk variant 3a/b, other mutations CDKN2a and B loss, LLL2, MTAP, SMARCa4, Tp53\par - He was started on lorlatinib in March 2020, took it for about one month, had a prolonged COVID hospitalization, restarted on 5/28/2020.\par - The FDA approval for lorlatinib is based on the results of a phase II study ([id number]) that included a subgroup of 198 patients with ALK-positive metastatic NSCLC previously treated with =1 ALK inhibitor.  The overall response rate with lorlatinib among these patients was 47 percent, with a complete response rate of 2 percent and a partial response rate of 45 percent. Efficacy according to prior treatment was as follows: post-crizotinib, KENNEY 73 percent, median PFS 11.1 months, and median duration of response not reached; post-one or more second-generation ALK inhibitors, KENNEY 40 percent, median PFS 6.9 months, median duration of response 7.1 months [66]. The most frequent adverse effects were hypercholesterolemia (81 percent), hypertriglyceridemia (61 percent), edema (43 percent), and peripheral neuropathy (30 percent). Serious treatment-related adverse effects occurred in 7 percent of patients, the most frequent being cognitive deficits in 1 percent. \par - Repeat CT C/A/P and MRI brain performed for new baseline. CT abdomen/pelvis negative. CT chest demonstrated increase in size of costophrenic mass from 6 cm on 3/10/2020 to 6.5 cm on 7/7/2020. 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 Reordered Avastin x 4 doses. Patient received 2 doses then developed hemoptysis, so held remaining doses \par - If unable to control neurologic symptoms with medical management, neurosurgeon Dr. Britt will do shunt placement.\par -  8/2020 CTA chest performed in ED showed that lung mass is stable.\par - CT A/P and MR Head from 10/2020 essentially show stable disease; CT chest showed possible progression but CTA chest that was done on recent admission did not mention progression, I asked it to get reviewed but it was not done. Clincally he is doing well I did speak to the radiologist who read the CT chest in  October and it it was not entirely clear if he has definitive progression  thus I decided to jamaica Roman  his Ct C/A/P only show sublte growth in his mass thus decided to c.w  Abel. If he still has future growth depending on the pace will consider switching. He may not do well with chemoimmunotherapy \par \par  We discussed treatment options if determined to have true intrathoracic progression including chemo + immunotherapy.\par -\par RTC in 1 month\par \par He is thinking about moving to [place] to live with his sister \par \par Contact. Ex wife: [phone], Brother Caesar  [Palliative] : Goals of care discussed with patient: Palliative

## 2021-01-14 LAB
ALBUMIN SERPL ELPH-MCNC: 4.3 G/DL
ALP BLD-CCNC: 81 U/L
ALT SERPL-CCNC: 19 U/L
ANION GAP SERPL CALC-SCNC: 11 MMOL/L
AST SERPL-CCNC: 25 U/L
BILIRUB SERPL-MCNC: 0.2 MG/DL
BUN SERPL-MCNC: 11 MG/DL
CALCIUM SERPL-MCNC: 9.2 MG/DL
CHLORIDE SERPL-SCNC: 104 MMOL/L
CO2 SERPL-SCNC: 27 MMOL/L
CREAT SERPL-MCNC: 0.7 MG/DL
GLUCOSE SERPL-MCNC: 124 MG/DL
HCT VFR BLD CALC: 44.3 %
HGB BLD-MCNC: 13.5 G/DL
MCHC RBC-ENTMCNC: 26.7 PG
MCHC RBC-ENTMCNC: 30.5 G/DL
MCV RBC AUTO: 87.5 FL
PLATELET # BLD AUTO: 198 K/UL
PMV BLD: 10.7 FL
POTASSIUM SERPL-SCNC: 4 MMOL/L
PROT SERPL-MCNC: 7.3 G/DL
RBC # BLD: 5.06 M/UL
RBC # FLD: 14.9 %
SODIUM SERPL-SCNC: 142 MMOL/L
WBC # FLD AUTO: 7.52 K/UL

## 2021-01-19 NOTE — PROGRESS NOTE ADULT - SUBJECTIVE AND OBJECTIVE BOX
Caller: Diego Ribeiro    Relationship: Self    Best call back number: [phone]     What form or medical record are you requesting: AN UPDATED RETURN TO WORK NOTE LISTING NO RESTRICTIONS UPON RETURN TO WORK ON Monday 01/25/21     Who is requesting this form or medical record from you: PATIENT TO GO TO EMPLOYER THE [facility] [employer]  - ATTN: PERSONNEL - CARE OF MIMA MERCADO    How would you like to receive the form or medical records (pick-up, mail, fax): [phone]     Timeframe paperwork needed: ASAP / BEFORE Monday 01/25/21     Additional notes: PATIENT SAW DR. LEVY YESTERDAY 01/18/21 FOR RIGHT KNEE & RECEIVED WORK NOTE BUT IT DID NOT LIST THAT PATIENT MAY RETURN TO WORK WITHOUT RESTRICTIONS     PATIENT CALL BACK [phone]     THANKS      
Faxed   
INTERVENTIONAL RADIOLOGY BRIEF-OPERATIVE NOTE    Procedure: CT guided left pleural based mass    Pre-Op Diagnosis: Left pleural based mass    Post-Op Diagnosis: same     Attending: [place]  Resident: None    Anesthesia (type):  [ ] General Anesthesia  [x ] Sedation  [ ] Spinal Anesthesia  [ x] Local/Regional    Contrast: None    Estimated Blood Loss: Minimal, < 5 cc    Condition:   [ ] Critical  [ ] Serious  [ ] Fair   [x] Good    Findings/Follow up Plan of Care:  see full report in pacs  6 cores    Specimens Removed: same    Implants: none    Complications: none    Disposition: Recovery      Please call Interventional Radiology [phone] with any questions, concerns, or issues.
PREOPERATIVE DAY OF PROCEDURE EVALUATION:     I have personally seen and examined this patient. I agree with the history and physical which I have reviewed and noted any changes below:     Plan is for ct guided left lower lung nodule biopsy with conscious sedation     Procedure/ risks/ benefits/ goals/ alternatives were explained. All questions answered. Informed content obtained from patient. Consent placed in chart.

## 2021-02-17 ENCOUNTER — OUTPATIENT (OUTPATIENT)
Dept: OUTPATIENT SERVICES | Facility: HOSPITAL | Age: 46
LOS: 1 days | Discharge: HOME | End: 2021-02-17

## 2021-02-17 ENCOUNTER — NON-APPOINTMENT (OUTPATIENT)
Age: 46
End: 2021-02-17

## 2021-02-17 ENCOUNTER — APPOINTMENT (OUTPATIENT)
Dept: HEMATOLOGY ONCOLOGY | Facility: CLINIC | Age: 46
End: 2021-02-17
Payer: MEDICARE

## 2021-02-17 ENCOUNTER — LABORATORY RESULT (OUTPATIENT)
Age: 46
End: 2021-02-17

## 2021-02-17 VITALS
BODY MASS INDEX: 29.51 KG/M2 | HEART RATE: 76 BPM | TEMPERATURE: 98.1 F | DIASTOLIC BLOOD PRESSURE: 78 MMHG | WEIGHT: 188 LBS | SYSTOLIC BLOOD PRESSURE: 144 MMHG | RESPIRATION RATE: 14 BRPM | HEIGHT: 67 IN

## 2021-02-17 DIAGNOSIS — C78.00 SECONDARY MALIGNANT NEOPLASM OF UNSPECIFIED LUNG: ICD-10-CM

## 2021-02-17 DIAGNOSIS — I67.89 OTHER CEREBROVASCULAR DISEASE: ICD-10-CM

## 2021-02-17 LAB
HCT VFR BLD CALC: 44.6 %
HGB BLD-MCNC: 13.8 G/DL
MCHC RBC-ENTMCNC: 26.4 PG
MCHC RBC-ENTMCNC: 30.9 G/DL
MCV RBC AUTO: 85.4 FL
PLATELET # BLD AUTO: 249 K/UL
PMV BLD: 9.9 FL
RBC # BLD: 5.22 M/UL
RBC # FLD: 14.9 %
WBC # FLD AUTO: 7.89 K/UL

## 2021-02-17 PROCEDURE — 99213 OFFICE O/P EST LOW 20 MIN: CPT

## 2021-02-17 NOTE — PHYSICAL THERAPY INITIAL EVALUATION ADULT - GAIT DISTANCE, PT EVAL
150 feet Alert-The patient is alert, awake and responds to voice. The patient is oriented to time, place, and person. The triage nurse is able to obtain subjective information.

## 2021-02-18 ENCOUNTER — NON-APPOINTMENT (OUTPATIENT)
Age: 46
End: 2021-02-18

## 2021-02-18 ENCOUNTER — EMERGENCY (EMERGENCY)
Facility: HOSPITAL | Age: 46
LOS: 0 days | Discharge: HOME | End: 2021-02-18
Attending: EMERGENCY MEDICINE | Admitting: EMERGENCY MEDICINE
Payer: MEDICARE

## 2021-02-18 VITALS
SYSTOLIC BLOOD PRESSURE: 156 MMHG | OXYGEN SATURATION: 95 % | HEIGHT: 68 IN | HEART RATE: 100 BPM | TEMPERATURE: 97 F | RESPIRATION RATE: 18 BRPM | DIASTOLIC BLOOD PRESSURE: 60 MMHG

## 2021-02-18 VITALS
SYSTOLIC BLOOD PRESSURE: 125 MMHG | DIASTOLIC BLOOD PRESSURE: 77 MMHG | HEART RATE: 94 BPM | OXYGEN SATURATION: 97 % | RESPIRATION RATE: 18 BRPM

## 2021-02-18 DIAGNOSIS — D64.9 ANEMIA, UNSPECIFIED: ICD-10-CM

## 2021-02-18 DIAGNOSIS — Z85.118 PERSONAL HISTORY OF OTHER MALIGNANT NEOPLASM OF BRONCHUS AND LUNG: ICD-10-CM

## 2021-02-18 DIAGNOSIS — Z88.8 ALLERGY STATUS TO OTHER DRUGS, MEDICAMENTS AND BIOLOGICAL SUBSTANCES: ICD-10-CM

## 2021-02-18 DIAGNOSIS — R79.9 ABNORMAL FINDING OF BLOOD CHEMISTRY, UNSPECIFIED: ICD-10-CM

## 2021-02-18 LAB
ALBUMIN SERPL ELPH-MCNC: 3.9 G/DL — SIGNIFICANT CHANGE UP (ref 3.5–5.2)
ALBUMIN SERPL ELPH-MCNC: 4.2 G/DL
ALP BLD-CCNC: 337 U/L
ALP SERPL-CCNC: 293 U/L — HIGH (ref 30–115)
ALT FLD-CCNC: 69 U/L — HIGH (ref 0–41)
ALT SERPL-CCNC: 97 U/L
ANION GAP SERPL CALC-SCNC: 11 MMOL/L — SIGNIFICANT CHANGE UP (ref 7–14)
ANION GAP SERPL CALC-SCNC: 16 MMOL/L
APTT BLD: 37.2 SEC — SIGNIFICANT CHANGE UP (ref 27–39.2)
AST SERPL-CCNC: 46 U/L — HIGH (ref 0–41)
AST SERPL-CCNC: 98 U/L
BASOPHILS # BLD AUTO: 0.03 K/UL — SIGNIFICANT CHANGE UP (ref 0–0.2)
BASOPHILS NFR BLD AUTO: 0.4 % — SIGNIFICANT CHANGE UP (ref 0–1)
BILIRUB SERPL-MCNC: 1.2 MG/DL — SIGNIFICANT CHANGE UP (ref 0.2–1.2)
BILIRUB SERPL-MCNC: 3.6 MG/DL
BLD GP AB SCN SERPL QL: SIGNIFICANT CHANGE UP
BUN SERPL-MCNC: 9 MG/DL
BUN SERPL-MCNC: 9 MG/DL — LOW (ref 10–20)
CALCIUM SERPL-MCNC: 9 MG/DL — SIGNIFICANT CHANGE UP (ref 8.5–10.1)
CALCIUM SERPL-MCNC: 9.5 MG/DL
CHLORIDE SERPL-SCNC: 102 MMOL/L
CHLORIDE SERPL-SCNC: 102 MMOL/L — SIGNIFICANT CHANGE UP (ref 98–110)
CO2 SERPL-SCNC: 21 MMOL/L
CO2 SERPL-SCNC: 25 MMOL/L — SIGNIFICANT CHANGE UP (ref 17–32)
CREAT SERPL-MCNC: 0.7 MG/DL
CREAT SERPL-MCNC: 0.7 MG/DL — SIGNIFICANT CHANGE UP (ref 0.7–1.5)
EOSINOPHIL # BLD AUTO: 0.11 K/UL — SIGNIFICANT CHANGE UP (ref 0–0.7)
EOSINOPHIL NFR BLD AUTO: 1.6 % — SIGNIFICANT CHANGE UP (ref 0–8)
GLUCOSE SERPL-MCNC: 119 MG/DL
GLUCOSE SERPL-MCNC: 96 MG/DL — SIGNIFICANT CHANGE UP (ref 70–99)
HCT VFR BLD CALC: 41.3 % — LOW (ref 42–52)
HGB BLD-MCNC: 13.3 G/DL — LOW (ref 14–18)
IMM GRANULOCYTES NFR BLD AUTO: 0.3 % — SIGNIFICANT CHANGE UP (ref 0.1–0.3)
INR BLD: 1.18 RATIO — SIGNIFICANT CHANGE UP (ref 0.65–1.3)
LYMPHOCYTES # BLD AUTO: 1.27 K/UL — SIGNIFICANT CHANGE UP (ref 1.2–3.4)
LYMPHOCYTES # BLD AUTO: 18.8 % — LOW (ref 20.5–51.1)
MCHC RBC-ENTMCNC: 27 PG — SIGNIFICANT CHANGE UP (ref 27–31)
MCHC RBC-ENTMCNC: 32.2 G/DL — SIGNIFICANT CHANGE UP (ref 32–37)
MCV RBC AUTO: 83.9 FL — SIGNIFICANT CHANGE UP (ref 80–94)
MONOCYTES # BLD AUTO: 0.82 K/UL — HIGH (ref 0.1–0.6)
MONOCYTES NFR BLD AUTO: 12.1 % — HIGH (ref 1.7–9.3)
NEUTROPHILS # BLD AUTO: 4.5 K/UL — SIGNIFICANT CHANGE UP (ref 1.4–6.5)
NEUTROPHILS NFR BLD AUTO: 66.8 % — SIGNIFICANT CHANGE UP (ref 42.2–75.2)
NRBC # BLD: 0 /100 WBCS — SIGNIFICANT CHANGE UP (ref 0–0)
PLATELET # BLD AUTO: 247 K/UL — SIGNIFICANT CHANGE UP (ref 130–400)
POTASSIUM SERPL-MCNC: 3.7 MMOL/L — SIGNIFICANT CHANGE UP (ref 3.5–5)
POTASSIUM SERPL-SCNC: 3.7 MMOL/L — SIGNIFICANT CHANGE UP (ref 3.5–5)
POTASSIUM SERPL-SCNC: 3.8 MMOL/L
PROT SERPL-MCNC: 7.3 G/DL — SIGNIFICANT CHANGE UP (ref 6–8)
PROT SERPL-MCNC: 8 G/DL
PROTHROM AB SERPL-ACNC: 13.6 SEC — HIGH (ref 9.95–12.87)
RBC # BLD: 4.92 M/UL — SIGNIFICANT CHANGE UP (ref 4.7–6.1)
RBC # FLD: 15.2 % — HIGH (ref 11.5–14.5)
SODIUM SERPL-SCNC: 138 MMOL/L — SIGNIFICANT CHANGE UP (ref 135–146)
SODIUM SERPL-SCNC: 139 MMOL/L
WBC # BLD: 6.75 K/UL — SIGNIFICANT CHANGE UP (ref 4.8–10.8)
WBC # FLD AUTO: 6.75 K/UL — SIGNIFICANT CHANGE UP (ref 4.8–10.8)

## 2021-02-18 PROCEDURE — 74177 CT ABD & PELVIS W/CONTRAST: CPT | Mod: 26

## 2021-02-18 PROCEDURE — 76705 ECHO EXAM OF ABDOMEN: CPT | Mod: 26

## 2021-02-18 PROCEDURE — 99284 EMERGENCY DEPT VISIT MOD MDM: CPT | Mod: GC

## 2021-02-18 NOTE — ED PROVIDER NOTE - OBJECTIVE STATEMENT
44 yo w/ ALK+ AdenoCa w/ brain mets since 2014 (was already metastatic then- doing well on ALK inhib - currently on Lorbrena) presenting for low Hgb. Pt says he had his routine appt with Charlee yesterday and was found to have a low Hgb. Pt otherwise says he feels well. No f/c/n/v. No melena, hematochezia, hemoptysis/hematemesis. No chest pain/SOB.

## 2021-02-18 NOTE — ED PROVIDER NOTE - ATTENDING CONTRIBUTION TO CARE
46 y/o male with hx of ALK+ AdenoCA with brain mets presents to ED with elevated LFT's, sent to ED by Heme/Onc Dr. Jara for imaging.  No complaints.  PE:  agree with above.  A/P:  Abnormal LFTs.  Imaging and Reassess.

## 2021-02-18 NOTE — ED PROVIDER NOTE - PATIENT PORTAL LINK FT
You can access the FollowMyHealth Patient Portal offered by [facility] by registering at the following website: [email or web address]. By joining [employer]’s FollowMyHealth portal, you will also be able to view your health information using other applications (apps) compatible with our system.

## 2021-02-18 NOTE — ED PROVIDER NOTE - CARE PROVIDER_API CALL
Faustino Jara)  Hematology; Internal Medicine; Medical Oncology  [address] [place] [address]  Phone: [phone]  Fax: [phone]  Follow Up Time: 1-3 Days

## 2021-02-18 NOTE — ED PROVIDER NOTE - PROGRESS NOTE DETAILS
Marek requesting CT abdomen and pelvis with IV contrast to r/o biliary obstruction -brasher ct, no evidence of dilation, LFTs downtrending. pt asymptomatic, will f/u with GI/heme for repeat LFTs

## 2021-02-18 NOTE — ED PROVIDER NOTE - NSFOLLOWUPINSTRUCTIONS_ED_ALL_ED_FT
You were noticed to have blood work with some abnormalities. At this time, you are safe to leave the hospital, we do not suspect that anything immediately will come of these findings, but it is something that should be followed to see if it is getting better, staying the same, or getting worse. Be sure to discuss this at your follow up visit with your Primary Care Doctor to have these tests repeated and to see what work up, if any, is necessary to continue managing it.

## 2021-02-18 NOTE — ED PROVIDER NOTE - CLINICAL SUMMARY MEDICAL DECISION MAKING FREE TEXT BOX
LFT downward trending.  CT shows no signs of CBD dilation.  Patient has no pain.  WIll d/c home. Patient to follow up with Heme/Onc.  Strict return instructions discussed.

## 2021-02-20 NOTE — HISTORY OF PRESENT ILLNESS
[Disease: _____________________] : Disease: [unfilled] [AJCC Stage: ____] : AJCC Stage: [unfilled] [Treatment Protocol] : Treatment Protocol [de-identified] : 41/M presenting for follow-up for metastatic ALK-positive lung adenocarcinoma.  He is currently on alectinib 600 mg BID.  He initially presented in 11/2014 with multiple pulmonary nodules, mediastinal lymphadenopathy, multiple bony metastases, and brain metastases.  He was initially started on crizotinib and was switched to ceritinib in 12/2015 upon progression.  He was unable to tolerate ceritinib due to significant vomiting and was switched to alectinib in 02/2016.\par \par He had SRS to a left cerebellar hemisphere brain metastasis (08/2015) and subsequently underwent WBRT for CNS progression (10-11/2015).  He had RT to a painful metastatic lesion in the right humerus (01/2015).  He had also received Xgeva for bony metastatic disease in the past. \par \par Since 09/2016, he has been experiencing intermittent headaches being treated with periodic dexamethasone taper.  MRI brain with perfusion analysis showed bilateral cerebellar lesions, favor postRT changes rather than residual tumor.  His most recent brain MRI (11/01/2016) showed overall  stable  exam  compared  to  the  previous  brain  MRI  9/2/2016; no  significant  interval  change  in  bilateral  cerebellar  heterogeneously  enhancing  masses  with  extensive  associated  edema,  as  well  as  smaller  lesions  within  the  left  parietal  white  matter,  superficial  left  temporal  lobe  and  left  dorsal  medulla; and, stable  mild  ventriculomegaly  and  mild  periventricular  FLAIR  signal  abnormality.  He has been following with our neurosurgeon, Dr. Britt, who has recommended no surgical intervention at this time. \par \par His most recent PETCT (11/01/2016) showed no sites of pathologic FDG uptake suspicious for biologic tumor activity.\par \par He reports occasional headaches and a mild sense of imbalance.  Denies any falls.  However he only takes a prn dexamethasone, once in every three days,  which reportedly relieves his intermittent headaches. \par Today he also complains mild pain in his left great toe, likely secondary to ingrowing toe nail. [de-identified] : ALK+ lung adenocarcinoma [de-identified] : He presented to follow up. He changed his insurance. Did not get scans and did not take his alectinib for about 1-2 weeks. We were not notified.  Otherwise she feels well she really requires dexamethasone yesterday headache probably once or twice a week as per patient no other complaints.\par \par 4/17/17\par He presents for follow up today. He had MRI of brain that showed stable disease 4/8/2017. He has not had the PET CT yet. He received  his alecetinib on 4/4/17 and stated to take them. States his headaches are rare now and feels well otherwise.\par \par 5/15/17\par He presents for follow up. He had a PET CT on 4/21/17 that  was ucnhged.Since November 1, 2016, no new foci of pathologic FDG uptake to suggest\par biologic tumor activity. . 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 . Unchanged non-FDG avid sclerotic foci involving the right proximal humerus\par and T9 vertebral body.\par \par He  feels well. No dizziness or headache.  No new symptoms or complaints.\par \par 6/19/17\par He is doing well. He has no complaints. No headaches. No SOB. \par \par 7/24/17\par Patient comes in for follow up visit, has no complaints, has no SOB and has a mild cough which is chronic, no hemoptysis. Patient states his appetite is good, weight is stable, he has no headaches and is currently off the steroids. Patient will have repeat PET scan and MRI of the brain with contrast as well as repeat bloodwork. \par \par 8/21/17\par He is here for follow up. He had an MR brain and PET CT on 8/3 and 8/2 that did not show progression, were essentially unchanged.  He feels great otherwise.\par \par 9/21/17\par He is doing well. No complaints. No headaches\par \par October 26, 2017\par He see her for followup he feels great he has no complaints.\par \par 11/16/17\par He is here for follow up. He had an MR of brain on 11/14/17: New areas of nodular enhancement along the inferior surfaces of the temporal lobes bilaterally. These are relatively symmetric and just above the level of the cerebellar enhancing masses, suggesting that these may reflect post treatment change.\par He had to reschedule his PET and states he will do it next Wednesday.\par \par No complaints.\par \par 12/26/17\par He is here for follow up. He feels well. he  had PET/CT in end of November that does not show procession, stable pleural findings. He  feels well. \par \par 1/22/18\par He is here for follow up for his NSCLCA. He states he feels well. No SOB, no headaches no fatigue.. \par \par February 21 2018\par  he is here for followup, he had a recent PET/CT scan which is PRESLEY he also had a recent MRI of the brain. This was reviewed at the CNS tumor board although wasn't clear but the consensus was that this is posttreatment changes in the fact that he is asymptomatic and decided to observe him.\par \par He has no new complaints no headaches no weakness or tingling.\par \par \par 3/21/18\par He is here for follow up. HE states he is doing well. Has very mild headaches on occasion but otherwise doing well.\par \par 4/13/18\par He is doing well. Reports no new complaints \par \par 5/3/18\par He is here for follow up to discuss his MR brain. HE complains of mild headache. Same memory issues as before. Balance is the same.  MR brain showed : Increased size of right parietotemporal of the findings enhancement  seen lesion measuring 5.2 cm, previously 2.8 centimeters and increased  size of left inferior temporal lobe mass measuring 2.3 cm, previously 1.6  cm. these areas again demonstrate hypoperfusion and may represent post  therapeutic changes.   2.  Significant increased edema and mass effect within the right  hemisphere with 9 mm right to left midline shift.  3.  No new enhancing lesions. Stable additional bilateral cerebellar and  supratentorial lesions.\par \par I spoke with Dr. 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 He developed severe headaches, some difficulty with speech and balance issues. 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 He has no insurance so my pharmacy has been giving  him a weekly supply while we work on it. He did not have his scans yet. He feel much better now except for dizziness\par \par 5/30/18\par He is feeling much better. His neurological complaints resolved. He stopped steroids on Monday since he had swelling in legs that has resolved. \par \par 6/13/18\par He is here for follow up. he had a PET/CT on 6/7/18 that showed These images reveal, when comparing the FDG brain images to MRI of  4/27/2018 series 10, there is one small focus of increased FDG uptake in  the region of the right parietotemporal lobe lesion (series 12 image 34).  Therefore persistent biologic tumor activity in this region cannot be  excluded.  No focal abnormal FDG uptake corresponding with the left inferior  temporal lobe lesion and both cerebellar lesions. In addition most of the  right parietotemporal lobe lesion is not FDG avid Compared to normal brain glucose metabolism in the thalamus (surrogate  for normal brain metabolism) relative hypometabolism of the right  parietal, posterior parietal and occipital region and relative  hypermetabolism of the left posterior parietal region  Persistent posttreatment FDG uptake (SUV up to 11.2) along the pleural  surface of the left hemithorax consistent with posttreatment changes  associated with talc pleurodesis  One new mildly FDG avid soft tissue nodule (SUV 3.3) in the left buttock \par \par He feels well now ,no headaches.\par \par 7/16/18\par He is here for follow up. doing well. He has no headaches. No new complaints\par \par 8/13/18\par He is here for follow up.  He is doing well. has no complaints. He is considering returning to work.\par \par 9/10/18\par He is here for follow up. He had an MR brain on 8/24/18 that showed In comparison to the previous brain MRI dated 4/27/2018:  1.  No significant interval change in the size and configuration of the  heterogeneously enhancing, necrotic and hemorrhagic lesions within the  temporal/occipital lobes and cerebellar hemispheres. Enhancement pattern  suggests post-therapeutic changes.  2.  Moderately increased edema within the posterior fossa (cerebellar  hemispheres and brainstem). Increased mass effect upon the fourth  ventricle with no evidence of hydrocephalus.  3.  The left cerebral hemispheric edema has mildly increased and the  right cerebral hemispheric edema has mildly decreased.   4.  Resolution of the previously seen right to left midline shift, right  lateral ventricular compression and left lateral ventricular enlargement.  5.  Treated lesions again noted in the left frontal white matter and left  lateral medulla, without contrast enhancement.\par \par HE is doing well has no new complaints. Has no neuroglial complaints. \par \par 10/10/18\par He is here for follow up he had PET/CT on  9/28/18 that was PRESLEY. He no has headaches again. States he is voiding multiple times a day and unable to empty bladder fully.\par \par 10/30/18\par He is here for follow up. he was in the ED for.Left lower quadrant pain symptoms her was given cirpo. he had a Ct abd/pelvis in ED 10/22/18 : Stable posttreatment changes of the left hemithorax including large  loculated pleural effusion.  2.  Multiple nondilated fluid-filled small bowel loops which may reflect  evidence of enteritis.  \par He had MR brain on 10/29/18:  No new lesions, but several existing lesions have increased in size  2. Bilateral hemorrhagic cerebellar lesions and edema has increased. Mass  effect on the fourth ventricle with hydrocephalus.   2.  Essentially unchanged sizes of supratentorial lesions within the  bilateral temporooccipital lobes and mildly improved edema\par \par He is having headaches and more confusion. He took 4 flomax in am and PM instead of his alecensa. I called [facility] with him and they told us they did not deliver his pills since they could not reach him but will deliver them tomorrow. He will be home. He states he had alecensa at home but not sure.\par \par 11/12/18\par Patient presents to the office with a chief complaint of burning in his right thigh.  He has had no new medications.  The patient states it is worse in the evening but lasts all day.  The patient states that the burning sensation starts at the right knee and is ascending.  Straight leg raise negative.  Discussed that it is unlikely that it is from his Avastin treatment, but unsure the etiology.  We will send for LE doppler to rule out any DVT.\par \par 11/27/18\par He is here for follow up. His Dopplers  were negative and his pain/cramps are nearly gone only some in right thigh. His headaches are gone and he is speech is  better and states memory is better since we started Avastin.\par \par 1/9/19\par He is doing well.  He has no more pain in his legs. He is eating well. He is due for 4/4 dose of Avastin. No other complains.\par \par 2/6/19\par He is here for follow-up.  He generally feels well.  He states he still suffers from headaches.  His memory has improved minimally.\par PET/CT 2/4/19 IMPRESSION: Since 9/28/2018, No definite sites of abnormal FDG uptake to suggest biologic tumor activity. Cerebellar lesions seen on brain MRI from 1/20/2019, are not FDG avid and consistent with treated metastases. Post talc pleurodesis of the left pleura, unchanged. \par MR Brain 1/20/19 IMPRESSION: In comparison to the previous brain MRI dated 10/29/2018: 1. No significant interval change in the size and configuration of the heterogeneously enhancing, necrotic and hemorrhagic lesions within the temporal/occipital lobes and cerebellar hemispheres. 2. The edema within the brainstem and cerebellum is about stable;however there is slight progression of the hydrocephalus since the prior exam. 3. The edema within the temporal and occipital lobes has decreased. 4. New patchy area of restricted diffusion around the left temporal horn (without corresponding enhancement) may reflect superimposed ischemic changes. Recommend attention on follow-up imaging. \par \par 3/6/19\par Patient is here for a follow-up visit.  He states he is having increased frequency of headaches with minimal relief from Tylenol.  He is now staying in the shelter on [address] with his daughter.  He will  his medications from [facility] from now on.  We are awaiting arrangement for him to get an apartment.\par \par 3/27/19\par He is here for follow up. his headache is better and he takes his Dexamethasone only when he has it which helps. Last PET was in February and last MRI was in January.\par \par 4/29/19\par He is here for follow-up.  Since last visit, he presented to the ED in 4/2019 due to chest pain.  He underwent CTA chest that demonstrated unchanged left pleural effusion. Likely radiation induced necrosis changes, he states it may have been related to stress related to his daughter.  He is still awaiting placement from the shelter.  He reports his usual frequency headaches, but he takes the Decadron as needed which helps.  \par CTA Chest (4/7/19) IMPRESSION:No pulmonary emboli.20.9 cm pleural fluid collection in the left hemithorax, essentially unchanged since PET/CT dated 2/4/2019.\par MR Brain (4/2/19) IMPRESSION: Since January 20, 2019:New worse signal abnormality involving the brain and volodymyr with increased edema and worse effacement of the fourth ventricle and resulting mild hydrocephalus.Slightly worse edema involving the cerebellum.Stable bilateral posterior temporal/occipital/cerebellar heterogeneously enhancing lesions.Stable left posterior frontal deep white matter and left dorsal medullary hypointense foci, compatible with treated lesions.\par \par 5/22/19\par He came in for follow up. He has been complaining of headaches mainly in AM. He does not think dexamethasone is helping anymore. No other issues.\par \par 6/3/19\par He is here for follow up. HIs headacehs are the same and uses dexamethasone 4 mg almost dialy. He had  CT C/A/P that showed a stable effusion 5/26/19 amd MR brain showed  5/29/19: Overall no significant changes compared with the previous brain MRI dated \par 4/2/2019:\par \par 1.  No significant interval change in the size and configuration of the \par heterogeneously enhancing, necrotic and hemorrhagic lesions within the \par temporal/occipital lobes and cerebellar hemispheres.\par \par 2. Extensive edema within the cerebellar hemispheres, brainstem, parietal \par and temporal lobes is again seen. Patchy areas of enhancement are noted \par in this region. Appearance is most suggestive of posttreatment changes.\par \par 3. Stable patchy area of restricted diffusion around the left temporal \par horn again seen, nonspecific.\par \par 4. No new enhancing lesions identified.\par \par 6/24/19\par He is here for follow up. he missed his Avastin since he had family issues. Still has headaches they are the same. Will restart Avastin today,.\par \par 7/15/19\par He is here for follow up. He could not tell me if headaches are better. He does use steroids once  in  a while for headaches. States memory is the same. \par \par \par 8/14/19\par He is doing better. He Missed his Avastin doses. But he has no headaches and does not use steroids. He had a fall  and hit a rail on his chest prior to his CT scans and the finding in his CT chest is probably related to the trauma he still has pain in the area but its better. CT C/A/P and MR brain from august are bellow\par \par IMPRESSION:\par \par Since April 7, 2019\par \par Stable left pleural fluid collection measuring 9.5 x 14.1 by 18.4 cm, \par with stable compressive atelectasis of left lung and left hemidiaphragm \par inversion.\par \par New, indeterminate approximate 8 mm soft tissue density, left anterior \par chest (series 4/104).\par \par Interval development of approximate 3.6 x 1.8 by 3.9 cm homogeneous \par elliptical shaped structure, left epicardial fat pad (4/206), possibly \par proteinaceous material (HU +35).\par \par \par IMPRESSION:\par \par Within the left cardiophrenic angle, there is a new area of lobulated \par soft tissue measuring 4.0 x 2.5 cm suspicious for new metastatic disease.\par \par Please see chest CT report for chest findings\par \par IMPRESSION:\par \par Overall no significant changes compared with the previous brain MRI dated \par 5/29/2019:\par \par 1.  No significant interval change in the size and configuration of the \par heterogeneously enhancing, necrotic and hemorrhagic lesions within the \par temporal/occipital lobes and cerebellar hemispheres.\par \par 2. Extensive edema within the cerebellar hemispheres, brainstem, parietal \par and temporal lobes is again seen. Patchy areas of enhancement are noted \par in this region. Appearance is most suggestive of posttreatment changes.\par \par 3. No new enhancing lesions identified.\par \par 10/01/19\par Pt presented today for routine follow up visit. He reports feeling fine and has no new complaints . Pt reports no new symptoms of weakness , headaches and confusion and is well compliant with treatment . \par \par \par 11/11/19\par He is here for follow up.   He feels well. No headaches.  Has a runny nose. No pain. No SOB. He gained a few  pounds.\par \par 12/9/19\par He is here for follow up. He had his MR brain on  11/16/19IMPRESSION: \par 1. Overall no significant changes compared with the previous brain MRI dated 8/5/2019. \par 2. Specifically, the heterogeneously enhancing, necrotic and hemorrhagic lesions within the temporal/occipital \par lobes and cerebellar hemispheres; with extensive associated edema and patchy enhancement are not \par significantly changed and most compatible with posttreatment changes. \par 3. No new enhancing lesions identified.\par \par He did not do his CTs yet. \par \par He feels well. No headaches. \par \par 1/13/2020\par Patient is here for a follow-up visit for metastatic lung cancer.  He is feeling well with no new complaints.  Most recent CBC is stable.  Patient denies fever, chills, nausea, vomiting, new pain or bleeding.  We discussed that based on most recent imaging, it may be suggestive that he is progressing on current treatment regimen.  \par PET/CT (1.7.20) IMPRESSION: Since February 4, 2019:1. Redemonstration of large left pleural fluid collection with circumferential FDG uptake along the pleural surface and associated calcifications (max SUV up to 18, [id number]), consistent with posttreatment change related to talc pleurodesis. New 5.8 x 3.5 cm lesion along the left posterior costophrenic angle, with peripheral FDG avidity, max SUV14. Given talc pleurodesis, findings may represent evolving inflammatory response, however neoplasm remains a consideration, and continued follow-up or tissue sampling recommended.2. Unchanged non-FDG avid sclerotic, treated osseous metastases in the right proximal humerus and T9 vertebral body with compression deformity.\par \par I presented him in tumor board and we decided to biopsy the new area. \par \par 2/4/2020-Interpretor services used as pt is Serbian speaking # [id number]\par  Dionisio is here for follow up complaining of b/l flank pain. No c/o fever, chills, dysuria, hematuria at home. No change in bowel habits. He describes dull pain b/l flank area that is mostly in the morning. Today during the office visit, he did not have much pain \par \par 3/17/20\par He is here for follow up. He underwent the biopsy of the  left pleural mass on 3/12/20 that showed Poorly differentiated malignant neoplasm. IHC is pending.  he is doing well otherwise. has minimal headaches. Not dizzy\par \par 4/1/2020: Dionisio is here for follow up, he started Lobrena on 3/17/2020, he is taking 100mg daily, reports no side effects. He reports "little headache" in the morning that is relieved with Tylenol, no other symptoms, no vision changes, no seizure like activity, no N/V, he reports no SOB, no palpitations, heart exam is RRR. No fevers. He will continue with Lobrena as prescribed. \par \par 6/22/2020\par Patient is here for a follow-up visit for metastatic lung cancer.  Since last visit he had COVID was in [facility].  he had a prolonged course. I spoke to his Neurosurgeon and he had MR brain that showed radiation necrosis and he had steroids. He only took Lorbrena for one month, Confirmed with pharmacy.  He is here with his ex wife. We used a pacific  Rosemary ID  [id number] \par He has been taking his Lorbreana since May 28 2020, he gets it from [facility] now, He is on [place] 2L from his hospitalization. He lives with his brother in law. He states he is not SOB. HE was unsteady on his feet. He felt well otherwise. \par \par 7/20/2020\par Patient presents for follow up of metastatic lung cancer. He is accompanied by his ex-wife.  [id number] used to communicate with patient. Patient is unsteady on his feet, ambulates with a walker, and requires someone to remain nearby for assistance. Patient fell last week and hurt 5th toe on right foot, although it seems to be improved now. ROS is significant for foot swelling. Results of recent MRI brain, CT chest/abdomen/pelvis reviewed. He has been taking lorlatinib since 5/28/2020. Ex-wife has noticed that feet are swollen and has attributed his symptoms to ativan, stating that she has switched from giving it to him every day to every other day.\par \par 8/24/2020\par Patient presents for follow up of metastatic lung cancer. He is accompanied by family. Family requested  services.  [id number] used. Patient went to ED with complaints of hemoptysis. CTA chest showed no evidence of PE and showed that pulmonary mass is stable. Patient brought tissue showing small blood clots from earlier today. He continues to take lorlatinib. Family continues to express concern for leg swelling/pain. She notes increased appetite and is concerned that his cholesterol is high. Patient does note that dizziness has improved since starting avastin.\par \par 9/30/20\par He is here for follow up.  HE is here with his wife. We used a  but Dionisio was answering all the questions in English. He is much better in regards to balance, and memory and communication. He only has mild occasional blood tinged sputum \par \par 10/28/2020\par Patient presents for follow up of metastatic lung cancer, accompanied by family members.  He has been tolerating the Lobrena once daily.  We reviewed findings of most recent imaging which shows essentially stable findings in the brain and abdomen/pelvis.  The official report for the CT Chest is still pending but may warrant PET/CT to determine if any true progression.  Patient denies fever, chills, nausea, vomiting, dizziness, seizure activity or bleeding.  \par CT A/P (10.21.2020) IMPRESSION:1.  No findings of new or progressive metastatic disease in the abdomen or pelvis. 2.  Left posterior chest mass broadly abutting/invading the diaphragm - see separately dictated CT chest report for detailed evaluation including complete assessment of the thorax.\par MR Head (10.13.2020) IMPRESSION:Since prior MR of 7/2/2020,Decreased periventricular T2 and FLAIR hyperintensities, compatible with evolving posttreatment changes.Grossly stable bilateral cerebellar hemorrhagic masses. Unchanged mass effect upon the fourth ventricle and stable ventriculomegaly.No evidence of new enhancing intracranial lesions.\par \par 12/2/2020\par He is here for follow up. He was recenly admitted to [facility] due to chest pain. Work up was negative. It was more of a social admission and he is now in Nursing home. \par \par His CT chest from 10/21/20: Posterior costophrenic cystic masses have increased in size since 7/7/2020 and were not within the field-of-view on the prior exam from 8/11/2020.\par For example, the left posterior costophrenic angle mass now measures approximately 4.8 x 3.8 cm and axial planes, previously 4.3 x 3.2 cm on comparable re-measurements (series 4/214).\par The medial posterior costophrenic angle mass now measures approximately 3.5 x 3.4 cm, previously 3.2 x 2.9 cm on comparable re-measurements (4/211).\par Anterior calcified recess mass measures approximately 3.2 x 1.9 cm, previously 2.3 x 1.9 cm of (series 602/130).\par \par \par He had CTA chest done on 11/5/20 when he was admitted: Finding impression:\par In comparison to recent CT chest 10/21/2020, no filling defects in the main pulmonary artery or segmental branches to suggest pulmonary embolus. New loculated pericardial fluid measuring 4.5 x 3.2 cm. Additional findings are unchanged in this short interval follow-up CT.\par \par An echo did not demonstrate a pericardial effusion it was his known pleural effusion.\par \par He states memorry and balance is fine. His main complaints are dissatisfaction with facility due to food in particular.  He had blood work done in facility that showed  11/10  normal CBC and CMP ( scanned) Covid PCR negative as well. \par \par \par 1/13/21\par HE is doing well. Here with a walker and his aid. He had restating CT C/A/P  on 1/11/21 only sublet growth in his mass. \par Subtle interval growth of low attenuating masses, abutting left hemidiaphragm/spleen\par (series 4/216 versus series 4/211 from October 21, 2020 study.). Current measurements approximately 9 x 9 cm, previously 8.6 x 7.7 cm..\par \par Hypoattenuating left prevascular structure (4/108), corresponding to the site of previously loculated pericardial fluid collection.\par No other significant interval change.\par \par 2/17/21\par Patient presents for follow up of metastatic lung cancer, accompanied by aide from [facility].  He has been tolerating the Lobrena once daily.  Denies any new headaches, pain, nausea or vomiting.  He remains slightly unsteady on his feet, uses walker assistance for ambulation.  He expresses discontent with current living situation and the meals provided at the facility but otherwise offers no new complaints.   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

## 2021-02-20 NOTE — REASON FOR VISIT
[Follow-Up Visit] : a follow-up [Other: _____] : [unfilled] [[employer] ] : provided by [employer]   [FreeTextEntry2] : Rosemary [FreeTextEntry1] : [id number]  [TWNoteComboBox1] : [place]

## 2021-02-20 NOTE — PHYSICAL EXAM
[Ambulatory and capable of all self care but unable to carry out any work activities] : Status 2- Ambulatory and capable of all self care but unable to carry out any work activities. Up and about more than 50% of waking hours [Normal] : affect appropriate [de-identified] : B/L LEs do not appear swollen. [de-identified] : Unsteady on feet. Ambulates with walker and requires assistance. Memory and balance is better [de-identified] : Forgetful, but stable.

## 2021-02-20 NOTE — REVIEW OF SYSTEMS
[Anxiety] : anxiety [Negative] : Heme/Lymph [Fever] : no fever [Recent Change In Weight] : ~T no recent weight change [Fatigue] : no fatigue [Shortness Of Breath] : no shortness of breath [Cough] : no cough [SOB on Exertion] : no shortness of breath during exertion [Confused] : no confusion [Dizziness] : no dizziness [Difficulty Walking] : no difficulty walking [Easy Bleeding] : no tendency for easy bleeding [de-identified] : memory deficit, reports that dizziness is  much better.

## 2021-02-20 NOTE — ASSESSMENT
[Palliative] : Goals of care discussed with patient: Palliative [FreeTextEntry1] : 1.  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 Due to insurance issues he stopped it and restarted it 4/4/2017. He progressed on alectinib and began taking lorlatinib on 5/28/2020.\par - Repeat MRI brain 8/2018 radiation related changes, post treatment changes, and repeat PET/CT 6/5/18   is without systemic progression.  It confirmed radiation necrosis. PET/CT on 9/28/18 was PRESLEY. CT abdomen was unchanged.\par - Repeat PET/CT from 01/2020 shows new FDG-avid lesions in the lung right under his left effusion on top of the diaphragm.\par - He underwent the biopsy of the  left pleural mass on 3/12/20 that showed poorly differentiated malignant neoplasm. IHC c/w non-small cell carcinoma of the lung, faovring adenocarcinoma, TTF 1 positive.\par - Per Dr. Richardson, he is not a candidate for radiation to the site of progression.\par - We did foundation one on his re-biopsy that showed EML4-Alk variant 3a/b, other mutations CDKN2a and B loss, LLL2, MTAP, SMARCa4, Tp53\par - He was started on lorlatinib in March 2020, took it for about one month, had a prolonged COVID hospitalization, restarted on 5/28/2020.\par - The FDA approval for lorlatinib is based on the results of a phase II study ([id number]) that included a subgroup of 198 patients with ALK-positive metastatic NSCLC previously treated with =1 ALK inhibitor.  The overall response rate with lorlatinib among these patients was 47 percent, with a complete response rate of 2 percent and a partial response rate of 45 percent. Efficacy according to prior treatment was as follows: post-crizotinib, KENNEY 73 percent, median PFS 11.1 months, and median duration of response not reached; post-one or more second-generation ALK inhibitors, KENNEY 40 percent, median PFS 6.9 months, median duration of response 7.1 months [66]. The most frequent adverse effects were hypercholesterolemia (81 percent), hypertriglyceridemia (61 percent), edema (43 percent), and peripheral neuropathy (30 percent). Serious treatment-related adverse effects occurred in 7 percent of patients, the most frequent being cognitive deficits in 1 percent. \par - Repeat CT C/A/P and MRI brain performed for new baseline. CT abdomen/pelvis negative. CT chest demonstrated increase in size of costophrenic mass from 6 cm on 3/10/2020 to 6.5 cm on 7/7/2020. 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 Reordered Avastin x 4 doses. Patient received 2 doses then developed hemoptysis, so held remaining doses \par - If unable to control neurologic symptoms with medical management, neurosurgeon Dr. Britt will do shunt placement.\par -  8/2020 CTA chest performed in ED showed that lung mass is stable.\par - CT A/P and MR Head from 10/2020 essentially show stable disease; CT chest showed possible progression but CTA chest that was done on recent admission did not mention progression, I asked it to get reviewed but it was not done. Clinically he is doing well I did speak to the radiologist who read the CT chest in  October and it it was not entirely clear if he has definitive progression  thus I decided to c.w  Lorbrena;  his Ct C/A/P only show sublte growth in his mass thus decided to c.w  Lorbrana. If he still has future growth depending on the pace will consider switching. He may not do well with chemoimmunotherapy.  We discussed treatment options if determined to have true intrathoracic progression including chemo + immunotherapy.\par - c/w Lobrena 100mg daily, there are refills\par - PET/CT and MR Head ordered to be done prior to next visit; Navigators tasked to assist with scheduling since he is at facility\par \par RTC in 1 month\par \par He is thinking about moving to [place] to live with his sister; currently resides in [facility] on [facility]\par \par Contact. Ex wife: [phone], Brother Caesar

## 2021-02-23 ENCOUNTER — NON-APPOINTMENT (OUTPATIENT)
Age: 46
End: 2021-02-23

## 2021-03-05 ENCOUNTER — APPOINTMENT (OUTPATIENT)
Dept: HEMATOLOGY ONCOLOGY | Facility: CLINIC | Age: 46
End: 2021-03-05

## 2021-03-17 ENCOUNTER — APPOINTMENT (OUTPATIENT)
Dept: HEMATOLOGY ONCOLOGY | Facility: CLINIC | Age: 46
End: 2021-03-17

## 2021-03-17 ENCOUNTER — NON-APPOINTMENT (OUTPATIENT)
Age: 46
End: 2021-03-17

## 2021-03-30 ENCOUNTER — NON-APPOINTMENT (OUTPATIENT)
Age: 46
End: 2021-03-30

## 2021-04-06 ENCOUNTER — NON-APPOINTMENT (OUTPATIENT)
Age: 46
End: 2021-04-06

## 2021-04-08 ENCOUNTER — NON-APPOINTMENT (OUTPATIENT)
Age: 46
End: 2021-04-08

## 2021-04-12 ENCOUNTER — RESULT REVIEW (OUTPATIENT)
Age: 46
End: 2021-04-12

## 2021-04-12 ENCOUNTER — OUTPATIENT (OUTPATIENT)
Dept: OUTPATIENT SERVICES | Facility: HOSPITAL | Age: 46
LOS: 1 days | Discharge: HOME | End: 2021-04-12
Payer: MEDICARE

## 2021-04-12 DIAGNOSIS — C78.02 SECONDARY MALIGNANT NEOPLASM OF LEFT LUNG: ICD-10-CM

## 2021-04-12 DIAGNOSIS — C78.00 SECONDARY MALIGNANT NEOPLASM OF UNSPECIFIED LUNG: ICD-10-CM

## 2021-04-12 LAB — GLUCOSE BLDC GLUCOMTR-MCNC: 98 MG/DL — SIGNIFICANT CHANGE UP (ref 70–99)

## 2021-04-12 PROCEDURE — 78815 PET IMAGE W/CT SKULL-THIGH: CPT | Mod: 26,PS,MH

## 2021-04-28 ENCOUNTER — APPOINTMENT (OUTPATIENT)
Dept: INFUSION THERAPY | Facility: CLINIC | Age: 46
End: 2021-04-28

## 2021-04-28 ENCOUNTER — APPOINTMENT (OUTPATIENT)
Dept: HEMATOLOGY ONCOLOGY | Facility: CLINIC | Age: 46
End: 2021-04-28
Payer: MEDICARE

## 2021-04-28 ENCOUNTER — LABORATORY RESULT (OUTPATIENT)
Age: 46
End: 2021-04-28

## 2021-04-28 VITALS
TEMPERATURE: 98.6 F | HEIGHT: 67 IN | DIASTOLIC BLOOD PRESSURE: 82 MMHG | SYSTOLIC BLOOD PRESSURE: 144 MMHG | RESPIRATION RATE: 14 BRPM | HEART RATE: 120 BPM

## 2021-04-28 LAB
HCT VFR BLD CALC: 38.7 %
HGB BLD-MCNC: 11.9 G/DL
MCHC RBC-ENTMCNC: 25.8 PG
MCHC RBC-ENTMCNC: 30.7 G/DL
MCV RBC AUTO: 83.9 FL
PLATELET # BLD AUTO: 299 K/UL
PMV BLD: 9.6 FL
RBC # BLD: 4.61 M/UL
RBC # FLD: 15.3 %
WBC # FLD AUTO: 7.67 K/UL

## 2021-04-28 PROCEDURE — 99215 OFFICE O/P EST HI 40 MIN: CPT

## 2021-04-28 RX ORDER — PREGABALIN 225 MG/1
1000 CAPSULE ORAL ONCE
Refills: 0 | Status: COMPLETED | OUTPATIENT
Start: 2021-04-28 | End: 2021-04-28

## 2021-04-28 RX ORDER — FOLIC ACID 1 MG/1
1 TABLET ORAL
Qty: 30 | Refills: 5 | Status: ACTIVE | COMMUNITY
Start: 2021-04-28 | End: 1900-01-01

## 2021-04-28 RX ORDER — ONDANSETRON 8 MG/1
8 TABLET, ORALLY DISINTEGRATING ORAL 3 TIMES DAILY
Qty: 30 | Refills: 3 | Status: ACTIVE | COMMUNITY
Start: 2021-04-28 | End: 1900-01-01

## 2021-04-28 RX ADMIN — PREGABALIN 1000 MICROGRAM(S): 225 CAPSULE ORAL at 12:22

## 2021-04-28 NOTE — REASON FOR VISIT
[Follow-Up Visit] : a follow-up [Other: _____] : [unfilled] [[employer] ] : provided by [employer]

## 2021-04-29 LAB
ALBUMIN SERPL ELPH-MCNC: 3.9 G/DL
ALP BLD-CCNC: 94 U/L
ALT SERPL-CCNC: 8 U/L
ANION GAP SERPL CALC-SCNC: 14 MMOL/L
AST SERPL-CCNC: 18 U/L
BILIRUB SERPL-MCNC: 0.4 MG/DL
BUN SERPL-MCNC: 10 MG/DL
CALCIUM SERPL-MCNC: 8.8 MG/DL
CHLORIDE SERPL-SCNC: 101 MMOL/L
CO2 SERPL-SCNC: 23 MMOL/L
CREAT SERPL-MCNC: 0.7 MG/DL
GLUCOSE SERPL-MCNC: 172 MG/DL
POTASSIUM SERPL-SCNC: 4.1 MMOL/L
PROT SERPL-MCNC: 7.8 G/DL
SODIUM SERPL-SCNC: 138 MMOL/L
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
## 2021-04-29 NOTE — HISTORY OF PRESENT ILLNESS
[Disease: _____________________] : Disease: [unfilled] [AJCC Stage: ____] : AJCC Stage: [unfilled] [Treatment Protocol] : Treatment Protocol [de-identified] : 41/M presenting for follow-up for metastatic ALK-positive lung adenocarcinoma.  He is currently on alectinib 600 mg BID.  He initially presented in 11/2014 with multiple pulmonary nodules, mediastinal lymphadenopathy, multiple bony metastases, and brain metastases.  He was initially started on crizotinib and was switched to ceritinib in 12/2015 upon progression.  He was unable to tolerate ceritinib due to significant vomiting and was switched to alectinib in 02/2016.\par \par He had SRS to a left cerebellar hemisphere brain metastasis (08/2015) and subsequently underwent WBRT for CNS progression (10-11/2015).  He had RT to a painful metastatic lesion in the right humerus (01/2015).  He had also received Xgeva for bony metastatic disease in the past. \par \par Since 09/2016, he has been experiencing intermittent headaches being treated with periodic dexamethasone taper.  MRI brain with perfusion analysis showed bilateral cerebellar lesions, favor postRT changes rather than residual tumor.  His most recent brain MRI (11/01/2016) showed overall  stable  exam  compared  to  the  previous  brain  MRI  9/2/2016; no  significant  interval  change  in  bilateral  cerebellar  heterogeneously  enhancing  masses  with  extensive  associated  edema,  as  well  as  smaller  lesions  within  the  left  parietal  white  matter,  superficial  left  temporal  lobe  and  left  dorsal  medulla; and, stable  mild  ventriculomegaly  and  mild  periventricular  FLAIR  signal  abnormality.  He has been following with our neurosurgeon, Dr. Britt, who has recommended no surgical intervention at this time. \par \par His most recent PETCT (11/01/2016) showed no sites of pathologic FDG uptake suspicious for biologic tumor activity.\par \par He reports occasional headaches and a mild sense of imbalance.  Denies any falls.  However he only takes a prn dexamethasone, once in every three days,  which reportedly relieves his intermittent headaches. \par Today he also complains mild pain in his left great toe, likely secondary to ingrowing toe nail. 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 He changed his insurance. Did not get scans and did not take his alectinib for about 1-2 weeks. We were not notified.  Otherwise she feels well she really requires dexamethasone yesterday headache probably once or twice a week as per patient no other complaints.\par \par 4/17/17\par He presents for follow up today. He had MRI of brain that showed stable disease 4/8/2017. He has not had the PET CT yet. He received  his alecetinib on 4/4/17 and stated to take them. States his headaches are rare now and feels well otherwise.\par \par 5/15/17\par He presents for follow up. He had a PET CT on 4/21/17 that  was ucnhged.Since November 1, 2016, no new foci of pathologic FDG uptake to suggest\par biologic tumor activity. . 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 . Unchanged non-FDG avid sclerotic foci involving the right proximal humerus\par and T9 vertebral body.\par \par He  feels well. No dizziness or headache.  No new symptoms or complaints.\par \par 6/19/17\par He is doing well. He has no complaints. No headaches. No SOB. \par \par 7/24/17\par Patient comes in for follow up visit, has no complaints, has no SOB and has a mild cough which is chronic, no hemoptysis. Patient states his appetite is good, weight is stable, he has no headaches and is currently off the steroids. Patient will have repeat PET scan and MRI of the brain with contrast as well as repeat bloodwork. \par \par 8/21/17\par He is here for follow up. He had an MR brain and PET CT on 8/3 and 8/2 that did not show progression, were essentially unchanged.  He feels great otherwise.\par \par 9/21/17\par He is doing well. No complaints. No headaches\par \par October 26, 2017\par He see her for followup he feels great he has no complaints.\par \par 11/16/17\par He is here for follow up. He had an MR of brain on 11/14/17: New areas of nodular enhancement along the inferior surfaces of the temporal lobes bilaterally. These are relatively symmetric and just above the level of the cerebellar enhancing masses, suggesting that these may reflect post treatment change.\par He had to reschedule his PET and states he will do it next Wednesday.\par \par No complaints.\par \par 12/26/17\par He is here for follow up. He feels well. he  had PET/CT in end of November that does not show procession, stable pleural findings. He  feels well. \par \par 1/22/18\par He is here for follow up for his NSCLCA. He states he feels well. No SOB, no headaches no fatigue.. \par \par February 21 2018\par  he is here for followup, he had a recent PET/CT scan which is PRESLEY he also had a recent MRI of the brain. This was reviewed at the CNS tumor board although wasn't clear but the consensus was that this is posttreatment changes in the fact that he is asymptomatic and decided to observe him.\par \par He has no new complaints no headaches no weakness or tingling.\par \par \par 3/21/18\par He is here for follow up. HE states he is doing well. Has very mild headaches on occasion but otherwise doing well.\par \par 4/13/18\par He is doing well. Reports no new complaints \par \par 5/3/18\par He is here for follow up to discuss his MR brain. HE complains of mild headache. Same memory issues as before. Balance is the same.  MR brain showed : Increased size of right parietotemporal of the findings enhancement  seen lesion measuring 5.2 cm, previously 2.8 centimeters and increased  size of left inferior temporal lobe mass measuring 2.3 cm, previously 1.6  cm. these areas again demonstrate hypoperfusion and may represent post  therapeutic changes.   2.  Significant increased edema and mass effect within the right  hemisphere with 9 mm right to left midline shift.  3.  No new enhancing lesions. Stable additional bilateral cerebellar and  supratentorial lesions.\par \par I spoke with Dr. 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 He developed severe headaches, some difficulty with speech and balance issues. 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 He has no insurance so my pharmacy has been giving  him a weekly supply while we work on it. He did not have his scans yet. He feel much better now except for dizziness\par \par 5/30/18\par He is feeling much better. His neurological complaints resolved. He stopped steroids on Monday since he had swelling in legs that has resolved. \par \par 6/13/18\par He is here for follow up. he had a PET/CT on 6/7/18 that showed These images reveal, when comparing the FDG brain images to MRI of  4/27/2018 series 10, there is one small focus of increased FDG uptake in  the region of the right parietotemporal lobe lesion (series 12 image 34).  Therefore persistent biologic tumor activity in this region cannot be  excluded.  No focal abnormal FDG uptake corresponding with the left inferior  temporal lobe lesion and both cerebellar lesions. In addition most of the  right parietotemporal lobe lesion is not FDG avid Compared to normal brain glucose metabolism in the thalamus (surrogate  for normal brain metabolism) relative hypometabolism of the right  parietal, posterior parietal and occipital region and relative  hypermetabolism of the left posterior parietal region  Persistent posttreatment FDG uptake (SUV up to 11.2) along the pleural  surface of the left hemithorax consistent with posttreatment changes  associated with talc pleurodesis  One new mildly FDG avid soft tissue nodule (SUV 3.3) in the left buttock \par \par He feels well now ,no headaches.\par \par 7/16/18\par He is here for follow up. doing well. He has no headaches. No new complaints\par \par 8/13/18\par He is here for follow up.  He is doing well. has no complaints. He is considering returning to work.\par \par 9/10/18\par He is here for follow up. He had an MR brain on 8/24/18 that showed In comparison to the previous brain MRI dated 4/27/2018:  1.  No significant interval change in the size and configuration of the  heterogeneously enhancing, necrotic and hemorrhagic lesions within the  temporal/occipital lobes and cerebellar hemispheres. Enhancement pattern  suggests post-therapeutic changes.  2.  Moderately increased edema within the posterior fossa (cerebellar  hemispheres and brainstem). Increased mass effect upon the fourth  ventricle with no evidence of hydrocephalus.  3.  The left cerebral hemispheric edema has mildly increased and the  right cerebral hemispheric edema has mildly decreased.   4.  Resolution of the previously seen right to left midline shift, right  lateral ventricular compression and left lateral ventricular enlargement.  5.  Treated lesions again noted in the left frontal white matter and left  lateral medulla, without contrast enhancement.\par \par HE is doing well has no new complaints. Has no neuroglial complaints. \par \par 10/10/18\par He is here for follow up he had PET/CT on  9/28/18 that was PRESLEY. He no has headaches again. States he is voiding multiple times a day and unable to empty bladder fully.\par \par 10/30/18\par He is here for follow up. he was in the ED for.Left lower quadrant pain symptoms her was given cirpo. he had a Ct abd/pelvis in ED 10/22/18 : Stable posttreatment changes of the left hemithorax including large  loculated pleural effusion.  2.  Multiple nondilated fluid-filled small bowel loops which may reflect  evidence of enteritis.  \par He had MR brain on 10/29/18:  No new lesions, but several existing lesions have increased in size  2. Bilateral hemorrhagic cerebellar lesions and edema has increased. Mass  effect on the fourth ventricle with hydrocephalus.   2.  Essentially unchanged sizes of supratentorial lesions within the  bilateral temporooccipital lobes and mildly improved edema\par \par He is having headaches and more confusion. He took 4 flomax in am and PM instead of his alecensa. I called [facility] with him and they told us they did not deliver his pills since they could not reach him but will deliver them tomorrow. He will be home. He states he had alecensa at home but not sure.\par \par 11/12/18\par Patient presents to the office with a chief complaint of burning in his right thigh.  He has had no new medications.  The patient states it is worse in the evening but lasts all day.  The patient states that the burning sensation starts at the right knee and is ascending.  Straight leg raise negative.  Discussed that it is unlikely that it is from his Avastin treatment, but unsure the etiology.  We will send for LE doppler to rule out any DVT.\par \par 11/27/18\par He is here for follow up. His Dopplers  were negative and his pain/cramps are nearly gone only some in right thigh. His headaches are gone and he is speech is  better and states memory is better since we started Avastin.\par \par 1/9/19\par He is doing well.  He has no more pain in his legs. He is eating well. He is due for 4/4 dose of Avastin. No other complains.\par \par 2/6/19\par He is here for follow-up.  He generally feels well.  He states he still suffers from headaches.  His memory has improved minimally.\par PET/CT 2/4/19 IMPRESSION: Since 9/28/2018, No definite sites of abnormal FDG uptake to suggest biologic tumor activity. Cerebellar lesions seen on brain MRI from 1/20/2019, are not FDG avid and consistent with treated metastases. Post talc pleurodesis of the left pleura, unchanged. \par MR Brain 1/20/19 IMPRESSION: In comparison to the previous brain MRI dated 10/29/2018: 1. No significant interval change in the size and configuration of the heterogeneously enhancing, necrotic and hemorrhagic lesions within the temporal/occipital lobes and cerebellar hemispheres. 2. The edema within the brainstem and cerebellum is about stable;however there is slight progression of the hydrocephalus since the prior exam. 3. The edema within the temporal and occipital lobes has decreased. 4. New patchy area of restricted diffusion around the left temporal horn (without corresponding enhancement) may reflect superimposed ischemic changes. Recommend attention on follow-up imaging. \par \par 3/6/19\par Patient is here for a follow-up visit.  He states he is having increased frequency of headaches with minimal relief from Tylenol.  He is now staying in the shelter on [address] with his daughter.  He will  his medications from [facility] from now on.  We are awaiting arrangement for him to get an apartment.\par \par 3/27/19\par He is here for follow up. his headache is better and he takes his Dexamethasone only when he has it which helps. Last PET was in February and last MRI was in January.\par \par 4/29/19\par He is here for follow-up.  Since last visit, he presented to the ED in 4/2019 due to chest pain.  He underwent CTA chest that demonstrated unchanged left pleural effusion. Likely radiation induced necrosis changes, he states it may have been related to stress related to his daughter.  He is still awaiting placement from the shelter.  He reports his usual frequency headaches, but he takes the Decadron as needed which helps.  \par CTA Chest (4/7/19) IMPRESSION:No pulmonary emboli.20.9 cm pleural fluid collection in the left hemithorax, essentially unchanged since PET/CT dated 2/4/2019.\par MR Brain (4/2/19) IMPRESSION: Since January 20, 2019:New worse signal abnormality involving the brain and volodymyr with increased edema and worse effacement of the fourth ventricle and resulting mild hydrocephalus.Slightly worse edema involving the cerebellum.Stable bilateral posterior temporal/occipital/cerebellar heterogeneously enhancing lesions.Stable left posterior frontal deep white matter and left dorsal medullary hypointense foci, compatible with treated lesions.\par \par 5/22/19\par He came in for follow up. He has been complaining of headaches mainly in AM. He does not think dexamethasone is helping anymore. No other issues.\par \par 6/3/19\par He is here for follow up. HIs headacehs are the same and uses dexamethasone 4 mg almost dialy. He had  CT C/A/P that showed a stable effusion 5/26/19 amd MR brain showed  5/29/19: Overall no significant changes compared with the previous brain MRI dated \par 4/2/2019:\par \par 1.  No significant interval change in the size and configuration of the \par heterogeneously enhancing, necrotic and hemorrhagic lesions within the \par temporal/occipital lobes and cerebellar hemispheres.\par \par 2. Extensive edema within the cerebellar hemispheres, brainstem, parietal \par and temporal lobes is again seen. Patchy areas of enhancement are noted \par in this region. Appearance is most suggestive of posttreatment changes.\par \par 3. Stable patchy area of restricted diffusion around the left temporal \par horn again seen, nonspecific.\par \par 4. No new enhancing lesions identified.\par \par 6/24/19\par He is here for follow up. he missed his Avastin since he had family issues. Still has headaches they are the same. Will restart Avastin today,.\par \par 7/15/19\par He is here for follow up. He could not tell me if headaches are better. He does use steroids once  in  a while for headaches. States memory is the same. \par \par \par 8/14/19\par He is doing better. He Missed his Avastin doses. But he has no headaches and does not use steroids. He had a fall  and hit a rail on his chest prior to his CT scans and the finding in his CT chest is probably related to the trauma he still has pain in the area but its better. CT C/A/P and MR brain from august are bellow\par \par IMPRESSION:\par \par Since April 7, 2019\par \par Stable left pleural fluid collection measuring 9.5 x 14.1 by 18.4 cm, \par with stable compressive atelectasis of left lung and left hemidiaphragm \par inversion.\par \par New, indeterminate approximate 8 mm soft tissue density, left anterior \par chest (series 4/104).\par \par Interval development of approximate 3.6 x 1.8 by 3.9 cm homogeneous \par elliptical shaped structure, left epicardial fat pad (4/206), possibly \par proteinaceous material (HU +35).\par \par \par IMPRESSION:\par \par Within the left cardiophrenic angle, there is a new area of lobulated \par soft tissue measuring 4.0 x 2.5 cm suspicious for new metastatic disease.\par \par Please see chest CT report for chest findings\par \par IMPRESSION:\par \par Overall no significant changes compared with the previous brain MRI dated \par 5/29/2019:\par \par 1.  No significant interval change in the size and configuration of the \par heterogeneously enhancing, necrotic and hemorrhagic lesions within the \par temporal/occipital lobes and cerebellar hemispheres.\par \par 2. Extensive edema within the cerebellar hemispheres, brainstem, parietal \par and temporal lobes is again seen. Patchy areas of enhancement are noted \par in this region. Appearance is most suggestive of posttreatment changes.\par \par 3. No new enhancing lesions identified.\par \par 10/01/19\par Pt presented today for routine follow up visit. He reports feeling fine and has no new complaints . Pt reports no new symptoms of weakness , headaches and confusion and is well compliant with treatment . \par \par \par 11/11/19\par He is here for follow up.   He feels well. No headaches.  Has a runny nose. No pain. No SOB. He gained a few  pounds.\par \par 12/9/19\par He is here for follow up. He had his MR brain on  11/16/19IMPRESSION: \par 1. Overall no significant changes compared with the previous brain MRI dated 8/5/2019. \par 2. Specifically, the heterogeneously enhancing, necrotic and hemorrhagic lesions within the temporal/occipital \par lobes and cerebellar hemispheres; with extensive associated edema and patchy enhancement are not \par significantly changed and most compatible with posttreatment changes. \par 3. No new enhancing lesions identified.\par \par He did not do his CTs yet. \par \par He feels well. No headaches. \par \par 1/13/2020\par Patient is here for a follow-up visit for metastatic lung cancer.  He is feeling well with no new complaints.  Most recent CBC is stable.  Patient denies fever, chills, nausea, vomiting, new pain or bleeding.  We discussed that based on most recent imaging, it may be suggestive that he is progressing on current treatment regimen.  \par PET/CT (1.7.20) IMPRESSION: Since February 4, 2019:1. Redemonstration of large left pleural fluid collection with circumferential FDG uptake along the pleural surface and associated calcifications (max SUV up to 18, [id number]), consistent with posttreatment change related to talc pleurodesis. New 5.8 x 3.5 cm lesion along the left posterior costophrenic angle, with peripheral FDG avidity, max SUV14. Given talc pleurodesis, findings may represent evolving inflammatory response, however neoplasm remains a consideration, and continued follow-up or tissue sampling recommended.2. Unchanged non-FDG avid sclerotic, treated osseous metastases in the right proximal humerus and T9 vertebral body with compression deformity.\par \par I presented him in tumor board and we decided to biopsy the new area. \par \par 2/4/2020-Interpretor services used as pt is [place]yr[place]z speaking # [id number]\par  Dionisio is here for follow up complaining of b/l flank pain. No c/o fever, chills, dysuria, hematuria at home. No change in bowel habits. He describes dull pain b/l flank area that is mostly in the morning. Today during the office visit, he did not have much pain \par \par 3/17/20\par He is here for follow up. He underwent the biopsy of the  left pleural mass on 3/12/20 that showed Poorly differentiated malignant neoplasm. IHC is pending.  he is doing well otherwise. has minimal headaches. Not dizzy\par \par 4/1/2020: Dionisio is here for follow up, he started Lobrena on 3/17/2020, he is taking 100mg daily, reports no side effects. He reports "little headache" in the morning that is relieved with Tylenol, no other symptoms, no vision changes, no seizure like activity, no N/V, he reports no SOB, no palpitations, heart exam is RRR. No fevers. He will continue with Lobrena as prescribed. \par \par 6/22/2020\par Patient is here for a follow-up visit for metastatic lung cancer.  Since last visit he had COVID was in [facility].  he had a prolonged course. I spoke to his Neurosurgeon and he had MR brain that showed radiation necrosis and he had steroids. He only took Lorbrena for one month, Confirmed with pharmacy.  He is here with his ex wife. We used a pacific  Rosemary ID  [id number] \par He has been taking his Lorbreana since May 28 2020, he gets it from [facility] now, He is on [place] 2L from his hospitalization. He lives with his brother in law. He states he is not SOB. HE was unsteady on his feet. He felt well otherwise. \par \par 7/20/2020\par Patient presents for follow up of metastatic lung cancer. He is accompanied by his ex-wife.  [id number] used to communicate with patient. Patient is unsteady on his feet, ambulates with a walker, and requires someone to remain nearby for assistance. Patient fell last week and hurt 5th toe on right foot, although it seems to be improved now. ROS is significant for foot swelling. Results of recent MRI brain, CT chest/abdomen/pelvis reviewed. He has been taking lorlatinib since 5/28/2020. Ex-wife has noticed that feet are swollen and has attributed his symptoms to ativan, stating that she has switched from giving it to him every day to every other day.\par \par 8/24/2020\par Patient presents for follow up of metastatic lung cancer. He is accompanied by family. Family requested  services.  [id number] used. Patient went to ED with complaints of hemoptysis. CTA chest showed no evidence of PE and showed that pulmonary mass is stable. Patient brought tissue showing small blood clots from earlier today. He continues to take lorlatinib. Family continues to express concern for leg swelling/pain. She notes increased appetite and is concerned that his cholesterol is high. Patient does note that dizziness has improved since starting avastin.\par \par 9/30/20\par He is here for follow up.  HE is here with his wife. We used a  but Dionisio was answering all the questions in English. He is much better in regards to balance, and memory and communication. He only has mild occasional blood tinged sputum \par \par 10/28/2020\par Patient presents for follow up of metastatic lung cancer, accompanied by family members.  He has been tolerating the Lobrena once daily.  We reviewed findings of most recent imaging which shows essentially stable findings in the brain and abdomen/pelvis.  The official report for the CT Chest is still pending but may warrant PET/CT to determine if any true progression.  Patient denies fever, chills, nausea, vomiting, dizziness, seizure activity or bleeding.  \par CT A/P (10.21.2020) IMPRESSION:1.  No findings of new or progressive metastatic disease in the abdomen or pelvis. 2.  Left posterior chest mass broadly abutting/invading the diaphragm - see separately dictated CT chest report for detailed evaluation including complete assessment of the thorax.\par MR Head (10.13.2020) IMPRESSION:Since prior MR of 7/2/2020,Decreased periventricular T2 and FLAIR hyperintensities, compatible with evolving posttreatment changes.Grossly stable bilateral cerebellar hemorrhagic masses. Unchanged mass effect upon the fourth ventricle and stable ventriculomegaly.No evidence of new enhancing intracranial lesions.\par \par 12/2/2020\par He is here for follow up. He was recenly admitted to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

## 2021-04-29 NOTE — PHYSICAL EXAM
[Ambulatory and capable of all self care but unable to carry out any work activities] : Status 2- Ambulatory and capable of all self care but unable to carry out any work activities. Up and about more than 50% of waking hours [Normal] : affect appropriate [de-identified] : Unsteady on feet. Ambulates with walker and requires assistance. Memory and balance is better overall [de-identified] : Forgetful, but stable.

## 2021-04-29 NOTE — ASSESSMENT
[Palliative] : Goals of care discussed with patient: Palliative [FreeTextEntry1] : 1.  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 Due to insurance issues he stopped it and restarted it 4/4/2017. He progressed on alectinib and began taking lorlatinib on 5/28/2020.\par - Repeat MRI brain 8/2018 radiation related changes, post treatment changes, and repeat PET/CT 6/5/18   is without systemic progression.  It confirmed radiation necrosis. PET/CT on 9/28/18 was PRESLEY. CT abdomen was unchanged.\par - Repeat PET/CT from 01/2020 shows new FDG-avid lesions in the lung right under his left effusion on top of the diaphragm.\par - He underwent the biopsy of the  left pleural mass on 3/12/20 that showed poorly differentiated malignant neoplasm. IHC c/w non-small cell carcinoma of the lung, faovring adenocarcinoma, TTF 1 positive.\par - Per Dr. Richardson, he is not a candidate for radiation to the site of progression.\par - We did foundation one on his re-biopsy that showed EML4-Alk variant 3a/b, other mutations CDKN2a and B loss, LLL2, MTAP, SMARCa4, Tp53\par - He was started on lorlatinib in March 2020, took it for about one month, had a prolonged COVID hospitalization, restarted on 5/28/2020 and Progressed in 4/2021 and it was stopped.\par -will proceed with CArboplatin, Alimta and Keytruda. I don’t think he would handle the 4 drug regiment IMpower 150.\par -went over side effects in detail\par -folic acid and  b12 injected today. Zofran started.  Explained to the patient and to the present from his facility how to use it it was also ordered and the printout provided with  direction.\par \par I also provided detailed information to about side effects of chemoimmunotherapy including that information for both the patient and the facility. Once again his daughter was in the phone  understood everything in Dionisio was in agreement.\par \par Also before switching therapy I confirmed that he has been taking Lorlatinib. \par RTC in 1 month\par \par He is thinking about moving to [place] to live with his sister; currently resides in [facility] on [address]\par \par -will rescan after 3 cycles if tolerating with PET/CT and MRI brain.\par \par -I spent 40 minutes with patient going over everything and also calming him down exlaining that he cant leave current place sicne he would be homeless, daughter is working on documets so he can be with his sister in [place].\par \par Contact. Ex wife: [phone], Brother Caesar  [Palliative Care Plan] : Patient was apprised of incurable nature of disease.  Hospice and Palliative care options discussed.

## 2021-05-06 ENCOUNTER — LABORATORY RESULT (OUTPATIENT)
Age: 46
End: 2021-05-06

## 2021-05-06 ENCOUNTER — APPOINTMENT (OUTPATIENT)
Dept: INFUSION THERAPY | Facility: CLINIC | Age: 46
End: 2021-05-06

## 2021-05-06 VITALS — BODY MASS INDEX: 26.06 KG/M2 | HEIGHT: 67 IN | WEIGHT: 166 LBS

## 2021-05-06 RX ORDER — DEXAMETHASONE 0.5 MG/5ML
12 ELIXIR ORAL ONCE
Refills: 0 | Status: COMPLETED | OUTPATIENT
Start: 2021-05-06 | End: 2021-05-06

## 2021-05-06 RX ORDER — CARBOPLATIN 50 MG
750 VIAL (EA) INTRAVENOUS ONCE
Refills: 0 | Status: COMPLETED | OUTPATIENT
Start: 2021-05-06 | End: 2021-05-06

## 2021-05-06 RX ORDER — FOSAPREPITANT DIMEGLUMINE 150 MG/5ML
150 INJECTION, POWDER, LYOPHILIZED, FOR SOLUTION INTRAVENOUS ONCE
Refills: 0 | Status: COMPLETED | OUTPATIENT
Start: 2021-05-06 | End: 2021-05-06

## 2021-05-06 RX ORDER — PEMETREXED 500 MG/20ML
935 INJECTION, SOLUTION, CONCENTRATE INTRAVENOUS ONCE
Refills: 0 | Status: COMPLETED | OUTPATIENT
Start: 2021-05-06 | End: 2021-05-06

## 2021-05-06 RX ORDER — PEMBROLIZUMAB 25 MG/ML
200 INJECTION, SOLUTION INTRAVENOUS ONCE
Refills: 0 | Status: COMPLETED | OUTPATIENT
Start: 2021-05-06 | End: 2021-05-06

## 2021-05-06 RX ADMIN — Medication 325 MILLIGRAM(S): at 12:30

## 2021-05-06 RX ADMIN — FOSAPREPITANT DIMEGLUMINE 150 MILLIGRAM(S): 150 INJECTION, POWDER, LYOPHILIZED, FOR SOLUTION INTRAVENOUS at 10:45

## 2021-05-06 RX ADMIN — Medication 122 MILLIGRAM(S): at 10:55

## 2021-05-06 RX ADMIN — PEMBROLIZUMAB 216 MILLIGRAM(S): 25 INJECTION, SOLUTION INTRAVENOUS at 11:25

## 2021-05-06 RX ADMIN — PEMETREXED 935 MILLIGRAM(S): 500 INJECTION, SOLUTION, CONCENTRATE INTRAVENOUS at 12:30

## 2021-05-06 RX ADMIN — PEMBROLIZUMAB 200 MILLIGRAM(S): 25 INJECTION, SOLUTION INTRAVENOUS at 11:55

## 2021-05-06 RX ADMIN — Medication 12 MILLIGRAM(S): at 11:10

## 2021-05-06 RX ADMIN — PEMETREXED 412.2 MILLIGRAM(S): 500 INJECTION, SOLUTION, CONCENTRATE INTRAVENOUS at 12:10

## 2021-05-06 RX ADMIN — FOSAPREPITANT DIMEGLUMINE 300 MILLIGRAM(S): 150 INJECTION, POWDER, LYOPHILIZED, FOR SOLUTION INTRAVENOUS at 10:25

## 2021-05-06 RX ADMIN — Medication 30 MILLILITER(S): at 10:53

## 2021-05-06 RX ADMIN — Medication 750 MILLIGRAM(S): at 13:30

## 2021-05-12 ENCOUNTER — RX RENEWAL (OUTPATIENT)
Age: 46
End: 2021-05-12

## 2021-05-12 RX ORDER — LORLATINIB 100 MG/1
100 TABLET, FILM COATED ORAL
Qty: 30 | Refills: 0 | Status: ACTIVE | COMMUNITY
Start: 2020-03-17 | End: 1900-01-01

## 2021-05-13 LAB
ALBUMIN SERPL ELPH-MCNC: 3.6 G/DL
ALP BLD-CCNC: 84 U/L
ALT SERPL-CCNC: 7 U/L
ANION GAP SERPL CALC-SCNC: 10 MMOL/L
AST SERPL-CCNC: 16 U/L
BILIRUB SERPL-MCNC: 0.3 MG/DL
BUN SERPL-MCNC: 9 MG/DL
CALCIUM SERPL-MCNC: 8.9 MG/DL
CHLORIDE SERPL-SCNC: 101 MMOL/L
CO2 SERPL-SCNC: 27 MMOL/L
CREAT SERPL-MCNC: 0.6 MG/DL
GLUCOSE SERPL-MCNC: 152 MG/DL
HCT VFR BLD CALC: 38.5 %
HGB BLD-MCNC: 11.9 G/DL
MCHC RBC-ENTMCNC: 25.6 PG
MCHC RBC-ENTMCNC: 30.9 G/DL
MCV RBC AUTO: 82.8 FL
PLATELET # BLD AUTO: 187 K/UL
PMV BLD: 10.7 FL
POTASSIUM SERPL-SCNC: 4.1 MMOL/L
PROT SERPL-MCNC: 7.4 G/DL
RBC # BLD: 4.65 M/UL
RBC # FLD: 15.6 %
SODIUM SERPL-SCNC: 138 MMOL/L
TSH SERPL-ACNC: 1.82 UIU/ML
WBC # FLD AUTO: 8.64 K/UL

## 2021-05-27 ENCOUNTER — APPOINTMENT (OUTPATIENT)
Dept: HEMATOLOGY ONCOLOGY | Facility: CLINIC | Age: 46
End: 2021-05-27
Payer: MEDICARE

## 2021-05-27 ENCOUNTER — APPOINTMENT (OUTPATIENT)
Dept: INFUSION THERAPY | Facility: CLINIC | Age: 46
End: 2021-05-27
Payer: MEDICARE

## 2021-05-27 ENCOUNTER — LABORATORY RESULT (OUTPATIENT)
Age: 46
End: 2021-05-27

## 2021-05-27 VITALS
RESPIRATION RATE: 14 BRPM | HEART RATE: 104 BPM | BODY MASS INDEX: 24.64 KG/M2 | WEIGHT: 157 LBS | SYSTOLIC BLOOD PRESSURE: 123 MMHG | HEIGHT: 67 IN | DIASTOLIC BLOOD PRESSURE: 78 MMHG | TEMPERATURE: 97.1 F

## 2021-05-27 PROCEDURE — 99214 OFFICE O/P EST MOD 30 MIN: CPT

## 2021-06-01 ENCOUNTER — APPOINTMENT (OUTPATIENT)
Dept: HEMATOLOGY ONCOLOGY | Facility: CLINIC | Age: 46
End: 2021-06-01
Payer: MEDICARE

## 2021-06-01 DIAGNOSIS — C78.00 SECONDARY MALIGNANT NEOPLASM OF UNSPECIFIED LUNG: ICD-10-CM

## 2021-06-01 LAB
HCT VFR BLD CALC: 38.5 %
HGB BLD-MCNC: 12.3 G/DL
MCHC RBC-ENTMCNC: 26.6 PG
MCHC RBC-ENTMCNC: 31.9 G/DL
MCV RBC AUTO: 83.3 FL
PLATELET # BLD AUTO: 209 K/UL
PMV BLD: 10.3 FL
RBC # BLD: 4.62 M/UL
RBC # FLD: 18.6 %
WBC # FLD AUTO: 3.59 K/UL

## 2021-06-01 PROCEDURE — 99214 OFFICE O/P EST MOD 30 MIN: CPT

## 2021-06-01 NOTE — PHYSICAL EXAM
[Ambulatory and capable of all self care but unable to carry out any work activities] : Status 2- Ambulatory and capable of all self care but unable to carry out any work activities. Up and about more than 50% of waking hours [Normal] : affect appropriate [de-identified] : pain not worse with palpation [de-identified] : Unsteady on feet. Ambulates with walker and requires assistance. Memory and balance is better overall [de-identified] : Forgetful, but stable.

## 2021-06-01 NOTE — ASSESSMENT
[Palliative] : Goals of care discussed with patient: Palliative [Palliative Care Plan] : Patient was apprised of incurable nature of disease.  Hospice and Palliative care options discussed. [FreeTextEntry1] : 1.  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 Due to insurance issues he stopped it and restarted it 4/4/2017. He progressed on alectinib and began taking lorlatinib on 5/28/2020.\par - Repeat MRI brain 8/2018 radiation related changes, post treatment changes, and repeat PET/CT 6/5/18   is without systemic progression.  It confirmed radiation necrosis. PET/CT on 9/28/18 was PRESLEY. CT abdomen was unchanged.\par - Repeat PET/CT from 01/2020 shows new FDG-avid lesions in the lung right under his left effusion on top of the diaphragm.\par - He underwent the biopsy of the  left pleural mass on 3/12/20 that showed poorly differentiated malignant neoplasm. IHC c/w non-small cell carcinoma of the lung, favoring adenocarcinoma, TTF 1 positive.\par - Per Dr. Richardson, he is not a candidate for radiation to the site of progression.\par - We did foundation one on his re-biopsy that showed EML4-Alk variant 3a/b, other mutations CDKN2a and B loss, LLL2, MTAP, SMARCa4, Tp53\par - He was started on lorlatinib in March 2020, took it for about one month, had a prolonged COVID hospitalization, restarted on 5/28/2020 and Progressed in 4/2021 and it was stopped.\par - will HOLD cycle 2 of Carboplatin, Alimta and Keytruda since he wishes to postpone due to recent abdominal pain. I don’t think he would handle the 4 drug regiment IMpower 150.\par - c/w folic acid daily and  b12 every 9 weeks (with Alimta)\par - will rescan after 3 cycles if tolerating with PET/CT and MRI brain.\par \par #New onset abdominal pain x 2 weeks, worse with eating\par - will obtain CT A/P with IVC r/o mesenteric ischemia vs immune-mediated colitis\par - he wishes to defer treatment today until after reimaging is performed\par - advised to follow with GI for complete workup pending reimaging; he has never had colonoscopy\par - he is taking OTC pain medication as needed with some relief\par \par I also provided detailed information to about side effects of chemoimmunotherapy including that information for both the patient and the facility. Once again his daughter was in the phone  understood everything in Dionisio was in agreement.\par \par RTC in 1 week with possible treatment , sooner if needed\par \par He is thinking about moving to [place] to live with his sister; currently resides in [facility] on [address]\par \par Contact. Ex wife: [phone], Brother Caesar \par \par Case reviewed and patient seen with Dr. Day, who agrees with plan of care.

## 2021-06-01 NOTE — END OF VISIT
[Time Spent: ___ minutes] : I have spent [unfilled] minutes of time on the encounter. [FreeTextEntry3] : I was physically present for the key portions of the evaluation and management service provided.  I agree with the history and physical, and plan which I have reviewed and edited where appropriate.\par \par He was to start chemoimmunotherapy last week but decided not to proceed with treatment. he came back today with aid from his assisted living facility. He informed us that he does not want any more treatment for his cancer. He is aware that by not undergoing any treatment this can shorten his life. In my opinion he has full capacity to make this decision. I suggested hospice when he is ready. He can RTC PRN if he changes his mind.  His prognosis is likely  6 months but maybe longer even 12 months or more,  since tumor since most of his disease is in his chest where the left lung has minimal if any contribution to his oxygenation

## 2021-06-01 NOTE — HISTORY OF PRESENT ILLNESS
[Disease: _____________________] : Disease: [unfilled] [AJCC Stage: ____] : AJCC Stage: [unfilled] [Treatment Protocol] : Treatment Protocol [Therapy: ___] : Therapy: [unfilled] [Cycle: ___] : Cycle: [unfilled] [de-identified] : 41/M presenting for follow-up for metastatic ALK-positive lung adenocarcinoma.  He is currently on alectinib 600 mg BID.  He initially presented in 11/2014 with multiple pulmonary nodules, mediastinal lymphadenopathy, multiple bony metastases, and brain metastases.  He was initially started on crizotinib and was switched to ceritinib in 12/2015 upon progression.  He was unable to tolerate ceritinib due to significant vomiting and was switched to alectinib in 02/2016.\par \par He had SRS to a left cerebellar hemisphere brain metastasis (08/2015) and subsequently underwent WBRT for CNS progression (10-11/2015).  He had RT to a painful metastatic lesion in the right humerus (01/2015).  He had also received Xgeva for bony metastatic disease in the past. \par \par Since 09/2016, he has been experiencing intermittent headaches being treated with periodic dexamethasone taper.  MRI brain with perfusion analysis showed bilateral cerebellar lesions, favor postRT changes rather than residual tumor.  His most recent brain MRI (11/01/2016) showed overall  stable  exam  compared  to  the  previous  brain  MRI  9/2/2016; no  significant  interval  change  in  bilateral  cerebellar  heterogeneously  enhancing  masses  with  extensive  associated  edema,  as  well  as  smaller  lesions  within  the  left  parietal  white  matter,  superficial  left  temporal  lobe  and  left  dorsal  medulla; and, stable  mild  ventriculomegaly  and  mild  periventricular  FLAIR  signal  abnormality.  He has been following with our neurosurgeon, Dr. Britt, who has recommended no surgical intervention at this time. \par \par His most recent PETCT (11/01/2016) showed no sites of pathologic FDG uptake suspicious for biologic tumor activity.\par \par He reports occasional headaches and a mild sense of imbalance.  Denies any falls.  However he only takes a prn dexamethasone, once in every three days,  which reportedly relieves his intermittent headaches. \par Today he also complains mild pain in his left great toe, likely secondary to ingrowing toe nail. 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 He changed his insurance. Did not get scans and did not take his alectinib for about 1-2 weeks. We were not notified.  Otherwise she feels well she really requires dexamethasone yesterday headache probably once or twice a week as per patient no other complaints.\par \par 4/17/17\par He presents for follow up today. He had MRI of brain that showed stable disease 4/8/2017. He has not had the PET CT yet. He received  his alecetinib on 4/4/17 and stated to take them. States his headaches are rare now and feels well otherwise.\par \par 5/15/17\par He presents for follow up. He had a PET CT on 4/21/17 that  was ucnhged.Since November 1, 2016, no new foci of pathologic FDG uptake to suggest\par biologic tumor activity. . 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 . Unchanged non-FDG avid sclerotic foci involving the right proximal humerus\par and T9 vertebral body.\par \par He  feels well. No dizziness or headache.  No new symptoms or complaints.\par \par 6/19/17\par He is doing well. He has no complaints. No headaches. No SOB. \par \par 7/24/17\par Patient comes in for follow up visit, has no complaints, has no SOB and has a mild cough which is chronic, no hemoptysis. Patient states his appetite is good, weight is stable, he has no headaches and is currently off the steroids. Patient will have repeat PET scan and MRI of the brain with contrast as well as repeat bloodwork. \par \par 8/21/17\par He is here for follow up. He had an MR brain and PET CT on 8/3 and 8/2 that did not show progression, were essentially unchanged.  He feels great otherwise.\par \par 9/21/17\par He is doing well. No complaints. No headaches\par \par October 26, 2017\par He see her for followup he feels great he has no complaints.\par \par 11/16/17\par He is here for follow up. He had an MR of brain on 11/14/17: New areas of nodular enhancement along the inferior surfaces of the temporal lobes bilaterally. These are relatively symmetric and just above the level of the cerebellar enhancing masses, suggesting that these may reflect post treatment change.\par He had to reschedule his PET and states he will do it next Wednesday.\par \par No complaints.\par \par 12/26/17\par He is here for follow up. He feels well. he  had PET/CT in end of November that does not show procession, stable pleural findings. He  feels well. \par \par 1/22/18\par He is here for follow up for his NSCLCA. He states he feels well. No SOB, no headaches no fatigue.. \par \par February 21 2018\par  he is here for followup, he had a recent PET/CT scan which is PRESLEY he also had a recent MRI of the brain. This was reviewed at the CNS tumor board although wasn't clear but the consensus was that this is posttreatment changes in the fact that he is asymptomatic and decided to observe him.\par \par He has no new complaints no headaches no weakness or tingling.\par \par \par 3/21/18\par He is here for follow up. HE states he is doing well. Has very mild headaches on occasion but otherwise doing well.\par \par 4/13/18\par He is doing well. Reports no new complaints \par \par 5/3/18\par He is here for follow up to discuss his MR brain. HE complains of mild headache. Same memory issues as before. Balance is the same.  MR brain showed : Increased size of right parietotemporal of the findings enhancement  seen lesion measuring 5.2 cm, previously 2.8 centimeters and increased  size of left inferior temporal lobe mass measuring 2.3 cm, previously 1.6  cm. these areas again demonstrate hypoperfusion and may represent post  therapeutic changes.   2.  Significant increased edema and mass effect within the right  hemisphere with 9 mm right to left midline shift.  3.  No new enhancing lesions. Stable additional bilateral cerebellar and  supratentorial lesions.\par \par I spoke with Dr. 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 He developed severe headaches, some difficulty with speech and balance issues. 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 He has no insurance so my pharmacy has been giving  him a weekly supply while we work on it. He did not have his scans yet. He feel much better now except for dizziness\par \par 5/30/18\par He is feeling much better. His neurological complaints resolved. He stopped steroids on Monday since he had swelling in legs that has resolved. \par \par 6/13/18\par He is here for follow up. he had a PET/CT on 6/7/18 that showed These images reveal, when comparing the FDG brain images to MRI of  4/27/2018 series 10, there is one small focus of increased FDG uptake in  the region of the right parietotemporal lobe lesion (series 12 image 34).  Therefore persistent biologic tumor activity in this region cannot be  excluded.  No focal abnormal FDG uptake corresponding with the left inferior  temporal lobe lesion and both cerebellar lesions. In addition most of the  right parietotemporal lobe lesion is not FDG avid Compared to normal brain glucose metabolism in the thalamus (surrogate  for normal brain metabolism) relative hypometabolism of the right  parietal, posterior parietal and occipital region and relative  hypermetabolism of the left posterior parietal region  Persistent posttreatment FDG uptake (SUV up to 11.2) along the pleural  surface of the left hemithorax consistent with posttreatment changes  associated with talc pleurodesis  One new mildly FDG avid soft tissue nodule (SUV 3.3) in the left buttock \par \par He feels well now ,no headaches.\par \par 7/16/18\par He is here for follow up. doing well. He has no headaches. No new complaints\par \par 8/13/18\par He is here for follow up.  He is doing well. has no complaints. He is considering returning to work.\par \par 9/10/18\par He is here for follow up. He had an MR brain on 8/24/18 that showed In comparison to the previous brain MRI dated 4/27/2018:  1.  No significant interval change in the size and configuration of the  heterogeneously enhancing, necrotic and hemorrhagic lesions within the  temporal/occipital lobes and cerebellar hemispheres. Enhancement pattern  suggests post-therapeutic changes.  2.  Moderately increased edema within the posterior fossa (cerebellar  hemispheres and brainstem). Increased mass effect upon the fourth  ventricle with no evidence of hydrocephalus.  3.  The left cerebral hemispheric edema has mildly increased and the  right cerebral hemispheric edema has mildly decreased.   4.  Resolution of the previously seen right to left midline shift, right  lateral ventricular compression and left lateral ventricular enlargement.  5.  Treated lesions again noted in the left frontal white matter and left  lateral medulla, without contrast enhancement.\par \par HE is doing well has no new complaints. Has no neuroglial complaints. \par \par 10/10/18\par He is here for follow up he had PET/CT on  9/28/18 that was PRESLEY. He no has headaches again. States he is voiding multiple times a day and unable to empty bladder fully.\par \par 10/30/18\par He is here for follow up. he was in the ED for.Left lower quadrant pain symptoms her was given cirpo. he had a Ct abd/pelvis in ED 10/22/18 : Stable posttreatment changes of the left hemithorax including large  loculated pleural effusion.  2.  Multiple nondilated fluid-filled small bowel loops which may reflect  evidence of enteritis.  \par He had MR brain on 10/29/18:  No new lesions, but several existing lesions have increased in size  2. Bilateral hemorrhagic cerebellar lesions and edema has increased. Mass  effect on the fourth ventricle with hydrocephalus.   2.  Essentially unchanged sizes of supratentorial lesions within the  bilateral temporooccipital lobes and mildly improved edema\par \par He is having headaches and more confusion. He took 4 flomax in am and PM instead of his alecensa. I called [facility] with him and they told us they did not deliver his pills since they could not reach him but will deliver them tomorrow. He will be home. He states he had alecensa at home but not sure.\par \par 11/12/18\par Patient presents to the office with a chief complaint of burning in his right thigh.  He has had no new medications.  The patient states it is worse in the evening but lasts all day.  The patient states that the burning sensation starts at the right knee and is ascending.  Straight leg raise negative.  Discussed that it is unlikely that it is from his Avastin treatment, but unsure the etiology.  We will send for LE doppler to rule out any DVT.\par \par 11/27/18\par He is here for follow up. His Dopplers  were negative and his pain/cramps are nearly gone only some in right thigh. His headaches are gone and he is speech is  better and states memory is better since we started Avastin.\par \par 1/9/19\par He is doing well.  He has no more pain in his legs. He is eating well. He is due for 4/4 dose of Avastin. No other complains.\par \par 2/6/19\par He is here for follow-up.  He generally feels well.  He states he still suffers from headaches.  His memory has improved minimally.\par PET/CT 2/4/19 IMPRESSION: Since 9/28/2018, No definite sites of abnormal FDG uptake to suggest biologic tumor activity. Cerebellar lesions seen on brain MRI from 1/20/2019, are not FDG avid and consistent with treated metastases. Post talc pleurodesis of the left pleura, unchanged. \par MR Brain 1/20/19 IMPRESSION: In comparison to the previous brain MRI dated 10/29/2018: 1. No significant interval change in the size and configuration of the heterogeneously enhancing, necrotic and hemorrhagic lesions within the temporal/occipital lobes and cerebellar hemispheres. 2. The edema within the brainstem and cerebellum is about stable;however there is slight progression of the hydrocephalus since the prior exam. 3. The edema within the temporal and occipital lobes has decreased. 4. New patchy area of restricted diffusion around the left temporal horn (without corresponding enhancement) may reflect superimposed ischemic changes. Recommend attention on follow-up imaging. \par \par 3/6/19\par Patient is here for a follow-up visit.  He states he is having increased frequency of headaches with minimal relief from Tylenol.  He is now staying in the shelter on [address] [place] with his daughter.  He will  his medications from [facility] from now on.  We are awaiting arrangement for him to get an apartment.\par \par 3/27/19\par He is here for follow up. his headache is better and he takes his Dexamethasone only when he has it which helps. Last PET was in February and last MRI was in January.\par \par 4/29/19\par He is here for follow-up.  Since last visit, he presented to the ED in 4/2019 due to chest pain.  He underwent CTA chest that demonstrated unchanged left pleural effusion. Likely radiation induced necrosis changes, he states it may have been related to stress related to his daughter.  He is still awaiting placement from the shelter.  He reports his usual frequency headaches, but he takes the Decadron as needed which helps.  \par CTA Chest (4/7/19) IMPRESSION:No pulmonary emboli.20.9 cm pleural fluid collection in the left hemithorax, essentially unchanged since PET/CT dated 2/4/2019.\par MR Brain (4/2/19) IMPRESSION: Since January 20, 2019:New worse signal abnormality involving the brain and volodymyr with increased edema and worse effacement of the fourth ventricle and resulting mild hydrocephalus.Slightly worse edema involving the cerebellum.Stable bilateral posterior temporal/occipital/cerebellar heterogeneously enhancing lesions.Stable left posterior frontal deep white matter and left dorsal medullary hypointense foci, compatible with treated lesions.\par \par 5/22/19\par He came in for follow up. He has been complaining of headaches mainly in AM. He does not think dexamethasone is helping anymore. No other issues.\par \par 6/3/19\par He is here for follow up. HIs headacehs are the same and uses dexamethasone 4 mg almost dialy. He had  CT C/A/P that showed a stable effusion 5/26/19 amd MR brain showed  5/29/19: Overall no significant changes compared with the previous brain MRI dated \par 4/2/2019:\par \par 1.  No significant interval change in the size and configuration of the \par heterogeneously enhancing, necrotic and hemorrhagic lesions within the \par temporal/occipital lobes and cerebellar hemispheres.\par \par 2. Extensive edema within the cerebellar hemispheres, brainstem, parietal \par and temporal lobes is again seen. Patchy areas of enhancement are noted \par in this region. Appearance is most suggestive of posttreatment changes.\par \par 3. Stable patchy area of restricted diffusion around the left temporal \par horn again seen, nonspecific.\par \par 4. No new enhancing lesions identified.\par \par 6/24/19\par He is here for follow up. he missed his Avastin since he had family issues. Still has headaches they are the same. Will restart Avastin today,.\par \par 7/15/19\par He is here for follow up. He could not tell me if headaches are better. He does use steroids once  in  a while for headaches. States memory is the same. \par \par \par 8/14/19\par He is doing better. He Missed his Avastin doses. But he has no headaches and does not use steroids. He had a fall  and hit a rail on his chest prior to his CT scans and the finding in his CT chest is probably related to the trauma he still has pain in the area but its better. CT C/A/P and MR brain from august are bellow\par \par IMPRESSION:\par \par Since April 7, 2019\par \par Stable left pleural fluid collection measuring 9.5 x 14.1 by 18.4 cm, \par with stable compressive atelectasis of left lung and left hemidiaphragm \par inversion.\par \par New, indeterminate approximate 8 mm soft tissue density, left anterior \par chest (series 4/104).\par \par Interval development of approximate 3.6 x 1.8 by 3.9 cm homogeneous \par elliptical shaped structure, left epicardial fat pad (4/206), possibly \par proteinaceous material (HU +35).\par \par \par IMPRESSION:\par \par Within the left cardiophrenic angle, there is a new area of lobulated \par soft tissue measuring 4.0 x 2.5 cm suspicious for new metastatic disease.\par \par Please see chest CT report for chest findings\par \par IMPRESSION:\par \par Overall no significant changes compared with the previous brain MRI dated \par 5/29/2019:\par \par 1.  No significant interval change in the size and configuration of the \par heterogeneously enhancing, necrotic and hemorrhagic lesions within the \par temporal/occipital lobes and cerebellar hemispheres.\par \par 2. Extensive edema within the cerebellar hemispheres, brainstem, parietal \par and temporal lobes is again seen. Patchy areas of enhancement are noted \par in this region. Appearance is most suggestive of posttreatment changes.\par \par 3. No new enhancing lesions identified.\par \par 10/01/19\par Pt presented today for routine follow up visit. He reports feeling fine and has no new complaints . Pt reports no new symptoms of weakness , headaches and confusion and is well compliant with treatment . \par \par \par 11/11/19\par He is here for follow up.   He feels well. No headaches.  Has a runny nose. No pain. No SOB. He gained a few  pounds.\par \par 12/9/19\par He is here for follow up. He had his MR brain on  11/16/19IMPRESSION: \par 1. Overall no significant changes compared with the previous brain MRI dated 8/5/2019. \par 2. Specifically, the heterogeneously enhancing, necrotic and hemorrhagic lesions within the temporal/occipital \par lobes and cerebellar hemispheres; with extensive associated edema and patchy enhancement are not \par significantly changed and most compatible with posttreatment changes. \par 3. No new enhancing lesions identified.\par \par He did not do his CTs yet. \par \par He feels well. No headaches. \par \par 1/13/2020\par Patient is here for a follow-up visit for metastatic lung cancer.  He is feeling well with no new complaints.  Most recent CBC is stable.  Patient denies fever, chills, nausea, vomiting, new pain or bleeding.  We discussed that based on most recent imaging, it may be suggestive that he is progressing on current treatment regimen.  \par PET/CT (1.7.20) IMPRESSION: Since February 4, 2019:1. Redemonstration of large left pleural fluid collection with circumferential FDG uptake along the pleural surface and associated calcifications (max SUV up to 18, [id number]), consistent with posttreatment change related to talc pleurodesis. New 5.8 x 3.5 cm lesion along the left posterior costophrenic angle, with peripheral FDG avidity, max SUV14. Given talc pleurodesis, findings may represent evolving inflammatory response, however neoplasm remains a consideration, and continued follow-up or tissue sampling recommended.2. Unchanged non-FDG avid sclerotic, treated osseous metastases in the right proximal humerus and T9 vertebral body with compression deformity.\par \par I presented him in tumor board and we decided to biopsy the new area. \par \par 2/4/2020-Interpretor services used as pt is Hebrew speaking # [id number]\par  Dionisio is here for follow up complaining of b/l flank pain. No c/o fever, chills, dysuria, hematuria at home. No change in bowel habits. He describes dull pain b/l flank area that is mostly in the morning. Today during the office visit, he did not have much pain \par \par 3/17/20\par He is here for follow up. He underwent the biopsy of the  left pleural mass on 3/12/20 that showed Poorly differentiated malignant neoplasm. IHC is pending.  he is doing well otherwise. has minimal headaches. Not dizzy\par \par 4/1/2020: Dionisio is here for follow up, he started Lobrena on 3/17/2020, he is taking 100mg daily, reports no side effects. He reports "little headache" in the morning that is relieved with Tylenol, no other symptoms, no vision changes, no seizure like activity, no N/V, he reports no SOB, no palpitations, heart exam is RRR. No fevers. He will continue with Lobrena as prescribed. \par \par 6/22/2020\par Patient is here for a follow-up visit for metastatic lung cancer.  Since last visit he had COVID was in [facility].  he had a prolonged course. I spoke to his Neurosurgeon and he had MR brain that showed radiation necrosis and he had steroids. He only took Lorbrena for one month, Confirmed with pharmacy.  He is here with his ex wife. We used a pacific  Rosemary ID  [id number] \par He has been taking his Lorbreana since May 28 2020, he gets it from [facility] now, He is on [place] 2[place] from his hospitalization. He lives with his brother in law. He states he is not SOB. HE was unsteady on his feet. He felt well otherwise. \par \par 7/20/2020\par Patient presents for follow up of metastatic lung cancer. He is accompanied by his ex-wife.  [id number] used to communicate with patient. Patient is unsteady on his feet, ambulates with a walker, and requires someone to remain nearby for assistance. Patient fell last week and hurt 5th toe on right foot, although it seems to be improved now. ROS is significant for foot swelling. Results of recent MRI brain, CT chest/abdomen/pelvis reviewed. He has been taking lorlatinib since 5/28/2020. Ex-wife has noticed that feet are swollen and has attributed his symptoms to ativan, stating that she has switched from giving it to him every day to every other day.\par \par 8/24/2020\par Patient presents for follow up of metastatic lung cancer. He is accompanied by family. Family requested  services.  [id number] used. Patient went to ED with complaints of hemoptysis. CTA chest showed no evidence of PE and showed that pulmonary mass is stable. Patient brought tissue showing small blood clots from earlier today. He continues to take lorlatinib. Family continues to express concern for leg swelling/pain. She notes increased appetite and is concerned that his cholesterol is high. Patient does note that dizziness has improved since starting avastin.\par \par 9/30/20\par He is here for follow up.  HE is here with his wife. We used a  but Dionisio was answering all the questions in English. He is much better in regards to balance, and memory and communication. He only has mild occasional blood tinged sputum \par \par 10/28/2020\par Patient presents for follow up of metastatic lung cancer, accompanied by family members.  He has been tolerating the Lobrena once daily.  We reviewed findings of most recent imaging which shows essentially stable findings in the brain and abdomen/pelvis.  The official report for the CT Chest is still pending but may warrant PET/CT to determine if any true progression.  Patient denies fever, chills, nausea, vomiting, dizziness, seizure activity or bleeding.  \par CT A/P (10.21.2020) IMPRESSION:1.  No findings of new or progressive metastatic disease in the abdomen or pelvis. 2.  Left posterior chest mass broadly abutting/invading the diaphragm - see separately dictated CT chest report for detailed evaluation including complete assessment of the thorax.\par MR Head (10.13.2020) IMPRESSION:Since prior MR of 7/2/2020,Decreased periventricular T2 and FLAIR hyperintensities, compatible with evolving posttreatment changes.Grossly stable bilateral cerebellar hemorrhagic masses. Unchanged mass effect upon the fourth ventricle and stable ventriculomegaly.No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

## 2021-06-01 NOTE — HISTORY OF PRESENT ILLNESS
[Disease: _____________________] : Disease: [unfilled] [AJCC Stage: ____] : AJCC Stage: [unfilled] [Treatment Protocol] : Treatment Protocol [de-identified] : 41/M presenting for follow-up for metastatic ALK-positive lung adenocarcinoma.  He is currently on alectinib 600 mg BID.  He initially presented in 11/2014 with multiple pulmonary nodules, mediastinal lymphadenopathy, multiple bony metastases, and brain metastases.  He was initially started on crizotinib and was switched to ceritinib in 12/2015 upon progression.  He was unable to tolerate ceritinib due to significant vomiting and was switched to alectinib in 02/2016.\par \par He had SRS to a left cerebellar hemisphere brain metastasis (08/2015) and subsequently underwent WBRT for CNS progression (10-11/2015).  He had RT to a painful metastatic lesion in the right humerus (01/2015).  He had also received Xgeva for bony metastatic disease in the past. \par \par Since 09/2016, he has been experiencing intermittent headaches being treated with periodic dexamethasone taper.  MRI brain with perfusion analysis showed bilateral cerebellar lesions, favor postRT changes rather than residual tumor.  His most recent brain MRI (11/01/2016) showed overall  stable  exam  compared  to  the  previous  brain  MRI  9/2/2016; no  significant  interval  change  in  bilateral  cerebellar  heterogeneously  enhancing  masses  with  extensive  associated  edema,  as  well  as  smaller  lesions  within  the  left  parietal  white  matter,  superficial  left  temporal  lobe  and  left  dorsal  medulla; and, stable  mild  ventriculomegaly  and  mild  periventricular  FLAIR  signal  abnormality.  He has been following with our neurosurgeon, Dr. Britt, who has recommended no surgical intervention at this time. \par \par His most recent PETCT (11/01/2016) showed no sites of pathologic FDG uptake suspicious for biologic tumor activity.\par \par He reports occasional headaches and a mild sense of imbalance.  Denies any falls.  However he only takes a prn dexamethasone, once in every three days,  which reportedly relieves his intermittent headaches. \par Today he also complains mild pain in his left great toe, likely secondary to ingrowing toe nail. 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 He changed his insurance. Did not get scans and did not take his alectinib for about 1-2 weeks. We were not notified.  Otherwise she feels well she really requires dexamethasone yesterday headache probably once or twice a week as per patient no other complaints.\par \par 4/17/17\par He presents for follow up today. He had MRI of brain that showed stable disease 4/8/2017. He has not had the PET CT yet. He received  his alecetinib on 4/4/17 and stated to take them. States his headaches are rare now and feels well otherwise.\par \par 5/15/17\par He presents for follow up. He had a PET CT on 4/21/17 that  was ucnhged.Since November 1, 2016, no new foci of pathologic FDG uptake to suggest\par biologic tumor activity. . 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 . Unchanged non-FDG avid sclerotic foci involving the right proximal humerus\par and T9 vertebral body.\par \par He  feels well. No dizziness or headache.  No new symptoms or complaints.\par \par 6/19/17\par He is doing well. He has no complaints. No headaches. No SOB. \par \par 7/24/17\par Patient comes in for follow up visit, has no complaints, has no SOB and has a mild cough which is chronic, no hemoptysis. Patient states his appetite is good, weight is stable, he has no headaches and is currently off the steroids. Patient will have repeat PET scan and MRI of the brain with contrast as well as repeat bloodwork. \par \par 8/21/17\par He is here for follow up. He had an MR brain and PET CT on 8/3 and 8/2 that did not show progression, were essentially unchanged.  He feels great otherwise.\par \par 9/21/17\par He is doing well. No complaints. No headaches\par \par October 26, 2017\par He see her for followup he feels great he has no complaints.\par \par 11/16/17\par He is here for follow up. He had an MR of brain on 11/14/17: New areas of nodular enhancement along the inferior surfaces of the temporal lobes bilaterally. These are relatively symmetric and just above the level of the cerebellar enhancing masses, suggesting that these may reflect post treatment change.\par He had to reschedule his PET and states he will do it next Wednesday.\par \par No complaints.\par \par 12/26/17\par He is here for follow up. He feels well. he  had PET/CT in end of November that does not show procession, stable pleural findings. He  feels well. \par \par 1/22/18\par He is here for follow up for his NSCLCA. He states he feels well. No SOB, no headaches no fatigue.. \par \par February 21 2018\par  he is here for followup, he had a recent PET/CT scan which is PRESLEY he also had a recent MRI of the brain. This was reviewed at the CNS tumor board although wasn't clear but the consensus was that this is posttreatment changes in the fact that he is asymptomatic and decided to observe him.\par \par He has no new complaints no headaches no weakness or tingling.\par \par \par 3/21/18\par He is here for follow up. HE states he is doing well. Has very mild headaches on occasion but otherwise doing well.\par \par 4/13/18\par He is doing well. Reports no new complaints \par \par 5/3/18\par He is here for follow up to discuss his MR brain. HE complains of mild headache. Same memory issues as before. Balance is the same.  MR brain showed : Increased size of right parietotemporal of the findings enhancement  seen lesion measuring 5.2 cm, previously 2.8 centimeters and increased  size of left inferior temporal lobe mass measuring 2.3 cm, previously 1.6  cm. these areas again demonstrate hypoperfusion and may represent post  therapeutic changes.   2.  Significant increased edema and mass effect within the right  hemisphere with 9 mm right to left midline shift.  3.  No new enhancing lesions. Stable additional bilateral cerebellar and  supratentorial lesions.\par \par I spoke with Dr. 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 He developed severe headaches, some difficulty with speech and balance issues. 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 He has no insurance so my pharmacy has been giving  him a weekly supply while we work on it. He did not have his scans yet. He feel much better now except for dizziness\par \par 5/30/18\par He is feeling much better. His neurological complaints resolved. He stopped steroids on Monday since he had swelling in legs that has resolved. \par \par 6/13/18\par He is here for follow up. he had a PET/CT on 6/7/18 that showed These images reveal, when comparing the FDG brain images to MRI of  4/27/2018 series 10, there is one small focus of increased FDG uptake in  the region of the right parietotemporal lobe lesion (series 12 image 34).  Therefore persistent biologic tumor activity in this region cannot be  excluded.  No focal abnormal FDG uptake corresponding with the left inferior  temporal lobe lesion and both cerebellar lesions. In addition most of the  right parietotemporal lobe lesion is not FDG avid Compared to normal brain glucose metabolism in the thalamus (surrogate  for normal brain metabolism) relative hypometabolism of the right  parietal, posterior parietal and occipital region and relative  hypermetabolism of the left posterior parietal region  Persistent posttreatment FDG uptake (SUV up to 11.2) along the pleural  surface of the left hemithorax consistent with posttreatment changes  associated with talc pleurodesis  One new mildly FDG avid soft tissue nodule (SUV 3.3) in the left buttock \par \par He feels well now ,no headaches.\par \par 7/16/18\par He is here for follow up. doing well. He has no headaches. No new complaints\par \par 8/13/18\par He is here for follow up.  He is doing well. has no complaints. He is considering returning to work.\par \par 9/10/18\par He is here for follow up. He had an MR brain on 8/24/18 that showed In comparison to the previous brain MRI dated 4/27/2018:  1.  No significant interval change in the size and configuration of the  heterogeneously enhancing, necrotic and hemorrhagic lesions within the  temporal/occipital lobes and cerebellar hemispheres. Enhancement pattern  suggests post-therapeutic changes.  2.  Moderately increased edema within the posterior fossa (cerebellar  hemispheres and brainstem). Increased mass effect upon the fourth  ventricle with no evidence of hydrocephalus.  3.  The left cerebral hemispheric edema has mildly increased and the  right cerebral hemispheric edema has mildly decreased.   4.  Resolution of the previously seen right to left midline shift, right  lateral ventricular compression and left lateral ventricular enlargement.  5.  Treated lesions again noted in the left frontal white matter and left  lateral medulla, without contrast enhancement.\par \par HE is doing well has no new complaints. Has no neuroglial complaints. \par \par 10/10/18\par He is here for follow up he had PET/CT on  9/28/18 that was PRESLEY. He no has headaches again. States he is voiding multiple times a day and unable to empty bladder fully.\par \par 10/30/18\par He is here for follow up. he was in the ED for.Left lower quadrant pain symptoms her was given cirpo. he had a Ct abd/pelvis in ED 10/22/18 : Stable posttreatment changes of the left hemithorax including large  loculated pleural effusion.  2.  Multiple nondilated fluid-filled small bowel loops which may reflect  evidence of enteritis.  \par He had MR brain on 10/29/18:  No new lesions, but several existing lesions have increased in size  2. Bilateral hemorrhagic cerebellar lesions and edema has increased. Mass  effect on the fourth ventricle with hydrocephalus.   2.  Essentially unchanged sizes of supratentorial lesions within the  bilateral temporooccipital lobes and mildly improved edema\par \par He is having headaches and more confusion. He took 4 flomax in am and PM instead of his alecensa. I called [facility] with him and they told us they did not deliver his pills since they could not reach him but will deliver them tomorrow. He will be home. He states he had alecensa at home but not sure.\par \par 11/12/18\par Patient presents to the office with a chief complaint of burning in his right thigh.  He has had no new medications.  The patient states it is worse in the evening but lasts all day.  The patient states that the burning sensation starts at the right knee and is ascending.  Straight leg raise negative.  Discussed that it is unlikely that it is from his Avastin treatment, but unsure the etiology.  We will send for LE doppler to rule out any DVT.\par \par 11/27/18\par He is here for follow up. His Dopplers  were negative and his pain/cramps are nearly gone only some in right thigh. His headaches are gone and he is speech is  better and states memory is better since we started Avastin.\par \par 1/9/19\par He is doing well.  He has no more pain in his legs. He is eating well. He is due for 4/4 dose of Avastin. No other complains.\par \par 2/6/19\par He is here for follow-up.  He generally feels well.  He states he still suffers from headaches.  His memory has improved minimally.\par PET/CT 2/4/19 IMPRESSION: Since 9/28/2018, No definite sites of abnormal FDG uptake to suggest biologic tumor activity. Cerebellar lesions seen on brain MRI from 1/20/2019, are not FDG avid and consistent with treated metastases. Post talc pleurodesis of the left pleura, unchanged. \par MR Brain 1/20/19 IMPRESSION: In comparison to the previous brain MRI dated 10/29/2018: 1. No significant interval change in the size and configuration of the heterogeneously enhancing, necrotic and hemorrhagic lesions within the temporal/occipital lobes and cerebellar hemispheres. 2. The edema within the brainstem and cerebellum is about stable;however there is slight progression of the hydrocephalus since the prior exam. 3. The edema within the temporal and occipital lobes has decreased. 4. New patchy area of restricted diffusion around the left temporal horn (without corresponding enhancement) may reflect superimposed ischemic changes. Recommend attention on follow-up imaging. \par \par 3/6/19\par Patient is here for a follow-up visit.  He states he is having increased frequency of headaches with minimal relief from Tylenol.  He is now staying in the shelter on [place] with his daughter.  He will  his medications from [facility] from now on.  We are awaiting arrangement for him to get an apartment.\par \par 3/27/19\par He is here for follow up. his headache is better and he takes his Dexamethasone only when he has it which helps. Last PET was in February and last MRI was in January.\par \par 4/29/19\par He is here for follow-up.  Since last visit, he presented to the ED in 4/2019 due to chest pain.  He underwent CTA chest that demonstrated unchanged left pleural effusion. Likely radiation induced necrosis changes, he states it may have been related to stress related to his daughter.  He is still awaiting placement from the shelter.  He reports his usual frequency headaches, but he takes the Decadron as needed which helps.  \par CTA Chest (4/7/19) IMPRESSION:No pulmonary emboli.20.9 cm pleural fluid collection in the left hemithorax, essentially unchanged since PET/CT dated 2/4/2019.\par MR Brain (4/2/19) IMPRESSION: Since January 20, 2019:New worse signal abnormality involving the brain and volodymyr with increased edema and worse effacement of the fourth ventricle and resulting mild hydrocephalus.Slightly worse edema involving the cerebellum.Stable bilateral posterior temporal/occipital/cerebellar heterogeneously enhancing lesions.Stable left posterior frontal deep white matter and left dorsal medullary hypointense foci, compatible with treated lesions.\par \par 5/22/19\par He came in for follow up. He has been complaining of headaches mainly in AM. He does not think dexamethasone is helping anymore. No other issues.\par \par 6/3/19\par He is here for follow up. HIs headacehs are the same and uses dexamethasone 4 mg almost dialy. He had  CT C/A/P that showed a stable effusion 5/26/19 amd MR brain showed  5/29/19: Overall no significant changes compared with the previous brain MRI dated \par 4/2/2019:\par \par 1.  No significant interval change in the size and configuration of the \par heterogeneously enhancing, necrotic and hemorrhagic lesions within the \par temporal/occipital lobes and cerebellar hemispheres.\par \par 2. Extensive edema within the cerebellar hemispheres, brainstem, parietal \par and temporal lobes is again seen. Patchy areas of enhancement are noted \par in this region. Appearance is most suggestive of posttreatment changes.\par \par 3. Stable patchy area of restricted diffusion around the left temporal \par horn again seen, nonspecific.\par \par 4. No new enhancing lesions identified.\par \par 6/24/19\par He is here for follow up. he missed his Avastin since he had family issues. Still has headaches they are the same. Will restart Avastin today,.\par \par 7/15/19\par He is here for follow up. He could not tell me if headaches are better. He does use steroids once  in  a while for headaches. States memory is the same. \par \par \par 8/14/19\par He is doing better. He Missed his Avastin doses. But he has no headaches and does not use steroids. He had a fall  and hit a rail on his chest prior to his CT scans and the finding in his CT chest is probably related to the trauma he still has pain in the area but its better. CT C/A/P and MR brain from august are bellow\par \par IMPRESSION:\par \par Since April 7, 2019\par \par Stable left pleural fluid collection measuring 9.5 x 14.1 by 18.4 cm, \par with stable compressive atelectasis of left lung and left hemidiaphragm \par inversion.\par \par New, indeterminate approximate 8 mm soft tissue density, left anterior \par chest (series 4/104).\par \par Interval development of approximate 3.6 x 1.8 by 3.9 cm homogeneous \par elliptical shaped structure, left epicardial fat pad (4/206), possibly \par proteinaceous material (HU +35).\par \par \par IMPRESSION:\par \par Within the left cardiophrenic angle, there is a new area of lobulated \par soft tissue measuring 4.0 x 2.5 cm suspicious for new metastatic disease.\par \par Please see chest CT report for chest findings\par \par IMPRESSION:\par \par Overall no significant changes compared with the previous brain MRI dated \par 5/29/2019:\par \par 1.  No significant interval change in the size and configuration of the \par heterogeneously enhancing, necrotic and hemorrhagic lesions within the \par temporal/occipital lobes and cerebellar hemispheres.\par \par 2. Extensive edema within the cerebellar hemispheres, brainstem, parietal \par and temporal lobes is again seen. Patchy areas of enhancement are noted \par in this region. Appearance is most suggestive of posttreatment changes.\par \par 3. No new enhancing lesions identified.\par \par 10/01/19\par Pt presented today for routine follow up visit. He reports feeling fine and has no new complaints . Pt reports no new symptoms of weakness , headaches and confusion and is well compliant with treatment . \par \par \par 11/11/19\par He is here for follow up.   He feels well. No headaches.  Has a runny nose. No pain. No SOB. He gained a few  pounds.\par \par 12/9/19\par He is here for follow up. He had his MR brain on  11/16/19IMPRESSION: \par 1. Overall no significant changes compared with the previous brain MRI dated 8/5/2019. \par 2. Specifically, the heterogeneously enhancing, necrotic and hemorrhagic lesions within the temporal/occipital \par lobes and cerebellar hemispheres; with extensive associated edema and patchy enhancement are not \par significantly changed and most compatible with posttreatment changes. \par 3. No new enhancing lesions identified.\par \par He did not do his CTs yet. \par \par He feels well. No headaches. \par \par 1/13/2020\par Patient is here for a follow-up visit for metastatic lung cancer.  He is feeling well with no new complaints.  Most recent CBC is stable.  Patient denies fever, chills, nausea, vomiting, new pain or bleeding.  We discussed that based on most recent imaging, it may be suggestive that he is progressing on current treatment regimen.  \par PET/CT (1.7.20) IMPRESSION: Since February 4, 2019:1. Redemonstration of large left pleural fluid collection with circumferential FDG uptake along the pleural surface and associated calcifications (max SUV up to 18, [id number]), consistent with posttreatment change related to talc pleurodesis. New 5.8 x 3.5 cm lesion along the left posterior costophrenic angle, with peripheral FDG avidity, max SUV14. Given talc pleurodesis, findings may represent evolving inflammatory response, however neoplasm remains a consideration, and continued follow-up or tissue sampling recommended.2. Unchanged non-FDG avid sclerotic, treated osseous metastases in the right proximal humerus and T9 vertebral body with compression deformity.\par \par I presented him in tumor board and we decided to biopsy the new area. \par \par 2/4/2020-Interpretor services used as pt is Korean speaking # [id number]\par  Dionisio is here for follow up complaining of b/l flank pain. No c/o fever, chills, dysuria, hematuria at home. No change in bowel habits. He describes dull pain b/l flank area that is mostly in the morning. Today during the office visit, he did not have much pain \par \par 3/17/20\par He is here for follow up. He underwent the biopsy of the  left pleural mass on 3/12/20 that showed Poorly differentiated malignant neoplasm. IHC is pending.  he is doing well otherwise. has minimal headaches. Not dizzy\par \par 4/1/2020: Dionisio is here for follow up, he started Lobrena on 3/17/2020, he is taking 100mg daily, reports no side effects. He reports "little headache" in the morning that is relieved with Tylenol, no other symptoms, no vision changes, no seizure like activity, no N/V, he reports no SOB, no palpitations, heart exam is RRR. No fevers. He will continue with Lobrena as prescribed. \par \par 6/22/2020\par Patient is here for a follow-up visit for metastatic lung cancer.  Since last visit he had COVID was in [facility].  he had a prolonged course. I spoke to his Neurosurgeon and he had MR brain that showed radiation necrosis and he had steroids. He only took Lorbrena for one month, Confirmed with pharmacy.  He is here with his ex wife. We used a pacific  Rosemary ID  [id number] \par He has been taking his Lorbreana since May 28 2020, he gets it from [facility] now, He is on [place] 2[place] from his hospitalization. He lives with his brother in law. He states he is not SOB. HE was unsteady on his feet. He felt well otherwise. \par \par 7/20/2020\par Patient presents for follow up of metastatic lung cancer. He is accompanied by his ex-wife.  [id number] used to communicate with patient. Patient is unsteady on his feet, ambulates with a walker, and requires someone to remain nearby for assistance. Patient fell last week and hurt 5th toe on right foot, although it seems to be improved now. ROS is significant for foot swelling. Results of recent MRI brain, CT chest/abdomen/pelvis reviewed. He has been taking lorlatinib since 5/28/2020. Ex-wife has noticed that feet are swollen and has attributed his symptoms to ativan, stating that she has switched from giving it to him every day to every other day.\par \par 8/24/2020\par Patient presents for follow up of metastatic lung cancer. He is accompanied by family. Family requested  services.  [id number] used. Patient went to ED with complaints of hemoptysis. CTA chest showed no evidence of PE and showed that pulmonary mass is stable. Patient brought tissue showing small blood clots from earlier today. He continues to take lorlatinib. Family continues to express concern for leg swelling/pain. She notes increased appetite and is concerned that his cholesterol is high. Patient does note that dizziness has improved since starting avastin.\par \par 9/30/20\par He is here for follow up.  HE is here with his wife. We used a  but Dionisio was answering all the questions in English. He is much better in regards to balance, and memory and communication. He only has mild occasional blood tinged sputum \par \par 10/28/2020\par Patient presents for follow up of metastatic lung cancer, accompanied by family members.  He has been tolerating the Lobrena once daily.  We reviewed findings of most recent imaging which shows essentially stable findings in the brain and abdomen/pelvis.  The official report for the CT Chest is still pending but may warrant PET/CT to determine if any true progression.  Patient denies fever, chills, nausea, vomiting, dizziness, seizure activity or bleeding.  \par CT A/P (10.21.2020) IMPRESSION:1.  No findings of new or progressive metastatic disease in the abdomen or pelvis. 2.  Left posterior chest mass broadly abutting/invading the diaphragm - see separately dictated CT chest report for detailed evaluation including complete assessment of the thorax.\par MR Head (10.13.2020) IMPRESSION:Since prior MR of 7/2/2020,Decreased periventricular T2 and FLAIR hyperintensities, compatible with evolving posttreatment changes.Grossly stable bilateral cerebellar hemorrhagic masses. Unchanged mass effect upon the fourth ventricle and stable ventriculomegaly.No evidence of new enhancing intracranial lesions.\par \par 12/2/2020\par He is here

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
## 2021-06-01 NOTE — REASON FOR VISIT
[Follow-Up Visit] : a follow-up [[employer] ] : provided by [employer]   [Formal Caregiver] : formal caregiver

## 2021-06-01 NOTE — PHYSICAL EXAM
[Ambulatory and capable of all self care but unable to carry out any work activities] : Status 2- Ambulatory and capable of all self care but unable to carry out any work activities. Up and about more than 50% of waking hours [Normal] : affect appropriate [de-identified] : pain not worse with palpation [de-identified] : Unsteady on feet. Ambulates with walker and requires assistance. Memory and balance is better overall [de-identified] : Forgetful, but stable.
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
## 2021-06-07 NOTE — ASSESSMENT
PRESENT HISTORY:  Kathleen Plummer is a pleasant 31 year old female.     Patient scheduled for EGD and colonoscopy. Anesthesia to sedate. Patient is without new active complaints at this time and tolerated full bowel preparation without abdominal distension, nausea, and vomiting. Stools were reported to be liquid and clear as well. Patient has been without antithrombotic and NSAID therapy for at least five days.    Patient Active Problem List   Diagnosis    Pap smear abnormality of cervix with ASCUS favoring benign       PHYSICAL EXAM:  Visit Vitals  /68 (BP Location: RUE - Right upper extremity, Patient Position: Semi-Wright's)   Pulse 88   Temp 97.1 °F (36.2 °C) (Temporal)   Resp 18   LMP 04/26/2021   SpO2 99%     Pleasant female sitting in chair, with no signs of acute distress, appropriate mood in appearance.  There is no height or weight on file to calculate BMI.  CENTRAL NERVOUS SYSTEM: The patient is awake, alert, oriented x3 with intact recent and remote memory. Intact cranial nerves.  EYES: No icterus noted on conjunctival exam. Pupils are equal and reactive to light. Corneal reflex present.  EAR AND NOSE AND THROAT: No lesion of the ears, nose or throat. Oral, nasal and tongue mucosa normal.  NECK: Supple. No masses. No thyroid enlargement. Trachea is centered. JVD negative.  LUNGS: Bilateral air entry is fair. No wheezing or rhonchi heard. Chest bilaterally symmetrical. No use of accessory muscles.  CARDIOVASCULAR SYSTEM: S1, S2 present. No systolic murmur. No peripheral edema.  ABDOMEN: Soft without tenderness to palpation. Bowel sounds are present. No hepatosplenomegaly appreciated. No masses felt. No rebound, rigidity, guarding. No distention. Negative Holloway sign.  SKIN: Intact and warm. No jaundice. Full range of motion in arms and legs on limited exam.    LABS AND IMAGING STUDIES REIVIEWED WITH THE PATIENT AND IN [facility].    Past Medical History:   Diagnosis Date    Abnormal Pap smear of cervix   [Palliative] : Goals of care discussed with patient: Palliative    ascus    Acne     Anemia     with pregnancy    Anxiety disorder     Chronic constipation 5/17/2016    Depression     GERD (gastroesophageal reflux disease) 5/17/2016    Irritable bowel syndrome 5/17/2016    Mental disorder     meds prior to pregnany- hydroxizine    Thyroid disease     found lumps on thyroid 2018, monitoring       Past Surgical History:   Procedure Laterality Date    ------------other-------------      Birth wilian removed on face    Colonoscopy  06/07/2021    Grade 1 hemorrhoids    Egd  06/07/2021    gastritis, esophagitis    Tonsillectomy         Current Facility-Administered Medications   Medication Dose Route Frequency Provider Last Rate Last Admin    lactated ringers infusion   Intravenous Continuous Loly Streeter  mL/hr at 06/07/21 1310 New Bag at 06/07/21 1310       ALLERGIES:   Allergen Reactions    Penicillin V HIVES and THROAT SWELLING    Morphine        Social History     Tobacco Use    Smoking status: Former Smoker     Packs/day: 0.00     Types: Cigarettes     Quit date: 12/29/2010     Years since quitting: 10.4    Smokeless tobacco: Never Used   Substance Use Topics    Alcohol use: Yes     Alcohol/week: 0.0 - 1.0 standard drinks     Comment: occasional    Drug use: No       Family History   Problem Relation Age of Onset    High cholesterol Paternal Grandmother     Hypertension Paternal Grandmother     Thyroid Paternal Grandmother     Cancer Paternal Grandmother         uterus    Cancer Mother         skin    Gastrointestinal Mother         diverticulitis    Rheumatologic Disease Mother         Lupus    Hemochromatosis Mother     COPD Father     Hypertension Father     Asthma Brother     Cancer Maternal Grandmother         stomach?    Hearing Loss Maternal Grandmother     Cancer Paternal Grandfather     Heart disease Paternal Grandfather     Hypothyroid Daughter     Celiac Disease Daughter        ASSESSMENT / PLAN:  Patient needs EGD and colonoscopy with the help of anesthesia  [Palliative Care Plan] : Patient was apprised of incurable nature of disease.  Hospice and Palliative care options discussed. [With Patient/Caregiver] : With Patient/Caregiver service.  Patient is mild to moderate risk for anesthesia.   Procedural risks discussed with patient, including bleeding, infection, missed polyps and lesions, perforation, device malfunction, and death, and consent was given.  XIOMY Rosa, I want to thank you for allowing me to take part in their care.     [FreeTextEntry1] : 1.  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 Due to insurance issues he stopped it and restarted it 4/4/2017. He progressed on alectinib and began taking lorlatinib on 5/28/2020.\par - Repeat MRI brain 8/2018 radiation related changes, post treatment changes, and repeat PET/CT 6/5/18   is without systemic progression.  It confirmed radiation necrosis. PET/CT on 9/28/18 was PRESLEY. CT abdomen was unchanged.\par - Repeat PET/CT from 01/2020 shows new FDG-avid lesions in the lung right under his left effusion on top of the diaphragm.\par - He underwent the biopsy of the  left pleural mass on 3/12/20 that showed poorly differentiated malignant neoplasm. IHC c/w non-small cell carcinoma of the lung, favoring adenocarcinoma, TTF 1 positive.\par - Per Dr. Richardson, he is not a candidate for radiation to the site of progression.\par - We did foundation one on his re-biopsy that showed EML4-Alk variant 3a/b, other mutations CDKN2a and B loss, LLL2, MTAP, SMARCa4, Tp53\par - He was started on lorlatinib in March 2020, took it for about one month, had a prolonged COVID hospitalization, restarted on 5/28/2020 and Progressed in 4/2021 and it was stopped.\par - will STOP chemotherapy with Carboplatin, Alimta and Keytruda \par - had a lengthy discussion regarding prognosis and role of hospice. He repeatedly refuses to continue with chemotherapy, risks and benefits discussed at this visit as well.  At this time, patient does not wish to pursue treatment, and hospice was offered but patient did not wish to enroll at this time.  Had a conversation regarding death and his personal spiritual beliefs. Patient and facility are aware they can call at any time and we can send a referral for Hospice\par - he will stop folic acid daily and  b12 injection\par \par #New onset abdominal pain x 2 weeks, worse with eating\par - originally advised to follow with GI for complete workup; he has never had colonoscopy\par - he can continue with OTC pain medication as needed; discussed that treatment could possibly help the abdominal pain but he wishes to defer treatment, asked him to consider hospice\par \par He is thinking about moving to [place] to live with his sister; currently resides in [facility] \par \par No followup visit given at this time.  Patient and caregiver from facility verbalized understanding that he may return as needed and we are more than willing to refer to hospice to keep him comfortable during end of life.  \par \par Contact. Ex wife: [phone], Brother Caesar  [AdvancecareDate] : 6/1/2021 [FreeTextEntry2] : Hospice consultation offered on 6/1/2021, deferred at this time

## 2021-06-08 ENCOUNTER — OUTPATIENT (OUTPATIENT)
Dept: OUTPATIENT SERVICES | Facility: HOSPITAL | Age: 46
LOS: 1 days | Discharge: HOME | End: 2021-06-08

## 2021-06-08 ENCOUNTER — APPOINTMENT (OUTPATIENT)
Dept: INFUSION THERAPY | Facility: CLINIC | Age: 46
End: 2021-06-08

## 2021-06-08 DIAGNOSIS — I67.89 OTHER CEREBROVASCULAR DISEASE: ICD-10-CM

## 2021-06-08 DIAGNOSIS — C78.00 SECONDARY MALIGNANT NEOPLASM OF UNSPECIFIED LUNG: ICD-10-CM

## 2021-07-08 ENCOUNTER — APPOINTMENT (OUTPATIENT)
Dept: INFUSION THERAPY | Facility: CLINIC | Age: 46
End: 2021-07-08

## 2022-07-08 NOTE — PHYSICAL EXAM
[Restricted in physically strenuous activity but ambulatory and able to carry out work of a light or sedentary nature] : Status 1- Restricted in physically strenuous activity but ambulatory and able to carry out work of a light or sedentary nature, e.g., light house work, office work [Normal] : affect appropriate [de-identified] : he has decrease breath sounds on left side base, unchanged. [de-identified] :  Gait is normal good strength  in all limbs Deep Sutures: 5-0 Monoswift

## 2022-10-31 NOTE — ED PROVIDER NOTE - NO SIGNIFICANT PAST SURGICAL HISTORY
Rx Refill Note  Requested Prescriptions     Pending Prescriptions Disp Refills   • lisinopril (PRINIVIL,ZESTRIL) 20 MG tablet [Pharmacy Med Name: LISINOPRIL 20MG TABLETS] 180 tablet 1     Sig: TAKE 1 TABLET BY MOUTH EVERY DAY. TAKE AN ADDITIONAL DOSE IF SYSTOLIC BLOOD PRESSURE IS GREATER THAN 150 OR DIASTOLIC LESS THAN 95      Last office visit with prescribing clinician: 1.21.22    Next office visit with prescribing clinician: 1/27/23           Tricia Mancia RN  10/31/22, 14:12 EDT   <<----- Click to add NO significant Past Surgical History

## 2023-03-29 NOTE — H&P ADULT - HISTORY OF PRESENT ILLNESS
44 YO M with PMHx of metastatic ALK-positive lung adenocarcinoma (currently on alectinib 600 mg BID) & HLD presented to the ED c/c of Left arm pain x 1 week. He described the pain as intermittent, sharp, 9/10 intensity, pleuritic in nature radiating to Left chest associated with SOB w/ deep breaths, no aggravating or relieving factors.   Patient denied recent surgery, trauma, calf swelling, weakness, fever, cough, hx of DVT, pitting edema, N/V/D.   In the ED vitals were unremarkable. CT Angio Chest PE Protocol w/ IV Cont showed "In comparison to recent CT chest 10/21/2020, no filling defects in the main pulmonary artery or segmental branches to suggest pulmonary embolus. New loculated pericardial fluid measuring 4.5 x 3.2 cm.     Hx of lung adenocarcinoma: He initially presented in 11/2014 with multiple pulmonary nodules, mediastinal lymphadenopathy, multiple bony metastases, and brain metastases. He was initially started on crizotinib and was switched to ceritinib in 12/2015 upon progression. He was unable to tolerate ceritinib due to significant vomiting and was switched to alectinib in 02/2016.  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 He had RT to a painful metastatic lesion in the right humerus (01/2015). He had also received Xgeva for bony metastatic disease in the past.   Since 09/2016, he has been experiencing intermittent headaches being treated with periodic dexamethasone taper. MRI brain with perfusion analysis showed bilateral cerebellar lesions, favor postRT changes rather than residual tumor. His most recent brain MRI (11/01/2016) showed overall stable exam compared to the previous brain MRI 9/2/2016; no significant interval change in bilateral cerebellar heterogeneously enhancing masses with extensive associated edema, as well as smaller lesions within the left parietal white matter, superficial left temporal lobe and left dorsal medulla; and, stable mild ventriculomegaly and mild periventricular FLAIR signal abnormality. He has been following with neurosurgeon, Dr. Britt, who has recommended no surgical intervention at this time.   His PET CT (11/01/2016) showed no sites of pathologic FDG uptake suspicious for biologic tumor activity.  He reported occasional headaches and a mild sense of imbalance. Takes a PRN dexamethasone, once in every three days, which reportedly relieves his intermittent headaches.        44 YO M with PMHx of metastatic ALK-positive lung adenocarcinoma (currently on Lorbrena) & HLD presented to the ED c/c of Left arm and chest pain x 1 week. He described the pain as intermittent, sharp, 9/10 intensity, worse with deep breaths, associated with SOB on and off. Patient reports that the pain gets worse when he is lying on his left side  Patient denied recent surgery, trauma, calf swelling, weakness, fever, cough, hx of DVT, pitting edema, N/V/D.   In the ED vitals were unremarkable. CT Angio Chest PE Protocol w/ IV Cont showed "In comparison to recent CT chest 10/21/2020, no filling defects in the main pulmonary artery or segmental branches to suggest pulmonary embolus. New loculated pericardial fluid measuring 4.5 x 3.2 cm."  ED contacted CT surgery and imaging was reviewed with Dr. Hercules. As per them loculated pleural effusion is not within pericardial space but pleural space.    Hx of lung adenocarcinoma: He initially presented in 11/2014 with multiple pulmonary nodules, mediastinal lymphadenopathy, multiple bony metastases, and brain metastasis. He was initially started on crizotinib and was switched to ceritinib in 12/2015 upon progression. He was unable to tolerate ceritinib due to significant vomiting and was switched to alectinib in 02/2016.  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 He had RT to a painful metastatic lesion in the right humerus (01/2015). He had also received Xgeva for bony metastatic disease in the past.   Since 09/2016, he has been experiencing intermittent headaches being treated with periodic dexamethasone taper. MRI brain with perfusion analysis showed bilateral cerebellar lesions, favor post-RT changes rather than residual tumor. Repeat brain MRI (11/01/2016) showed overall stable exam compared to the previous brain MRI 9/2/2016; no significant interval change in bilateral cerebellar heterogeneously enhancing masses with extensive associated edema, as well as smaller lesions within the left parietal white matter, superficial left temporal lobe and left dorsal medulla; and, stable mild ventriculomegaly and mild periventricular FLAIR signal abnormality. He has been following with neurosurgeon, Dr. Britt, who has recommended no surgical intervention at this time.   His PET CT (11/01/2016) showed no sites of pathologic FDG uptake suspicious for biologic tumor activity.  He reported occasional headaches and a mild sense of imbalance. Takes a PRN dexamethasone, once in every three days, which reportedly relieves his intermittent headaches.        Detail Level: Detailed Number Of Freeze-Thaw Cycles: 1 freeze-thaw cycle Render Post-Care Instructions In Note?: yes Consent: The patient's consent was obtained including but not limited to risks of crusting, scabbing, blistering, scarring, darker or lighter pigmentary change, recurrence, incomplete removal and infection. Render Note In Bullet Format When Appropriate: No Duration Of Freeze Thaw-Cycle (Seconds): 0 Post-Care Instructions: I reviewed with the patient in detail post-care instructions. Patient is to wear sunprotection, and avoid picking at any of the treated lesions. Pt may apply Vaseline to crusted or scabbing areas.

## 2023-05-31 NOTE — ED PROVIDER NOTE - ATTENDING CONTRIBUTION TO CARE
She needs an appointment  to discuss this    44 M to ED with HA and weakness in LE  h/o lung CA with brain mets on chemo daily (last yesterday)  No fevers, no sick contacts, no travels, no trauma  poor balance but able to walk  AVSS, exam as noted, CTAB, RRR, abdomen soft NTND,

## 2024-09-04 NOTE — ED ADULT TRIAGE NOTE - CADM TRG TX PRIOR TO ARRIVAL
[de-identified] : Current weight:  212 lbs/ height: 67 inches/ BMI 33.2    highest weight:  212 lbs    lowest weight:  150.  Use of medication for weight loss before:  No. weight loss surgery of any kind:  No. Medication causing weight gain:  None  vitamin deficiencies at all as far as you know: none known On an average day, how many meals would you say you typically would eat:  Two to three.    breakfast: skipped breakfast yesterday     lunch:  chicken parm and pasta(takeout at work)    dinner:  chicken quesadilla. snacking:  not when at work but definitely when at home alcohol at all:  No. Any diet, special diet restrictions:  None  Exercise:  very active- up on my feet all day- up and down flights and flights of stairs. walks dog for over an hour every day.  family history:  Diabetes, cancer, heart disease  none

## 2025-02-03 NOTE — ED PROVIDER NOTE - EXITCARE/DISCHARGE INSTRUCTIONS
Pt ambulated to triage    Per pt aunt, pt has been sick for the past week.  Pt has had an upset stomach, and an on and off fever along with a cough and a runny nose.  Pt has take tylenol, motrin, and flu medication last given @ 1800 today.  Pt has not been seen by his PCP.     Launch [employer] and print the 'Prescriptions from this Visit' Report